# Patient Record
Sex: FEMALE | Race: WHITE | Employment: OTHER | URBAN - METROPOLITAN AREA
[De-identification: names, ages, dates, MRNs, and addresses within clinical notes are randomized per-mention and may not be internally consistent; named-entity substitution may affect disease eponyms.]

---

## 2017-01-03 RX ORDER — CLONAZEPAM 0.5 MG/1
TABLET ORAL
Qty: 190 TABLET | Refills: 0 | Status: SHIPPED | OUTPATIENT
Start: 2017-01-03 | End: 2017-02-12 | Stop reason: SDUPTHER

## 2017-01-09 ENCOUNTER — OFFICE VISIT (OUTPATIENT)
Dept: PULMONOLOGY | Facility: CLINIC | Age: 22
End: 2017-01-09

## 2017-01-09 VITALS
HEART RATE: 89 BPM | HEIGHT: 55 IN | WEIGHT: 85 LBS | OXYGEN SATURATION: 97 % | RESPIRATION RATE: 14 BRPM | BODY MASS INDEX: 19.67 KG/M2 | DIASTOLIC BLOOD PRESSURE: 71 MMHG | SYSTOLIC BLOOD PRESSURE: 115 MMHG

## 2017-01-09 DIAGNOSIS — J98.4 RESTRICTIVE LUNG MECHANICS DUE TO NEUROMUSCULAR DISEASE (HCC): ICD-10-CM

## 2017-01-09 DIAGNOSIS — F84.2 RETT SYNDROME: ICD-10-CM

## 2017-01-09 DIAGNOSIS — J96.11 CHRONIC RESPIRATORY FAILURE WITH HYPOXIA AND HYPERCAPNIA (HCC): Primary | ICD-10-CM

## 2017-01-09 DIAGNOSIS — J96.12 CHRONIC RESPIRATORY FAILURE WITH HYPOXIA AND HYPERCAPNIA (HCC): Primary | ICD-10-CM

## 2017-01-09 DIAGNOSIS — G47.33 OSA (OBSTRUCTIVE SLEEP APNEA): ICD-10-CM

## 2017-01-09 DIAGNOSIS — G70.9 RESTRICTIVE LUNG MECHANICS DUE TO NEUROMUSCULAR DISEASE (HCC): ICD-10-CM

## 2017-01-09 DIAGNOSIS — G47.31 CENTRAL SLEEP APNEA: ICD-10-CM

## 2017-01-09 PROCEDURE — 99203 OFFICE O/P NEW LOW 30 MIN: CPT | Performed by: INTERNAL MEDICINE

## 2017-01-09 RX ORDER — PREDNISONE 10 MG/1
20 TABLET ORAL DAILY
Qty: 10 TABLET | Refills: 0 | Status: SHIPPED | OUTPATIENT
Start: 2017-01-09 | End: 2017-01-14

## 2017-01-09 RX ORDER — PROBENECID 500 MG/1
500 TABLET, FILM COATED ORAL 2 TIMES DAILY
Status: ON HOLD | COMMUNITY
End: 2017-03-03 | Stop reason: HOSPADM

## 2017-01-09 RX ORDER — ALBUTEROL SULFATE 2.5 MG/3ML
2.5 SOLUTION RESPIRATORY (INHALATION) 3 TIMES DAILY PRN
Qty: 120 EACH | Refills: 6 | Status: ON HOLD | OUTPATIENT
Start: 2017-01-09 | End: 2017-03-03 | Stop reason: HOSPADM

## 2017-01-09 RX ORDER — LEVOFLOXACIN 250 MG/1
250 TABLET ORAL DAILY
Qty: 7 TABLET | Refills: 0 | Status: SHIPPED | OUTPATIENT
Start: 2017-01-09 | End: 2017-01-16

## 2017-01-09 RX ORDER — POLYETHYLENE GLYCOL 3350 17 G/17G
17 POWDER, FOR SOLUTION ORAL DAILY
COMMUNITY
End: 2017-04-13 | Stop reason: SDUPTHER

## 2017-01-10 ENCOUNTER — TELEPHONE (OUTPATIENT)
Dept: FAMILY MEDICINE CLINIC | Facility: CLINIC | Age: 22
End: 2017-01-10

## 2017-01-13 RX ORDER — NUTRITIONAL SUPPLEMENT 0.04G-1/ML
1 LIQUID (ML) ORAL 3 TIMES DAILY
Qty: 22320 ML | Refills: 5 | Status: SHIPPED | OUTPATIENT
Start: 2017-01-13 | End: 2019-11-06

## 2017-01-13 RX ORDER — NUTRITIONAL SUPPLEMENT 0.04G-1/ML
1 LIQUID (ML) ORAL 3 TIMES DAILY
Qty: 22320 ML | Refills: 5 | Status: SHIPPED | OUTPATIENT
Start: 2017-01-13 | End: 2017-01-13 | Stop reason: SDUPTHER

## 2017-02-07 RX ORDER — LEVETIRACETAM 100 MG/ML
SOLUTION ORAL
Qty: 2160 ML | Refills: 3 | Status: SHIPPED | OUTPATIENT
Start: 2017-02-07 | End: 2017-12-27 | Stop reason: SDUPTHER

## 2017-02-13 ENCOUNTER — HOSPITAL ENCOUNTER (OUTPATIENT)
Age: 22
Setting detail: SPECIMEN
Discharge: HOME | End: 2017-02-13
Payer: COMMERCIAL

## 2017-02-13 ENCOUNTER — OFFICE VISIT (OUTPATIENT)
Dept: FAMILY MEDICINE CLINIC | Facility: CLINIC | Age: 22
End: 2017-02-13

## 2017-02-13 VITALS
WEIGHT: 97.5 LBS | HEART RATE: 80 BPM | RESPIRATION RATE: 16 BRPM | SYSTOLIC BLOOD PRESSURE: 104 MMHG | HEIGHT: 55 IN | TEMPERATURE: 97.6 F | DIASTOLIC BLOOD PRESSURE: 70 MMHG | BODY MASS INDEX: 22.57 KG/M2

## 2017-02-13 DIAGNOSIS — G70.9 RESTRICTIVE LUNG MECHANICS DUE TO NEUROMUSCULAR DISEASE (HCC): ICD-10-CM

## 2017-02-13 DIAGNOSIS — R39.89 ABNORMAL URINE COLOR: ICD-10-CM

## 2017-02-13 DIAGNOSIS — Z23 NEED FOR INFLUENZA VACCINATION: ICD-10-CM

## 2017-02-13 DIAGNOSIS — G47.33 OSA (OBSTRUCTIVE SLEEP APNEA): ICD-10-CM

## 2017-02-13 DIAGNOSIS — G47.36: ICD-10-CM

## 2017-02-13 DIAGNOSIS — G24.9 DYSTONIA: ICD-10-CM

## 2017-02-13 DIAGNOSIS — R25.1 TREMORS OF NERVOUS SYSTEM: ICD-10-CM

## 2017-02-13 DIAGNOSIS — G70.9: ICD-10-CM

## 2017-02-13 DIAGNOSIS — R82.90 ABNORMAL URINE ODOR: ICD-10-CM

## 2017-02-13 DIAGNOSIS — K21.9 GASTROESOPHAGEAL REFLUX DISEASE, ESOPHAGITIS PRESENCE NOT SPECIFIED: ICD-10-CM

## 2017-02-13 DIAGNOSIS — J98.4 RESTRICTIVE LUNG MECHANICS DUE TO NEUROMUSCULAR DISEASE (HCC): ICD-10-CM

## 2017-02-13 DIAGNOSIS — G40.909 SEIZURE DISORDER (HCC): ICD-10-CM

## 2017-02-13 DIAGNOSIS — F84.2 RETT'S SYNDROME: Primary | ICD-10-CM

## 2017-02-13 DIAGNOSIS — Z23 NEED FOR 23-POLYVALENT PNEUMOCOCCAL POLYSACCHARIDE VACCINE: ICD-10-CM

## 2017-02-13 DIAGNOSIS — G47.31 CENTRAL SLEEP APNEA: ICD-10-CM

## 2017-02-13 LAB
BILIRUBIN, URINE: NEGATIVE
BLOOD, URINE: NEGATIVE
CLARITY: CLEAR
COLOR: YELLOW
GLUCOSE URINE: NEGATIVE
KETONES, URINE: NEGATIVE
LEUKOCYTE ESTERASE, URINE: NEGATIVE
NITRITE, URINE: NEGATIVE
PH UA: 7 (ref 4.5–8)
PROTEIN UA: NEGATIVE
SPECIFIC GRAVITY, URINE: 1.02
UROBILINOGEN, URINE: NORMAL

## 2017-02-13 PROCEDURE — 90472 IMMUNIZATION ADMIN EACH ADD: CPT | Performed by: PEDIATRICS

## 2017-02-13 PROCEDURE — 99214 OFFICE O/P EST MOD 30 MIN: CPT | Performed by: PEDIATRICS

## 2017-02-13 PROCEDURE — 87086 URINE CULTURE/COLONY COUNT: CPT

## 2017-02-13 PROCEDURE — 90471 IMMUNIZATION ADMIN: CPT | Performed by: PEDIATRICS

## 2017-02-13 PROCEDURE — 90688 IIV4 VACCINE SPLT 0.5 ML IM: CPT | Performed by: PEDIATRICS

## 2017-02-13 PROCEDURE — 90732 PPSV23 VACC 2 YRS+ SUBQ/IM: CPT | Performed by: PEDIATRICS

## 2017-02-13 RX ORDER — CLONAZEPAM 0.5 MG/1
0.75 TABLET ORAL 3 TIMES DAILY PRN
Qty: 190 TABLET | Refills: 0 | Status: SHIPPED | OUTPATIENT
Start: 2017-02-13 | End: 2017-03-24 | Stop reason: SDUPTHER

## 2017-02-13 RX ORDER — SACCHAROMYCES BOULARDII 250 MG
250 CAPSULE ORAL DAILY
COMMUNITY
End: 2018-02-09 | Stop reason: SDUPTHER

## 2017-02-13 ASSESSMENT — ENCOUNTER SYMPTOMS
EYE REDNESS: 0
COUGH: 0
CONSTIPATION: 0
EYE DISCHARGE: 0
PHOTOPHOBIA: 0
RHINORRHEA: 0
COLOR CHANGE: 0
ABDOMINAL PAIN: 0
DIARRHEA: 0
SHORTNESS OF BREATH: 0
TROUBLE SWALLOWING: 0
EYE PAIN: 0
STRIDOR: 0
VOMITING: 0
WHEEZING: 0

## 2017-02-14 PROBLEM — G70.9 SLEEP-RELATED HYPOVENTILATION DUE TO NEUROMUSCULAR DISORDER (HCC): Status: ACTIVE | Noted: 2017-02-14

## 2017-02-14 PROBLEM — G47.36 SLEEP-RELATED HYPOVENTILATION DUE TO NEUROMUSCULAR DISORDER (HCC): Status: ACTIVE | Noted: 2017-02-14

## 2017-02-14 RX ORDER — TRAMADOL HYDROCHLORIDE 50 MG/1
50 TABLET ORAL EVERY 8 HOURS PRN
Qty: 30 TABLET | Refills: 5 | Status: SHIPPED | OUTPATIENT
Start: 2017-02-14 | End: 2017-10-03 | Stop reason: SDUPTHER

## 2017-02-14 ASSESSMENT — ENCOUNTER SYMPTOMS: APNEA: 1

## 2017-02-15 LAB
CULTURE: NO GROWTH
CULTURE: NORMAL
Lab: NORMAL
SPECIMEN DESCRIPTION: NORMAL
STATUS: NORMAL

## 2017-02-27 ENCOUNTER — HOSPITAL ENCOUNTER (INPATIENT)
Age: 22
LOS: 4 days | Discharge: HOME HEALTH | DRG: 193 | End: 2017-03-03
Attending: EMERGENCY MEDICINE | Admitting: INTERNAL MEDICINE
Payer: COMMERCIAL

## 2017-02-27 ENCOUNTER — APPOINTMENT (OUTPATIENT)
Dept: GENERAL RADIOLOGY | Age: 22
DRG: 193 | End: 2017-02-27
Payer: COMMERCIAL

## 2017-02-27 DIAGNOSIS — L08.9 SKIN INFECTION AT GASTROSTOMY TUBE SITE (HCC): ICD-10-CM

## 2017-02-27 DIAGNOSIS — L03.114 CELLULITIS OF LEFT ELBOW: Primary | ICD-10-CM

## 2017-02-27 DIAGNOSIS — J18.9 PNEUMONIA OF LEFT LOWER LOBE DUE TO INFECTIOUS ORGANISM: ICD-10-CM

## 2017-02-27 DIAGNOSIS — K94.22 SKIN INFECTION AT GASTROSTOMY TUBE SITE (HCC): ICD-10-CM

## 2017-02-27 LAB
ABSOLUTE EOS #: 0.2 K/UL (ref 0–0.4)
ABSOLUTE LYMPH #: 2 K/UL (ref 1.2–5.2)
ABSOLUTE MONO #: 0.4 K/UL (ref 0.1–1.4)
ALBUMIN SERPL-MCNC: 4.7 G/DL (ref 3.5–5.2)
ALBUMIN/GLOBULIN RATIO: 1.3 (ref 1–2.5)
ALP BLD-CCNC: 94 U/L (ref 35–104)
ALT SERPL-CCNC: 15 U/L (ref 5–33)
ANION GAP SERPL CALCULATED.3IONS-SCNC: 8 MMOL/L (ref 9–17)
AST SERPL-CCNC: 16 U/L
BASOPHILS # BLD: 1 % (ref 0–2)
BASOPHILS ABSOLUTE: 0 K/UL (ref 0–0.2)
BILIRUB SERPL-MCNC: 0.17 MG/DL (ref 0.3–1.2)
BUN BLDV-MCNC: 13 MG/DL (ref 6–20)
BUN/CREAT BLD: ABNORMAL (ref 9–20)
C-REACTIVE PROTEIN: 10.7 MG/L (ref 0–5)
CALCIUM SERPL-MCNC: 10 MG/DL (ref 8.6–10.4)
CHLORIDE BLD-SCNC: 94 MMOL/L (ref 98–107)
CO2: 34 MMOL/L (ref 20–31)
CREAT SERPL-MCNC: <0.2 MG/DL (ref 0.5–0.9)
DIFFERENTIAL TYPE: NORMAL
EOSINOPHILS RELATIVE PERCENT: 4 % (ref 1–4)
FIO2: ABNORMAL
GFR AFRICAN AMERICAN: ABNORMAL ML/MIN
GFR NON-AFRICAN AMERICAN: ABNORMAL ML/MIN
GFR SERPL CREATININE-BSD FRML MDRD: ABNORMAL ML/MIN/{1.73_M2}
GFR SERPL CREATININE-BSD FRML MDRD: ABNORMAL ML/MIN/{1.73_M2}
GLUCOSE BLD-MCNC: 90 MG/DL (ref 70–99)
HCO3 VENOUS: 41.2 MMOL/L (ref 24–30)
HCT VFR BLD CALC: 42.3 % (ref 36–46)
HEMOGLOBIN: 14.2 G/DL (ref 12–16)
KEPPRA: 27 UG/ML
LYMPHOCYTES # BLD: 36 % (ref 25–45)
MCH RBC QN AUTO: 29.4 PG (ref 26–34)
MCHC RBC AUTO-ENTMCNC: 33.6 G/DL (ref 31–37)
MCV RBC AUTO: 87.5 FL (ref 80–100)
MONOCYTES # BLD: 8 % (ref 2–8)
NEGATIVE BASE EXCESS, VEN: ABNORMAL (ref 0–2)
O2 DEVICE/FLOW/%: ABNORMAL
O2 SAT, VEN: 81 %
PATIENT TEMP: ABNORMAL
PCO2, VEN: 69 MM HG (ref 39–55)
PDW BLD-RTO: 14 % (ref 12.5–15.4)
PH VENOUS: 7.38 (ref 7.32–7.42)
PLATELET # BLD: 280 K/UL (ref 140–450)
PLATELET ESTIMATE: NORMAL
PMV BLD AUTO: 8.4 FL (ref 6–12)
PO2, VEN: 48 MM HG (ref 30–50)
POC LACTIC ACID, VEN: 0.46 MMOL/L (ref 0.9–1.7)
POC PCO2 TEMP: ABNORMAL MM HG
POC PH TEMP: ABNORMAL
POC PO2 TEMP: ABNORMAL MM HG
POSITIVE BASE EXCESS, VEN: 16 (ref 0–2)
POTASSIUM SERPL-SCNC: 4.5 MMOL/L (ref 3.7–5.3)
RBC # BLD: 4.83 M/UL (ref 4–5.2)
RBC # BLD: NORMAL 10*6/UL
SEDIMENTATION RATE, ERYTHROCYTE: 17 MM (ref 0–20)
SEG NEUTROPHILS: 51 % (ref 34–64)
SEGMENTED NEUTROPHILS ABSOLUTE COUNT: 3 K/UL (ref 1.8–8)
SODIUM BLD-SCNC: 136 MMOL/L (ref 135–144)
TOTAL CO2, VENOUS: 43 MM HG (ref 25–31)
TOTAL PROTEIN: 8.3 G/DL (ref 6.4–8.3)
WBC # BLD: 5.7 K/UL (ref 4.5–13.5)
WBC # BLD: NORMAL 10*3/UL

## 2017-02-27 PROCEDURE — P9612 CATHETERIZE FOR URINE SPEC: HCPCS

## 2017-02-27 PROCEDURE — 6360000002 HC RX W HCPCS: Performed by: EMERGENCY MEDICINE

## 2017-02-27 PROCEDURE — 87040 BLOOD CULTURE FOR BACTERIA: CPT

## 2017-02-27 PROCEDURE — 85651 RBC SED RATE NONAUTOMATED: CPT

## 2017-02-27 PROCEDURE — 2000000000 HC ICU R&B

## 2017-02-27 PROCEDURE — 96365 THER/PROPH/DIAG IV INF INIT: CPT

## 2017-02-27 PROCEDURE — 96375 TX/PRO/DX INJ NEW DRUG ADDON: CPT

## 2017-02-27 PROCEDURE — 86738 MYCOPLASMA ANTIBODY: CPT

## 2017-02-27 PROCEDURE — 80053 COMPREHEN METABOLIC PANEL: CPT

## 2017-02-27 PROCEDURE — 85025 COMPLETE CBC W/AUTO DIFF WBC: CPT

## 2017-02-27 PROCEDURE — 83605 ASSAY OF LACTIC ACID: CPT

## 2017-02-27 PROCEDURE — 80177 DRUG SCRN QUAN LEVETIRACETAM: CPT

## 2017-02-27 PROCEDURE — 96361 HYDRATE IV INFUSION ADD-ON: CPT

## 2017-02-27 PROCEDURE — 86140 C-REACTIVE PROTEIN: CPT

## 2017-02-27 PROCEDURE — 73080 X-RAY EXAM OF ELBOW: CPT

## 2017-02-27 PROCEDURE — 99284 EMERGENCY DEPT VISIT MOD MDM: CPT

## 2017-02-27 PROCEDURE — 82803 BLOOD GASES ANY COMBINATION: CPT

## 2017-02-27 PROCEDURE — 2580000003 HC RX 258: Performed by: EMERGENCY MEDICINE

## 2017-02-27 PROCEDURE — 71010 XR CHEST LIMITED: CPT

## 2017-02-27 RX ORDER — 0.9 % SODIUM CHLORIDE 0.9 %
10 INTRAVENOUS SOLUTION INTRAVENOUS ONCE
Status: COMPLETED | OUTPATIENT
Start: 2017-02-27 | End: 2017-02-28

## 2017-02-27 RX ADMIN — VANCOMYCIN HYDROCHLORIDE 750 MG: 100 INJECTION, POWDER, LYOPHILIZED, FOR SOLUTION INTRAVENOUS at 23:21

## 2017-02-27 RX ADMIN — SODIUM CHLORIDE 417 ML: 9 INJECTION, SOLUTION INTRAVENOUS at 22:24

## 2017-02-27 RX ADMIN — CEFTRIAXONE SODIUM 1000 MG: 1 INJECTION, POWDER, FOR SOLUTION INTRAMUSCULAR; INTRAVENOUS at 22:25

## 2017-02-27 ASSESSMENT — ENCOUNTER SYMPTOMS
COUGH: 0
VOMITING: 0
BLOOD IN STOOL: 0
NAUSEA: 0
ABDOMINAL PAIN: 0
DIARRHEA: 0

## 2017-02-28 ENCOUNTER — TELEPHONE (OUTPATIENT)
Dept: PULMONOLOGY | Facility: CLINIC | Age: 22
End: 2017-02-28

## 2017-02-28 DIAGNOSIS — J96.12 CHRONIC RESPIRATORY FAILURE WITH HYPERCAPNIA (HCC): Primary | ICD-10-CM

## 2017-02-28 PROBLEM — G70.9 SLEEP-RELATED HYPOVENTILATION DUE TO NEUROMUSCULAR DISORDER (HCC): Chronic | Status: ACTIVE | Noted: 2017-02-14

## 2017-02-28 PROBLEM — G47.31 CENTRAL SLEEP APNEA: Chronic | Status: ACTIVE | Noted: 2017-01-09

## 2017-02-28 PROBLEM — G70.9 RESTRICTIVE LUNG MECHANICS DUE TO NEUROMUSCULAR DISEASE (HCC): Chronic | Status: ACTIVE | Noted: 2017-01-09

## 2017-02-28 PROBLEM — G47.36 SLEEP-RELATED HYPOVENTILATION DUE TO NEUROMUSCULAR DISORDER (HCC): Chronic | Status: ACTIVE | Noted: 2017-02-14

## 2017-02-28 PROBLEM — J96.02 ACUTE RESPIRATORY FAILURE WITH HYPERCAPNIA (HCC): Status: ACTIVE | Noted: 2017-02-28

## 2017-02-28 PROBLEM — J98.4 RESTRICTIVE LUNG MECHANICS DUE TO NEUROMUSCULAR DISEASE (HCC): Chronic | Status: ACTIVE | Noted: 2017-01-09

## 2017-02-28 PROBLEM — J18.9 PNEUMONIA OF LEFT LOWER LOBE DUE TO INFECTIOUS ORGANISM: Status: ACTIVE | Noted: 2017-02-28

## 2017-02-28 LAB
ABSOLUTE EOS #: 0.1 K/UL (ref 0–0.4)
ABSOLUTE LYMPH #: 1.4 K/UL (ref 1.2–5.2)
ABSOLUTE MONO #: 0.7 K/UL (ref 0.1–1.4)
ANION GAP SERPL CALCULATED.3IONS-SCNC: 12 MMOL/L (ref 9–17)
BASOPHILS # BLD: 0 % (ref 0–2)
BASOPHILS ABSOLUTE: 0 K/UL (ref 0–0.2)
BUN BLDV-MCNC: 12 MG/DL (ref 6–20)
BUN/CREAT BLD: ABNORMAL (ref 9–20)
CALCIUM SERPL-MCNC: 8.8 MG/DL (ref 8.6–10.4)
CHLORIDE BLD-SCNC: 102 MMOL/L (ref 98–107)
CO2: 28 MMOL/L (ref 20–31)
CREAT SERPL-MCNC: <0.2 MG/DL (ref 0.5–0.9)
DIFFERENTIAL TYPE: ABNORMAL
DIRECT EXAM: NORMAL
EOSINOPHILS RELATIVE PERCENT: 1 % (ref 1–4)
GFR AFRICAN AMERICAN: ABNORMAL ML/MIN
GFR NON-AFRICAN AMERICAN: ABNORMAL ML/MIN
GFR SERPL CREATININE-BSD FRML MDRD: ABNORMAL ML/MIN/{1.73_M2}
GFR SERPL CREATININE-BSD FRML MDRD: ABNORMAL ML/MIN/{1.73_M2}
GLUCOSE BLD-MCNC: 84 MG/DL (ref 70–99)
HCT VFR BLD CALC: 38 % (ref 36–46)
HEMOGLOBIN: 12.8 G/DL (ref 12–16)
LYMPHOCYTES # BLD: 19 % (ref 25–45)
Lab: NORMAL
MAGNESIUM: 2.1 MG/DL (ref 1.6–2.6)
MCH RBC QN AUTO: 29.2 PG (ref 26–34)
MCHC RBC AUTO-ENTMCNC: 33.6 G/DL (ref 31–37)
MCV RBC AUTO: 87 FL (ref 80–100)
MONOCYTES # BLD: 9 % (ref 2–8)
PDW BLD-RTO: 14 % (ref 12.5–15.4)
PLATELET # BLD: 254 K/UL (ref 140–450)
PLATELET ESTIMATE: ABNORMAL
PMV BLD AUTO: 8.2 FL (ref 6–12)
POTASSIUM SERPL-SCNC: 4.2 MMOL/L (ref 3.7–5.3)
RBC # BLD: 4.36 M/UL (ref 4–5.2)
RBC # BLD: ABNORMAL 10*6/UL
SEG NEUTROPHILS: 71 % (ref 34–64)
SEGMENTED NEUTROPHILS ABSOLUTE COUNT: 5.1 K/UL (ref 1.8–8)
SODIUM BLD-SCNC: 142 MMOL/L (ref 135–144)
SPECIMEN DESCRIPTION: NORMAL
STATUS: NORMAL
WBC # BLD: 7.3 K/UL (ref 4.5–13.5)
WBC # BLD: ABNORMAL 10*3/UL

## 2017-02-28 PROCEDURE — 97162 PT EVAL MOD COMPLEX 30 MIN: CPT

## 2017-02-28 PROCEDURE — 2580000003 HC RX 258: Performed by: EMERGENCY MEDICINE

## 2017-02-28 PROCEDURE — 2580000003 HC RX 258: Performed by: INTERNAL MEDICINE

## 2017-02-28 PROCEDURE — 36415 COLL VENOUS BLD VENIPUNCTURE: CPT

## 2017-02-28 PROCEDURE — 2000000000 HC ICU R&B

## 2017-02-28 PROCEDURE — 6370000000 HC RX 637 (ALT 250 FOR IP): Performed by: INTERNAL MEDICINE

## 2017-02-28 PROCEDURE — 94762 N-INVAS EAR/PLS OXIMTRY CONT: CPT

## 2017-02-28 PROCEDURE — 87804 INFLUENZA ASSAY W/OPTIC: CPT

## 2017-02-28 PROCEDURE — 2580000003 HC RX 258: Performed by: FAMILY MEDICINE

## 2017-02-28 PROCEDURE — 97110 THERAPEUTIC EXERCISES: CPT

## 2017-02-28 PROCEDURE — G8979 MOBILITY GOAL STATUS: HCPCS

## 2017-02-28 PROCEDURE — G0463 HOSPITAL OUTPT CLINIC VISIT: HCPCS

## 2017-02-28 PROCEDURE — 99223 1ST HOSP IP/OBS HIGH 75: CPT | Performed by: INTERNAL MEDICINE

## 2017-02-28 PROCEDURE — 6370000000 HC RX 637 (ALT 250 FOR IP): Performed by: EMERGENCY MEDICINE

## 2017-02-28 PROCEDURE — 6360000002 HC RX W HCPCS: Performed by: FAMILY MEDICINE

## 2017-02-28 PROCEDURE — 6360000002 HC RX W HCPCS: Performed by: INTERNAL MEDICINE

## 2017-02-28 PROCEDURE — 85025 COMPLETE CBC W/AUTO DIFF WBC: CPT

## 2017-02-28 PROCEDURE — G8978 MOBILITY CURRENT STATUS: HCPCS

## 2017-02-28 PROCEDURE — 83735 ASSAY OF MAGNESIUM: CPT

## 2017-02-28 PROCEDURE — 94660 CPAP INITIATION&MGMT: CPT

## 2017-02-28 PROCEDURE — 99233 SBSQ HOSP IP/OBS HIGH 50: CPT | Performed by: INTERNAL MEDICINE

## 2017-02-28 PROCEDURE — 80048 BASIC METABOLIC PNL TOTAL CA: CPT

## 2017-02-28 RX ORDER — SODIUM CHLORIDE 0.9 % (FLUSH) 0.9 %
10 SYRINGE (ML) INJECTION PRN
Status: DISCONTINUED | OUTPATIENT
Start: 2017-02-28 | End: 2017-03-03 | Stop reason: HOSPADM

## 2017-02-28 RX ORDER — DIAZEPAM 10 MG/2ML
10 GEL RECTAL
Status: ACTIVE | OUTPATIENT
Start: 2017-02-28 | End: 2017-02-28

## 2017-02-28 RX ORDER — SODIUM CHLORIDE 9 MG/ML
INJECTION, SOLUTION INTRAVENOUS CONTINUOUS
Status: DISCONTINUED | OUTPATIENT
Start: 2017-02-28 | End: 2017-03-01

## 2017-02-28 RX ORDER — POLYETHYLENE GLYCOL 3350 17 G/17G
17 POWDER, FOR SOLUTION ORAL DAILY
Status: DISCONTINUED | OUTPATIENT
Start: 2017-03-01 | End: 2017-03-03 | Stop reason: HOSPADM

## 2017-02-28 RX ORDER — DOCUSATE SODIUM 100 MG/1
100 CAPSULE, LIQUID FILLED ORAL 2 TIMES DAILY PRN
Status: DISCONTINUED | OUTPATIENT
Start: 2017-02-28 | End: 2017-03-03 | Stop reason: HOSPADM

## 2017-02-28 RX ORDER — SODIUM CHLORIDE 0.9 % (FLUSH) 0.9 %
10 SYRINGE (ML) INJECTION EVERY 12 HOURS SCHEDULED
Status: DISCONTINUED | OUTPATIENT
Start: 2017-02-28 | End: 2017-03-03 | Stop reason: HOSPADM

## 2017-02-28 RX ORDER — LACTOBACILLUS RHAMNOSUS GG 10B CELL
1 CAPSULE ORAL DAILY
Status: DISCONTINUED | OUTPATIENT
Start: 2017-03-01 | End: 2017-03-03 | Stop reason: HOSPADM

## 2017-02-28 RX ORDER — FAMOTIDINE 20 MG/1
20 TABLET, FILM COATED ORAL 2 TIMES DAILY
Status: DISCONTINUED | OUTPATIENT
Start: 2017-02-28 | End: 2017-03-03 | Stop reason: HOSPADM

## 2017-02-28 RX ORDER — CLONAZEPAM 0.5 MG/1
0.75 TABLET ORAL 3 TIMES DAILY PRN
Status: DISCONTINUED | OUTPATIENT
Start: 2017-02-28 | End: 2017-03-03 | Stop reason: HOSPADM

## 2017-02-28 RX ORDER — TRAZODONE HYDROCHLORIDE 50 MG/1
50 TABLET ORAL NIGHTLY
Status: DISCONTINUED | OUTPATIENT
Start: 2017-02-28 | End: 2017-03-03 | Stop reason: HOSPADM

## 2017-02-28 RX ORDER — ACETAMINOPHEN 325 MG/1
650 TABLET ORAL EVERY 4 HOURS PRN
Status: DISCONTINUED | OUTPATIENT
Start: 2017-02-28 | End: 2017-03-03 | Stop reason: HOSPADM

## 2017-02-28 RX ORDER — MORPHINE SULFATE 2 MG/ML
2 INJECTION, SOLUTION INTRAMUSCULAR; INTRAVENOUS
Status: DISCONTINUED | OUTPATIENT
Start: 2017-02-28 | End: 2017-03-03 | Stop reason: HOSPADM

## 2017-02-28 RX ORDER — MIDAZOLAM HYDROCHLORIDE 1 MG/ML
0.25 INJECTION INTRAMUSCULAR; INTRAVENOUS ONCE
Status: DISCONTINUED | OUTPATIENT
Start: 2017-02-28 | End: 2017-03-01

## 2017-02-28 RX ORDER — POTASSIUM CHLORIDE 7.45 MG/ML
10 INJECTION INTRAVENOUS PRN
Status: DISCONTINUED | OUTPATIENT
Start: 2017-02-28 | End: 2017-03-03 | Stop reason: HOSPADM

## 2017-02-28 RX ORDER — NYSTATIN 100000 U/G
CREAM TOPICAL 2 TIMES DAILY
Status: DISCONTINUED | OUTPATIENT
Start: 2017-02-28 | End: 2017-03-03 | Stop reason: HOSPADM

## 2017-02-28 RX ORDER — BISACODYL 10 MG
10 SUPPOSITORY, RECTAL RECTAL DAILY PRN
Status: DISCONTINUED | OUTPATIENT
Start: 2017-02-28 | End: 2017-03-03 | Stop reason: HOSPADM

## 2017-02-28 RX ORDER — POTASSIUM CHLORIDE 20MEQ/15ML
40 LIQUID (ML) ORAL PRN
Status: DISCONTINUED | OUTPATIENT
Start: 2017-02-28 | End: 2017-03-03 | Stop reason: HOSPADM

## 2017-02-28 RX ORDER — BACLOFEN 2000 UG/ML
975 INJECTION, SOLUTION INTRATHECAL ONCE
Status: DISCONTINUED | OUTPATIENT
Start: 2017-02-28 | End: 2017-03-01

## 2017-02-28 RX ORDER — LEVETIRACETAM 100 MG/ML
1400 SOLUTION ORAL 2 TIMES DAILY
Status: DISCONTINUED | OUTPATIENT
Start: 2017-02-28 | End: 2017-03-03 | Stop reason: HOSPADM

## 2017-02-28 RX ORDER — POTASSIUM CHLORIDE 20 MEQ/1
40 TABLET, EXTENDED RELEASE ORAL PRN
Status: DISCONTINUED | OUTPATIENT
Start: 2017-02-28 | End: 2017-03-03 | Stop reason: HOSPADM

## 2017-02-28 RX ORDER — ONDANSETRON 2 MG/ML
4 INJECTION INTRAMUSCULAR; INTRAVENOUS EVERY 6 HOURS PRN
Status: DISCONTINUED | OUTPATIENT
Start: 2017-02-28 | End: 2017-03-03 | Stop reason: HOSPADM

## 2017-02-28 RX ADMIN — FAMOTIDINE 20 MG: 20 TABLET, FILM COATED ORAL at 20:37

## 2017-02-28 RX ADMIN — NYSTATIN: 100000 CREAM TOPICAL at 20:37

## 2017-02-28 RX ADMIN — ENOXAPARIN SODIUM 30 MG: 30 INJECTION SUBCUTANEOUS at 08:15

## 2017-02-28 RX ADMIN — SODIUM CHLORIDE: 9 INJECTION, SOLUTION INTRAVENOUS at 18:47

## 2017-02-28 RX ADMIN — VANCOMYCIN HYDROCHLORIDE 750 MG: 1 INJECTION, POWDER, LYOPHILIZED, FOR SOLUTION INTRAVENOUS at 14:00

## 2017-02-28 RX ADMIN — TRAZODONE HYDROCHLORIDE 50 MG: 50 TABLET ORAL at 23:43

## 2017-02-28 RX ADMIN — SODIUM CHLORIDE: 9 INJECTION, SOLUTION INTRAVENOUS at 02:37

## 2017-02-28 RX ADMIN — AZITHROMYCIN 500 MG: 500 INJECTION, POWDER, LYOPHILIZED, FOR SOLUTION INTRAVENOUS at 07:00

## 2017-02-28 RX ADMIN — LEVETIRACETAM 1400 MG: 100 SOLUTION ORAL at 20:37

## 2017-02-28 RX ADMIN — CEFTRIAXONE SODIUM 1 G: 1 INJECTION, POWDER, FOR SOLUTION INTRAMUSCULAR; INTRAVENOUS at 23:00

## 2017-02-28 RX ADMIN — NYSTATIN: 100000 CREAM TOPICAL at 08:15

## 2017-02-28 RX ADMIN — LEVETIRACETAM 1400 MG: 100 SOLUTION ORAL at 08:15

## 2017-02-28 RX ADMIN — MORPHINE SULFATE 2 MG: 2 INJECTION, SOLUTION INTRAMUSCULAR; INTRAVENOUS at 03:14

## 2017-02-28 ASSESSMENT — PAIN SCALES - GENERAL: PAINLEVEL_OUTOF10: 0

## 2017-03-01 LAB
ABSOLUTE EOS #: 0.3 K/UL (ref 0–0.4)
ABSOLUTE LYMPH #: 2.3 K/UL (ref 1.2–5.2)
ABSOLUTE MONO #: 0.7 K/UL (ref 0.1–1.4)
ANION GAP SERPL CALCULATED.3IONS-SCNC: 10 MMOL/L (ref 9–17)
BASOPHILS # BLD: 1 % (ref 0–2)
BASOPHILS ABSOLUTE: 0 K/UL (ref 0–0.2)
BUN BLDV-MCNC: 7 MG/DL (ref 6–20)
BUN/CREAT BLD: ABNORMAL (ref 9–20)
CALCIUM SERPL-MCNC: 9 MG/DL (ref 8.6–10.4)
CAMPYLOBACTER PCR: NORMAL
CHLORIDE BLD-SCNC: 105 MMOL/L (ref 98–107)
CO2: 30 MMOL/L (ref 20–31)
CREAT SERPL-MCNC: <0.2 MG/DL (ref 0.5–0.9)
DIFFERENTIAL TYPE: ABNORMAL
EOSINOPHILS RELATIVE PERCENT: 4 % (ref 1–4)
FIO2: 2
GFR AFRICAN AMERICAN: ABNORMAL ML/MIN
GFR NON-AFRICAN AMERICAN: ABNORMAL ML/MIN
GFR SERPL CREATININE-BSD FRML MDRD: ABNORMAL ML/MIN/{1.73_M2}
GFR SERPL CREATININE-BSD FRML MDRD: ABNORMAL ML/MIN/{1.73_M2}
GLUCOSE BLD-MCNC: 84 MG/DL (ref 70–99)
HCT VFR BLD CALC: 38.6 % (ref 36–46)
HEMOGLOBIN: 12.8 G/DL (ref 12–16)
LYMPHOCYTES # BLD: 37 % (ref 25–45)
MCH RBC QN AUTO: 29.2 PG (ref 26–34)
MCHC RBC AUTO-ENTMCNC: 33.3 G/DL (ref 31–37)
MCV RBC AUTO: 87.7 FL (ref 80–100)
MONOCYTES # BLD: 11 % (ref 2–8)
MYCOPLASMA PNEUMONIAE IGM: 0.26
NEGATIVE BASE EXCESS, ART: ABNORMAL (ref 0–2)
O2 DEVICE/FLOW/%: ABNORMAL
PATIENT TEMP: ABNORMAL
PDW BLD-RTO: 14 % (ref 12.5–15.4)
PLATELET # BLD: 240 K/UL (ref 140–450)
PLATELET ESTIMATE: ABNORMAL
PMV BLD AUTO: 8.2 FL (ref 6–12)
POC HCO3: 35 MMOL/L (ref 22–27)
POC O2 SATURATION: 97 %
POC PCO2 TEMP: ABNORMAL MM HG
POC PCO2: 62 MM HG (ref 32–45)
POC PH TEMP: ABNORMAL
POC PH: 7.36 (ref 7.35–7.45)
POC PO2 TEMP: ABNORMAL MM HG
POC PO2: 102 MM HG (ref 75–95)
POSITIVE BASE EXCESS, ART: 10 (ref 0–2)
POTASSIUM SERPL-SCNC: 3.9 MMOL/L (ref 3.7–5.3)
RBC # BLD: 4.4 M/UL (ref 4–5.2)
RBC # BLD: ABNORMAL 10*6/UL
SALMONELLA PCR: NORMAL
SEG NEUTROPHILS: 47 % (ref 34–64)
SEGMENTED NEUTROPHILS ABSOLUTE COUNT: 2.9 K/UL (ref 1.8–8)
SHIGATOXIN GENE PCR: NORMAL
SHIGELLA SP PCR: NORMAL
SODIUM BLD-SCNC: 145 MMOL/L (ref 135–144)
SPECIMEN: NORMAL
TCO2 (CALC), ART: 37 MM HG (ref 23–28)
VANCOMYCIN TROUGH DATE LAST DOSE: ABNORMAL
VANCOMYCIN TROUGH DOSE AMOUNT: ABNORMAL
VANCOMYCIN TROUGH TIME LAST DOSE: ABNORMAL
VANCOMYCIN TROUGH: 6.5 UG/ML (ref 10–20)
WBC # BLD: 6.1 K/UL (ref 4.5–13.5)
WBC # BLD: ABNORMAL 10*3/UL

## 2017-03-01 PROCEDURE — 6370000000 HC RX 637 (ALT 250 FOR IP): Performed by: EMERGENCY MEDICINE

## 2017-03-01 PROCEDURE — 2580000003 HC RX 258: Performed by: FAMILY MEDICINE

## 2017-03-01 PROCEDURE — 99232 SBSQ HOSP IP/OBS MODERATE 35: CPT | Performed by: INTERNAL MEDICINE

## 2017-03-01 PROCEDURE — 6360000002 HC RX W HCPCS: Performed by: INTERNAL MEDICINE

## 2017-03-01 PROCEDURE — 80048 BASIC METABOLIC PNL TOTAL CA: CPT

## 2017-03-01 PROCEDURE — 97530 THERAPEUTIC ACTIVITIES: CPT

## 2017-03-01 PROCEDURE — 6360000002 HC RX W HCPCS: Performed by: FAMILY MEDICINE

## 2017-03-01 PROCEDURE — 6370000000 HC RX 637 (ALT 250 FOR IP): Performed by: INTERNAL MEDICINE

## 2017-03-01 PROCEDURE — 1200000000 HC SEMI PRIVATE

## 2017-03-01 PROCEDURE — 94762 N-INVAS EAR/PLS OXIMTRY CONT: CPT

## 2017-03-01 PROCEDURE — 36415 COLL VENOUS BLD VENIPUNCTURE: CPT

## 2017-03-01 PROCEDURE — 87505 NFCT AGENT DETECTION GI: CPT

## 2017-03-01 PROCEDURE — 2580000003 HC RX 258: Performed by: EMERGENCY MEDICINE

## 2017-03-01 PROCEDURE — 36600 WITHDRAWAL OF ARTERIAL BLOOD: CPT

## 2017-03-01 PROCEDURE — 82803 BLOOD GASES ANY COMBINATION: CPT

## 2017-03-01 PROCEDURE — 80202 ASSAY OF VANCOMYCIN: CPT

## 2017-03-01 PROCEDURE — 99291 CRITICAL CARE FIRST HOUR: CPT | Performed by: INTERNAL MEDICINE

## 2017-03-01 PROCEDURE — 2580000003 HC RX 258: Performed by: INTERNAL MEDICINE

## 2017-03-01 PROCEDURE — 85025 COMPLETE CBC W/AUTO DIFF WBC: CPT

## 2017-03-01 PROCEDURE — 97110 THERAPEUTIC EXERCISES: CPT

## 2017-03-01 RX ORDER — DIAZEPAM 10 MG/2ML
10 GEL RECTAL
Status: COMPLETED | OUTPATIENT
Start: 2017-03-01 | End: 2017-03-01

## 2017-03-01 RX ORDER — DEXTROSE, SODIUM CHLORIDE, AND POTASSIUM CHLORIDE 5; .45; .15 G/100ML; G/100ML; G/100ML
INJECTION INTRAVENOUS CONTINUOUS
Status: DISCONTINUED | OUTPATIENT
Start: 2017-03-01 | End: 2017-03-03 | Stop reason: HOSPADM

## 2017-03-01 RX ORDER — LORAZEPAM 2 MG/ML
0.5 INJECTION INTRAMUSCULAR EVERY 4 HOURS PRN
Status: DISCONTINUED | OUTPATIENT
Start: 2017-03-01 | End: 2017-03-03 | Stop reason: HOSPADM

## 2017-03-01 RX ADMIN — VANCOMYCIN HYDROCHLORIDE 750 MG: 1 INJECTION, POWDER, LYOPHILIZED, FOR SOLUTION INTRAVENOUS at 02:06

## 2017-03-01 RX ADMIN — FAMOTIDINE 20 MG: 20 TABLET, FILM COATED ORAL at 09:02

## 2017-03-01 RX ADMIN — Medication 2000 UNITS: at 09:03

## 2017-03-01 RX ADMIN — NYSTATIN: 100000 CREAM TOPICAL at 21:02

## 2017-03-01 RX ADMIN — LEVETIRACETAM 1400 MG: 100 SOLUTION ORAL at 09:03

## 2017-03-01 RX ADMIN — Medication 10 ML: at 21:01

## 2017-03-01 RX ADMIN — TRAZODONE HYDROCHLORIDE 50 MG: 50 TABLET ORAL at 21:00

## 2017-03-01 RX ADMIN — FAMOTIDINE 20 MG: 20 TABLET, FILM COATED ORAL at 21:01

## 2017-03-01 RX ADMIN — POTASSIUM CHLORIDE, DEXTROSE MONOHYDRATE AND SODIUM CHLORIDE: 150; 5; 450 INJECTION, SOLUTION INTRAVENOUS at 14:48

## 2017-03-01 RX ADMIN — SODIUM CHLORIDE: 9 INJECTION, SOLUTION INTRAVENOUS at 12:15

## 2017-03-01 RX ADMIN — Medication 1 CAPSULE: at 12:10

## 2017-03-01 RX ADMIN — VANCOMYCIN HYDROCHLORIDE 750 MG: 1 INJECTION, POWDER, LYOPHILIZED, FOR SOLUTION INTRAVENOUS at 10:17

## 2017-03-01 RX ADMIN — LEVETIRACETAM 1400 MG: 100 SOLUTION ORAL at 21:01

## 2017-03-01 RX ADMIN — ENOXAPARIN SODIUM 30 MG: 30 INJECTION SUBCUTANEOUS at 09:02

## 2017-03-01 RX ADMIN — DIAZEPAM 5 MG: 10 GEL RECTAL at 10:05

## 2017-03-01 RX ADMIN — CEFTRIAXONE SODIUM 1 G: 1 INJECTION, POWDER, FOR SOLUTION INTRAMUSCULAR; INTRAVENOUS at 23:43

## 2017-03-01 RX ADMIN — NYSTATIN: 100000 CREAM TOPICAL at 09:04

## 2017-03-01 RX ADMIN — AZITHROMYCIN 500 MG: 500 INJECTION, POWDER, LYOPHILIZED, FOR SOLUTION INTRAVENOUS at 07:15

## 2017-03-02 ENCOUNTER — APPOINTMENT (OUTPATIENT)
Dept: GENERAL RADIOLOGY | Age: 22
DRG: 193 | End: 2017-03-02
Payer: COMMERCIAL

## 2017-03-02 LAB
ABSOLUTE EOS #: 0.1 K/UL (ref 0–0.4)
ABSOLUTE LYMPH #: 1.5 K/UL (ref 1.2–5.2)
ABSOLUTE MONO #: 1.2 K/UL (ref 0.1–1.4)
ANION GAP SERPL CALCULATED.3IONS-SCNC: 13 MMOL/L (ref 9–17)
BASOPHILS # BLD: 1 % (ref 0–2)
BASOPHILS ABSOLUTE: 0 K/UL (ref 0–0.2)
BUN BLDV-MCNC: 8 MG/DL (ref 6–20)
BUN/CREAT BLD: ABNORMAL (ref 9–20)
CALCIUM SERPL-MCNC: 9.5 MG/DL (ref 8.6–10.4)
CHLORIDE BLD-SCNC: 100 MMOL/L (ref 98–107)
CO2: 29 MMOL/L (ref 20–31)
CREAT SERPL-MCNC: <0.2 MG/DL (ref 0.5–0.9)
DIFFERENTIAL TYPE: ABNORMAL
EOSINOPHILS RELATIVE PERCENT: 1 % (ref 1–4)
GFR AFRICAN AMERICAN: ABNORMAL ML/MIN
GFR NON-AFRICAN AMERICAN: ABNORMAL ML/MIN
GFR SERPL CREATININE-BSD FRML MDRD: ABNORMAL ML/MIN/{1.73_M2}
GFR SERPL CREATININE-BSD FRML MDRD: ABNORMAL ML/MIN/{1.73_M2}
GLUCOSE BLD-MCNC: 91 MG/DL (ref 70–99)
HCT VFR BLD CALC: 38.6 % (ref 36–46)
HEMOGLOBIN: 13 G/DL (ref 12–16)
LYMPHOCYTES # BLD: 16 % (ref 25–45)
MCH RBC QN AUTO: 29.4 PG (ref 26–34)
MCHC RBC AUTO-ENTMCNC: 33.6 G/DL (ref 31–37)
MCV RBC AUTO: 87.4 FL (ref 80–100)
MONOCYTES # BLD: 13 % (ref 2–8)
PDW BLD-RTO: 13.9 % (ref 12.5–15.4)
PLATELET # BLD: 242 K/UL (ref 140–450)
PLATELET ESTIMATE: ABNORMAL
PMV BLD AUTO: 8.8 FL (ref 6–12)
POTASSIUM SERPL-SCNC: 3.7 MMOL/L (ref 3.7–5.3)
RBC # BLD: 4.42 M/UL (ref 4–5.2)
RBC # BLD: ABNORMAL 10*6/UL
SEG NEUTROPHILS: 69 % (ref 34–64)
SEGMENTED NEUTROPHILS ABSOLUTE COUNT: 6.6 K/UL (ref 1.8–8)
SODIUM BLD-SCNC: 142 MMOL/L (ref 135–144)
WBC # BLD: 9.4 K/UL (ref 4.5–13.5)
WBC # BLD: ABNORMAL 10*3/UL

## 2017-03-02 PROCEDURE — 1200000000 HC SEMI PRIVATE

## 2017-03-02 PROCEDURE — 71010 XR CHEST PORTABLE: CPT

## 2017-03-02 PROCEDURE — 6360000002 HC RX W HCPCS: Performed by: INTERNAL MEDICINE

## 2017-03-02 PROCEDURE — 85025 COMPLETE CBC W/AUTO DIFF WBC: CPT

## 2017-03-02 PROCEDURE — 80048 BASIC METABOLIC PNL TOTAL CA: CPT

## 2017-03-02 PROCEDURE — 6360000002 HC RX W HCPCS: Performed by: FAMILY MEDICINE

## 2017-03-02 PROCEDURE — 6370000000 HC RX 637 (ALT 250 FOR IP): Performed by: EMERGENCY MEDICINE

## 2017-03-02 PROCEDURE — 6370000000 HC RX 637 (ALT 250 FOR IP): Performed by: INTERNAL MEDICINE

## 2017-03-02 PROCEDURE — 99233 SBSQ HOSP IP/OBS HIGH 50: CPT | Performed by: INTERNAL MEDICINE

## 2017-03-02 PROCEDURE — 2580000003 HC RX 258: Performed by: FAMILY MEDICINE

## 2017-03-02 PROCEDURE — 99232 SBSQ HOSP IP/OBS MODERATE 35: CPT | Performed by: FAMILY MEDICINE

## 2017-03-02 PROCEDURE — 97110 THERAPEUTIC EXERCISES: CPT

## 2017-03-02 PROCEDURE — 2580000003 HC RX 258: Performed by: INTERNAL MEDICINE

## 2017-03-02 PROCEDURE — 36415 COLL VENOUS BLD VENIPUNCTURE: CPT

## 2017-03-02 PROCEDURE — 94762 N-INVAS EAR/PLS OXIMTRY CONT: CPT

## 2017-03-02 RX ADMIN — FAMOTIDINE 20 MG: 20 TABLET, FILM COATED ORAL at 19:57

## 2017-03-02 RX ADMIN — TRAZODONE HYDROCHLORIDE 50 MG: 50 TABLET ORAL at 19:57

## 2017-03-02 RX ADMIN — LEVETIRACETAM 1400 MG: 100 SOLUTION ORAL at 19:57

## 2017-03-02 RX ADMIN — POLYETHYLENE GLYCOL 3350 17 G: 17 POWDER, FOR SOLUTION ORAL at 10:09

## 2017-03-02 RX ADMIN — CEFTRIAXONE SODIUM 1 G: 1 INJECTION, POWDER, FOR SOLUTION INTRAMUSCULAR; INTRAVENOUS at 23:22

## 2017-03-02 RX ADMIN — NYSTATIN: 100000 CREAM TOPICAL at 09:00

## 2017-03-02 RX ADMIN — LEVETIRACETAM 1400 MG: 100 SOLUTION ORAL at 09:00

## 2017-03-02 RX ADMIN — FAMOTIDINE 20 MG: 20 TABLET, FILM COATED ORAL at 09:00

## 2017-03-02 RX ADMIN — ENOXAPARIN SODIUM 30 MG: 30 INJECTION SUBCUTANEOUS at 09:00

## 2017-03-02 RX ADMIN — AZITHROMYCIN 500 MG: 500 INJECTION, POWDER, LYOPHILIZED, FOR SOLUTION INTRAVENOUS at 06:01

## 2017-03-02 RX ADMIN — Medication 1 CAPSULE: at 10:09

## 2017-03-02 RX ADMIN — Medication 2000 UNITS: at 10:09

## 2017-03-03 VITALS
RESPIRATION RATE: 16 BRPM | BODY MASS INDEX: 21.29 KG/M2 | SYSTOLIC BLOOD PRESSURE: 110 MMHG | HEART RATE: 89 BPM | DIASTOLIC BLOOD PRESSURE: 71 MMHG | HEIGHT: 55 IN | OXYGEN SATURATION: 92 % | TEMPERATURE: 98.6 F | WEIGHT: 92 LBS

## 2017-03-03 LAB
ABSOLUTE EOS #: 0.1 K/UL (ref 0–0.4)
ABSOLUTE LYMPH #: 2.3 K/UL (ref 1.2–5.2)
ABSOLUTE MONO #: 0.8 K/UL (ref 0.1–1.4)
ANION GAP SERPL CALCULATED.3IONS-SCNC: 12 MMOL/L (ref 9–17)
BASOPHILS # BLD: 1 % (ref 0–2)
BASOPHILS ABSOLUTE: 0 K/UL (ref 0–0.2)
BUN BLDV-MCNC: 9 MG/DL (ref 6–20)
BUN/CREAT BLD: ABNORMAL (ref 9–20)
C-REACTIVE PROTEIN: 23.7 MG/L (ref 0–5)
CALCIUM SERPL-MCNC: 9.8 MG/DL (ref 8.6–10.4)
CHLORIDE BLD-SCNC: 98 MMOL/L (ref 98–107)
CO2: 31 MMOL/L (ref 20–31)
CREAT SERPL-MCNC: 0.23 MG/DL (ref 0.5–0.9)
DIFFERENTIAL TYPE: ABNORMAL
EOSINOPHILS RELATIVE PERCENT: 2 % (ref 1–4)
FIO2: 21
GFR AFRICAN AMERICAN: >60 ML/MIN
GFR NON-AFRICAN AMERICAN: >60 ML/MIN
GFR SERPL CREATININE-BSD FRML MDRD: ABNORMAL ML/MIN/{1.73_M2}
GFR SERPL CREATININE-BSD FRML MDRD: ABNORMAL ML/MIN/{1.73_M2}
GLUCOSE BLD-MCNC: 120 MG/DL (ref 70–99)
HCT VFR BLD CALC: 38.6 % (ref 36–46)
HEMOGLOBIN: 12.9 G/DL (ref 12–16)
LYMPHOCYTES # BLD: 29 % (ref 25–45)
MCH RBC QN AUTO: 29.5 PG (ref 26–34)
MCHC RBC AUTO-ENTMCNC: 33.4 G/DL (ref 31–37)
MCV RBC AUTO: 88.1 FL (ref 80–100)
MONOCYTES # BLD: 10 % (ref 2–8)
NEGATIVE BASE EXCESS, ART: ABNORMAL (ref 0–2)
O2 DEVICE/FLOW/%: ABNORMAL
PATIENT TEMP: ABNORMAL
PDW BLD-RTO: 13.9 % (ref 12.5–15.4)
PLATELET # BLD: 226 K/UL (ref 140–450)
PLATELET ESTIMATE: ABNORMAL
PMV BLD AUTO: 8.6 FL (ref 6–12)
POC HCO3: 37.1 MMOL/L (ref 22–27)
POC O2 SATURATION: 94 %
POC PCO2 TEMP: ABNORMAL MM HG
POC PCO2: 53 MM HG (ref 32–45)
POC PH TEMP: ABNORMAL
POC PH: 7.46 (ref 7.35–7.45)
POC PO2 TEMP: ABNORMAL MM HG
POC PO2: 69 MM HG (ref 75–95)
POSITIVE BASE EXCESS, ART: 13 (ref 0–2)
POTASSIUM SERPL-SCNC: 4.5 MMOL/L (ref 3.7–5.3)
RBC # BLD: 4.38 M/UL (ref 4–5.2)
RBC # BLD: ABNORMAL 10*6/UL
SEG NEUTROPHILS: 58 % (ref 34–64)
SEGMENTED NEUTROPHILS ABSOLUTE COUNT: 4.6 K/UL (ref 1.8–8)
SODIUM BLD-SCNC: 141 MMOL/L (ref 135–144)
TCO2 (CALC), ART: 39 MM HG (ref 23–28)
WBC # BLD: 7.9 K/UL (ref 4.5–13.5)
WBC # BLD: ABNORMAL 10*3/UL

## 2017-03-03 PROCEDURE — 76937 US GUIDE VASCULAR ACCESS: CPT

## 2017-03-03 PROCEDURE — 97530 THERAPEUTIC ACTIVITIES: CPT

## 2017-03-03 PROCEDURE — 36600 WITHDRAWAL OF ARTERIAL BLOOD: CPT

## 2017-03-03 PROCEDURE — 94762 N-INVAS EAR/PLS OXIMTRY CONT: CPT

## 2017-03-03 PROCEDURE — C1725 CATH, TRANSLUMIN NON-LASER: HCPCS

## 2017-03-03 PROCEDURE — 6360000002 HC RX W HCPCS: Performed by: INTERNAL MEDICINE

## 2017-03-03 PROCEDURE — 97110 THERAPEUTIC EXERCISES: CPT

## 2017-03-03 PROCEDURE — 80048 BASIC METABOLIC PNL TOTAL CA: CPT

## 2017-03-03 PROCEDURE — 99232 SBSQ HOSP IP/OBS MODERATE 35: CPT | Performed by: FAMILY MEDICINE

## 2017-03-03 PROCEDURE — 99232 SBSQ HOSP IP/OBS MODERATE 35: CPT | Performed by: INTERNAL MEDICINE

## 2017-03-03 PROCEDURE — 82803 BLOOD GASES ANY COMBINATION: CPT

## 2017-03-03 PROCEDURE — 36415 COLL VENOUS BLD VENIPUNCTURE: CPT

## 2017-03-03 PROCEDURE — 86140 C-REACTIVE PROTEIN: CPT

## 2017-03-03 PROCEDURE — 6360000002 HC RX W HCPCS: Performed by: FAMILY MEDICINE

## 2017-03-03 PROCEDURE — 85025 COMPLETE CBC W/AUTO DIFF WBC: CPT

## 2017-03-03 PROCEDURE — 2580000003 HC RX 258: Performed by: FAMILY MEDICINE

## 2017-03-03 PROCEDURE — 2580000003 HC RX 258: Performed by: INTERNAL MEDICINE

## 2017-03-03 RX ORDER — ZINC OXIDE 216 MG/ML
1 LOTION TOPICAL 3 TIMES DAILY
Qty: 22320 ML | Refills: 5 | Status: SHIPPED | OUTPATIENT
Start: 2017-03-03 | End: 2017-03-03

## 2017-03-03 RX ORDER — ZINC OXIDE 216 MG/ML
1 LOTION TOPICAL 3 TIMES DAILY
Qty: 22320 ML | Refills: 5 | Status: ON HOLD | OUTPATIENT
Start: 2017-03-03 | End: 2018-01-25 | Stop reason: HOSPADM

## 2017-03-03 RX ADMIN — AZITHROMYCIN 500 MG: 500 INJECTION, POWDER, LYOPHILIZED, FOR SOLUTION INTRAVENOUS at 06:16

## 2017-03-03 RX ADMIN — CEFTRIAXONE SODIUM 1 G: 1 INJECTION, POWDER, FOR SOLUTION INTRAMUSCULAR; INTRAVENOUS at 19:00

## 2017-03-05 LAB
CULTURE: NORMAL
CULTURE: NORMAL
Lab: NORMAL
SPECIMEN DESCRIPTION: NORMAL
STATUS: NORMAL

## 2017-03-07 RX ORDER — POLYETHYLENE GLYCOL 3350 17 G/17G
17 POWDER, FOR SOLUTION ORAL DAILY
Qty: 510 G | Refills: 5 | Status: SHIPPED | OUTPATIENT
Start: 2017-03-07 | End: 2020-03-19 | Stop reason: SDUPTHER

## 2017-03-13 ENCOUNTER — TELEPHONE (OUTPATIENT)
Dept: FAMILY MEDICINE CLINIC | Age: 22
End: 2017-03-13

## 2017-03-17 ENCOUNTER — OFFICE VISIT (OUTPATIENT)
Dept: FAMILY MEDICINE CLINIC | Age: 22
End: 2017-03-17
Payer: COMMERCIAL

## 2017-03-17 VITALS
DIASTOLIC BLOOD PRESSURE: 70 MMHG | SYSTOLIC BLOOD PRESSURE: 98 MMHG | RESPIRATION RATE: 20 BRPM | OXYGEN SATURATION: 95 % | BODY MASS INDEX: 20.6 KG/M2 | TEMPERATURE: 96.9 F | HEART RATE: 60 BPM | WEIGHT: 89 LBS

## 2017-03-17 DIAGNOSIS — R19.7 DIARRHEA, UNSPECIFIED TYPE: ICD-10-CM

## 2017-03-17 DIAGNOSIS — R68.89 BODY TEMPERATURE LOW: ICD-10-CM

## 2017-03-17 DIAGNOSIS — J18.9 PNEUMONIA OF LEFT LOWER LOBE DUE TO INFECTIOUS ORGANISM: Primary | ICD-10-CM

## 2017-03-17 PROCEDURE — 99214 OFFICE O/P EST MOD 30 MIN: CPT | Performed by: PEDIATRICS

## 2017-03-19 ENCOUNTER — HOSPITAL ENCOUNTER (INPATIENT)
Age: 22
LOS: 3 days | Discharge: HOME HEALTH | DRG: 871 | End: 2017-03-23
Attending: EMERGENCY MEDICINE | Admitting: INTERNAL MEDICINE
Payer: COMMERCIAL

## 2017-03-19 ENCOUNTER — APPOINTMENT (OUTPATIENT)
Dept: GENERAL RADIOLOGY | Age: 22
DRG: 871 | End: 2017-03-19
Payer: COMMERCIAL

## 2017-03-19 DIAGNOSIS — A41.9 SEPSIS, DUE TO UNSPECIFIED ORGANISM: ICD-10-CM

## 2017-03-19 DIAGNOSIS — R41.82 ALTERED MENTAL STATUS, UNSPECIFIED ALTERED MENTAL STATUS TYPE: ICD-10-CM

## 2017-03-19 DIAGNOSIS — J18.9 PNEUMONIA DUE TO ORGANISM: ICD-10-CM

## 2017-03-19 DIAGNOSIS — R68.89 BODY TEMPERATURE LOW: Primary | ICD-10-CM

## 2017-03-19 LAB
-: NORMAL
ABSOLUTE EOS #: 0.2 K/UL (ref 0–0.4)
ABSOLUTE LYMPH #: 1.1 K/UL (ref 1.2–5.2)
ABSOLUTE MONO #: 0.3 K/UL (ref 0.1–1.4)
AMORPHOUS: NORMAL
ANION GAP SERPL CALCULATED.3IONS-SCNC: 9 MMOL/L (ref 9–17)
ANION GAP: 10 MMOL/L (ref 8–16)
BACTERIA: NORMAL
BASOPHILS # BLD: 0 % (ref 0–2)
BASOPHILS ABSOLUTE: 0 K/UL (ref 0–0.2)
BILIRUBIN URINE: NEGATIVE
BUN BLDV-MCNC: 16 MG/DL (ref 6–20)
BUN/CREAT BLD: ABNORMAL (ref 9–20)
CALCIUM SERPL-MCNC: 9.5 MG/DL (ref 8.6–10.4)
CASTS UA: NORMAL /LPF (ref 0–8)
CHLORIDE BLD-SCNC: 95 MMOL/L (ref 98–107)
CO2: 35 MMOL/L (ref 20–31)
COLOR: YELLOW
COMMENT UA: ABNORMAL
CREAT SERPL-MCNC: <0.2 MG/DL (ref 0.5–0.9)
CRYSTALS, UA: NORMAL /HPF
DIFFERENTIAL TYPE: ABNORMAL
EOSINOPHILS RELATIVE PERCENT: 3 % (ref 1–4)
EPITHELIAL CELLS UA: NORMAL /HPF (ref 0–5)
GFR AFRICAN AMERICAN: ABNORMAL ML/MIN
GFR NON-AFRICAN AMERICAN: ABNORMAL ML/MIN
GFR SERPL CREATININE-BSD FRML MDRD: ABNORMAL ML/MIN/{1.73_M2}
GFR SERPL CREATININE-BSD FRML MDRD: ABNORMAL ML/MIN/{1.73_M2}
GLUCOSE BLD-MCNC: 85 MG/DL (ref 74–106)
GLUCOSE BLD-MCNC: 95 MG/DL (ref 70–99)
GLUCOSE URINE: NEGATIVE
HCO3 VENOUS: 37.7 MMOL/L (ref 24–30)
HCT VFR BLD CALC: 41.3 % (ref 36–46)
HEMOGLOBIN: 13.9 G/DL (ref 12–16)
KETONES, URINE: NEGATIVE
LACTIC ACID, WHOLE BLOOD: 1 MMOL/L (ref 0.7–2.1)
LACTIC ACID: NORMAL MMOL/L
LEUKOCYTE ESTERASE, URINE: NEGATIVE
LYMPHOCYTES # BLD: 18 % (ref 25–45)
MCH RBC QN AUTO: 29.6 PG (ref 26–34)
MCHC RBC AUTO-ENTMCNC: 33.7 G/DL (ref 31–37)
MCV RBC AUTO: 87.8 FL (ref 80–100)
MONOCYTES # BLD: 5 % (ref 2–8)
MUCUS: NORMAL
NEGATIVE BASE EXCESS, VEN: ABNORMAL (ref 0–2)
NITRITE, URINE: NEGATIVE
OTHER OBSERVATIONS UA: NORMAL
PCO2, VEN: 69 MM HG (ref 39–55)
PDW BLD-RTO: 13.7 % (ref 12.5–15.4)
PH UA: 7 (ref 5–8)
PH VENOUS: 7.35 (ref 7.32–7.42)
PLATELET # BLD: 257 K/UL (ref 140–450)
PLATELET ESTIMATE: ABNORMAL
PMV BLD AUTO: 8.2 FL (ref 6–12)
POC BUN: 19 MG/DL (ref 6–20)
POC CHLORIDE: 97 MMOL/L (ref 98–110)
POC HEMATOCRIT: 42 % (ref 36–46)
POC HEMOGLOBIN: 14.3 GM/DL (ref 12–16)
POC POTASSIUM: 4.4 MMOL/L (ref 3.5–5.1)
POC SODIUM: 140 MMOL/L (ref 136–145)
POSITIVE BASE EXCESS, VEN: 12 (ref 0–2)
POTASSIUM SERPL-SCNC: 4.5 MMOL/L (ref 3.7–5.3)
PROTEIN UA: NEGATIVE
RBC # BLD: 4.7 M/UL (ref 4–5.2)
RBC # BLD: ABNORMAL 10*6/UL
RBC UA: NORMAL /HPF (ref 0–4)
RENAL EPITHELIAL, UA: NORMAL /HPF
SEG NEUTROPHILS: 74 % (ref 34–64)
SEGMENTED NEUTROPHILS ABSOLUTE COUNT: 4.5 K/UL (ref 1.8–8)
SODIUM BLD-SCNC: 139 MMOL/L (ref 135–144)
SPECIFIC GRAVITY UA: 1.01 (ref 1–1.03)
TOTAL CO2, VENOUS: 40 MM HG (ref 25–31)
TRICHOMONAS: NORMAL
TSH SERPL DL<=0.05 MIU/L-ACNC: 1.03 MIU/L (ref 0.3–5)
TURBIDITY: CLEAR
URINE HGB: ABNORMAL
UROBILINOGEN, URINE: NORMAL
WBC # BLD: 6.1 K/UL (ref 4.5–13.5)
WBC # BLD: ABNORMAL 10*3/UL
WBC UA: NORMAL /HPF (ref 0–5)
YEAST: NORMAL

## 2017-03-19 PROCEDURE — 6360000002 HC RX W HCPCS: Performed by: EMERGENCY MEDICINE

## 2017-03-19 PROCEDURE — 83605 ASSAY OF LACTIC ACID: CPT

## 2017-03-19 PROCEDURE — 82435 ASSAY OF BLOOD CHLORIDE: CPT

## 2017-03-19 PROCEDURE — 84132 ASSAY OF SERUM POTASSIUM: CPT

## 2017-03-19 PROCEDURE — 87493 C DIFF AMPLIFIED PROBE: CPT

## 2017-03-19 PROCEDURE — 51703 INSERT BLADDER CATH COMPLEX: CPT

## 2017-03-19 PROCEDURE — 99285 EMERGENCY DEPT VISIT HI MDM: CPT

## 2017-03-19 PROCEDURE — 80048 BASIC METABOLIC PNL TOTAL CA: CPT

## 2017-03-19 PROCEDURE — 84443 ASSAY THYROID STIM HORMONE: CPT

## 2017-03-19 PROCEDURE — 36556 INSERT NON-TUNNEL CV CATH: CPT

## 2017-03-19 PROCEDURE — 2580000003 HC RX 258: Performed by: EMERGENCY MEDICINE

## 2017-03-19 PROCEDURE — 84520 ASSAY OF UREA NITROGEN: CPT

## 2017-03-19 PROCEDURE — 87040 BLOOD CULTURE FOR BACTERIA: CPT

## 2017-03-19 PROCEDURE — 81001 URINALYSIS AUTO W/SCOPE: CPT

## 2017-03-19 PROCEDURE — 85014 HEMATOCRIT: CPT

## 2017-03-19 PROCEDURE — 84295 ASSAY OF SERUM SODIUM: CPT

## 2017-03-19 PROCEDURE — 82803 BLOOD GASES ANY COMBINATION: CPT

## 2017-03-19 PROCEDURE — 85025 COMPLETE CBC W/AUTO DIFF WBC: CPT

## 2017-03-19 PROCEDURE — 82947 ASSAY GLUCOSE BLOOD QUANT: CPT

## 2017-03-19 PROCEDURE — 96366 THER/PROPH/DIAG IV INF ADDON: CPT

## 2017-03-19 PROCEDURE — 87086 URINE CULTURE/COLONY COUNT: CPT

## 2017-03-19 PROCEDURE — 96365 THER/PROPH/DIAG IV INF INIT: CPT

## 2017-03-19 RX ORDER — 0.9 % SODIUM CHLORIDE 0.9 %
1000 INTRAVENOUS SOLUTION INTRAVENOUS ONCE
Status: COMPLETED | OUTPATIENT
Start: 2017-03-19 | End: 2017-03-20

## 2017-03-19 RX ORDER — VANCOMYCIN HYDROCHLORIDE 1 G/200ML
1000 INJECTION, SOLUTION INTRAVENOUS ONCE
Status: COMPLETED | OUTPATIENT
Start: 2017-03-19 | End: 2017-03-20

## 2017-03-19 RX ADMIN — SODIUM CHLORIDE 1000 ML: 9 INJECTION, SOLUTION INTRAVENOUS at 23:15

## 2017-03-19 RX ADMIN — PIPERACILLIN AND TAZOBACTAM 3.38 G: 3; .375 INJECTION, POWDER, LYOPHILIZED, FOR SOLUTION INTRAVENOUS; PARENTERAL at 23:56

## 2017-03-19 ASSESSMENT — ENCOUNTER SYMPTOMS
NAUSEA: 0
DIARRHEA: 1
VOMITING: 0
COUGH: 1

## 2017-03-20 ENCOUNTER — APPOINTMENT (OUTPATIENT)
Dept: GENERAL RADIOLOGY | Age: 22
DRG: 871 | End: 2017-03-20
Payer: COMMERCIAL

## 2017-03-20 PROBLEM — I95.9 HYPOTENSION, UNSPECIFIED: Status: ACTIVE | Noted: 2017-03-20

## 2017-03-20 PROBLEM — T68.XXXA HYPOTHERMIA: Status: ACTIVE | Noted: 2017-03-20

## 2017-03-20 PROBLEM — J18.9 PNEUMONIA DUE TO INFECTIOUS ORGANISM: Status: ACTIVE | Noted: 2017-03-20

## 2017-03-20 PROBLEM — R47.01 NONVERBAL: Status: ACTIVE | Noted: 2017-03-20

## 2017-03-20 LAB
C DIFFICILE TOXINS, PCR: NORMAL
FIO2: 28
FIO2: 30
NEGATIVE BASE EXCESS, ART: ABNORMAL (ref 0–2)
NEGATIVE BASE EXCESS, ART: ABNORMAL (ref 0–2)
O2 DEVICE/FLOW/%: ABNORMAL
O2 DEVICE/FLOW/%: ABNORMAL
PATIENT TEMP: 34.4
PATIENT TEMP: 34.7
POC HCO3: 32 MMOL/L (ref 22–27)
POC HCO3: 33.2 MMOL/L (ref 22–27)
POC O2 SATURATION: 100 %
POC O2 SATURATION: 98 %
POC PCO2 TEMP: 64 MM HG
POC PCO2 TEMP: 77 MM HG
POC PCO2: 71 MM HG (ref 32–45)
POC PCO2: 86 MM HG (ref 32–45)
POC PH TEMP: 7.23
POC PH TEMP: 7.3
POC PH: 7.2 (ref 7.35–7.45)
POC PH: 7.26 (ref 7.35–7.45)
POC PO2 TEMP: 113 MM HG
POC PO2 TEMP: 350 MM HG
POC PO2: 127 MM HG (ref 75–95)
POC PO2: 364 MM HG (ref 75–95)
POSITIVE BASE EXCESS, ART: 5 (ref 0–2)
POSITIVE BASE EXCESS, ART: 5 (ref 0–2)
SPECIMEN DESCRIPTION: NORMAL
TCO2 (CALC), ART: 34 MM HG (ref 23–28)
TCO2 (CALC), ART: 36 MM HG (ref 23–28)

## 2017-03-20 PROCEDURE — 94762 N-INVAS EAR/PLS OXIMTRY CONT: CPT

## 2017-03-20 PROCEDURE — 02HV33Z INSERTION OF INFUSION DEVICE INTO SUPERIOR VENA CAVA, PERCUTANEOUS APPROACH: ICD-10-PCS | Performed by: EMERGENCY MEDICINE

## 2017-03-20 PROCEDURE — 6360000002 HC RX W HCPCS: Performed by: EMERGENCY MEDICINE

## 2017-03-20 PROCEDURE — 71010 XR CHEST PORTABLE: CPT

## 2017-03-20 PROCEDURE — 87493 C DIFF AMPLIFIED PROBE: CPT

## 2017-03-20 PROCEDURE — 6370000000 HC RX 637 (ALT 250 FOR IP): Performed by: EMERGENCY MEDICINE

## 2017-03-20 PROCEDURE — 6370000000 HC RX 637 (ALT 250 FOR IP): Performed by: INTERNAL MEDICINE

## 2017-03-20 PROCEDURE — 6360000002 HC RX W HCPCS: Performed by: INTERNAL MEDICINE

## 2017-03-20 PROCEDURE — 36600 WITHDRAWAL OF ARTERIAL BLOOD: CPT

## 2017-03-20 PROCEDURE — 94660 CPAP INITIATION&MGMT: CPT

## 2017-03-20 PROCEDURE — 2580000003 HC RX 258: Performed by: EMERGENCY MEDICINE

## 2017-03-20 PROCEDURE — 2580000003 HC RX 258: Performed by: INTERNAL MEDICINE

## 2017-03-20 PROCEDURE — 2500000003 HC RX 250 WO HCPCS

## 2017-03-20 PROCEDURE — 82803 BLOOD GASES ANY COMBINATION: CPT

## 2017-03-20 PROCEDURE — 2000000000 HC ICU R&B

## 2017-03-20 PROCEDURE — 99291 CRITICAL CARE FIRST HOUR: CPT | Performed by: INTERNAL MEDICINE

## 2017-03-20 RX ORDER — LIDOCAINE HYDROCHLORIDE 10 MG/ML
INJECTION, SOLUTION INFILTRATION; PERINEURAL
Status: COMPLETED
Start: 2017-03-20 | End: 2017-03-20

## 2017-03-20 RX ORDER — LEVETIRACETAM 100 MG/ML
1400 SOLUTION ORAL 2 TIMES DAILY
Status: DISCONTINUED | OUTPATIENT
Start: 2017-03-20 | End: 2017-03-23 | Stop reason: HOSPADM

## 2017-03-20 RX ORDER — SODIUM CHLORIDE 0.9 % (FLUSH) 0.9 %
10 SYRINGE (ML) INJECTION EVERY 12 HOURS SCHEDULED
Status: DISCONTINUED | OUTPATIENT
Start: 2017-03-20 | End: 2017-03-23 | Stop reason: HOSPADM

## 2017-03-20 RX ORDER — DOXAZOSIN MESYLATE 1 MG/1
1 TABLET ORAL NIGHTLY
Status: DISCONTINUED | OUTPATIENT
Start: 2017-03-20 | End: 2017-03-23 | Stop reason: HOSPADM

## 2017-03-20 RX ORDER — SODIUM CHLORIDE 9 MG/ML
INJECTION, SOLUTION INTRAVENOUS CONTINUOUS
Status: DISCONTINUED | OUTPATIENT
Start: 2017-03-20 | End: 2017-03-21

## 2017-03-20 RX ORDER — NUTRITIONAL SUPPLEMENT 0.04G-1/ML
1 LIQUID (ML) ORAL 3 TIMES DAILY
Status: DISCONTINUED | OUTPATIENT
Start: 2017-03-20 | End: 2017-03-20

## 2017-03-20 RX ORDER — ONDANSETRON 2 MG/ML
4 INJECTION INTRAMUSCULAR; INTRAVENOUS EVERY 6 HOURS PRN
Status: DISCONTINUED | OUTPATIENT
Start: 2017-03-20 | End: 2017-03-23 | Stop reason: HOSPADM

## 2017-03-20 RX ORDER — SODIUM CHLORIDE 0.9 % (FLUSH) 0.9 %
10 SYRINGE (ML) INJECTION PRN
Status: DISCONTINUED | OUTPATIENT
Start: 2017-03-20 | End: 2017-03-23 | Stop reason: HOSPADM

## 2017-03-20 RX ORDER — 0.9 % SODIUM CHLORIDE 0.9 %
1000 INTRAVENOUS SOLUTION INTRAVENOUS ONCE
Status: COMPLETED | OUTPATIENT
Start: 2017-03-20 | End: 2017-03-20

## 2017-03-20 RX ORDER — TRAZODONE HYDROCHLORIDE 50 MG/1
50 TABLET ORAL NIGHTLY
Status: DISCONTINUED | OUTPATIENT
Start: 2017-03-20 | End: 2017-03-23 | Stop reason: HOSPADM

## 2017-03-20 RX ORDER — CLONAZEPAM 0.5 MG/1
0.75 TABLET ORAL 3 TIMES DAILY PRN
Status: DISCONTINUED | OUTPATIENT
Start: 2017-03-20 | End: 2017-03-23 | Stop reason: HOSPADM

## 2017-03-20 RX ORDER — POLYETHYLENE GLYCOL 3350 17 G/17G
17 POWDER, FOR SOLUTION ORAL DAILY
Status: DISCONTINUED | OUTPATIENT
Start: 2017-03-20 | End: 2017-03-22

## 2017-03-20 RX ORDER — PANTOPRAZOLE SODIUM 40 MG/1
40 TABLET, DELAYED RELEASE ORAL
Status: DISCONTINUED | OUTPATIENT
Start: 2017-03-20 | End: 2017-03-22

## 2017-03-20 RX ORDER — LACTOBACILLUS RHAMNOSUS GG 10B CELL
1 CAPSULE ORAL DAILY
Status: DISCONTINUED | OUTPATIENT
Start: 2017-03-20 | End: 2017-03-23 | Stop reason: HOSPADM

## 2017-03-20 RX ADMIN — PIPERACILLIN AND TAZOBACTAM 3.38 G: 3; .375 INJECTION, POWDER, LYOPHILIZED, FOR SOLUTION INTRAVENOUS; PARENTERAL at 21:20

## 2017-03-20 RX ADMIN — PIPERACILLIN AND TAZOBACTAM 3.38 G: 3; .375 INJECTION, POWDER, LYOPHILIZED, FOR SOLUTION INTRAVENOUS; PARENTERAL at 10:52

## 2017-03-20 RX ADMIN — SODIUM CHLORIDE: 9 INJECTION, SOLUTION INTRAVENOUS at 04:41

## 2017-03-20 RX ADMIN — SODIUM CHLORIDE 1000 ML: 9 INJECTION, SOLUTION INTRAVENOUS at 00:58

## 2017-03-20 RX ADMIN — PIPERACILLIN AND TAZOBACTAM 3.38 G: 3; .375 INJECTION, POWDER, LYOPHILIZED, FOR SOLUTION INTRAVENOUS; PARENTERAL at 04:41

## 2017-03-20 RX ADMIN — PANTOPRAZOLE SODIUM 40 MG: 40 TABLET, DELAYED RELEASE ORAL at 11:19

## 2017-03-20 RX ADMIN — VANCOMYCIN HYDROCHLORIDE 750 MG: 1 INJECTION, POWDER, LYOPHILIZED, FOR SOLUTION INTRAVENOUS at 16:17

## 2017-03-20 RX ADMIN — MAGNESIUM HYDROXIDE 15 ML: 400 SUSPENSION ORAL at 11:21

## 2017-03-20 RX ADMIN — Medication 10 ML: at 16:22

## 2017-03-20 RX ADMIN — ENOXAPARIN SODIUM 40 MG: 40 INJECTION SUBCUTANEOUS at 08:23

## 2017-03-20 RX ADMIN — Medication 10 ML: at 11:39

## 2017-03-20 RX ADMIN — VANCOMYCIN HYDROCHLORIDE 1000 MG: 1 INJECTION, SOLUTION INTRAVENOUS at 00:58

## 2017-03-20 RX ADMIN — DOXAZOSIN MESYLATE 1 MG: 1 TABLET ORAL at 21:08

## 2017-03-20 RX ADMIN — POLYETHYLENE GLYCOL 3350 17 G: 17 POWDER, FOR SOLUTION ORAL at 11:21

## 2017-03-20 RX ADMIN — TRAZODONE HYDROCHLORIDE 50 MG: 50 TABLET ORAL at 21:07

## 2017-03-20 RX ADMIN — LEVETIRACETAM 1400 MG: 500 SOLUTION ORAL at 11:20

## 2017-03-20 RX ADMIN — Medication 1 CAPSULE: at 11:19

## 2017-03-20 RX ADMIN — SODIUM CHLORIDE: 9 INJECTION, SOLUTION INTRAVENOUS at 16:20

## 2017-03-20 RX ADMIN — LEVETIRACETAM 1400 MG: 500 SOLUTION ORAL at 21:06

## 2017-03-20 RX ADMIN — SODIUM CHLORIDE: 9 INJECTION, SOLUTION INTRAVENOUS at 03:37

## 2017-03-20 RX ADMIN — VANCOMYCIN HYDROCHLORIDE 750 MG: 1 INJECTION, POWDER, LYOPHILIZED, FOR SOLUTION INTRAVENOUS at 21:20

## 2017-03-20 RX ADMIN — Medication 2000 UNITS: at 11:20

## 2017-03-20 RX ADMIN — Medication: at 03:30

## 2017-03-20 RX ADMIN — SODIUM CHLORIDE: 9 INJECTION, SOLUTION INTRAVENOUS at 08:17

## 2017-03-20 ASSESSMENT — ENCOUNTER SYMPTOMS
TROUBLE SWALLOWING: 0
ABDOMINAL PAIN: 0
WHEEZING: 0
EYE DISCHARGE: 0
COLOR CHANGE: 0
APNEA: 1
EYE REDNESS: 0
EYE PAIN: 0
VOMITING: 0
DIARRHEA: 0
RHINORRHEA: 0
COUGH: 0
STRIDOR: 0
CONSTIPATION: 0
PHOTOPHOBIA: 0
SHORTNESS OF BREATH: 0

## 2017-03-20 ASSESSMENT — PULMONARY FUNCTION TESTS
PIF_VALUE: 20
PIF_VALUE: 21
PIF_VALUE: 20
PIF_VALUE: 21
PIF_VALUE: 21
PIF_VALUE: 20
PIF_VALUE: 21
PIF_VALUE: 20
PIF_VALUE: 20
PIF_VALUE: 21
PIF_VALUE: 20

## 2017-03-21 PROBLEM — R68.89 BODY TEMPERATURE LOW: Status: ACTIVE | Noted: 2017-03-20

## 2017-03-21 LAB
ANION GAP SERPL CALCULATED.3IONS-SCNC: 14 MMOL/L (ref 9–17)
BUN BLDV-MCNC: 6 MG/DL (ref 6–20)
BUN/CREAT BLD: ABNORMAL (ref 9–20)
CALCIUM SERPL-MCNC: 8.5 MG/DL (ref 8.6–10.4)
CHLORIDE BLD-SCNC: 107 MMOL/L (ref 98–107)
CO2: 25 MMOL/L (ref 20–31)
CREAT SERPL-MCNC: <0.2 MG/DL (ref 0.5–0.9)
CULTURE: NORMAL
CULTURE: NORMAL
FIO2: 30
GFR AFRICAN AMERICAN: ABNORMAL ML/MIN
GFR NON-AFRICAN AMERICAN: ABNORMAL ML/MIN
GFR SERPL CREATININE-BSD FRML MDRD: ABNORMAL ML/MIN/{1.73_M2}
GFR SERPL CREATININE-BSD FRML MDRD: ABNORMAL ML/MIN/{1.73_M2}
GLUCOSE BLD-MCNC: 86 MG/DL (ref 70–99)
HCT VFR BLD CALC: 32.9 % (ref 36–46)
HEMOGLOBIN: 11.2 G/DL (ref 12–16)
Lab: NORMAL
MCH RBC QN AUTO: 29.7 PG (ref 26–34)
MCHC RBC AUTO-ENTMCNC: 34.2 G/DL (ref 31–37)
MCV RBC AUTO: 86.8 FL (ref 80–100)
NEGATIVE BASE EXCESS, ART: ABNORMAL (ref 0–2)
O2 DEVICE/FLOW/%: ABNORMAL
OSMOLALITY URINE: 245 MOSM/KG (ref 80–1300)
PATIENT TEMP: ABNORMAL
PDW BLD-RTO: 13.8 % (ref 12.5–15.4)
PLATELET # BLD: 207 K/UL (ref 140–450)
PMV BLD AUTO: 8.5 FL (ref 6–12)
POC HCO3: 29.6 MMOL/L (ref 22–27)
POC O2 SATURATION: 99 %
POC PCO2 TEMP: ABNORMAL MM HG
POC PCO2: 37 MM HG (ref 32–45)
POC PH TEMP: ABNORMAL
POC PH: 7.51 (ref 7.35–7.45)
POC PO2 TEMP: ABNORMAL MM HG
POC PO2: 136 MM HG (ref 75–95)
POSITIVE BASE EXCESS, ART: 7 (ref 0–2)
POTASSIUM SERPL-SCNC: 2.8 MMOL/L (ref 3.7–5.3)
POTASSIUM SERPL-SCNC: 4.3 MMOL/L (ref 3.7–5.3)
RBC # BLD: 3.79 M/UL (ref 4–5.2)
SERUM OSMOLALITY: 295 MOSM/KG (ref 275–295)
SODIUM BLD-SCNC: 146 MMOL/L (ref 135–144)
SODIUM,UR: 85 MMOL/L
SPECIMEN DESCRIPTION: NORMAL
STATUS: NORMAL
TCO2 (CALC), ART: 31 MM HG (ref 23–28)
VANCOMYCIN TROUGH DATE LAST DOSE: NORMAL
VANCOMYCIN TROUGH DOSE AMOUNT: NORMAL
VANCOMYCIN TROUGH TIME LAST DOSE: NORMAL
VANCOMYCIN TROUGH: 13.5 UG/ML (ref 10–20)
WBC # BLD: 6.8 K/UL (ref 4.5–13.5)

## 2017-03-21 PROCEDURE — 99291 CRITICAL CARE FIRST HOUR: CPT | Performed by: INTERNAL MEDICINE

## 2017-03-21 PROCEDURE — 6370000000 HC RX 637 (ALT 250 FOR IP): Performed by: INTERNAL MEDICINE

## 2017-03-21 PROCEDURE — 6360000002 HC RX W HCPCS: Performed by: INTERNAL MEDICINE

## 2017-03-21 PROCEDURE — 2580000003 HC RX 258: Performed by: INTERNAL MEDICINE

## 2017-03-21 PROCEDURE — 82803 BLOOD GASES ANY COMBINATION: CPT

## 2017-03-21 PROCEDURE — 85027 COMPLETE CBC AUTOMATED: CPT

## 2017-03-21 PROCEDURE — 84132 ASSAY OF SERUM POTASSIUM: CPT

## 2017-03-21 PROCEDURE — 2580000003 HC RX 258: Performed by: EMERGENCY MEDICINE

## 2017-03-21 PROCEDURE — 80048 BASIC METABOLIC PNL TOTAL CA: CPT

## 2017-03-21 PROCEDURE — 94762 N-INVAS EAR/PLS OXIMTRY CONT: CPT

## 2017-03-21 PROCEDURE — 36600 WITHDRAWAL OF ARTERIAL BLOOD: CPT

## 2017-03-21 PROCEDURE — 2000000000 HC ICU R&B

## 2017-03-21 PROCEDURE — 80202 ASSAY OF VANCOMYCIN: CPT

## 2017-03-21 PROCEDURE — 6360000002 HC RX W HCPCS: Performed by: EMERGENCY MEDICINE

## 2017-03-21 PROCEDURE — 36415 COLL VENOUS BLD VENIPUNCTURE: CPT

## 2017-03-21 PROCEDURE — 83935 ASSAY OF URINE OSMOLALITY: CPT

## 2017-03-21 PROCEDURE — 83930 ASSAY OF BLOOD OSMOLALITY: CPT

## 2017-03-21 PROCEDURE — 94660 CPAP INITIATION&MGMT: CPT

## 2017-03-21 PROCEDURE — 84300 ASSAY OF URINE SODIUM: CPT

## 2017-03-21 RX ORDER — TRAMADOL HYDROCHLORIDE 50 MG/1
50 TABLET ORAL EVERY 8 HOURS PRN
Status: DISCONTINUED | OUTPATIENT
Start: 2017-03-21 | End: 2017-03-23 | Stop reason: HOSPADM

## 2017-03-21 RX ORDER — SODIUM CHLORIDE 450 MG/100ML
INJECTION, SOLUTION INTRAVENOUS CONTINUOUS
Status: DISCONTINUED | OUTPATIENT
Start: 2017-03-21 | End: 2017-03-22

## 2017-03-21 RX ORDER — ALBUTEROL SULFATE 2.5 MG/3ML
2.5 SOLUTION RESPIRATORY (INHALATION) 4 TIMES DAILY
Status: DISCONTINUED | OUTPATIENT
Start: 2017-03-21 | End: 2017-03-23 | Stop reason: HOSPADM

## 2017-03-21 RX ORDER — POTASSIUM CHLORIDE 7.45 MG/ML
10 INJECTION INTRAVENOUS PRN
Status: DISCONTINUED | OUTPATIENT
Start: 2017-03-21 | End: 2017-03-21

## 2017-03-21 RX ORDER — POTASSIUM CHLORIDE 20MEQ/15ML
40 LIQUID (ML) ORAL PRN
Status: DISCONTINUED | OUTPATIENT
Start: 2017-03-21 | End: 2017-03-23 | Stop reason: HOSPADM

## 2017-03-21 RX ORDER — POTASSIUM CHLORIDE 29.8 MG/ML
20 INJECTION INTRAVENOUS PRN
Status: DISCONTINUED | OUTPATIENT
Start: 2017-03-21 | End: 2017-03-23 | Stop reason: HOSPADM

## 2017-03-21 RX ORDER — DIAZEPAM 10 MG/2ML
5 GEL RECTAL PRN
Status: COMPLETED | OUTPATIENT
Start: 2017-03-21 | End: 2017-03-22

## 2017-03-21 RX ORDER — POTASSIUM CHLORIDE 20 MEQ/1
40 TABLET, EXTENDED RELEASE ORAL PRN
Status: DISCONTINUED | OUTPATIENT
Start: 2017-03-21 | End: 2017-03-23 | Stop reason: HOSPADM

## 2017-03-21 RX ADMIN — POTASSIUM CHLORIDE 20 MEQ: 29.8 INJECTION, SOLUTION INTRAVENOUS at 12:22

## 2017-03-21 RX ADMIN — Medication 10 ML: at 09:44

## 2017-03-21 RX ADMIN — TRAZODONE HYDROCHLORIDE 50 MG: 50 TABLET ORAL at 20:15

## 2017-03-21 RX ADMIN — VANCOMYCIN HYDROCHLORIDE 750 MG: 1 INJECTION, POWDER, LYOPHILIZED, FOR SOLUTION INTRAVENOUS at 05:30

## 2017-03-21 RX ADMIN — ENOXAPARIN SODIUM 40 MG: 40 INJECTION SUBCUTANEOUS at 09:42

## 2017-03-21 RX ADMIN — PIPERACILLIN AND TAZOBACTAM 3.38 G: 3; .375 INJECTION, POWDER, LYOPHILIZED, FOR SOLUTION INTRAVENOUS; PARENTERAL at 04:02

## 2017-03-21 RX ADMIN — PIPERACILLIN AND TAZOBACTAM 3.38 G: 3; .375 INJECTION, POWDER, LYOPHILIZED, FOR SOLUTION INTRAVENOUS; PARENTERAL at 11:10

## 2017-03-21 RX ADMIN — VANCOMYCIN HYDROCHLORIDE 750 MG: 1 INJECTION, POWDER, LYOPHILIZED, FOR SOLUTION INTRAVENOUS at 20:17

## 2017-03-21 RX ADMIN — SODIUM CHLORIDE: 9 INJECTION, SOLUTION INTRAVENOUS at 03:45

## 2017-03-21 RX ADMIN — PIPERACILLIN AND TAZOBACTAM 3.38 G: 3; .375 INJECTION, POWDER, LYOPHILIZED, FOR SOLUTION INTRAVENOUS; PARENTERAL at 20:09

## 2017-03-21 RX ADMIN — DOXAZOSIN MESYLATE 1 MG: 1 TABLET ORAL at 20:15

## 2017-03-21 RX ADMIN — TRAMADOL HYDROCHLORIDE 50 MG: 50 TABLET, FILM COATED ORAL at 01:48

## 2017-03-21 RX ADMIN — PANTOPRAZOLE SODIUM 40 MG: 40 TABLET, DELAYED RELEASE ORAL at 11:33

## 2017-03-21 RX ADMIN — Medication 2000 UNITS: at 11:52

## 2017-03-21 RX ADMIN — POTASSIUM CHLORIDE 20 MEQ: 29.8 INJECTION, SOLUTION INTRAVENOUS at 17:13

## 2017-03-21 RX ADMIN — TRAMADOL HYDROCHLORIDE 50 MG: 50 TABLET, FILM COATED ORAL at 21:43

## 2017-03-21 RX ADMIN — POTASSIUM CHLORIDE 20 MEQ: 29.8 INJECTION, SOLUTION INTRAVENOUS at 11:19

## 2017-03-21 RX ADMIN — VANCOMYCIN HYDROCHLORIDE 750 MG: 1 INJECTION, POWDER, LYOPHILIZED, FOR SOLUTION INTRAVENOUS at 12:23

## 2017-03-21 RX ADMIN — LEVETIRACETAM 1400 MG: 500 SOLUTION ORAL at 09:42

## 2017-03-21 RX ADMIN — LEVETIRACETAM 1400 MG: 500 SOLUTION ORAL at 20:15

## 2017-03-21 RX ADMIN — SODIUM CHLORIDE: 4.5 INJECTION, SOLUTION INTRAVENOUS at 11:10

## 2017-03-21 RX ADMIN — Medication 1 CAPSULE: at 09:43

## 2017-03-21 RX ADMIN — Medication 10 ML: at 20:17

## 2017-03-21 ASSESSMENT — PULMONARY FUNCTION TESTS
PIF_VALUE: 21
PIF_VALUE: 21

## 2017-03-22 PROBLEM — A41.9 SEPSIS (HCC): Status: ACTIVE | Noted: 2017-03-22

## 2017-03-22 LAB
ANION GAP SERPL CALCULATED.3IONS-SCNC: 13 MMOL/L (ref 9–17)
BUN BLDV-MCNC: 5 MG/DL (ref 6–20)
BUN/CREAT BLD: ABNORMAL (ref 9–20)
CALCIUM SERPL-MCNC: 9.1 MG/DL (ref 8.6–10.4)
CHLORIDE BLD-SCNC: 98 MMOL/L (ref 98–107)
CO2: 27 MMOL/L (ref 20–31)
CREAT SERPL-MCNC: <0.2 MG/DL (ref 0.5–0.9)
GFR AFRICAN AMERICAN: ABNORMAL ML/MIN
GFR NON-AFRICAN AMERICAN: ABNORMAL ML/MIN
GFR SERPL CREATININE-BSD FRML MDRD: ABNORMAL ML/MIN/{1.73_M2}
GFR SERPL CREATININE-BSD FRML MDRD: ABNORMAL ML/MIN/{1.73_M2}
GLUCOSE BLD-MCNC: 97 MG/DL (ref 70–99)
MAGNESIUM: 2 MG/DL (ref 1.6–2.6)
POTASSIUM SERPL-SCNC: 3.5 MMOL/L (ref 3.7–5.3)
POTASSIUM SERPL-SCNC: 4.6 MMOL/L (ref 3.7–5.3)
SODIUM BLD-SCNC: 138 MMOL/L (ref 135–144)

## 2017-03-22 PROCEDURE — 83735 ASSAY OF MAGNESIUM: CPT

## 2017-03-22 PROCEDURE — 99233 SBSQ HOSP IP/OBS HIGH 50: CPT | Performed by: INTERNAL MEDICINE

## 2017-03-22 PROCEDURE — 6370000000 HC RX 637 (ALT 250 FOR IP): Performed by: INTERNAL MEDICINE

## 2017-03-22 PROCEDURE — 6360000002 HC RX W HCPCS: Performed by: EMERGENCY MEDICINE

## 2017-03-22 PROCEDURE — 6360000002 HC RX W HCPCS: Performed by: INTERNAL MEDICINE

## 2017-03-22 PROCEDURE — 6370000000 HC RX 637 (ALT 250 FOR IP): Performed by: EMERGENCY MEDICINE

## 2017-03-22 PROCEDURE — 2580000003 HC RX 258: Performed by: INTERNAL MEDICINE

## 2017-03-22 PROCEDURE — 2580000003 HC RX 258: Performed by: EMERGENCY MEDICINE

## 2017-03-22 PROCEDURE — 84132 ASSAY OF SERUM POTASSIUM: CPT

## 2017-03-22 PROCEDURE — 2580000003 HC RX 258: Performed by: STUDENT IN AN ORGANIZED HEALTH CARE EDUCATION/TRAINING PROGRAM

## 2017-03-22 PROCEDURE — 6360000002 HC RX W HCPCS: Performed by: HOSPITALIST

## 2017-03-22 PROCEDURE — 36415 COLL VENOUS BLD VENIPUNCTURE: CPT

## 2017-03-22 PROCEDURE — 94664 DEMO&/EVAL PT USE INHALER: CPT

## 2017-03-22 PROCEDURE — 94640 AIRWAY INHALATION TREATMENT: CPT

## 2017-03-22 PROCEDURE — 1200000000 HC SEMI PRIVATE

## 2017-03-22 PROCEDURE — 76937 US GUIDE VASCULAR ACCESS: CPT

## 2017-03-22 PROCEDURE — 94667 MNPJ CHEST WALL 1ST: CPT

## 2017-03-22 RX ORDER — SODIUM CHLORIDE 9 MG/ML
INJECTION, SOLUTION INTRAVENOUS CONTINUOUS
Status: DISCONTINUED | OUTPATIENT
Start: 2017-03-22 | End: 2017-03-23 | Stop reason: HOSPADM

## 2017-03-22 RX ADMIN — PIPERACILLIN AND TAZOBACTAM 3.38 G: 3; .375 INJECTION, POWDER, LYOPHILIZED, FOR SOLUTION INTRAVENOUS; PARENTERAL at 03:55

## 2017-03-22 RX ADMIN — PIPERACILLIN AND TAZOBACTAM 3.38 G: 3; .375 INJECTION, POWDER, LYOPHILIZED, FOR SOLUTION INTRAVENOUS; PARENTERAL at 11:30

## 2017-03-22 RX ADMIN — Medication 2000 UNITS: at 11:29

## 2017-03-22 RX ADMIN — Medication 10 ML: at 09:46

## 2017-03-22 RX ADMIN — ALBUTEROL SULFATE 2.5 MG: 2.5 SOLUTION RESPIRATORY (INHALATION) at 16:18

## 2017-03-22 RX ADMIN — LEVETIRACETAM 1400 MG: 500 SOLUTION ORAL at 09:43

## 2017-03-22 RX ADMIN — ALBUTEROL SULFATE 2.5 MG: 2.5 SOLUTION RESPIRATORY (INHALATION) at 12:58

## 2017-03-22 RX ADMIN — VANCOMYCIN HYDROCHLORIDE 750 MG: 1 INJECTION, POWDER, LYOPHILIZED, FOR SOLUTION INTRAVENOUS at 04:25

## 2017-03-22 RX ADMIN — LEVETIRACETAM 1400 MG: 500 SOLUTION ORAL at 22:06

## 2017-03-22 RX ADMIN — VANCOMYCIN HYDROCHLORIDE 750 MG: 1 INJECTION, POWDER, LYOPHILIZED, FOR SOLUTION INTRAVENOUS at 22:05

## 2017-03-22 RX ADMIN — VANCOMYCIN HYDROCHLORIDE 750 MG: 1 INJECTION, POWDER, LYOPHILIZED, FOR SOLUTION INTRAVENOUS at 14:09

## 2017-03-22 RX ADMIN — DIAZEPAM 5 MG: 10 GEL RECTAL at 11:20

## 2017-03-22 RX ADMIN — Medication 1 CAPSULE: at 09:42

## 2017-03-22 RX ADMIN — ENOXAPARIN SODIUM 40 MG: 40 INJECTION SUBCUTANEOUS at 09:42

## 2017-03-22 RX ADMIN — Medication 2000 UNITS: at 22:05

## 2017-03-22 RX ADMIN — DOXAZOSIN MESYLATE 1 MG: 1 TABLET ORAL at 22:16

## 2017-03-22 RX ADMIN — LANSOPRAZOLE 30 MG: 30 TABLET, ORALLY DISINTEGRATING, DELAYED RELEASE ORAL at 11:29

## 2017-03-22 RX ADMIN — Medication 10 ML: at 22:25

## 2017-03-22 RX ADMIN — ALBUTEROL SULFATE 2.5 MG: 2.5 SOLUTION RESPIRATORY (INHALATION) at 10:24

## 2017-03-22 RX ADMIN — POTASSIUM CHLORIDE 40 MEQ: 40 SOLUTION ORAL at 02:12

## 2017-03-22 RX ADMIN — TRAZODONE HYDROCHLORIDE 50 MG: 50 TABLET ORAL at 22:06

## 2017-03-22 RX ADMIN — ALBUTEROL SULFATE 2.5 MG: 2.5 SOLUTION RESPIRATORY (INHALATION) at 22:05

## 2017-03-22 RX ADMIN — SODIUM CHLORIDE: 9 INJECTION, SOLUTION INTRAVENOUS at 22:08

## 2017-03-22 RX ADMIN — PIPERACILLIN AND TAZOBACTAM 3.38 G: 3; .375 INJECTION, POWDER, LYOPHILIZED, FOR SOLUTION INTRAVENOUS; PARENTERAL at 22:07

## 2017-03-22 ASSESSMENT — PAIN SCALES - GENERAL: PAINLEVEL_OUTOF10: 0

## 2017-03-22 ASSESSMENT — PULMONARY FUNCTION TESTS: PIF_VALUE: 21

## 2017-03-23 ENCOUNTER — APPOINTMENT (OUTPATIENT)
Dept: GENERAL RADIOLOGY | Age: 22
DRG: 871 | End: 2017-03-23
Payer: COMMERCIAL

## 2017-03-23 VITALS
HEIGHT: 55 IN | SYSTOLIC BLOOD PRESSURE: 119 MMHG | BODY MASS INDEX: 22.09 KG/M2 | RESPIRATION RATE: 17 BRPM | WEIGHT: 95.46 LBS | DIASTOLIC BLOOD PRESSURE: 70 MMHG | TEMPERATURE: 97.7 F | HEART RATE: 92 BPM | OXYGEN SATURATION: 98 %

## 2017-03-23 LAB
ANION GAP SERPL CALCULATED.3IONS-SCNC: 17 MMOL/L (ref 9–17)
BUN BLDV-MCNC: 6 MG/DL (ref 6–20)
BUN/CREAT BLD: ABNORMAL (ref 9–20)
CALCIUM SERPL-MCNC: 9.5 MG/DL (ref 8.6–10.4)
CHLORIDE BLD-SCNC: 98 MMOL/L (ref 98–107)
CO2: 26 MMOL/L (ref 20–31)
CREAT SERPL-MCNC: <0.2 MG/DL (ref 0.5–0.9)
GFR AFRICAN AMERICAN: ABNORMAL ML/MIN
GFR NON-AFRICAN AMERICAN: ABNORMAL ML/MIN
GFR SERPL CREATININE-BSD FRML MDRD: ABNORMAL ML/MIN/{1.73_M2}
GFR SERPL CREATININE-BSD FRML MDRD: ABNORMAL ML/MIN/{1.73_M2}
GLUCOSE BLD-MCNC: 84 MG/DL (ref 70–99)
POTASSIUM SERPL-SCNC: 3.7 MMOL/L (ref 3.7–5.3)
SODIUM BLD-SCNC: 141 MMOL/L (ref 135–144)

## 2017-03-23 PROCEDURE — 2580000003 HC RX 258: Performed by: EMERGENCY MEDICINE

## 2017-03-23 PROCEDURE — 6360000002 HC RX W HCPCS: Performed by: HOSPITALIST

## 2017-03-23 PROCEDURE — 2580000003 HC RX 258: Performed by: INTERNAL MEDICINE

## 2017-03-23 PROCEDURE — 71010 XR CHEST PORTABLE: CPT

## 2017-03-23 PROCEDURE — 80048 BASIC METABOLIC PNL TOTAL CA: CPT

## 2017-03-23 PROCEDURE — 99233 SBSQ HOSP IP/OBS HIGH 50: CPT | Performed by: INTERNAL MEDICINE

## 2017-03-23 PROCEDURE — 6370000000 HC RX 637 (ALT 250 FOR IP): Performed by: INTERNAL MEDICINE

## 2017-03-23 PROCEDURE — 6360000002 HC RX W HCPCS: Performed by: EMERGENCY MEDICINE

## 2017-03-23 PROCEDURE — 94660 CPAP INITIATION&MGMT: CPT

## 2017-03-23 PROCEDURE — 36415 COLL VENOUS BLD VENIPUNCTURE: CPT

## 2017-03-23 PROCEDURE — 6360000002 HC RX W HCPCS: Performed by: INTERNAL MEDICINE

## 2017-03-23 PROCEDURE — 6370000000 HC RX 637 (ALT 250 FOR IP): Performed by: EMERGENCY MEDICINE

## 2017-03-23 PROCEDURE — 94640 AIRWAY INHALATION TREATMENT: CPT

## 2017-03-23 RX ORDER — AMOXICILLIN AND CLAVULANATE POTASSIUM 875; 125 MG/1; MG/1
1 TABLET, FILM COATED ORAL 2 TIMES DAILY
Qty: 14 TABLET | Refills: 0 | Status: SHIPPED | OUTPATIENT
Start: 2017-03-23 | End: 2017-03-30

## 2017-03-23 RX ADMIN — PIPERACILLIN AND TAZOBACTAM 3.38 G: 3; .375 INJECTION, POWDER, LYOPHILIZED, FOR SOLUTION INTRAVENOUS; PARENTERAL at 03:30

## 2017-03-23 RX ADMIN — LEVETIRACETAM 1400 MG: 500 SOLUTION ORAL at 10:19

## 2017-03-23 RX ADMIN — PIPERACILLIN AND TAZOBACTAM 3.38 G: 3; .375 INJECTION, POWDER, LYOPHILIZED, FOR SOLUTION INTRAVENOUS; PARENTERAL at 12:52

## 2017-03-23 RX ADMIN — Medication 2000 UNITS: at 10:19

## 2017-03-23 RX ADMIN — Medication 1 CAPSULE: at 10:19

## 2017-03-23 RX ADMIN — VANCOMYCIN HYDROCHLORIDE 750 MG: 1 INJECTION, POWDER, LYOPHILIZED, FOR SOLUTION INTRAVENOUS at 06:45

## 2017-03-23 RX ADMIN — VANCOMYCIN HYDROCHLORIDE 750 MG: 1 INJECTION, POWDER, LYOPHILIZED, FOR SOLUTION INTRAVENOUS at 12:51

## 2017-03-23 RX ADMIN — ALBUTEROL SULFATE 2.5 MG: 2.5 SOLUTION RESPIRATORY (INHALATION) at 16:34

## 2017-03-23 RX ADMIN — ALBUTEROL SULFATE 2.5 MG: 2.5 SOLUTION RESPIRATORY (INHALATION) at 08:37

## 2017-03-23 RX ADMIN — ALBUTEROL SULFATE 2.5 MG: 2.5 SOLUTION RESPIRATORY (INHALATION) at 12:01

## 2017-03-23 RX ADMIN — LANSOPRAZOLE 30 MG: 30 TABLET, ORALLY DISINTEGRATING, DELAYED RELEASE ORAL at 10:20

## 2017-03-23 RX ADMIN — ENOXAPARIN SODIUM 40 MG: 40 INJECTION SUBCUTANEOUS at 10:19

## 2017-03-24 RX ORDER — CLONAZEPAM 0.5 MG/1
TABLET ORAL
Qty: 190 TABLET | Refills: 0 | Status: ON HOLD | OUTPATIENT
Start: 2017-03-24 | End: 2017-04-11 | Stop reason: HOSPADM

## 2017-03-25 LAB
CULTURE: NORMAL
Lab: NORMAL
Lab: NORMAL
SPECIMEN DESCRIPTION: NORMAL
SPECIMEN DESCRIPTION: NORMAL
STATUS: NORMAL
STATUS: NORMAL

## 2017-03-31 DIAGNOSIS — J18.9 PNEUMONIA OF LEFT LOWER LOBE DUE TO INFECTIOUS ORGANISM: ICD-10-CM

## 2017-03-31 PROCEDURE — 71020 XR CHEST STANDARD TWO VW: CPT | Performed by: PEDIATRICS

## 2017-04-08 ENCOUNTER — APPOINTMENT (OUTPATIENT)
Dept: CT IMAGING | Age: 22
DRG: 948 | End: 2017-04-08
Payer: COMMERCIAL

## 2017-04-08 ENCOUNTER — HOSPITAL ENCOUNTER (INPATIENT)
Age: 22
LOS: 3 days | Discharge: HOME HEALTH | DRG: 948 | End: 2017-04-11
Attending: EMERGENCY MEDICINE | Admitting: INTERNAL MEDICINE
Payer: COMMERCIAL

## 2017-04-08 ENCOUNTER — APPOINTMENT (OUTPATIENT)
Dept: GENERAL RADIOLOGY | Age: 22
DRG: 948 | End: 2017-04-08
Payer: COMMERCIAL

## 2017-04-08 PROBLEM — T68.XXXA HYPOTHERMIA: Status: ACTIVE | Noted: 2017-04-08

## 2017-04-08 PROBLEM — R94.31 PROLONGED QT INTERVAL: Status: ACTIVE | Noted: 2017-04-08

## 2017-04-08 LAB
-: ABNORMAL
ABSOLUTE EOS #: 0.2 K/UL (ref 0–0.4)
ABSOLUTE LYMPH #: 1.6 K/UL (ref 1–4.8)
ABSOLUTE MONO #: 0.4 K/UL (ref 0.1–1.4)
ALBUMIN SERPL-MCNC: 3.9 G/DL (ref 3.5–5.2)
ALBUMIN/GLOBULIN RATIO: 1.1 (ref 1–2.5)
ALP BLD-CCNC: 87 U/L (ref 35–104)
ALT SERPL-CCNC: 20 U/L (ref 5–33)
AMMONIA: 56 UMOL/L (ref 11–51)
AMORPHOUS: ABNORMAL
AMPHETAMINE SCREEN URINE: NEGATIVE
ANION GAP SERPL CALCULATED.3IONS-SCNC: 10 MMOL/L (ref 9–17)
ANION GAP: 12 MMOL/L (ref 8–16)
AST SERPL-CCNC: 18 U/L
BACTERIA: ABNORMAL
BARBITURATE SCREEN URINE: NEGATIVE
BASOPHILS # BLD: 1 % (ref 0–2)
BASOPHILS ABSOLUTE: 0 K/UL (ref 0–0.2)
BENZODIAZEPINE SCREEN, URINE: POSITIVE
BILIRUB SERPL-MCNC: 0.17 MG/DL (ref 0.3–1.2)
BILIRUBIN DIRECT: <0.08 MG/DL
BILIRUBIN URINE: NEGATIVE
BUN BLDV-MCNC: 14 MG/DL (ref 6–20)
BUN/CREAT BLD: ABNORMAL (ref 9–20)
BUPRENORPHINE URINE: ABNORMAL
CALCIUM SERPL-MCNC: 9.4 MG/DL (ref 8.6–10.4)
CANNABINOID SCREEN URINE: NEGATIVE
CASTS UA: ABNORMAL /LPF (ref 0–8)
CHLORIDE BLD-SCNC: 94 MMOL/L (ref 98–107)
CO2: 35 MMOL/L (ref 20–31)
COCAINE METABOLITE, URINE: NEGATIVE
COLOR: YELLOW
CORTISOL COLLECTION INFO: NORMAL
CORTISOL: 5.9 UG/DL
CREAT SERPL-MCNC: <0.2 MG/DL (ref 0.5–0.9)
CRYSTALS, UA: ABNORMAL /HPF
DIFFERENTIAL TYPE: ABNORMAL
EOSINOPHILS RELATIVE PERCENT: 4 % (ref 1–4)
EPITHELIAL CELLS UA: ABNORMAL /HPF (ref 0–5)
FIO2: 21
GFR AFRICAN AMERICAN: ABNORMAL ML/MIN
GFR NON-AFRICAN AMERICAN: ABNORMAL ML/MIN
GFR SERPL CREATININE-BSD FRML MDRD: ABNORMAL ML/MIN/{1.73_M2}
GFR SERPL CREATININE-BSD FRML MDRD: ABNORMAL ML/MIN/{1.73_M2}
GLUCOSE BLD-MCNC: 87 MG/DL (ref 74–106)
GLUCOSE BLD-MCNC: 92 MG/DL (ref 70–99)
GLUCOSE URINE: NEGATIVE
HCG QUALITATIVE: NEGATIVE
HCO3 VENOUS: 35.2 MMOL/L (ref 24–30)
HCT VFR BLD CALC: 34.1 % (ref 36–46)
HEMOGLOBIN: 11.6 G/DL (ref 12–16)
INR BLD: 1.1
KETONES, URINE: NEGATIVE
LEUKOCYTE ESTERASE, URINE: ABNORMAL
LIPASE: 21 U/L (ref 13–60)
LYMPHOCYTES # BLD: 33 % (ref 25–45)
MAGNESIUM: 2.1 MG/DL (ref 1.6–2.6)
MCH RBC QN AUTO: 29.7 PG (ref 26–34)
MCHC RBC AUTO-ENTMCNC: 33.9 G/DL (ref 31–37)
MCV RBC AUTO: 87.4 FL (ref 80–100)
MDMA URINE: ABNORMAL
METHADONE SCREEN, URINE: NEGATIVE
METHAMPHETAMINE, URINE: ABNORMAL
MONOCYTES # BLD: 8 % (ref 2–8)
MUCUS: ABNORMAL
NEGATIVE BASE EXCESS, ART: ABNORMAL (ref 0–2)
NEGATIVE BASE EXCESS, VEN: ABNORMAL (ref 0–2)
NITRITE, URINE: NEGATIVE
O2 DEVICE/FLOW/%: ABNORMAL
OPIATES, URINE: NEGATIVE
OTHER OBSERVATIONS UA: ABNORMAL
OXYCODONE SCREEN URINE: NEGATIVE
PARTIAL THROMBOPLASTIN TIME: 27.1 SEC (ref 21.3–31.3)
PATIENT TEMP: ABNORMAL
PCO2, VEN: 45 MM HG (ref 39–55)
PDW BLD-RTO: 14 % (ref 12.5–15.4)
PH UA: 6.5 (ref 5–8)
PH VENOUS: 7.5 (ref 7.32–7.42)
PHENCYCLIDINE, URINE: NEGATIVE
PLATELET # BLD: 338 K/UL (ref 140–450)
PLATELET ESTIMATE: ABNORMAL
PMV BLD AUTO: 7.9 FL (ref 6–12)
POC BUN: 15 MG/DL (ref 6–20)
POC CHLORIDE: 97 MMOL/L (ref 98–110)
POC HCO3: 32.3 MMOL/L (ref 22–27)
POC HEMATOCRIT: 34 % (ref 36–46)
POC HEMOGLOBIN: 11.6 GM/DL (ref 12–16)
POC LACTIC ACID, VEN: 0.36 MMOL/L (ref 0.9–1.7)
POC O2 SATURATION: 93 %
POC PCO2 TEMP: ABNORMAL MM HG
POC PCO2: 59 MM HG (ref 32–45)
POC PH TEMP: ABNORMAL
POC PH: 7.35 (ref 7.35–7.45)
POC PO2 TEMP: ABNORMAL MM HG
POC PO2: 72 MM HG (ref 75–95)
POC POTASSIUM: 3.5 MMOL/L (ref 3.5–5.1)
POC SODIUM: 141 MMOL/L (ref 136–145)
POC TROPONIN I: 0 NG/ML (ref 0–0.1)
POC TROPONIN INTERP: NORMAL
POSITIVE BASE EXCESS, ART: 7 (ref 0–2)
POSITIVE BASE EXCESS, VEN: 12 (ref 0–2)
POTASSIUM SERPL-SCNC: 3.5 MMOL/L (ref 3.7–5.3)
PROPOXYPHENE, URINE: ABNORMAL
PROTEIN UA: NEGATIVE
PROTHROMBIN TIME: 11.6 SEC (ref 9.4–12.6)
RBC # BLD: 3.9 M/UL (ref 4–5.2)
RBC # BLD: ABNORMAL 10*6/UL
RBC UA: ABNORMAL /HPF (ref 0–4)
RENAL EPITHELIAL, UA: ABNORMAL /HPF
SEG NEUTROPHILS: 54 % (ref 34–64)
SEGMENTED NEUTROPHILS ABSOLUTE COUNT: 2.7 K/UL (ref 1.8–7.7)
SERUM OSMOLALITY: 299 MOSM/KG (ref 275–295)
SODIUM BLD-SCNC: 139 MMOL/L (ref 135–144)
SPECIFIC GRAVITY UA: 1.02 (ref 1–1.03)
TCO2 (CALC), ART: 34 MM HG (ref 23–28)
TEST INFORMATION: ABNORMAL
TOTAL CO2, VENOUS: 37 MM HG (ref 25–31)
TOTAL PROTEIN: 7.4 G/DL (ref 6.4–8.3)
TRICHOMONAS: ABNORMAL
TRICYCLIC ANTIDEPRESSANTS, UR: ABNORMAL
TSH SERPL DL<=0.05 MIU/L-ACNC: 0.68 MIU/L (ref 0.3–5)
TURBIDITY: CLEAR
URINE HGB: NEGATIVE
UROBILINOGEN, URINE: NORMAL
WBC # BLD: 4.9 K/UL (ref 4.5–13.5)
WBC # BLD: ABNORMAL 10*3/UL
WBC UA: ABNORMAL /HPF (ref 0–5)
YEAST: ABNORMAL

## 2017-04-08 PROCEDURE — 80053 COMPREHEN METABOLIC PANEL: CPT

## 2017-04-08 PROCEDURE — 71010 XR CHEST PORTABLE: CPT

## 2017-04-08 PROCEDURE — 83605 ASSAY OF LACTIC ACID: CPT

## 2017-04-08 PROCEDURE — 82803 BLOOD GASES ANY COMBINATION: CPT

## 2017-04-08 PROCEDURE — 87040 BLOOD CULTURE FOR BACTERIA: CPT

## 2017-04-08 PROCEDURE — 82140 ASSAY OF AMMONIA: CPT

## 2017-04-08 PROCEDURE — 93005 ELECTROCARDIOGRAM TRACING: CPT

## 2017-04-08 PROCEDURE — 36556 INSERT NON-TUNNEL CV CATH: CPT

## 2017-04-08 PROCEDURE — 87086 URINE CULTURE/COLONY COUNT: CPT

## 2017-04-08 PROCEDURE — 83735 ASSAY OF MAGNESIUM: CPT

## 2017-04-08 PROCEDURE — 84443 ASSAY THYROID STIM HORMONE: CPT

## 2017-04-08 PROCEDURE — 84703 CHORIONIC GONADOTROPIN ASSAY: CPT

## 2017-04-08 PROCEDURE — 85730 THROMBOPLASTIN TIME PARTIAL: CPT

## 2017-04-08 PROCEDURE — 99285 EMERGENCY DEPT VISIT HI MDM: CPT

## 2017-04-08 PROCEDURE — 51702 INSERT TEMP BLADDER CATH: CPT

## 2017-04-08 PROCEDURE — 83690 ASSAY OF LIPASE: CPT

## 2017-04-08 PROCEDURE — 36415 COLL VENOUS BLD VENIPUNCTURE: CPT

## 2017-04-08 PROCEDURE — 2580000003 HC RX 258: Performed by: STUDENT IN AN ORGANIZED HEALTH CARE EDUCATION/TRAINING PROGRAM

## 2017-04-08 PROCEDURE — 82805 BLOOD GASES W/O2 SATURATION: CPT

## 2017-04-08 PROCEDURE — 82248 BILIRUBIN DIRECT: CPT

## 2017-04-08 PROCEDURE — 2580000003 HC RX 258: Performed by: EMERGENCY MEDICINE

## 2017-04-08 PROCEDURE — 82533 TOTAL CORTISOL: CPT

## 2017-04-08 PROCEDURE — 84484 ASSAY OF TROPONIN QUANT: CPT

## 2017-04-08 PROCEDURE — 84295 ASSAY OF SERUM SODIUM: CPT

## 2017-04-08 PROCEDURE — 85014 HEMATOCRIT: CPT

## 2017-04-08 PROCEDURE — 6370000000 HC RX 637 (ALT 250 FOR IP): Performed by: STUDENT IN AN ORGANIZED HEALTH CARE EDUCATION/TRAINING PROGRAM

## 2017-04-08 PROCEDURE — 85025 COMPLETE CBC W/AUTO DIFF WBC: CPT

## 2017-04-08 PROCEDURE — 82947 ASSAY GLUCOSE BLOOD QUANT: CPT

## 2017-04-08 PROCEDURE — 36600 WITHDRAWAL OF ARTERIAL BLOOD: CPT

## 2017-04-08 PROCEDURE — 74176 CT ABD & PELVIS W/O CONTRAST: CPT

## 2017-04-08 PROCEDURE — 80307 DRUG TEST PRSMV CHEM ANLYZR: CPT

## 2017-04-08 PROCEDURE — 85610 PROTHROMBIN TIME: CPT

## 2017-04-08 PROCEDURE — 83930 ASSAY OF BLOOD OSMOLALITY: CPT

## 2017-04-08 PROCEDURE — 87641 MR-STAPH DNA AMP PROBE: CPT

## 2017-04-08 PROCEDURE — 02HV33Z INSERTION OF INFUSION DEVICE INTO SUPERIOR VENA CAVA, PERCUTANEOUS APPROACH: ICD-10-PCS | Performed by: EMERGENCY MEDICINE

## 2017-04-08 PROCEDURE — 84520 ASSAY OF UREA NITROGEN: CPT

## 2017-04-08 PROCEDURE — 84132 ASSAY OF SERUM POTASSIUM: CPT

## 2017-04-08 PROCEDURE — 2000000000 HC ICU R&B

## 2017-04-08 PROCEDURE — 81001 URINALYSIS AUTO W/SCOPE: CPT

## 2017-04-08 PROCEDURE — 82435 ASSAY OF BLOOD CHLORIDE: CPT

## 2017-04-08 RX ORDER — ONDANSETRON 2 MG/ML
4 INJECTION INTRAMUSCULAR; INTRAVENOUS EVERY 6 HOURS PRN
Status: DISCONTINUED | OUTPATIENT
Start: 2017-04-08 | End: 2017-04-11 | Stop reason: HOSPADM

## 2017-04-08 RX ORDER — POTASSIUM CHLORIDE 20MEQ/15ML
40 LIQUID (ML) ORAL PRN
Status: DISCONTINUED | OUTPATIENT
Start: 2017-04-08 | End: 2017-04-11 | Stop reason: HOSPADM

## 2017-04-08 RX ORDER — SODIUM CHLORIDE 0.9 % (FLUSH) 0.9 %
10 SYRINGE (ML) INJECTION PRN
Status: DISCONTINUED | OUTPATIENT
Start: 2017-04-08 | End: 2017-04-11 | Stop reason: HOSPADM

## 2017-04-08 RX ORDER — POTASSIUM CHLORIDE 20 MEQ/1
40 TABLET, EXTENDED RELEASE ORAL PRN
Status: DISCONTINUED | OUTPATIENT
Start: 2017-04-08 | End: 2017-04-11 | Stop reason: HOSPADM

## 2017-04-08 RX ORDER — TRAZODONE HYDROCHLORIDE 50 MG/1
50 TABLET ORAL NIGHTLY
Status: DISCONTINUED | OUTPATIENT
Start: 2017-04-08 | End: 2017-04-11 | Stop reason: HOSPADM

## 2017-04-08 RX ORDER — DOXAZOSIN 2 MG/1
1 TABLET ORAL NIGHTLY
Status: DISCONTINUED | OUTPATIENT
Start: 2017-04-08 | End: 2017-04-11 | Stop reason: HOSPADM

## 2017-04-08 RX ORDER — SODIUM CHLORIDE 0.9 % (FLUSH) 0.9 %
10 SYRINGE (ML) INJECTION EVERY 12 HOURS SCHEDULED
Status: DISCONTINUED | OUTPATIENT
Start: 2017-04-08 | End: 2017-04-11 | Stop reason: HOSPADM

## 2017-04-08 RX ORDER — 0.9 % SODIUM CHLORIDE 0.9 %
1000 INTRAVENOUS SOLUTION INTRAVENOUS ONCE
Status: COMPLETED | OUTPATIENT
Start: 2017-04-08 | End: 2017-04-08

## 2017-04-08 RX ORDER — SODIUM CHLORIDE 9 MG/ML
INJECTION, SOLUTION INTRAVENOUS CONTINUOUS
Status: DISCONTINUED | OUTPATIENT
Start: 2017-04-08 | End: 2017-04-11 | Stop reason: HOSPADM

## 2017-04-08 RX ORDER — LEVETIRACETAM 100 MG/ML
1400 SOLUTION ORAL 2 TIMES DAILY
Status: DISCONTINUED | OUTPATIENT
Start: 2017-04-08 | End: 2017-04-11 | Stop reason: HOSPADM

## 2017-04-08 RX ORDER — PANTOPRAZOLE SODIUM 40 MG/1
40 TABLET, DELAYED RELEASE ORAL
Status: DISCONTINUED | OUTPATIENT
Start: 2017-04-09 | End: 2017-04-10

## 2017-04-08 RX ORDER — ACETAMINOPHEN 325 MG/1
650 TABLET ORAL EVERY 4 HOURS PRN
Status: DISCONTINUED | OUTPATIENT
Start: 2017-04-08 | End: 2017-04-11 | Stop reason: HOSPADM

## 2017-04-08 RX ORDER — POTASSIUM CHLORIDE 7.45 MG/ML
10 INJECTION INTRAVENOUS PRN
Status: DISCONTINUED | OUTPATIENT
Start: 2017-04-08 | End: 2017-04-11 | Stop reason: HOSPADM

## 2017-04-08 RX ORDER — NUTRITIONAL SUPPLEMENT 0.04G-1/ML
1 LIQUID (ML) ORAL 3 TIMES DAILY
Status: DISCONTINUED | OUTPATIENT
Start: 2017-04-08 | End: 2017-04-08

## 2017-04-08 RX ADMIN — LEVETIRACETAM 1400 MG: 500 SOLUTION ORAL at 23:14

## 2017-04-08 RX ADMIN — POTASSIUM CHLORIDE 40 MEQ: 40 SOLUTION ORAL at 23:18

## 2017-04-08 RX ADMIN — DOXAZOSIN 1 MG: 2 TABLET ORAL at 23:13

## 2017-04-08 RX ADMIN — SODIUM CHLORIDE 1000 ML: 9 INJECTION, SOLUTION INTRAVENOUS at 19:47

## 2017-04-08 RX ADMIN — Medication 10 ML: at 23:13

## 2017-04-08 RX ADMIN — TRAZODONE HYDROCHLORIDE 50 MG: 50 TABLET ORAL at 23:13

## 2017-04-08 RX ADMIN — SODIUM CHLORIDE: 9 INJECTION, SOLUTION INTRAVENOUS at 22:08

## 2017-04-09 PROBLEM — R33.9 URINARY RETENTION WITH INCOMPLETE BLADDER EMPTYING: Status: ACTIVE | Noted: 2017-04-09

## 2017-04-09 PROBLEM — J96.11 CHRONIC RESPIRATORY FAILURE WITH HYPOXIA AND HYPERCAPNIA (HCC): Status: ACTIVE | Noted: 2017-04-09

## 2017-04-09 PROBLEM — R68.0 HYPOTHERMIA DUE TO NON-ENVIRONMENTAL CAUSE: Status: ACTIVE | Noted: 2017-04-08

## 2017-04-09 PROBLEM — J96.12 CHRONIC RESPIRATORY FAILURE WITH HYPOXIA AND HYPERCAPNIA (HCC): Status: ACTIVE | Noted: 2017-04-09

## 2017-04-09 LAB
ABSOLUTE EOS #: 0.29 K/UL (ref 0–0.4)
ABSOLUTE LYMPH #: 3.04 K/UL (ref 1–4.8)
ABSOLUTE MONO #: 0.76 K/UL (ref 0.1–1.4)
ALBUMIN SERPL-MCNC: 3.7 G/DL (ref 3.5–5.2)
ALBUMIN/GLOBULIN RATIO: 1.2 (ref 1–2.5)
ALP BLD-CCNC: 78 U/L (ref 35–104)
ALT SERPL-CCNC: 23 U/L (ref 5–33)
ANION GAP SERPL CALCULATED.3IONS-SCNC: 11 MMOL/L (ref 9–17)
AST SERPL-CCNC: 36 U/L
BASOPHILS # BLD: 1 % (ref 0–2)
BASOPHILS ABSOLUTE: 0.1 K/UL (ref 0–0.2)
BILIRUB SERPL-MCNC: 0.17 MG/DL (ref 0.3–1.2)
BUN BLDV-MCNC: 9 MG/DL (ref 6–20)
BUN/CREAT BLD: ABNORMAL (ref 9–20)
CALCIUM SERPL-MCNC: 8.6 MG/DL (ref 8.6–10.4)
CHLORIDE BLD-SCNC: 104 MMOL/L (ref 98–107)
CO2: 27 MMOL/L (ref 20–31)
CREAT SERPL-MCNC: <0.2 MG/DL (ref 0.5–0.9)
CULTURE: NO GROWTH
CULTURE: NORMAL
DIFFERENTIAL TYPE: ABNORMAL
EOSINOPHILS RELATIVE PERCENT: 3 % (ref 1–4)
GFR AFRICAN AMERICAN: ABNORMAL ML/MIN
GFR NON-AFRICAN AMERICAN: ABNORMAL ML/MIN
GFR SERPL CREATININE-BSD FRML MDRD: ABNORMAL ML/MIN/{1.73_M2}
GFR SERPL CREATININE-BSD FRML MDRD: ABNORMAL ML/MIN/{1.73_M2}
GLUCOSE BLD-MCNC: 81 MG/DL (ref 70–99)
HCT VFR BLD CALC: 33.4 % (ref 36–46)
HEMOGLOBIN: 11.3 G/DL (ref 12–16)
LYMPHOCYTES # BLD: 32 % (ref 25–45)
Lab: NORMAL
MCH RBC QN AUTO: 30 PG (ref 26–34)
MCHC RBC AUTO-ENTMCNC: 33.9 G/DL (ref 31–37)
MCV RBC AUTO: 88.4 FL (ref 80–100)
MONOCYTES # BLD: 8 % (ref 2–8)
MORPHOLOGY: NORMAL
MRSA, DNA, NASAL: NORMAL
PDW BLD-RTO: 14.3 % (ref 12.5–15.4)
PLATELET # BLD: ABNORMAL K/UL (ref 140–450)
PLATELET ESTIMATE: ABNORMAL
PMV BLD AUTO: ABNORMAL FL (ref 6–12)
POTASSIUM SERPL-SCNC: 5.2 MMOL/L (ref 3.7–5.3)
RBC # BLD: 3.78 M/UL (ref 4–5.2)
RBC # BLD: ABNORMAL 10*6/UL
SEG NEUTROPHILS: 56 % (ref 34–64)
SEGMENTED NEUTROPHILS ABSOLUTE COUNT: 5.31 K/UL (ref 1.8–7.7)
SODIUM BLD-SCNC: 142 MMOL/L (ref 135–144)
SPECIMEN DESCRIPTION: NORMAL
SPECIMEN DESCRIPTION: NORMAL
STATUS: NORMAL
TOTAL PROTEIN: 6.9 G/DL (ref 6.4–8.3)
WBC # BLD: 9.5 K/UL (ref 4.5–13.5)
WBC # BLD: ABNORMAL 10*3/UL

## 2017-04-09 PROCEDURE — 94660 CPAP INITIATION&MGMT: CPT

## 2017-04-09 PROCEDURE — 80053 COMPREHEN METABOLIC PANEL: CPT

## 2017-04-09 PROCEDURE — 6360000002 HC RX W HCPCS: Performed by: STUDENT IN AN ORGANIZED HEALTH CARE EDUCATION/TRAINING PROGRAM

## 2017-04-09 PROCEDURE — 85025 COMPLETE CBC W/AUTO DIFF WBC: CPT

## 2017-04-09 PROCEDURE — 94762 N-INVAS EAR/PLS OXIMTRY CONT: CPT

## 2017-04-09 PROCEDURE — 99223 1ST HOSP IP/OBS HIGH 75: CPT | Performed by: INTERNAL MEDICINE

## 2017-04-09 PROCEDURE — 2580000003 HC RX 258: Performed by: STUDENT IN AN ORGANIZED HEALTH CARE EDUCATION/TRAINING PROGRAM

## 2017-04-09 PROCEDURE — 2060000000 HC ICU INTERMEDIATE R&B

## 2017-04-09 PROCEDURE — 1200000000 HC SEMI PRIVATE

## 2017-04-09 PROCEDURE — 6370000000 HC RX 637 (ALT 250 FOR IP): Performed by: STUDENT IN AN ORGANIZED HEALTH CARE EDUCATION/TRAINING PROGRAM

## 2017-04-09 PROCEDURE — 36415 COLL VENOUS BLD VENIPUNCTURE: CPT

## 2017-04-09 RX ORDER — LORAZEPAM 2 MG/ML
1 INJECTION INTRAMUSCULAR EVERY 6 HOURS PRN
Status: DISCONTINUED | OUTPATIENT
Start: 2017-04-09 | End: 2017-04-11 | Stop reason: HOSPADM

## 2017-04-09 RX ADMIN — SODIUM CHLORIDE: 9 INJECTION, SOLUTION INTRAVENOUS at 23:07

## 2017-04-09 RX ADMIN — SODIUM CHLORIDE: 9 INJECTION, SOLUTION INTRAVENOUS at 11:18

## 2017-04-09 RX ADMIN — PANTOPRAZOLE SODIUM 40 MG: 40 TABLET, DELAYED RELEASE ORAL at 08:31

## 2017-04-09 RX ADMIN — TRAZODONE HYDROCHLORIDE 50 MG: 50 TABLET ORAL at 20:42

## 2017-04-09 RX ADMIN — LEVETIRACETAM 1400 MG: 500 SOLUTION ORAL at 08:31

## 2017-04-09 RX ADMIN — Medication 10 ML: at 08:32

## 2017-04-09 RX ADMIN — ENOXAPARIN SODIUM 30 MG: 100 INJECTION SUBCUTANEOUS at 11:19

## 2017-04-09 RX ADMIN — LEVETIRACETAM 1400 MG: 500 SOLUTION ORAL at 20:42

## 2017-04-09 RX ADMIN — Medication 2000 UNITS: at 08:31

## 2017-04-09 RX ADMIN — Medication 10 ML: at 20:43

## 2017-04-09 RX ADMIN — DOXAZOSIN 1 MG: 2 TABLET ORAL at 20:42

## 2017-04-09 ASSESSMENT — PULMONARY FUNCTION TESTS
PIF_VALUE: 16
PIF_VALUE: 16

## 2017-04-10 PROCEDURE — 2580000003 HC RX 258: Performed by: STUDENT IN AN ORGANIZED HEALTH CARE EDUCATION/TRAINING PROGRAM

## 2017-04-10 PROCEDURE — 6370000000 HC RX 637 (ALT 250 FOR IP): Performed by: STUDENT IN AN ORGANIZED HEALTH CARE EDUCATION/TRAINING PROGRAM

## 2017-04-10 PROCEDURE — 6360000002 HC RX W HCPCS: Performed by: STUDENT IN AN ORGANIZED HEALTH CARE EDUCATION/TRAINING PROGRAM

## 2017-04-10 PROCEDURE — 94660 CPAP INITIATION&MGMT: CPT

## 2017-04-10 PROCEDURE — 99254 IP/OBS CNSLTJ NEW/EST MOD 60: CPT

## 2017-04-10 PROCEDURE — 1200000000 HC SEMI PRIVATE

## 2017-04-10 PROCEDURE — 99233 SBSQ HOSP IP/OBS HIGH 50: CPT | Performed by: INTERNAL MEDICINE

## 2017-04-10 PROCEDURE — 95951 HC EEG MONITORING VIDEO RECORDING: CPT

## 2017-04-10 RX ADMIN — LEVETIRACETAM 1400 MG: 500 SOLUTION ORAL at 20:40

## 2017-04-10 RX ADMIN — ENOXAPARIN SODIUM 30 MG: 100 INJECTION SUBCUTANEOUS at 09:22

## 2017-04-10 RX ADMIN — Medication 10 ML: at 09:23

## 2017-04-10 RX ADMIN — TRAZODONE HYDROCHLORIDE 50 MG: 50 TABLET ORAL at 20:39

## 2017-04-10 RX ADMIN — Medication 2000 UNITS: at 09:22

## 2017-04-10 RX ADMIN — SODIUM CHLORIDE: 9 INJECTION, SOLUTION INTRAVENOUS at 11:25

## 2017-04-10 RX ADMIN — DOXAZOSIN 1 MG: 2 TABLET ORAL at 20:39

## 2017-04-10 RX ADMIN — PANTOPRAZOLE SODIUM 40 MG: 40 TABLET, DELAYED RELEASE ORAL at 06:16

## 2017-04-10 RX ADMIN — SODIUM CHLORIDE: 9 INJECTION, SOLUTION INTRAVENOUS at 23:40

## 2017-04-10 RX ADMIN — LEVETIRACETAM 1400 MG: 500 SOLUTION ORAL at 09:21

## 2017-04-10 RX ADMIN — Medication 10 ML: at 20:40

## 2017-04-10 ASSESSMENT — PULMONARY FUNCTION TESTS
PIF_VALUE: 16

## 2017-04-11 VITALS
RESPIRATION RATE: 11 BRPM | BODY MASS INDEX: 22.86 KG/M2 | HEART RATE: 73 BPM | HEIGHT: 55 IN | TEMPERATURE: 97.2 F | DIASTOLIC BLOOD PRESSURE: 75 MMHG | OXYGEN SATURATION: 95 % | SYSTOLIC BLOOD PRESSURE: 128 MMHG | WEIGHT: 98.8 LBS

## 2017-04-11 LAB
ABSOLUTE EOS #: 0.2 K/UL (ref 0–0.4)
ABSOLUTE LYMPH #: 2.8 K/UL (ref 1–4.8)
ABSOLUTE MONO #: 0.8 K/UL (ref 0.1–1.4)
ANION GAP SERPL CALCULATED.3IONS-SCNC: 14 MMOL/L (ref 9–17)
BASOPHILS # BLD: 0 % (ref 0–2)
BASOPHILS ABSOLUTE: 0 K/UL (ref 0–0.2)
BUN BLDV-MCNC: 11 MG/DL (ref 6–20)
BUN/CREAT BLD: ABNORMAL (ref 9–20)
CALCIUM SERPL-MCNC: 8.8 MG/DL (ref 8.6–10.4)
CHLORIDE BLD-SCNC: 101 MMOL/L (ref 98–107)
CO2: 29 MMOL/L (ref 20–31)
CREAT SERPL-MCNC: <0.2 MG/DL (ref 0.5–0.9)
DIFFERENTIAL TYPE: ABNORMAL
EOSINOPHILS RELATIVE PERCENT: 3 % (ref 1–4)
GFR AFRICAN AMERICAN: ABNORMAL ML/MIN
GFR NON-AFRICAN AMERICAN: ABNORMAL ML/MIN
GFR SERPL CREATININE-BSD FRML MDRD: ABNORMAL ML/MIN/{1.73_M2}
GFR SERPL CREATININE-BSD FRML MDRD: ABNORMAL ML/MIN/{1.73_M2}
GLUCOSE BLD-MCNC: 90 MG/DL (ref 70–99)
HCT VFR BLD CALC: 31.8 % (ref 36–46)
HEMOGLOBIN: 10.9 G/DL (ref 12–16)
LYMPHOCYTES # BLD: 38 % (ref 25–45)
MCH RBC QN AUTO: 29.8 PG (ref 26–34)
MCHC RBC AUTO-ENTMCNC: 34.3 G/DL (ref 31–37)
MCV RBC AUTO: 86.9 FL (ref 80–100)
MONOCYTES # BLD: 11 % (ref 2–8)
PDW BLD-RTO: 13.9 % (ref 12.5–15.4)
PLATELET # BLD: 289 K/UL (ref 140–450)
PLATELET ESTIMATE: ABNORMAL
PMV BLD AUTO: 8 FL (ref 6–12)
POTASSIUM SERPL-SCNC: 3.4 MMOL/L (ref 3.7–5.3)
RBC # BLD: 3.65 M/UL (ref 4–5.2)
RBC # BLD: ABNORMAL 10*6/UL
SEG NEUTROPHILS: 48 % (ref 34–64)
SEGMENTED NEUTROPHILS ABSOLUTE COUNT: 3.5 K/UL (ref 1.8–7.7)
SODIUM BLD-SCNC: 144 MMOL/L (ref 135–144)
WBC # BLD: 7.4 K/UL (ref 4.5–13.5)
WBC # BLD: ABNORMAL 10*3/UL

## 2017-04-11 PROCEDURE — 36415 COLL VENOUS BLD VENIPUNCTURE: CPT

## 2017-04-11 PROCEDURE — 99231 SBSQ HOSP IP/OBS SF/LOW 25: CPT

## 2017-04-11 PROCEDURE — 2580000003 HC RX 258: Performed by: STUDENT IN AN ORGANIZED HEALTH CARE EDUCATION/TRAINING PROGRAM

## 2017-04-11 PROCEDURE — 6370000000 HC RX 637 (ALT 250 FOR IP): Performed by: INTERNAL MEDICINE

## 2017-04-11 PROCEDURE — 80048 BASIC METABOLIC PNL TOTAL CA: CPT

## 2017-04-11 PROCEDURE — 6370000000 HC RX 637 (ALT 250 FOR IP): Performed by: STUDENT IN AN ORGANIZED HEALTH CARE EDUCATION/TRAINING PROGRAM

## 2017-04-11 PROCEDURE — 95951 PR EEG MONITORING/VIDEORECORD: CPT

## 2017-04-11 PROCEDURE — 6360000002 HC RX W HCPCS: Performed by: STUDENT IN AN ORGANIZED HEALTH CARE EDUCATION/TRAINING PROGRAM

## 2017-04-11 PROCEDURE — 99239 HOSP IP/OBS DSCHRG MGMT >30: CPT | Performed by: INTERNAL MEDICINE

## 2017-04-11 PROCEDURE — 95951 HC EEG MONITORING VIDEO RECORDING: CPT

## 2017-04-11 PROCEDURE — 85025 COMPLETE CBC W/AUTO DIFF WBC: CPT

## 2017-04-11 RX ORDER — SODIUM PHOSPHATE, DIBASIC AND SODIUM PHOSPHATE, MONOBASIC 7; 19 G/133ML; G/133ML
1 ENEMA RECTAL ONCE
Status: COMPLETED | OUTPATIENT
Start: 2017-04-11 | End: 2017-04-11

## 2017-04-11 RX ADMIN — SODIUM CHLORIDE: 9 INJECTION, SOLUTION INTRAVENOUS at 13:25

## 2017-04-11 RX ADMIN — POTASSIUM CHLORIDE 40 MEQ: 40 SOLUTION ORAL at 09:09

## 2017-04-11 RX ADMIN — SODIUM PHOSPHATE 1 ENEMA: 7; 19 ENEMA RECTAL at 13:12

## 2017-04-11 RX ADMIN — Medication 10 ML: at 09:05

## 2017-04-11 RX ADMIN — Medication 2000 UNITS: at 09:06

## 2017-04-11 RX ADMIN — LANSOPRAZOLE 30 MG: 30 TABLET, ORALLY DISINTEGRATING, DELAYED RELEASE ORAL at 09:04

## 2017-04-11 RX ADMIN — ENOXAPARIN SODIUM 30 MG: 100 INJECTION SUBCUTANEOUS at 09:04

## 2017-04-11 RX ADMIN — LEVETIRACETAM 1400 MG: 500 SOLUTION ORAL at 09:04

## 2017-04-11 ASSESSMENT — PULMONARY FUNCTION TESTS
PIF_VALUE: 15
PIF_VALUE: 16
PIF_VALUE: 15
PIF_VALUE: 16
PIF_VALUE: 15

## 2017-04-12 LAB
EKG ATRIAL RATE: 61 BPM
EKG P AXIS: 92 DEGREES
EKG P-R INTERVAL: 184 MS
EKG Q-T INTERVAL: 490 MS
EKG QRS DURATION: 82 MS
EKG QTC CALCULATION (BAZETT): 493 MS
EKG R AXIS: 22 DEGREES
EKG T AXIS: 49 DEGREES
EKG VENTRICULAR RATE: 61 BPM

## 2017-04-13 ENCOUNTER — OFFICE VISIT (OUTPATIENT)
Dept: FAMILY MEDICINE CLINIC | Age: 22
End: 2017-04-13
Payer: COMMERCIAL

## 2017-04-13 VITALS
HEART RATE: 78 BPM | HEIGHT: 55 IN | SYSTOLIC BLOOD PRESSURE: 116 MMHG | DIASTOLIC BLOOD PRESSURE: 72 MMHG | BODY MASS INDEX: 22.86 KG/M2 | RESPIRATION RATE: 16 BRPM | WEIGHT: 98.77 LBS

## 2017-04-13 DIAGNOSIS — R33.9 URINARY RETENTION: ICD-10-CM

## 2017-04-13 DIAGNOSIS — F84.2 RETT'S SYNDROME: ICD-10-CM

## 2017-04-13 PROCEDURE — 99213 OFFICE O/P EST LOW 20 MIN: CPT | Performed by: INTERNAL MEDICINE

## 2017-04-21 ENCOUNTER — OFFICE VISIT (OUTPATIENT)
Dept: UROLOGY | Age: 22
End: 2017-04-21
Payer: COMMERCIAL

## 2017-04-21 VITALS
SYSTOLIC BLOOD PRESSURE: 125 MMHG | TEMPERATURE: 98.5 F | WEIGHT: 85 LBS | BODY MASS INDEX: 19.67 KG/M2 | HEIGHT: 55 IN | DIASTOLIC BLOOD PRESSURE: 79 MMHG | HEART RATE: 86 BPM

## 2017-04-21 DIAGNOSIS — N31.9 NEUROGENIC BLADDER: Primary | ICD-10-CM

## 2017-04-21 DIAGNOSIS — R33.9 URINARY RETENTION: ICD-10-CM

## 2017-04-21 DIAGNOSIS — K59.00 CONSTIPATION, UNSPECIFIED CONSTIPATION TYPE: ICD-10-CM

## 2017-04-21 PROCEDURE — 99204 OFFICE O/P NEW MOD 45 MIN: CPT | Performed by: UROLOGY

## 2017-05-02 DIAGNOSIS — E55.9 VITAMIN D DEFICIENCY: ICD-10-CM

## 2017-05-03 RX ORDER — CLONAZEPAM 0.5 MG/1
0.75 TABLET ORAL 3 TIMES DAILY PRN
Qty: 190 TABLET | Refills: 0 | Status: ON HOLD | OUTPATIENT
Start: 2017-05-03 | End: 2018-01-23 | Stop reason: ALTCHOICE

## 2017-05-19 DIAGNOSIS — K94.22 INFLAMMATION AT GASTROSTOMY TUBE SITE (HCC): Primary | ICD-10-CM

## 2017-05-19 DIAGNOSIS — G47.9 SLEEP DISORDER: ICD-10-CM

## 2017-05-19 DIAGNOSIS — K11.7 INCREASED OROPHARYNGEAL SECRETIONS: ICD-10-CM

## 2017-05-19 RX ORDER — CHOLESTYRAMINE 4 G/9G
POWDER, FOR SUSPENSION ORAL
Qty: 15 PACKET | Refills: 6 | Status: CANCELLED | OUTPATIENT
Start: 2017-05-19 | End: 2018-05-19

## 2017-05-19 RX ORDER — NYSTATIN 100000 U/G
OINTMENT TOPICAL
Qty: 30 G | Refills: 6 | Status: SHIPPED | OUTPATIENT
Start: 2017-05-19 | End: 2018-02-09 | Stop reason: SDUPTHER

## 2017-05-19 RX ORDER — TRAZODONE HYDROCHLORIDE 50 MG/1
75-100 TABLET ORAL NIGHTLY
Qty: 60 TABLET | Refills: 5 | Status: SHIPPED | OUTPATIENT
Start: 2017-05-19 | End: 2018-02-17 | Stop reason: SDUPTHER

## 2017-05-19 RX ORDER — SCOLOPAMINE TRANSDERMAL SYSTEM 1 MG/1
1 PATCH, EXTENDED RELEASE TRANSDERMAL
Qty: 20 PATCH | Refills: 5 | Status: ON HOLD | OUTPATIENT
Start: 2017-05-19 | End: 2018-01-23 | Stop reason: ALTCHOICE

## 2017-05-25 ENCOUNTER — TELEPHONE (OUTPATIENT)
Dept: FAMILY MEDICINE CLINIC | Age: 22
End: 2017-05-25

## 2017-06-12 ENCOUNTER — OFFICE VISIT (OUTPATIENT)
Dept: FAMILY MEDICINE CLINIC | Age: 22
End: 2017-06-12
Payer: COMMERCIAL

## 2017-06-12 VITALS
HEART RATE: 80 BPM | RESPIRATION RATE: 14 BRPM | WEIGHT: 88.4 LBS | BODY MASS INDEX: 20.55 KG/M2 | DIASTOLIC BLOOD PRESSURE: 64 MMHG | TEMPERATURE: 97.4 F | SYSTOLIC BLOOD PRESSURE: 108 MMHG

## 2017-06-12 DIAGNOSIS — G47.36: ICD-10-CM

## 2017-06-12 DIAGNOSIS — R33.9 URINARY RETENTION WITH INCOMPLETE BLADDER EMPTYING: ICD-10-CM

## 2017-06-12 DIAGNOSIS — J96.11 CHRONIC RESPIRATORY FAILURE WITH HYPOXIA AND HYPERCAPNIA (HCC): ICD-10-CM

## 2017-06-12 DIAGNOSIS — F84.2 RETT'S SYNDROME: Primary | ICD-10-CM

## 2017-06-12 DIAGNOSIS — G70.9: ICD-10-CM

## 2017-06-12 DIAGNOSIS — J96.12 CHRONIC RESPIRATORY FAILURE WITH HYPOXIA AND HYPERCAPNIA (HCC): ICD-10-CM

## 2017-06-12 PROCEDURE — 99214 OFFICE O/P EST MOD 30 MIN: CPT | Performed by: PEDIATRICS

## 2017-06-12 ASSESSMENT — ENCOUNTER SYMPTOMS
COUGH: 0
SHORTNESS OF BREATH: 0
WHEEZING: 0
STRIDOR: 0

## 2017-06-13 ASSESSMENT — ENCOUNTER SYMPTOMS
EYE DISCHARGE: 0
PHOTOPHOBIA: 0
VOMITING: 0
EYE PAIN: 0
DIARRHEA: 1
CONSTIPATION: 0
APNEA: 1
TROUBLE SWALLOWING: 0
ABDOMINAL PAIN: 0
RHINORRHEA: 0
COLOR CHANGE: 0
EYE REDNESS: 0

## 2017-07-05 ENCOUNTER — TELEPHONE (OUTPATIENT)
Dept: FAMILY MEDICINE CLINIC | Age: 22
End: 2017-07-05

## 2017-07-10 ENCOUNTER — TELEPHONE (OUTPATIENT)
Dept: FAMILY MEDICINE CLINIC | Age: 22
End: 2017-07-10

## 2017-07-28 ENCOUNTER — TELEPHONE (OUTPATIENT)
Dept: FAMILY MEDICINE CLINIC | Age: 22
End: 2017-07-28

## 2017-09-05 ENCOUNTER — TELEPHONE (OUTPATIENT)
Dept: FAMILY MEDICINE CLINIC | Age: 22
End: 2017-09-05

## 2017-09-05 DIAGNOSIS — Z87.440 HISTORY OF URINARY TRACT INFECTION: Primary | ICD-10-CM

## 2017-09-06 ENCOUNTER — HOSPITAL ENCOUNTER (OUTPATIENT)
Age: 22
Setting detail: SPECIMEN
Discharge: HOME | End: 2017-09-06
Payer: COMMERCIAL

## 2017-09-06 LAB
-: ABNORMAL
AMORPHOUS: ABNORMAL
BACTERIA: ABNORMAL
BILIRUBIN URINE: ABNORMAL
CASTS UA: ABNORMAL /LPF
COLOR: ABNORMAL
COMMENT UA: ABNORMAL
CRYSTALS, UA: ABNORMAL /HPF
EPITHELIAL CELLS UA: ABNORMAL /HPF
GLUCOSE URINE: ABNORMAL
KETONES, URINE: ABNORMAL
LEUKOCYTE ESTERASE, URINE: ABNORMAL
MUCUS: ABNORMAL
NITRITE, URINE: ABNORMAL
OTHER OBSERVATIONS UA: ABNORMAL
PH UA: 7.5 (ref 5–8)
PROTEIN UA: ABNORMAL
RBC UA: ABNORMAL /HPF (ref 0–2)
RENAL EPITHELIAL, UA: ABNORMAL /HPF
SPECIFIC GRAVITY UA: 1.02 (ref 1–1.03)
TRICHOMONAS: ABNORMAL
TURBIDITY: CLEAR
URINE HGB: NEGATIVE
UROBILINOGEN, URINE: ABNORMAL
WBC UA: ABNORMAL /HPF (ref 0–5)
YEAST: ABNORMAL

## 2017-09-06 PROCEDURE — 81001 URINALYSIS AUTO W/SCOPE: CPT

## 2017-09-06 PROCEDURE — 87086 URINE CULTURE/COLONY COUNT: CPT

## 2017-09-07 ENCOUNTER — TELEPHONE (OUTPATIENT)
Dept: FAMILY MEDICINE CLINIC | Age: 22
End: 2017-09-07

## 2017-09-07 ENCOUNTER — OFFICE VISIT (OUTPATIENT)
Dept: FAMILY MEDICINE CLINIC | Age: 22
End: 2017-09-07
Payer: COMMERCIAL

## 2017-09-07 VITALS — TEMPERATURE: 99.3 F | SYSTOLIC BLOOD PRESSURE: 108 MMHG | HEART RATE: 116 BPM | DIASTOLIC BLOOD PRESSURE: 70 MMHG

## 2017-09-07 DIAGNOSIS — R47.01 NONVERBAL: ICD-10-CM

## 2017-09-07 DIAGNOSIS — R05.9 COUGH: Primary | ICD-10-CM

## 2017-09-07 DIAGNOSIS — N39.0 RECURRENT UTI: ICD-10-CM

## 2017-09-07 PROBLEM — R33.9 BLADDER RETENTION: Status: ACTIVE | Noted: 2017-04-26

## 2017-09-07 PROBLEM — Z99.11 DEPENDENT ON VENTILATOR (HCC): Status: ACTIVE | Noted: 2017-04-27

## 2017-09-07 PROBLEM — G70.9 MYONEURAL DISORDER (HCC): Status: ACTIVE | Noted: 2017-09-07

## 2017-09-07 PROBLEM — J98.11 ATELECTASIS: Status: ACTIVE | Noted: 2017-04-27

## 2017-09-07 PROBLEM — Z99.89 DEPENDENCE ON ENABLING MACHINE: Status: ACTIVE | Noted: 2017-04-27

## 2017-09-07 PROBLEM — K11.7 SIALORRHEA: Status: ACTIVE | Noted: 2017-04-27

## 2017-09-07 PROBLEM — Z87.01 HISTORY OF RECURRENT PNEUMONIA: Status: ACTIVE | Noted: 2017-04-27

## 2017-09-07 PROCEDURE — 99213 OFFICE O/P EST LOW 20 MIN: CPT | Performed by: NURSE PRACTITIONER

## 2017-09-07 RX ORDER — PHENAZOPYRIDINE HYDROCHLORIDE 200 MG/1
TABLET, FILM COATED ORAL
Refills: 1 | Status: ON HOLD | COMMUNITY
Start: 2017-08-17 | End: 2018-01-23 | Stop reason: ALTCHOICE

## 2017-09-07 ASSESSMENT — ENCOUNTER SYMPTOMS
CONSTIPATION: 0
WHEEZING: 0
DIARRHEA: 0
ABDOMINAL PAIN: 0
EYES NEGATIVE: 1
RHINORRHEA: 0
COUGH: 1
SHORTNESS OF BREATH: 0
VOMITING: 0

## 2017-09-08 LAB
CULTURE: NO GROWTH
CULTURE: NORMAL
Lab: NORMAL
SPECIMEN DESCRIPTION: NORMAL
SPECIMEN DESCRIPTION: NORMAL
STATUS: NORMAL

## 2017-09-08 RX ORDER — SULFAMETHOXAZOLE AND TRIMETHOPRIM 200; 40 MG/5ML; MG/5ML
SUSPENSION ORAL
Qty: 100 ML | Refills: 0 | Status: SHIPPED | OUTPATIENT
Start: 2017-09-08 | End: 2017-09-18

## 2017-09-11 ENCOUNTER — TELEPHONE (OUTPATIENT)
Dept: INTERNAL MEDICINE CLINIC | Age: 22
End: 2017-09-11

## 2017-09-20 ENCOUNTER — TELEPHONE (OUTPATIENT)
Dept: FAMILY MEDICINE CLINIC | Age: 22
End: 2017-09-20

## 2017-09-20 DIAGNOSIS — M62.838 MUSCLE SPASTICITY: ICD-10-CM

## 2017-09-20 DIAGNOSIS — Z99.11 DEPENDENT ON VENTILATOR (HCC): ICD-10-CM

## 2017-09-20 DIAGNOSIS — R62.50 DELAY IN DEVELOPMENT: ICD-10-CM

## 2017-09-20 DIAGNOSIS — Z99.89 DEPENDENCE ON ENABLING MACHINE: ICD-10-CM

## 2017-09-20 DIAGNOSIS — M41.9 SCOLIOSIS OF THORACOLUMBAR SPINE, UNSPECIFIED SCOLIOSIS TYPE: Primary | ICD-10-CM

## 2017-10-03 DIAGNOSIS — F84.2 RETT'S SYNDROME: ICD-10-CM

## 2017-10-03 RX ORDER — TRAMADOL HYDROCHLORIDE 50 MG/1
50 TABLET ORAL EVERY 8 HOURS PRN
Qty: 30 TABLET | Refills: 5 | Status: ON HOLD | OUTPATIENT
Start: 2017-10-03 | End: 2018-01-25 | Stop reason: HOSPADM

## 2017-10-03 NOTE — TELEPHONE ENCOUNTER
LOV 6-12-17  LRF 2-14-17    Health Maintenance   Topic Date Due    Cervical cancer screen  03/30/2017    Flu vaccine (1) 09/01/2017    HIV screen  04/13/2018 (Originally 3/30/2011)    Chlamydia screen  04/13/2018 (Originally 10/12/2016)    DTaP/Tdap/Td vaccine (7 - Td) 11/27/2017    Meningococcal (MCV) Vaccine Age 0-22 Years  Completed             (applicable per patient's age: Cancer Screenings, Depression Screening, Fall Risk Screening, Immunizations)    AST (U/L)   Date Value   04/09/2017 36 (H)     ALT (U/L)   Date Value   04/09/2017 23     BUN (mg/dL)   Date Value   04/11/2017 11      (goal A1C is < 7)   (goal LDL is <100) need 30-50% reduction from baseline     BP Readings from Last 3 Encounters:   09/07/17 108/70   06/12/17 108/64   04/21/17 125/79    (goal /80)      All Future Testing planned in [facility]:  Lab Frequency Next Occurrence   XR Chest Standard TWO VW Once 3/30/2017   Urine Culture Once 9/6/2017   Urinalysis Once 9/6/2017       Next Visit Date:  No future appointments.          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     Acute respiratory failure with hypercapnia (HCC)     Skin infection at gastrostomy tube site [facility])     Chronic respiratory failure with hypoxia and hypercapnia (HCC)     Pneumonia due to infectious organism     Body temperature low     Hypotension, unspecified     Nonverbal     Sepsis ([address]     Hypothermia due to non-environmental cause     Prolonged QT interval     Urinary retention with incomplete bladder emptying     Chronic respiratory failure with

## 2017-11-01 ENCOUNTER — TELEPHONE (OUTPATIENT)
Dept: FAMILY MEDICINE CLINIC | Facility: CLINIC | Age: 22
End: 2017-11-01

## 2017-11-01 DIAGNOSIS — E55.9 VITAMIN D DEFICIENCY: ICD-10-CM

## 2017-11-08 RX ORDER — DOXAZOSIN MESYLATE 1 MG/1
1 TABLET ORAL NIGHTLY
Qty: 90 TABLET | Refills: 1 | Status: SHIPPED | OUTPATIENT
Start: 2017-11-08 | End: 2018-06-14 | Stop reason: SDUPTHER

## 2017-11-08 NOTE — TELEPHONE ENCOUNTER
LOV 6/12/17  LRF 4/6/17    Health Maintenance   Topic Date Due    Cervical cancer screen  03/30/2017    Flu vaccine (1) 09/01/2017    HIV screen  04/13/2018 (Originally 3/30/2011)    Chlamydia screen  04/13/2018 (Originally 10/12/2016)    DTaP/Tdap/Td vaccine (7 - Td) 11/27/2017    Meningococcal (MCV) Vaccine Age 0-22 Years  Completed             (applicable per patient's age: Cancer Screenings, Depression Screening, Fall Risk Screening, Immunizations)    AST (U/L)   Date Value   04/09/2017 36 (H)     ALT (U/L)   Date Value   04/09/2017 23     BUN (mg/dL)   Date Value   04/11/2017 11      (goal A1C is < 7)   (goal LDL is <100) need 30-50% reduction from baseline     BP Readings from Last 3 Encounters:   09/07/17 108/70   06/12/17 108/64   04/21/17 125/79    (goal /80)      All Future Testing planned in [facility]:  Lab Frequency Next Occurrence   XR Chest Standard TWO VW Once 03/30/2017   Urine Culture Once 09/06/2017   Urinalysis Once 09/06/2017       Next Visit Date:  No future appointments.          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 [facility])     Acute respiratory failure with hypercapnia (HCC)     Skin infection at gastrostomy tube site [facility])     Chronic respiratory failure with hypoxia and hypercapnia (HCC)     Pneumonia due to infectious organism     Body temperature low     Hypotension     Nonverbal     Sepsis ([address]     Hypothermia due to non-environmental cause     Prolonged QT interval     Urinary retention with incomplete bladder emptying     Chronic respiratory failure with

## 2017-11-10 ENCOUNTER — TELEPHONE (OUTPATIENT)
Dept: FAMILY MEDICINE CLINIC | Age: 22
End: 2017-11-10

## 2017-11-10 NOTE — TELEPHONE ENCOUNTER
Lucy Cook, the  from [facility], called because she is trying to help the patient's mom get some modifications for the care and home to better assist with caring for Daniel Perales. Lucy Cook states that she would need a device to transfer from the Wheel chair to the car and a walk in shower. Lucy Cook is asking if you would be willing to send those orders for those changes and devices. Lucy Cook stated that the PT will be sending a new report as the one scanned into the chart is dated wrong. Please advise. Health Maintenance   Topic Date Due    Cervical cancer screen  03/30/2017    Flu vaccine (1) 09/01/2017    HIV screen  04/13/2018 (Originally 3/30/2011)    Chlamydia screen  04/13/2018 (Originally 10/12/2016)    DTaP/Tdap/Td vaccine (7 - Td) 11/27/2017    Meningococcal (MCV) Vaccine Age 0-22 Years  Completed             (applicable per patient's age: Cancer Screenings, Depression Screening, Fall Risk Screening, Immunizations)    AST (U/L)   Date Value   04/09/2017 36 (H)     ALT (U/L)   Date Value   04/09/2017 23     BUN (mg/dL)   Date Value   04/11/2017 11      (goal A1C is < 7)   (goal LDL is <100) need 30-50% reduction from baseline     BP Readings from Last 3 Encounters:   09/07/17 108/70   06/12/17 108/64   04/21/17 125/79    (goal /80)      All Future Testing planned in [facility]:  Lab Frequency Next Occurrence   XR Chest Standard TWO VW Once 03/30/2017   Urine Culture Once 09/06/2017   Urinalysis Once 09/06/2017       Next Visit Date:  No future appointments.          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

## 2017-11-14 NOTE — TELEPHONE ENCOUNTER
Dosing previously was 1 tsp daily. Sent in as 1 ml.   Pharmacy questioning if could change back to 1 tsp daily
WS has already changed this. cm
Yes - I thought the script might have been incorrect but I was not able to change it as it was a co-sign. Please change.   Thank you
hypoxia and hypercapnia (HCC)     Myoclonus     Atelectasis     Dependence on enabling machine     Dependent on ventilator ([address]     Delay in development     Tonic-clonic seizures ([address]     History of recurrent pneumonia     Fistula     Encounter for removal of internal fixation device     Sialorrhea     Myoneural disorder ([address]     Muscle spasticity     Bladder retention

## 2017-12-22 ENCOUNTER — TELEPHONE (OUTPATIENT)
Dept: FAMILY MEDICINE CLINIC | Age: 22
End: 2017-12-22

## 2017-12-22 DIAGNOSIS — M41.9 SCOLIOSIS, UNSPECIFIED SCOLIOSIS TYPE, UNSPECIFIED SPINAL REGION: ICD-10-CM

## 2017-12-22 DIAGNOSIS — F84.2 RETT'S SYNDROME: Primary | Chronic | ICD-10-CM

## 2017-12-26 ENCOUNTER — TELEPHONE (OUTPATIENT)
Dept: FAMILY MEDICINE CLINIC | Age: 22
End: 2017-12-26

## 2017-12-28 RX ORDER — LEVETIRACETAM 100 MG/ML
SOLUTION ORAL
Qty: 2520 ML | Refills: 2 | Status: SHIPPED | OUTPATIENT
Start: 2017-12-28 | End: 2021-10-16

## 2017-12-28 NOTE — TELEPHONE ENCOUNTER
([address]     Myoclonus     Atelectasis     Dependence on enabling machine     Dependent on ventilator ([address]     Delay in development     Tonic-clonic seizures ([address]     History of recurrent pneumonia     Fistula     Encounter for removal of internal fixation device     Sialorrhea     Myoneural disorder ([address]     Muscle spasticity     Bladder retention

## 2018-01-13 ENCOUNTER — NURSE TRIAGE (OUTPATIENT)
Dept: OTHER | Age: 23
End: 2018-01-13

## 2018-01-13 ENCOUNTER — HOSPITAL ENCOUNTER (INPATIENT)
Age: 23
LOS: 4 days | Discharge: HOME HEALTH | DRG: 177 | End: 2018-01-17
Attending: EMERGENCY MEDICINE | Admitting: INTERNAL MEDICINE
Payer: COMMERCIAL

## 2018-01-13 ENCOUNTER — APPOINTMENT (OUTPATIENT)
Dept: GENERAL RADIOLOGY | Age: 23
DRG: 177 | End: 2018-01-13
Payer: COMMERCIAL

## 2018-01-13 DIAGNOSIS — R50.9 FEVER, UNSPECIFIED FEVER CAUSE: ICD-10-CM

## 2018-01-13 DIAGNOSIS — J18.9 PNEUMONIA OF LEFT LOWER LOBE DUE TO INFECTIOUS ORGANISM: Primary | ICD-10-CM

## 2018-01-13 PROBLEM — J10.1 INFLUENZA A: Status: ACTIVE | Noted: 2018-01-13

## 2018-01-13 LAB
-: ABNORMAL
ABSOLUTE EOS #: 0.04 K/UL (ref 0–0.44)
ABSOLUTE IMMATURE GRANULOCYTE: 0.08 K/UL (ref 0–0.3)
ABSOLUTE LYMPH #: 0.7 K/UL (ref 1.1–3.7)
ABSOLUTE MONO #: 0.83 K/UL (ref 0.1–1.4)
ADENOVIRUS PCR: NOT DETECTED
AMORPHOUS: ABNORMAL
ANION GAP SERPL CALCULATED.3IONS-SCNC: 13 MMOL/L (ref 9–17)
BACTERIA: ABNORMAL
BASOPHILS # BLD: 0 % (ref 0–2)
BASOPHILS ABSOLUTE: 0.04 K/UL (ref 0–0.2)
BILIRUBIN URINE: NEGATIVE
BORDETELLA PERTUSSIS PCR: NOT DETECTED
BUN BLDV-MCNC: 8 MG/DL (ref 6–20)
BUN/CREAT BLD: ABNORMAL (ref 9–20)
CALCIUM SERPL-MCNC: 8.7 MG/DL (ref 8.6–10.4)
CASTS UA: ABNORMAL /LPF (ref 0–2)
CHLAMYDIA PNEUMONIAE BY PCR: NOT DETECTED
CHLORIDE BLD-SCNC: 98 MMOL/L (ref 98–107)
CO2: 28 MMOL/L (ref 20–31)
COLOR: YELLOW
CORONAVIRUS 229E PCR: NOT DETECTED
CORONAVIRUS HKU1 PCR: NOT DETECTED
CORONAVIRUS NL63 PCR: NOT DETECTED
CORONAVIRUS OC43 PCR: NOT DETECTED
CREAT SERPL-MCNC: <0.2 MG/DL (ref 0.5–0.9)
CRYSTALS, UA: ABNORMAL /HPF
DIFFERENTIAL TYPE: ABNORMAL
EOSINOPHILS RELATIVE PERCENT: 0 % (ref 1–4)
EPITHELIAL CELLS UA: ABNORMAL /HPF (ref 0–5)
GFR AFRICAN AMERICAN: ABNORMAL ML/MIN
GFR NON-AFRICAN AMERICAN: ABNORMAL ML/MIN
GFR SERPL CREATININE-BSD FRML MDRD: ABNORMAL ML/MIN/{1.73_M2}
GFR SERPL CREATININE-BSD FRML MDRD: ABNORMAL ML/MIN/{1.73_M2}
GLUCOSE BLD-MCNC: 113 MG/DL (ref 70–99)
GLUCOSE URINE: NEGATIVE
HCT VFR BLD CALC: 41.6 % (ref 36.3–47.1)
HEMOGLOBIN: 13.1 G/DL (ref 11.9–15.1)
HUMAN METAPNEUMOVIRUS PCR: NOT DETECTED
IMMATURE GRANULOCYTES: 1 %
INFLUENZA A BY PCR: DETECTED
INFLUENZA A H1 (2009) PCR: NOT DETECTED
INFLUENZA A H1 PCR: NOT DETECTED
INFLUENZA A H3 PCR: DETECTED
INFLUENZA B BY PCR: NOT DETECTED
KEPPRA: 25 UG/ML
KETONES, URINE: ABNORMAL
LACTIC ACID, WHOLE BLOOD: 0.9 MMOL/L (ref 0.7–2.1)
LEUKOCYTE ESTERASE, URINE: NEGATIVE
LYMPHOCYTES # BLD: 5 % (ref 25–45)
MCH RBC QN AUTO: 27.1 PG (ref 25.2–33.5)
MCHC RBC AUTO-ENTMCNC: 31.5 G/DL (ref 28.4–34.8)
MCV RBC AUTO: 86 FL (ref 82.6–102.9)
MONOCYTES # BLD: 6 % (ref 2–8)
MUCUS: ABNORMAL
MYCOPLASMA PNEUMONIAE PCR: NOT DETECTED
NITRITE, URINE: NEGATIVE
OTHER OBSERVATIONS UA: ABNORMAL
PARAINFLUENZA 1 PCR: NOT DETECTED
PARAINFLUENZA 2 PCR: NOT DETECTED
PARAINFLUENZA 3 PCR: NOT DETECTED
PARAINFLUENZA 4 PCR: NOT DETECTED
PDW BLD-RTO: 13.9 % (ref 11.8–14.4)
PH UA: 7.5 (ref 5–8)
PLATELET # BLD: 224 K/UL (ref 138–453)
PLATELET ESTIMATE: ABNORMAL
PMV BLD AUTO: 10.2 FL (ref 8.1–13.5)
POTASSIUM SERPL-SCNC: 4.1 MMOL/L (ref 3.7–5.3)
PROTEIN UA: NEGATIVE
RBC # BLD: 4.84 M/UL (ref 3.95–5.11)
RBC # BLD: ABNORMAL 10*6/UL
RBC UA: ABNORMAL /HPF (ref 0–2)
RENAL EPITHELIAL, UA: ABNORMAL /HPF
RESP SYNCYTIAL VIRUS PCR: NOT DETECTED
RHINO/ENTEROVIRUS PCR: NOT DETECTED
SEG NEUTROPHILS: 88 % (ref 34–64)
SEGMENTED NEUTROPHILS ABSOLUTE COUNT: 13.12 K/UL (ref 1.5–8.1)
SODIUM BLD-SCNC: 139 MMOL/L (ref 135–144)
SOURCE: ABNORMAL
SPECIFIC GRAVITY UA: 1.01 (ref 1–1.03)
TRICHOMONAS: ABNORMAL
TURBIDITY: CLEAR
URINE HGB: NEGATIVE
UROBILINOGEN, URINE: NORMAL
WBC # BLD: 14.8 K/UL (ref 4.5–13.5)
WBC # BLD: ABNORMAL 10*3/UL
WBC UA: ABNORMAL /HPF (ref 0–5)
YEAST: ABNORMAL

## 2018-01-13 PROCEDURE — 96365 THER/PROPH/DIAG IV INF INIT: CPT

## 2018-01-13 PROCEDURE — 71046 X-RAY EXAM CHEST 2 VIEWS: CPT

## 2018-01-13 PROCEDURE — 85025 COMPLETE CBC W/AUTO DIFF WBC: CPT

## 2018-01-13 PROCEDURE — 2580000003 HC RX 258: Performed by: EMERGENCY MEDICINE

## 2018-01-13 PROCEDURE — 80177 DRUG SCRN QUAN LEVETIRACETAM: CPT

## 2018-01-13 PROCEDURE — 87486 CHLMYD PNEUM DNA AMP PROBE: CPT

## 2018-01-13 PROCEDURE — 87581 M.PNEUMON DNA AMP PROBE: CPT

## 2018-01-13 PROCEDURE — 83605 ASSAY OF LACTIC ACID: CPT

## 2018-01-13 PROCEDURE — 87040 BLOOD CULTURE FOR BACTERIA: CPT

## 2018-01-13 PROCEDURE — 6360000002 HC RX W HCPCS: Performed by: EMERGENCY MEDICINE

## 2018-01-13 PROCEDURE — 1200000000 HC SEMI PRIVATE

## 2018-01-13 PROCEDURE — 99284 EMERGENCY DEPT VISIT MOD MDM: CPT

## 2018-01-13 PROCEDURE — 6370000000 HC RX 637 (ALT 250 FOR IP): Performed by: STUDENT IN AN ORGANIZED HEALTH CARE EDUCATION/TRAINING PROGRAM

## 2018-01-13 PROCEDURE — 2580000003 HC RX 258: Performed by: STUDENT IN AN ORGANIZED HEALTH CARE EDUCATION/TRAINING PROGRAM

## 2018-01-13 PROCEDURE — 80048 BASIC METABOLIC PNL TOTAL CA: CPT

## 2018-01-13 PROCEDURE — 6370000000 HC RX 637 (ALT 250 FOR IP): Performed by: EMERGENCY MEDICINE

## 2018-01-13 PROCEDURE — 87798 DETECT AGENT NOS DNA AMP: CPT

## 2018-01-13 PROCEDURE — 81001 URINALYSIS AUTO W/SCOPE: CPT

## 2018-01-13 PROCEDURE — 87633 RESP VIRUS 12-25 TARGETS: CPT

## 2018-01-13 RX ORDER — OSELTAMIVIR PHOSPHATE 6 MG/ML
75 FOR SUSPENSION ORAL ONCE
Status: COMPLETED | OUTPATIENT
Start: 2018-01-13 | End: 2018-01-13

## 2018-01-13 RX ORDER — TRAZODONE HYDROCHLORIDE 100 MG/1
100 TABLET ORAL NIGHTLY
Status: DISCONTINUED | OUTPATIENT
Start: 2018-01-13 | End: 2018-01-17 | Stop reason: HOSPADM

## 2018-01-13 RX ORDER — LEVETIRACETAM 100 MG/ML
500 SOLUTION ORAL 2 TIMES DAILY
Status: DISCONTINUED | OUTPATIENT
Start: 2018-01-13 | End: 2018-01-17 | Stop reason: HOSPADM

## 2018-01-13 RX ORDER — CLONAZEPAM 0.5 MG/1
0.75 TABLET ORAL 3 TIMES DAILY PRN
Status: DISCONTINUED | OUTPATIENT
Start: 2018-01-13 | End: 2018-01-17 | Stop reason: HOSPADM

## 2018-01-13 RX ORDER — SODIUM CHLORIDE 0.9 % (FLUSH) 0.9 %
10 SYRINGE (ML) INJECTION EVERY 12 HOURS SCHEDULED
Status: DISCONTINUED | OUTPATIENT
Start: 2018-01-13 | End: 2018-01-17 | Stop reason: HOSPADM

## 2018-01-13 RX ORDER — TRAMADOL HYDROCHLORIDE 50 MG/1
50 TABLET ORAL EVERY 8 HOURS PRN
Status: DISCONTINUED | OUTPATIENT
Start: 2018-01-13 | End: 2018-01-17 | Stop reason: HOSPADM

## 2018-01-13 RX ORDER — ACETAMINOPHEN 500 MG
500 TABLET ORAL EVERY 4 HOURS PRN
Status: DISCONTINUED | OUTPATIENT
Start: 2018-01-13 | End: 2018-01-13 | Stop reason: SDUPTHER

## 2018-01-13 RX ORDER — MULTIVIT-MIN/FERROUS GLUCONATE 9 MG/15 ML
15 LIQUID (ML) ORAL DAILY
Status: DISCONTINUED | OUTPATIENT
Start: 2018-01-14 | End: 2018-01-17 | Stop reason: HOSPADM

## 2018-01-13 RX ORDER — ONDANSETRON 2 MG/ML
4 INJECTION INTRAMUSCULAR; INTRAVENOUS EVERY 6 HOURS PRN
Status: DISCONTINUED | OUTPATIENT
Start: 2018-01-13 | End: 2018-01-17 | Stop reason: HOSPADM

## 2018-01-13 RX ORDER — SODIUM CHLORIDE 0.9 % (FLUSH) 0.9 %
10 SYRINGE (ML) INJECTION PRN
Status: DISCONTINUED | OUTPATIENT
Start: 2018-01-13 | End: 2018-01-17 | Stop reason: HOSPADM

## 2018-01-13 RX ORDER — SODIUM CHLORIDE 9 MG/ML
INJECTION, SOLUTION INTRAVENOUS CONTINUOUS
Status: DISCONTINUED | OUTPATIENT
Start: 2018-01-13 | End: 2018-01-17 | Stop reason: HOSPADM

## 2018-01-13 RX ORDER — 0.9 % SODIUM CHLORIDE 0.9 %
1000 INTRAVENOUS SOLUTION INTRAVENOUS ONCE
Status: COMPLETED | OUTPATIENT
Start: 2018-01-13 | End: 2018-01-13

## 2018-01-13 RX ORDER — LACTOBACILLUS RHAMNOSUS GG 10B CELL
1 CAPSULE ORAL
Status: DISCONTINUED | OUTPATIENT
Start: 2018-01-14 | End: 2018-01-17 | Stop reason: HOSPADM

## 2018-01-13 RX ORDER — SCOLOPAMINE TRANSDERMAL SYSTEM 1 MG/1
1 PATCH, EXTENDED RELEASE TRANSDERMAL
Status: DISCONTINUED | OUTPATIENT
Start: 2018-01-13 | End: 2018-01-17 | Stop reason: HOSPADM

## 2018-01-13 RX ORDER — DOXAZOSIN 2 MG/1
1 TABLET ORAL NIGHTLY
Status: DISCONTINUED | OUTPATIENT
Start: 2018-01-13 | End: 2018-01-17 | Stop reason: HOSPADM

## 2018-01-13 RX ORDER — ACETAMINOPHEN 325 MG/1
650 TABLET ORAL EVERY 4 HOURS PRN
Status: DISCONTINUED | OUTPATIENT
Start: 2018-01-13 | End: 2018-01-17 | Stop reason: HOSPADM

## 2018-01-13 RX ORDER — LIDOCAINE HYDROCHLORIDE 10 MG/ML
20 INJECTION, SOLUTION INFILTRATION; PERINEURAL ONCE
Status: DISCONTINUED | OUTPATIENT
Start: 2018-01-13 | End: 2018-01-17 | Stop reason: HOSPADM

## 2018-01-13 RX ORDER — POLYETHYLENE GLYCOL 3350 17 G/17G
17 POWDER, FOR SOLUTION ORAL DAILY
Status: DISCONTINUED | OUTPATIENT
Start: 2018-01-14 | End: 2018-01-17 | Stop reason: HOSPADM

## 2018-01-13 RX ORDER — PANTOPRAZOLE SODIUM 40 MG/1
40 TABLET, DELAYED RELEASE ORAL
Status: DISCONTINUED | OUTPATIENT
Start: 2018-01-14 | End: 2018-01-17 | Stop reason: HOSPADM

## 2018-01-13 RX ORDER — OSELTAMIVIR PHOSPHATE 6 MG/ML
75 FOR SUSPENSION ORAL 2 TIMES DAILY
Status: DISCONTINUED | OUTPATIENT
Start: 2018-01-14 | End: 2018-01-17 | Stop reason: HOSPADM

## 2018-01-13 RX ORDER — LEVOFLOXACIN 25 MG/ML
8 SOLUTION ORAL DAILY
Status: DISCONTINUED | OUTPATIENT
Start: 2018-01-14 | End: 2018-01-14

## 2018-01-13 RX ORDER — ONDANSETRON 2 MG/ML
4 INJECTION INTRAMUSCULAR; INTRAVENOUS EVERY 8 HOURS PRN
Status: DISCONTINUED | OUTPATIENT
Start: 2018-01-13 | End: 2018-01-13 | Stop reason: SDUPTHER

## 2018-01-13 RX ADMIN — IBUPROFEN 386 MG: 100 SUSPENSION ORAL at 23:37

## 2018-01-13 RX ADMIN — Medication 24 ML: at 23:33

## 2018-01-13 RX ADMIN — TRAZODONE HYDROCHLORIDE 100 MG: 100 TABLET ORAL at 23:18

## 2018-01-13 RX ADMIN — SODIUM CHLORIDE 1000 ML: 9 INJECTION, SOLUTION INTRAVENOUS at 20:22

## 2018-01-13 RX ADMIN — CEFTRIAXONE SODIUM 1 G: 1 INJECTION, POWDER, FOR SOLUTION INTRAMUSCULAR; INTRAVENOUS at 20:24

## 2018-01-13 RX ADMIN — DOXAZOSIN 1 MG: 2 TABLET ORAL at 23:17

## 2018-01-13 RX ADMIN — Medication 75 MG: at 21:44

## 2018-01-13 RX ADMIN — SODIUM CHLORIDE: 9 INJECTION, SOLUTION INTRAVENOUS at 22:51

## 2018-01-13 RX ADMIN — DEXTROSE 500 MG: 5 SOLUTION INTRAVENOUS at 22:51

## 2018-01-13 RX ADMIN — LEVETIRACETAM 500 MG: 100 SOLUTION ORAL at 23:17

## 2018-01-13 RX ADMIN — PANTOPRAZOLE SODIUM 40 MG: 40 TABLET, DELAYED RELEASE ORAL at 23:18

## 2018-01-13 ASSESSMENT — ENCOUNTER SYMPTOMS
SHORTNESS OF BREATH: 0
COUGH: 1
DIARRHEA: 0
CHEST TIGHTNESS: 0
NAUSEA: 0
ABDOMINAL PAIN: 0
VOMITING: 0
SORE THROAT: 0

## 2018-01-13 ASSESSMENT — PAIN SCALES - GENERAL: PAINLEVEL_OUTOF10: 0

## 2018-01-13 NOTE — ED PROVIDER NOTES
Betadine. Lidocaine 1%, 3 mL's, used for analgesia. Sterile precautions observed with sterile gloves, probe cover and sterile gel. With ultrasound guidance, femoral vein was accessed and 20 mL of blood was obtained. Patient tolerated well. CONSULTS:  IP CONSULT TO INTERNAL MEDICINE  IP CONSULT TO SOCIAL WORK    CRITICAL CARE:  None    FINAL IMPRESSION      1. Pneumonia of left lower lobe due to infectious organism ([address]    2.  Fever, unspecified fever cause          DISPOSITION / PLAN     DISPOSITION  Admitted      PATIENT REFERRED TO:  Frederick Davis MD  [address] [place], [place] [address]y [id number] [id number]            DISCHARGE MEDICATIONS:  New Prescriptions    No medications on file       Rubina Patrick MD  Emergency Medicine Resident    (Please note that portions of this note were completed with a voice recognition program.  Efforts were made to edit the dictations but occasionally words are     Rubina Patrick MD  Resident  01/13/18 [id number]

## 2018-01-14 ENCOUNTER — APPOINTMENT (OUTPATIENT)
Dept: GENERAL RADIOLOGY | Age: 23
DRG: 177 | End: 2018-01-14
Payer: COMMERCIAL

## 2018-01-14 LAB
ABSOLUTE EOS #: 0 K/UL (ref 0–0.4)
ABSOLUTE IMMATURE GRANULOCYTE: 0.28 K/UL (ref 0–0.3)
ABSOLUTE LYMPH #: 0.5 K/UL (ref 1–4.8)
ABSOLUTE MONO #: 0.28 K/UL (ref 0.1–1.4)
ALLEN TEST: POSITIVE
ANION GAP SERPL CALCULATED.3IONS-SCNC: 13 MMOL/L (ref 9–17)
BASOPHILS # BLD: 0 % (ref 0–2)
BASOPHILS ABSOLUTE: 0 K/UL (ref 0–0.2)
BUN BLDV-MCNC: 6 MG/DL (ref 6–20)
BUN/CREAT BLD: ABNORMAL (ref 9–20)
CALCIUM SERPL-MCNC: 7.2 MG/DL (ref 8.6–10.4)
CHLORIDE BLD-SCNC: 99 MMOL/L (ref 98–107)
CO2: 24 MMOL/L (ref 20–31)
CREAT SERPL-MCNC: <0.2 MG/DL (ref 0.5–0.9)
DIFFERENTIAL TYPE: ABNORMAL
EKG ATRIAL RATE: 131 BPM
EKG P AXIS: 77 DEGREES
EKG P-R INTERVAL: 162 MS
EKG Q-T INTERVAL: 280 MS
EKG QRS DURATION: 60 MS
EKG QTC CALCULATION (BAZETT): 413 MS
EKG R AXIS: 43 DEGREES
EKG T AXIS: 12 DEGREES
EKG VENTRICULAR RATE: 131 BPM
EOSINOPHILS RELATIVE PERCENT: 0 % (ref 1–4)
FIO2: 5
GFR AFRICAN AMERICAN: ABNORMAL ML/MIN
GFR NON-AFRICAN AMERICAN: ABNORMAL ML/MIN
GFR SERPL CREATININE-BSD FRML MDRD: ABNORMAL ML/MIN/{1.73_M2}
GFR SERPL CREATININE-BSD FRML MDRD: ABNORMAL ML/MIN/{1.73_M2}
GLUCOSE BLD-MCNC: 93 MG/DL (ref 70–99)
HCT VFR BLD CALC: 33.8 % (ref 36.3–47.1)
HEMOGLOBIN: 11 G/DL (ref 11.9–15.1)
IMMATURE GRANULOCYTES: 4 %
LYMPHOCYTES # BLD: 7 % (ref 25–45)
MCH RBC QN AUTO: 27.9 PG (ref 25.2–33.5)
MCHC RBC AUTO-ENTMCNC: 32.5 G/DL (ref 28.4–34.8)
MCV RBC AUTO: 85.8 FL (ref 82.6–102.9)
MODE: ABNORMAL
MONOCYTES # BLD: 4 % (ref 2–8)
MORPHOLOGY: ABNORMAL
NEGATIVE BASE EXCESS, ART: ABNORMAL (ref 0–2)
O2 DEVICE/FLOW/%: ABNORMAL
PATIENT TEMP: ABNORMAL
PDW BLD-RTO: 13.9 % (ref 11.8–14.4)
PLATELET # BLD: ABNORMAL K/UL (ref 138–453)
PLATELET ESTIMATE: ABNORMAL
PLATELET, FLUORESCENCE: NORMAL K/UL (ref 138–453)
PLATELET, IMMATURE FRACTION: NORMAL % (ref 1.1–10.3)
PMV BLD AUTO: ABNORMAL FL (ref 8.1–13.5)
POC HCO3: 31.4 MMOL/L (ref 21–28)
POC O2 SATURATION: 92 % (ref 94–98)
POC PCO2 TEMP: ABNORMAL MM HG
POC PCO2: 52.5 MM HG (ref 35–48)
POC PH TEMP: ABNORMAL
POC PH: 7.38 (ref 7.35–7.45)
POC PO2 TEMP: ABNORMAL MM HG
POC PO2: 65 MM HG (ref 83–108)
POSITIVE BASE EXCESS, ART: 5 (ref 0–3)
POTASSIUM SERPL-SCNC: 3.9 MMOL/L (ref 3.7–5.3)
RBC # BLD: 3.94 M/UL (ref 3.95–5.11)
RBC # BLD: ABNORMAL 10*6/UL
SAMPLE SITE: ABNORMAL
SEG NEUTROPHILS: 85 % (ref 34–64)
SEGMENTED NEUTROPHILS ABSOLUTE COUNT: 6.04 K/UL (ref 1.8–7.7)
SODIUM BLD-SCNC: 136 MMOL/L (ref 135–144)
TCO2 (CALC), ART: 33 MMOL/L (ref 22–29)
WBC # BLD: 7.1 K/UL (ref 4.5–13.5)
WBC # BLD: ABNORMAL 10*3/UL

## 2018-01-14 PROCEDURE — 2500000003 HC RX 250 WO HCPCS: Performed by: STUDENT IN AN ORGANIZED HEALTH CARE EDUCATION/TRAINING PROGRAM

## 2018-01-14 PROCEDURE — 97110 THERAPEUTIC EXERCISES: CPT

## 2018-01-14 PROCEDURE — 36415 COLL VENOUS BLD VENIPUNCTURE: CPT

## 2018-01-14 PROCEDURE — 71045 X-RAY EXAM CHEST 1 VIEW: CPT

## 2018-01-14 PROCEDURE — 6360000002 HC RX W HCPCS: Performed by: STUDENT IN AN ORGANIZED HEALTH CARE EDUCATION/TRAINING PROGRAM

## 2018-01-14 PROCEDURE — 94762 N-INVAS EAR/PLS OXIMTRY CONT: CPT

## 2018-01-14 PROCEDURE — G8978 MOBILITY CURRENT STATUS: HCPCS

## 2018-01-14 PROCEDURE — 94667 MNPJ CHEST WALL 1ST: CPT

## 2018-01-14 PROCEDURE — 85025 COMPLETE CBC W/AUTO DIFF WBC: CPT

## 2018-01-14 PROCEDURE — 2060000000 HC ICU INTERMEDIATE R&B

## 2018-01-14 PROCEDURE — 82803 BLOOD GASES ANY COMBINATION: CPT

## 2018-01-14 PROCEDURE — 97161 PT EVAL LOW COMPLEX 20 MIN: CPT

## 2018-01-14 PROCEDURE — G8979 MOBILITY GOAL STATUS: HCPCS

## 2018-01-14 PROCEDURE — 80048 BASIC METABOLIC PNL TOTAL CA: CPT

## 2018-01-14 PROCEDURE — 94668 MNPJ CHEST WALL SBSQ: CPT

## 2018-01-14 PROCEDURE — 6370000000 HC RX 637 (ALT 250 FOR IP): Performed by: STUDENT IN AN ORGANIZED HEALTH CARE EDUCATION/TRAINING PROGRAM

## 2018-01-14 PROCEDURE — 99223 1ST HOSP IP/OBS HIGH 75: CPT | Performed by: INTERNAL MEDICINE

## 2018-01-14 PROCEDURE — 93005 ELECTROCARDIOGRAM TRACING: CPT

## 2018-01-14 PROCEDURE — 36600 WITHDRAWAL OF ARTERIAL BLOOD: CPT

## 2018-01-14 RX ORDER — LEVALBUTEROL INHALATION SOLUTION 1.25 MG/3ML
1.25 SOLUTION RESPIRATORY (INHALATION) EVERY 6 HOURS PRN
Status: DISCONTINUED | OUTPATIENT
Start: 2018-01-14 | End: 2018-01-17 | Stop reason: HOSPADM

## 2018-01-14 RX ORDER — LEVOFLOXACIN 5 MG/ML
500 INJECTION, SOLUTION INTRAVENOUS EVERY 24 HOURS
Status: DISCONTINUED | OUTPATIENT
Start: 2018-01-14 | End: 2018-01-14

## 2018-01-14 RX ADMIN — Medication 24 ML: at 20:38

## 2018-01-14 RX ADMIN — Medication 75 MG: at 20:37

## 2018-01-14 RX ADMIN — TRAZODONE HYDROCHLORIDE 100 MG: 100 TABLET ORAL at 20:37

## 2018-01-14 RX ADMIN — TAZOBACTAM SODIUM AND PIPERACILLIN SODIUM 3.38 G: 375; 3 INJECTION, SOLUTION INTRAVENOUS at 23:18

## 2018-01-14 RX ADMIN — Medication 15 ML: at 09:41

## 2018-01-14 RX ADMIN — DOXAZOSIN 1 MG: 2 TABLET ORAL at 20:37

## 2018-01-14 RX ADMIN — Medication 400 UNITS: at 09:42

## 2018-01-14 RX ADMIN — ENOXAPARIN SODIUM 30 MG: 30 INJECTION SUBCUTANEOUS at 09:42

## 2018-01-14 RX ADMIN — IBUPROFEN 386 MG: 100 SUSPENSION ORAL at 09:54

## 2018-01-14 RX ADMIN — LEVOFLOXACIN 500 MG: 5 INJECTION, SOLUTION INTRAVENOUS at 09:43

## 2018-01-14 RX ADMIN — IBUPROFEN 386 MG: 100 SUSPENSION ORAL at 14:46

## 2018-01-14 RX ADMIN — LEVETIRACETAM 500 MG: 100 SOLUTION ORAL at 09:42

## 2018-01-14 RX ADMIN — Medication 75 MG: at 09:42

## 2018-01-14 RX ADMIN — LEVETIRACETAM 500 MG: 100 SOLUTION ORAL at 20:37

## 2018-01-14 RX ADMIN — IBUPROFEN 386 MG: 100 SUSPENSION ORAL at 06:23

## 2018-01-14 RX ADMIN — Medication 24 ML: at 09:42

## 2018-01-14 RX ADMIN — ACETAMINOPHEN 650 MG: 325 TABLET ORAL at 12:36

## 2018-01-14 ASSESSMENT — PAIN SCALES - GENERAL
PAINLEVEL_OUTOF10: 0

## 2018-01-14 NOTE — PROGRESS NOTES
pain)       Orientation  Orientation  Overall Orientation Status: Impaired  Orientation Level: Unable to assess (Pt is non verbal at baseline)    Social/Functional History  Social/Functional History  Lives With: Family (mother, father and sibilings)  Type of Home: House  Home Layout: One level  Home Access: Ramped entrance  Home Equipment: [address] Help From: Family, Home health (Family assists, home health aide 30hrs/wk, Nurse Mon-Thurs)  ADL Assistance: Needs assistance  Homemaking Assistance: Needs assistance  Homemaking Responsibilities: No  Ambulation Assistance: Needs assistance (max A household distances)  Transfer Assistance: Needs assistance  Active : No  Occupation: Student  Type of occupation: [employer]: [employer]  IADL Comments: Mother reports pt currently gets therapy at school but states this will be ending soon and she is interested in home therapy.  Mom states pt assist with supine to sit transfer and is max A to ambulate with family around the house (amb about 50ft normally)   Mother presented following treatment to answer home questions  Objective          PROM RLE (degrees)  RLE PROM: WFL  PROM LLE (degrees)  LLE PROM: WFL  PROM RUE (degrees)  RUE General PROM: Shoulder limited to approximately 25% of range, elbow/wrist is casted  PROM LUE (degrees)  LUE PROM: Exceptions  LUE General PROM: shoulder limited to approximately 25% of range, elbow   Strength Other  Other: Not formally assessed- no active movement noted during evaluation  Tone RLE  RLE Tone: Hypertonic  Tone Description: spastic  Tone LLE  LLE Tone: Hypertonic  Tone Description: spastic      Bed mobility  Supine to Sit: Dependent/Total  Sit to Supine: Dependent/Total  Scooting: Dependent/Total   Pt was EOB x2-3 minutes  Transfers  Comment: Not attempted this date  Ambulation  Ambulation?: No  Stairs/Curb  Stairs?: No     Balance  Posture: Poor  Sitting - Static:

## 2018-01-14 NOTE — PROGRESS NOTES
lower lobe due to infectious organism [facility])    Acute respiratory failure with hypercapnia (HCC)    Skin infection at gastrostomy tube site [facility])    Chronic respiratory failure with hypoxia and hypercapnia (HCC)    Pneumonia due to infectious organism    Body temperature low    Hypotension    Nonverbal    Sepsis (HCC)    Hypothermia due to non-environmental cause    Prolonged QT interval    Urinary retention with incomplete bladder emptying    Chronic respiratory failure with hypoxia and hypercapnia (HCC)    Myoclonus    Atelectasis    Dependence on enabling machine    Dependent on ventilator (HCC)    Delay in development    Tonic-clonic seizures (HCC)    History of recurrent pneumonia    Fistula    Encounter for removal of internal fixation device    Sialorrhea    Myoneural disorder (HCC)    Muscle spasticity    Bladder retention    Community acquired pneumonia of left lower lobe of lung [facility]    Influenza A       PLAN:  1. Community-acquired pneumonia due to influenza A  Wbc 14.8  Chest x-ray showed left lower lobe consolidation. Continue Tamiflu and levofloxacin  Continue IV fluids  Monitor heart rate     2. History of seizures  Continue Keppra     DVT prophylaxis  Lovenox 30 mg  PT and OT evaluation    MD JEFFERSON Lacey[address] Phone [phone]   Fax: [phone]  Answering Service: [phone]    I have discussed the care of Prasanna Tang , including pertinent history and exam findings,    today with the resident. I have seen and examined the patient and the key elements of all parts of the encounter have been performed by me . I agree with the assessment, plan and orders as documented by the resident.        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

## 2018-01-14 NOTE — PROGRESS NOTES
Rylee Matson is a 25 y.o. with PMH of Rett syndrome, restrictive lung disease and central sleep apnea, Chronic hypoxemic, convulsions, hypercarbic respiratory failure presented to the ER with high-grade fever of more than 102°F, cough, high heart rate since this morning. Father and mother at bedside. States she goes to the school 3 times a week. Doesn't know if someone was sick over there. She has no nausea or vomiting or diarrhea. No abdominal pain. In the ER, chest x-ray revealed left lower lobe pneumonia. WBC of 14.8. Urinalysis was negative. Was given a dose of Rocephin and Zithromax in the ER. On physical examination, patient is nonverbal.  Has kyphoscoliosis. Bedridden. Awake and alert. Regular rhythm, rapid rate, saturating more than 97% on room air. Has J-tube in place. 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 Start Tamiflu suspension 75 mg twice a day. 2. Will start Levaquin suspension by mouth from tomorrow 8 mg/kg daily for 4 days. 3. Resume oral diet. 4. Normal saline at 50 mL per hour. 5. Resume all medications. 6. Discharge plan home with family support. Maribell De La Fuente MD,   PGY-3 Internal Medicine Resident. [address] [place] [address]    1/13/2018  8:28 PM

## 2018-01-14 NOTE — H&P
[facility]    HISTORY AND PHYSICAL EXAMINATION            Date:   1/13/2018  Patient name:  Aishwarya Nunez  Date of admission:  1/13/2018  4:17 PM  MRN:   [id number]  YOB: 1995    CHIEF COMPLAINT     History Obtained From:  Patient and chart review. HISTORY OF PRESENT ILLNESS      The patient is a 25 y.o.  female  With past medical history of Rett syndrome, obstructive sleep apnea, GERD, seizures who is admitted to the hospital for fever along with cough and high heart rate. Patient's parents were at bedside and they told that her fever started today morning and she was having high-grade fever of more than 102°F.   Her Cough also started this morning with white colored thick sputum. They told that her heart rate was high in the 140s. No complaint of chest pain, nausea, vomiting, diarrhea or any change in urinary habits, any change in bowel habits. Doesn't know if anybody sick around. Patient does not interact and she  Goes to school 3 times a week. In ER chest x-ray revealed left lower pneumonia. Blood pressure was 109/ 77, heart rate 117, respiratory rate 20, temperature 99.5. Labs  WBC 14.8, UA negative, positive for influenza A. Patient was admitted last year in April for aspiration pneumonia. 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 PAST SURGICAL HISTORY       has a past surgical history that includes baclofen pump implantation (87203111); Gastric fundoplication (43121884); Spinal fusion ([id number]); Cholecystectomy (86020869); and baclofen pump implantation (06/28/2016).      HOME

## 2018-01-14 NOTE — ED NOTES
Dr Puja Ortega to perform a R fem stick for labs. Good blood return noted. Pressure applied to site after procedure.  Pt ramin Mejia RN  01/13/18 2052

## 2018-01-14 NOTE — PROGRESS NOTES
and uses the cough assist device QID at home, as well as bronchodilator nebs PRN.      RR 18  Breath Sounds: diminished t/o, fine crackles in bases      · Bronchodilator assessment at level  1  ·   ·   · [x]    Bronchodilator Assessment  BRONCHODILATOR ASSESSMENT SCORE  Score 0 1 2 3 4 5   Breath Sounds   []  Patient Baseline [x]  No Wheeze good aeration []  Faint, scattered wheezing, good aeration []  Expiratory Wheezing and or moderately diminished []  Insp/Exp wheeze and/or very diminished []  Insp/Exp and/ or marked distress   Respiratory Rate   []  Patient Baseline [x]  Less than 20 []  Less than 20 []  20-25 []  Greater than 25 []  Greater than 25   Peak flow % of Pred or PB [x]  NA   []  Greater than 90%  []  81-90% []  71-80% []  Less than or equal to 70%  or unable to perform []  Unable due to Respiratory Distress   Dyspnea re []  Patient Baseline [x]  No SOB []  No SOB []  SOB on exertion []  SOB min activity []  At rest/acute   e FEV% Predicted       [x]  NA []  Above 69%  []  Unable []  Above 60-69%  []  Unable []  Above 50-59%  []  Unable []  Above 35-49%  []  Unable []  Less than 35%  []  Unable

## 2018-01-14 NOTE — CARE COORDINATION
Case Management Initial Discharge Plan  Christian Ene,         Readmission Risk              Readmission Risk:        0       Age 72 or Greater:  0    Admitted from SNF or Requires Paid or Family Care:      Currently has CHF,COPD,ARF,CRI,or is on dialysis:      Takes more than 5 Prescription Medications:      Takes Digoxin,Insulin,Anticoagulants,Narcotics or ASA/Plavix:      [address] in Past 12 Months:      On Disability:      Patient Considers own Health:              Met with:family member patient and mother to discuss discharge plans.    Information verified: address, contacts, phone number, , insurance Yes  PCP: Trudy Blackburn MD  Date of last visit: recent    Insurance Provider: [employer]     Discharge Planning  Current Residence:  Private Residence  Living Arrangements:  Parent   Home has one stories/no stairs to climb  Support Systems:  Parent  Current Services PTA:  Home Bipap, Home Care, Durable Medical Equipment Supplier:   Patient able to perform ADL's:Dependent  DME used to aid ambulation prior to admission: w/c/during admission, none     Potential Assistance Needed:  N/A    Pharmacy: [facility] Medications:  No  Does patient want to participate in local refill/ meds to beds program?  Yes    Patient agreeable to home care: Yes  Freedom of choice provided:  yes      Type of [facility] Care Services:  [facility]  Patient expects to be discharged to:  Home    Prior SNF/Rehab Placement and Facility: n/a  Agreeable to SNF/Rehab: No  [place] of choice provided: n/a   Evaluation: no    Expected Discharge date:  01/15/18  Follow Up Appointment: Best Day/ Time: Tuesday AM    Transportation provider: mom  Transportation arrangements needed for discharge: No    Discharge Plan: Pt has Waiver, has Skilled nursing care [facility] and [facility] through [facility], wants to continue         Electronically signed by Sathya Carty RN on 1/14/18 at 2:39 PM

## 2018-01-15 LAB
ABSOLUTE EOS #: <0.03 K/UL (ref 0–0.44)
ABSOLUTE IMMATURE GRANULOCYTE: <0.03 K/UL (ref 0–0.3)
ABSOLUTE LYMPH #: 0.9 K/UL (ref 1.1–3.7)
ABSOLUTE MONO #: 0.32 K/UL (ref 0.1–1.4)
ANION GAP SERPL CALCULATED.3IONS-SCNC: 8 MMOL/L (ref 9–17)
BASOPHILS # BLD: 0 % (ref 0–2)
BASOPHILS ABSOLUTE: <0.03 K/UL (ref 0–0.2)
BUN BLDV-MCNC: 12 MG/DL (ref 6–20)
BUN/CREAT BLD: ABNORMAL (ref 9–20)
CALCIUM SERPL-MCNC: 8.8 MG/DL (ref 8.6–10.4)
CHLORIDE BLD-SCNC: 101 MMOL/L (ref 98–107)
CO2: 27 MMOL/L (ref 20–31)
CREAT SERPL-MCNC: <0.2 MG/DL (ref 0.5–0.9)
DIFFERENTIAL TYPE: ABNORMAL
EOSINOPHILS RELATIVE PERCENT: 0 % (ref 1–4)
GFR AFRICAN AMERICAN: ABNORMAL ML/MIN
GFR NON-AFRICAN AMERICAN: ABNORMAL ML/MIN
GFR SERPL CREATININE-BSD FRML MDRD: ABNORMAL ML/MIN/{1.73_M2}
GFR SERPL CREATININE-BSD FRML MDRD: ABNORMAL ML/MIN/{1.73_M2}
GLUCOSE BLD-MCNC: 139 MG/DL (ref 70–99)
HCT VFR BLD CALC: 32.8 % (ref 36.3–47.1)
HEMOGLOBIN: 10.3 G/DL (ref 11.9–15.1)
IMMATURE GRANULOCYTES: 0 %
KEPPRA: 11 UG/ML
LYMPHOCYTES # BLD: 14 % (ref 25–45)
MCH RBC QN AUTO: 27.6 PG (ref 25.2–33.5)
MCHC RBC AUTO-ENTMCNC: 31.4 G/DL (ref 28.4–34.8)
MCV RBC AUTO: 87.9 FL (ref 82.6–102.9)
MONOCYTES # BLD: 5 % (ref 2–8)
PDW BLD-RTO: 14.5 % (ref 11.8–14.4)
PLATELET # BLD: ABNORMAL K/UL (ref 138–453)
PLATELET ESTIMATE: ABNORMAL
PLATELET, FLUORESCENCE: 152 K/UL (ref 138–453)
PLATELET, IMMATURE FRACTION: 3.4 % (ref 1.1–10.3)
PMV BLD AUTO: ABNORMAL FL (ref 8.1–13.5)
POTASSIUM SERPL-SCNC: 3.2 MMOL/L (ref 3.7–5.3)
RBC # BLD: 3.73 M/UL (ref 3.95–5.11)
RBC # BLD: ABNORMAL 10*6/UL
SEG NEUTROPHILS: 80 % (ref 34–64)
SEGMENTED NEUTROPHILS ABSOLUTE COUNT: 4.97 K/UL (ref 1.5–8.1)
SODIUM BLD-SCNC: 136 MMOL/L (ref 135–144)
WBC # BLD: 6.2 K/UL (ref 4.5–13.5)
WBC # BLD: ABNORMAL 10*3/UL

## 2018-01-15 PROCEDURE — 2500000003 HC RX 250 WO HCPCS: Performed by: STUDENT IN AN ORGANIZED HEALTH CARE EDUCATION/TRAINING PROGRAM

## 2018-01-15 PROCEDURE — 2060000000 HC ICU INTERMEDIATE R&B

## 2018-01-15 PROCEDURE — 94762 N-INVAS EAR/PLS OXIMTRY CONT: CPT

## 2018-01-15 PROCEDURE — 2580000003 HC RX 258: Performed by: STUDENT IN AN ORGANIZED HEALTH CARE EDUCATION/TRAINING PROGRAM

## 2018-01-15 PROCEDURE — 80048 BASIC METABOLIC PNL TOTAL CA: CPT

## 2018-01-15 PROCEDURE — 6360000002 HC RX W HCPCS: Performed by: STUDENT IN AN ORGANIZED HEALTH CARE EDUCATION/TRAINING PROGRAM

## 2018-01-15 PROCEDURE — 80177 DRUG SCRN QUAN LEVETIRACETAM: CPT

## 2018-01-15 PROCEDURE — 99233 SBSQ HOSP IP/OBS HIGH 50: CPT | Performed by: INTERNAL MEDICINE

## 2018-01-15 PROCEDURE — 99255 IP/OBS CONSLTJ NEW/EST HI 80: CPT | Performed by: PSYCHIATRY & NEUROLOGY

## 2018-01-15 PROCEDURE — 6370000000 HC RX 637 (ALT 250 FOR IP): Performed by: STUDENT IN AN ORGANIZED HEALTH CARE EDUCATION/TRAINING PROGRAM

## 2018-01-15 PROCEDURE — 99255 IP/OBS CONSLTJ NEW/EST HI 80: CPT | Performed by: INTERNAL MEDICINE

## 2018-01-15 PROCEDURE — 85025 COMPLETE CBC W/AUTO DIFF WBC: CPT

## 2018-01-15 PROCEDURE — 36415 COLL VENOUS BLD VENIPUNCTURE: CPT

## 2018-01-15 PROCEDURE — 6370000000 HC RX 637 (ALT 250 FOR IP): Performed by: INTERNAL MEDICINE

## 2018-01-15 PROCEDURE — 94668 MNPJ CHEST WALL SBSQ: CPT

## 2018-01-15 PROCEDURE — 94640 AIRWAY INHALATION TREATMENT: CPT

## 2018-01-15 RX ORDER — AZITHROMYCIN 250 MG/1
250 TABLET, FILM COATED ORAL DAILY
Status: DISCONTINUED | OUTPATIENT
Start: 2018-01-15 | End: 2018-01-17 | Stop reason: HOSPADM

## 2018-01-15 RX ORDER — LORAZEPAM 2 MG/ML
1 INJECTION INTRAMUSCULAR EVERY 5 MIN PRN
Status: DISCONTINUED | OUTPATIENT
Start: 2018-01-15 | End: 2018-01-17 | Stop reason: HOSPADM

## 2018-01-15 RX ORDER — FUROSEMIDE 10 MG/ML
20 INJECTION INTRAMUSCULAR; INTRAVENOUS ONCE
Status: DISCONTINUED | OUTPATIENT
Start: 2018-01-15 | End: 2018-01-15

## 2018-01-15 RX ORDER — LORAZEPAM 2 MG/ML
1 INJECTION INTRAMUSCULAR EVERY 4 HOURS PRN
Status: DISCONTINUED | OUTPATIENT
Start: 2018-01-15 | End: 2018-01-15

## 2018-01-15 RX ADMIN — VANCOMYCIN HYDROCHLORIDE 750 MG: 1 INJECTION, POWDER, LYOPHILIZED, FOR SOLUTION INTRAVENOUS at 04:13

## 2018-01-15 RX ADMIN — TRAZODONE HYDROCHLORIDE 100 MG: 100 TABLET ORAL at 21:00

## 2018-01-15 RX ADMIN — PANTOPRAZOLE SODIUM 40 MG: 40 TABLET, DELAYED RELEASE ORAL at 09:46

## 2018-01-15 RX ADMIN — TAZOBACTAM SODIUM AND PIPERACILLIN SODIUM 3.38 G: 375; 3 INJECTION, SOLUTION INTRAVENOUS at 15:37

## 2018-01-15 RX ADMIN — TAZOBACTAM SODIUM AND PIPERACILLIN SODIUM 3.38 G: 375; 3 INJECTION, SOLUTION INTRAVENOUS at 09:46

## 2018-01-15 RX ADMIN — LEVETIRACETAM 500 MG: 100 SOLUTION ORAL at 09:44

## 2018-01-15 RX ADMIN — DOXAZOSIN 1 MG: 2 TABLET ORAL at 21:00

## 2018-01-15 RX ADMIN — Medication 1 CAPSULE: at 12:37

## 2018-01-15 RX ADMIN — ACETAMINOPHEN 650 MG: 325 TABLET ORAL at 04:13

## 2018-01-15 RX ADMIN — VANCOMYCIN HYDROCHLORIDE 750 MG: 1 INJECTION, POWDER, LYOPHILIZED, FOR SOLUTION INTRAVENOUS at 15:23

## 2018-01-15 RX ADMIN — ENOXAPARIN SODIUM 30 MG: 30 INJECTION SUBCUTANEOUS at 09:44

## 2018-01-15 RX ADMIN — Medication 400 UNITS: at 12:37

## 2018-01-15 RX ADMIN — LEVETIRACETAM 500 MG: 100 SOLUTION ORAL at 21:01

## 2018-01-15 RX ADMIN — AZITHROMYCIN 250 MG: 250 TABLET, FILM COATED ORAL at 21:00

## 2018-01-15 RX ADMIN — Medication 75 MG: at 12:36

## 2018-01-15 RX ADMIN — ACETAMINOPHEN 650 MG: 325 TABLET ORAL at 15:50

## 2018-01-15 ASSESSMENT — PAIN SCALES - GENERAL: PAINLEVEL_OUTOF10: 0

## 2018-01-15 NOTE — PROCEDURES
We need checklist for mri please, have family fill out if patient cannot. Thanks.  Call mri with any concerns

## 2018-01-15 NOTE — PROGRESS NOTES
Smoking Cessation - topics covered   []  Health Risks  []  Benefits of Quitting   []  Smoking Cessation  [x]  Patient has no history of tobacco use  []  Patient is former smoker. [x]  No need for tobacco cessation education. []  Booklet given  []  Patient verbalizes understanding. []  Patient denies need for tobacco cessation education.   Zoe Arce  8:29 AM

## 2018-01-15 NOTE — PROGRESS NOTES
, including pertinent history and exam findings,    today with the resident. I have seen and examined the patient and the key elements of all parts of the encounter have been performed by me . I agree with the assessment, plan and orders as documented by the resident. 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         Electronically signed by Noemy Gutierrez MD

## 2018-01-15 NOTE — PLAN OF CARE
Problem: Risk for Impaired Skin Integrity  Goal: Tissue integrity - skin and mucous membranes  Structural intactness and normal physiological function of skin and  mucous membranes.    Outcome: Ongoing      Problem: Breathing Pattern - Ineffective:  Goal: Ability to achieve and maintain a regular respiratory rate will improve  Ability to achieve and maintain a regular respiratory rate will improve   Outcome: Ongoing      Problem: Neurological  Goal: Maximum potential motor/sensory/cognitive function  Outcome: Ongoing      Problem: Nutritional:  Goal: Ability to tolerate tube feedings without aspirating will improve  Ability to tolerate tube feedings without aspirating will improve   Outcome: Ongoing

## 2018-01-15 NOTE — PLAN OF CARE
Problem: Nutrition  Goal: Optimal nutrition therapy  Outcome: Ongoing  Nutrition Problem: Inadequate oral intake  Intervention: Food and/or Nutrient Delivery: Continue NPO, Continue current Tube Feeding  Nutritional Goals: Meet 100% of pt's nutrition needs

## 2018-01-15 NOTE — PROGRESS NOTES
Nutrition Assessment    Type and Reason for Visit: Initial    Nutrition Recommendations: Recommend continue Vital AF (semielemental tube feed) at 35 ml per hour around the clock until pt is able to take po. This will provide 1000 kcal, 63g protein and is the nutritional equivalent of her home tube feed. Malnutrition Assessment:  · Malnutrition Status: No malnutrition    Nutrition Diagnosis:   · Problem: Inadequate oral intake  · Etiology: related to Cognitive or neurological impairment     Signs and symptoms:  as evidenced by Nutrition support - EN    Nutrition Assessment:  · Subjective Assessment: Pt referred due to tube feed. Spoke to [employer]. Pt is followed by a dietitian at [facility]. Pt is tolerating Vital AF (Semielemental) tube feed which is the equivalent of Peptamin, however Pt receives a 1.0 product. Current vital is 1.2 kcal per ounce. Regular home regimen is: po during the day and 500 ml of Peptamin 1.0 given 80 ml per hour at night. On days when pt takes no po, she gets 1000ml of Peptamin per day (1000 kcal, 40 g protein). Review of old records shows pt is at her UBW  · Current Nutrition Therapies:  · Oral Diet Orders: NPO   · Tube Feeding (TF) Orders:   · Feeding Route: Gastrostomy  · Formula: Semi-elemental  · Rate (ml/hr):35 ml per hour    · Volume (ml/day): 840 ml  · Duration: Continuous 24hrs  · Goal TF & Flush Orders Provides: 1008 kcal, 63 g protein  · Anthropometric Measures:  · Ht: 4' 7\" (139.7 cm)   · Current Body Wt: 86 lb (39 kg)  · BMI Classification: BMI 18.5 - 24.9 Normal Weight  · Comparative Standards (Estimated Nutrition Needs):  · Estimated Daily Total Kcal: 25 kcal per ki=5615 kcal per day  · Estimated Daily Protein (g): 1g/kg/d- 40 g per day    Estimated Intake vs Estimated Needs: Intake Meets Needs    Nutrition Risk Level:  Moderate    Nutrition Interventions:   Continue NPO, Continue current Tube Feeding       Nutrition Evaluation:   · Evaluation: Goals set

## 2018-01-15 NOTE — CONSULTS
clindamycin/lincomycin; gentamicin; hydrocodone; and tape [adhesive tape]. 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 She has never used smokeless tobacco. She reports that she does not drink alcohol or use drugs.     Family History   Problem Relation Age of Onset    High Blood Pressure Mother        Current Medications:     piperacillin-tazobactam  3.375 g Intravenous Q8H    vancomycin  750 mg Intravenous Q8H    vancomycin (VANCOCIN) intermittent dosing (placeholder)   Other RX Placeholder    lidocaine  20 mL Intradermal Once    sodium chloride flush  10 mL Intravenous 2 times per day    doxazosin  1 mg Oral Nightly    polyethylene glycol  17 g Per G Tube Daily    scopolamine  1 patch Transdermal Q72H    levETIRAcetam  500 mg Oral BID    sodium chloride flush  10 mL Intravenous 2 times per day    oseltamivir 6mg/ml  75 mg Oral BID    traZODone  100 mg Oral Nightly    pantoprazole  40 mg Oral QAM AC    enoxaparin  30 mg Subcutaneous Daily    magnesium hydroxide  24 mL Oral BID    CENTRUM/CERTA-JARED with minerals oral  15 mL Per J Tube Daily    vitamin D  400 Units Per G Tube Daily    lactobacillus  1 capsule Oral Daily with breakfast     PRN Meds include: LORazepam, levalbuterol, sodium chloride flush,

## 2018-01-15 NOTE — PLAN OF CARE
Problem: Falls - Risk of  Goal: Absence of falls  Outcome: Ongoing      Problem: Risk for Impaired Skin Integrity  Goal: Tissue integrity - skin and mucous membranes  Structural intactness and normal physiological function of skin and  mucous membranes.    Outcome: Ongoing      Problem: Breathing Pattern - Ineffective:  Goal: Ability to achieve and maintain a regular respiratory rate will improve  Ability to achieve and maintain a regular respiratory rate will improve   Outcome: Ongoing      Problem: Neurological  Goal: Maximum potential motor/sensory/cognitive function  Outcome: Ongoing

## 2018-01-15 NOTE — PROGRESS NOTES
New order for neuro consult. Dr. Deisy Smallwood updated on patient's status. Telephone orders for MRI, EEG, and prn ativan q5 min were given.

## 2018-01-15 NOTE — CONSULTS
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 She has never used smokeless tobacco.  ETOH:   reports that she does not drink alcohol. ALLERGIES:    Allergies   Allergen Reactions    Clindamycin/Lincomycin     Gentamicin     Hydrocodone     Tape Landry Sane Tape] Rash     Redness that lasts a couple days       Home Meds:   Prior to Admission medications    Medication Sig Start Date End Date Taking?  Authorizing Provider   ibuprofen (ADVIL;MOTRIN) 100 MG/5ML suspension Take 10 mg/kg by mouth every 4 hours as needed for Fever   Yes Historical Provider, MD   levETIRAcetam (KEPPRA) 100 MG/ML solution Take 14ML two times a day by G-button 12/28/17 12/28/18 Yes Mary Stanley MD   esomeprazole ([address]) 20 MG delayed release capsule Take 1 capsule by mouth every morning (before breakfast)  Patient taking differently: Take by mouth nightly  12/4/17 12/4/18 Yes Mary Stanley MD   vitamin D (AQUEOUS VITAMIN D) 400 UNIT/ML LIQD 1 mL by Per G Tube route daily 1 teaspoon per g-tube daily  Patient taking differently: 2,000 Units by Per G Tube route nightly 1 teaspoon per g-tube daily 11/14/17 11/14/18 Yes Mary Stanley MD   doxazosin (CARDURA) 1 MG tablet Take 1 tablet by mouth nightly 11/8/17 11/8/18 Yes Leda Velez MD   Multiple Vitamins-Minerals (CERTAVITE/ANTIOXIDANTS) solution Give 5 ML per G Tube daily 7/21/17 7/21/18 Yes Ashkan Niño CNP   traZODone (DESYREL) 50 MG tablet Take 1.5-2 tablets by mouth nightly 5/19/17 5/19/18 Yes Leda Velez MD   Probiotic Product (ALIGN PO) Take 4 mg by mouth nightly    Yes Historical Provider, MD   saccharomyces boulardii (FLORASTOR) 250 MG capsule Take 250 mg by mouth daily   Yes Historical Provider, MD   magnesium hydroxide (MILK OF MAGNESIA CONCENTRATE) 2400 MG/10ML SUSP Take 8 mLs by mouth 2 times daily    Yes Historical Provider, MD   Handicap Placard MISC by Does not apply route Expires 12/21/2022 12/22/17   Akin Sanchez CNP   traMADol (ULTRAM) 50 MG tablet 1 tablet by Per G Tube route every 8 hours as needed for Pain 10/3/17 10/4/18  Leda Velez MD   phenazopyridine (PYRIDIUM) 200 MG tablet GIVE 0.5 TABLETS BY G BUTTON 3 TIMES DAILY AS NEEDED FOR PAIN. ( URINARY PAIN ) 8/17/17 9/7/18  Historical Provider, MD   nystatin (MYCOSTATIN) 431894 UNIT/GM ointment Apply topically 2 times daily.  5/19/17 5/19/18  Leda Velez MD   scopolamine (TRANSDERM-SCOP, 1.5 MG,) transdermal patch Place 1 patch onto the skin every 72 hours Place on hairless area behind the ear- change every 1-3 days 5/19/17 5/19/18  Leda Velez MD   Feeding Tubes - Sets (JAYDEN-KEY GASTROSTOMY KIT 18FR) MISC 3.5 cm 5/19/17 5/19/18  Leda Velez MD   clonazePAM (KLONOPIN) 0.5 MG tablet Take 1.5 tablets by mouth 3 times daily as needed for Anxiety 5/3/17 5/3/18  Leda Velez MD   polyethylene glycol (GLYCOLAX) powder 17 g by Per G Tube route daily 3/7/17 3/7/18  Leda Velez MD   NUTRITIONAL SUPPLEMENT LIQD 1 Can by Per G Tube route 3 times daily Peptamen 1 jovany 3/3/17 3/3/18  Rema Andres MD   Nutritional Supplements (PEPTAMEN 1 JOVANY) LIQD Take 1 Can by mouth 3 times daily deformity      Heart:    Regular rate and rhythm, S1 and S2 normal, no murmur, rub        or gallop no rvh                           Abdomen:                                                                   Soft, non-tender, bowel sounds active all four quadrants,     no masses, no organomegaly                                 CBC:   Recent Labs      01/13/18   1837  01/14/18   1617  01/15/18   1021   WBC  14.8*  7.1  6.2   HGB  13.1  11.0*  10.3*   PLT  224  See Reflexed IPF Result  See Reflexed IPF Result     BMP:    Recent Labs      01/13/18   2000  01/14/18   1358  01/15/18   1021   NA  139  136  136   K  4.1  3.9  3.2*   CL  98  99  101   CO2  28  24  27   BUN  8  6  12   CREATININE  <0.20*  <0.20*  <0.20*   GLUCOSE  113*  93  139*     Hepatic: No results for input(s): AST, ALT, ALB, BILITOT, ALKPHOS in the last 72 hours. Amylase: No results found for: AMYLASE  Lipase:   Lab Results   Component Value Date    LIPASE 21 04/08/2017     Troponin: No results for input(s): TROPONINI in the last 72 hours. BNP: No results for input(s): BNP in the last 72 hours. Lipids: No results for input(s): CHOL, HDL in the last 72 hours. Invalid input(s): LDLCALCU  ABGs: No results found for: PHART, PO2ART, CQO8ETJ  INR: No results for input(s): INR in the last 72 hours. Thyroid:   Lab Results   Component Value Date    TSH 0.68 04/08/2017     Urinalysis:   Recent Labs      01/13/18   1737   BACTERIA  FEW*   COLORU  YELLOW   PHUR  7.5   PROTEINU  NEGATIVE   RBCUA  2 TO 5   SPECGRAV  1.015   BILIRUBINUR  NEGATIVE   NITRU  NEGATIVE   WBCUA  0 TO 2   LEUKOCYTESUR  NEGATIVE   GLUCOSEU  NEGATIVE       Xr Chest Standard (2 Vw)    Result Date: 1/13/2018  EXAMINATION: TWO VIEWS OF THE CHEST 1/13/2018 5:06 pm COMPARISON: April 8, 2017 HISTORY: ORDERING SYSTEM PROVIDED HISTORY: cough, fever TECHNOLOGIST PROVIDED HISTORY: Reason for exam:->cough, fever FINDINGS: Suboptimal positioning is noted.   The patient's head obscures the left lung cause T68. 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    Atelectasis J98.11    Dependence on enabling machine Z99.89    Dependent on ventilator ([facility]) Z99.11    Delay in development R62.50    Tonic-clonic seizures ([address] G40.409    History of recurrent pneumonia Z87.01    Fistula L98.8    Encounter for removal of internal fixation device Z47.2    Sialorrhea K11.7    Myoneural disorder ([facility]) G70.9    Muscle spasticity M62.838    Bladder retention R33.9    Community acquired pneumonia of left lower lobe of lung ([address] J18.1    Influenza A J10.1        : Left lower lobe pneumonia    PLAN:      Continue zosyn  Continue Tamiflu  Stop Vancomycin  Start Azithromycin  Supplement oxygen    I hope this updates you on my evaluation and clinical thinking. Thank you for allowing me to participate in his care. Attending Physician Statement  I have discussed the care of Aishwarya Nunez, including pertinent history and exam findings,  with the resident. I have seen and examined the patient and the key elements of all parts of the encounter have been performed by me. I agree with the assessment, plan and orders as documented by the resident.      Qiana Christiansen MD  1/15/2018  10:33 PM      Sincerely,      Jaswant Obando MD

## 2018-01-15 NOTE — PROGRESS NOTES
Intake/Output Summary (Last 24 hours) at 01/14/18 2159  Last data filed at 01/14/18 1941   Gross per 24 hour   Intake 1749 ml   Output 0 ml   Net 1749 ml       Culture Date      Source                       Results      Ht Readings from Last 1 Encounters:   01/13/18 4' 7\" (1.397 m)        Wt Readings from Last 1 Encounters:   01/13/18 86 lb 6.4 oz (39.2 kg)         Body mass index is 20.08 kg/m². CrCl cannot be calculated (Unknown ideal weight. ). Assessment/Plan:  Will initiate vancomycin 750 mg IV every 8 hours. Timing of trough level will be determined based on culture results, renal function, and clinical response. Thank you for the consult. Will continue to follow. Maria Elena Schofield Pharm. D.    1/14/2018  10:00 PM

## 2018-01-16 ENCOUNTER — APPOINTMENT (OUTPATIENT)
Dept: GENERAL RADIOLOGY | Age: 23
DRG: 177 | End: 2018-01-16
Payer: COMMERCIAL

## 2018-01-16 PROBLEM — J69.0 ASPIRATION PNEUMONIA (HCC): Status: ACTIVE | Noted: 2018-01-16

## 2018-01-16 LAB
BUN BLDV-MCNC: 10 MG/DL (ref 6–20)
CREAT SERPL-MCNC: <0.2 MG/DL (ref 0.5–0.9)
GFR AFRICAN AMERICAN: ABNORMAL ML/MIN
GFR NON-AFRICAN AMERICAN: ABNORMAL ML/MIN
GFR SERPL CREATININE-BSD FRML MDRD: ABNORMAL ML/MIN/{1.73_M2}
GFR SERPL CREATININE-BSD FRML MDRD: ABNORMAL ML/MIN/{1.73_M2}
HCT VFR BLD CALC: 32.5 % (ref 36.3–47.1)
HEMOGLOBIN: 9.4 G/DL (ref 11.9–15.1)
MCH RBC QN AUTO: 27.2 PG (ref 25.2–33.5)
MCHC RBC AUTO-ENTMCNC: 28.9 G/DL (ref 28.4–34.8)
MCV RBC AUTO: 94.2 FL (ref 82.6–102.9)
NRBC AUTOMATED: 0 PER 100 WBC
PDW BLD-RTO: 14.6 % (ref 11.8–14.4)
PLATELET # BLD: 224 K/UL (ref 138–453)
PMV BLD AUTO: 10.7 FL (ref 8.1–13.5)
POTASSIUM SERPL-SCNC: 3.5 MMOL/L (ref 3.7–5.3)
RBC # BLD: 3.45 M/UL (ref 3.95–5.11)
WBC # BLD: 5.4 K/UL (ref 4.5–13.5)

## 2018-01-16 PROCEDURE — 84520 ASSAY OF UREA NITROGEN: CPT

## 2018-01-16 PROCEDURE — 97535 SELF CARE MNGMENT TRAINING: CPT

## 2018-01-16 PROCEDURE — 94640 AIRWAY INHALATION TREATMENT: CPT

## 2018-01-16 PROCEDURE — 71045 X-RAY EXAM CHEST 1 VIEW: CPT

## 2018-01-16 PROCEDURE — 99232 SBSQ HOSP IP/OBS MODERATE 35: CPT | Performed by: INTERNAL MEDICINE

## 2018-01-16 PROCEDURE — G8987 SELF CARE CURRENT STATUS: HCPCS

## 2018-01-16 PROCEDURE — 82565 ASSAY OF CREATININE: CPT

## 2018-01-16 PROCEDURE — 6370000000 HC RX 637 (ALT 250 FOR IP): Performed by: STUDENT IN AN ORGANIZED HEALTH CARE EDUCATION/TRAINING PROGRAM

## 2018-01-16 PROCEDURE — 6370000000 HC RX 637 (ALT 250 FOR IP): Performed by: INTERNAL MEDICINE

## 2018-01-16 PROCEDURE — 85027 COMPLETE CBC AUTOMATED: CPT

## 2018-01-16 PROCEDURE — 2500000003 HC RX 250 WO HCPCS: Performed by: STUDENT IN AN ORGANIZED HEALTH CARE EDUCATION/TRAINING PROGRAM

## 2018-01-16 PROCEDURE — 97167 OT EVAL HIGH COMPLEX 60 MIN: CPT

## 2018-01-16 PROCEDURE — 36415 COLL VENOUS BLD VENIPUNCTURE: CPT

## 2018-01-16 PROCEDURE — 6360000002 HC RX W HCPCS: Performed by: STUDENT IN AN ORGANIZED HEALTH CARE EDUCATION/TRAINING PROGRAM

## 2018-01-16 PROCEDURE — G8988 SELF CARE GOAL STATUS: HCPCS

## 2018-01-16 PROCEDURE — 94762 N-INVAS EAR/PLS OXIMTRY CONT: CPT

## 2018-01-16 PROCEDURE — 84132 ASSAY OF SERUM POTASSIUM: CPT

## 2018-01-16 PROCEDURE — 2060000000 HC ICU INTERMEDIATE R&B

## 2018-01-16 PROCEDURE — 99233 SBSQ HOSP IP/OBS HIGH 50: CPT | Performed by: PSYCHIATRY & NEUROLOGY

## 2018-01-16 RX ADMIN — Medication 75 MG: at 10:17

## 2018-01-16 RX ADMIN — TAZOBACTAM SODIUM AND PIPERACILLIN SODIUM 3.38 G: 375; 3 INJECTION, SOLUTION INTRAVENOUS at 22:35

## 2018-01-16 RX ADMIN — TRAZODONE HYDROCHLORIDE 100 MG: 100 TABLET ORAL at 22:13

## 2018-01-16 RX ADMIN — DOXAZOSIN 1 MG: 2 TABLET ORAL at 22:13

## 2018-01-16 RX ADMIN — LEVETIRACETAM 500 MG: 100 SOLUTION ORAL at 10:17

## 2018-01-16 RX ADMIN — Medication 75 MG: at 00:41

## 2018-01-16 RX ADMIN — Medication 1 CAPSULE: at 10:19

## 2018-01-16 RX ADMIN — ENOXAPARIN SODIUM 30 MG: 30 INJECTION SUBCUTANEOUS at 10:16

## 2018-01-16 RX ADMIN — Medication 75 MG: at 23:07

## 2018-01-16 RX ADMIN — TAZOBACTAM SODIUM AND PIPERACILLIN SODIUM 3.38 G: 375; 3 INJECTION, SOLUTION INTRAVENOUS at 00:03

## 2018-01-16 RX ADMIN — Medication 15 ML: at 10:17

## 2018-01-16 RX ADMIN — LEVETIRACETAM 500 MG: 100 SOLUTION ORAL at 22:14

## 2018-01-16 RX ADMIN — TAZOBACTAM SODIUM AND PIPERACILLIN SODIUM 3.38 G: 375; 3 INJECTION, SOLUTION INTRAVENOUS at 08:49

## 2018-01-16 RX ADMIN — TAZOBACTAM SODIUM AND PIPERACILLIN SODIUM 3.38 G: 375; 3 INJECTION, SOLUTION INTRAVENOUS at 16:44

## 2018-01-16 RX ADMIN — AZITHROMYCIN 250 MG: 250 TABLET, FILM COATED ORAL at 10:17

## 2018-01-16 NOTE — PROGRESS NOTES
[address] [facility].    Date:   1/16/2018  Patient name:  Stuart Eckert  Date of admission:  1/13/2018  4:17 PM  MRN:   [id number]  YOB: 1995    CC- Fever along with cough    HPI-The patient is a 25 y.o.  female  With past medical history of Rett syndrome, obstructive sleep apnea, GERD, seizures who is admitted to the hospital for fever along with cough and high heart rate. Patient's parents were at bedside and they told that her fever started today morning and she was having high-grade fever of more than 102°F.   Her Cough also started this morning with white colored thick sputum. They told that her heart rate was high in the 140s. No complaint of chest pain, nausea, vomiting, diarrhea or any change in urinary habits, any change in bowel habits. Doesn't know if anybody sick around. Patient does not interact and she  Goes to school 3 times a week.     In ER chest x-ray revealed left lower pneumonia. Blood pressure was 109/ 77, heart rate 117, respiratory rate 20, temperature 99. 5.     Labs  WBC 14.8, UA negative, positive for influenza A.     Patient was admitted last year in April for aspiration pneumonia. In ER was given 1 dose of azithromycin and Rocephin     Interval history  No acute issues overnight. Patient is saturating well. Use BiPAP overnight. Heart rate is better in 9o's. Chest x-ray better. Afebrile since yesterday evening. One-time temperature of 99.2 at midnight. Blood pressure is stable. WC count is 5.9.       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

## 2018-01-16 NOTE — PROGRESS NOTES
NEUROLOGY INPATIENT PROGRESS NOTE    1/16/2018         Subjective: Holley Elliott is a  25 y.o. female admitted on 1/13/2018 with Community acquired pneumonia of left lower lobe of lung ([facility] [J18.1]      Briefly, this is a  25 y.o. female admitted on 1/13/2018 with past medical history of  Rett syndrome, ISELA on CPAP, seizure disorder, dystonia on baclofen pump, Nutrition by PEG tube, GERD presented with  fever. It started 2 days ago, 102 f at home. It is associated with cough and  thick purulent sputum.      In the ER chest X Ray showed left lower consolidation. Also Influenza A +. She is receiving antibiotics and tamiflu.     Neurology consulted as pt has seizure 1/14 night. Witnessed by mother. She describes it as eye rolling upwards. No tonic-clonic movements. On exam difficult to arouse but following commands. She has Rett disorder and follows with neurologist at [facility]. She is on keppra for seizures. She has 2 seizure episodes/week and with her most recent prior procedure 2 weeks ago, this does not represent an exacerbation relative to her baseline seizure frequency. Current AED regimen   is  LEV solution 500 mg every 12 hours PNG . She has full time home care. Uses wheel chair but can walk with assistance. Mother states pt seems a little better today. She states pt had 5d hospital stay 4/2017 for same reason. No seizures since admission. No current facility-administered medications on file prior to encounter.       Current Outpatient Prescriptions on File Prior to Encounter   Medication Sig Dispense Refill    levETIRAcetam (KEPPRA) 100 MG/ML solution Take 14ML two times a day by G-button 2520 mL 2    esomeprazole (NEXIUM) 20 MG delayed release capsule Take 1 capsule by mouth every morning (before breakfast) (Patient taking differently: Take by mouth nightly ) 60 capsule 5    vitamin D (AQUEOUS VITAMIN D) 400 UNIT/ML LIQD 1 mL by Per G Tube route daily 1 teaspoon per g-tube daily (Patient taking differently: 2,000 Units by Per G Tube route nightly 1 teaspoon per g-tube daily) 150 mL 5    doxazosin (CARDURA) 1 MG tablet Take 1 tablet by mouth nightly 90 tablet 1    Multiple Vitamins-Minerals (CERTAVITE/ANTIOXIDANTS) solution Give 5 ML per G Tube daily 236 mL 5    traZODone (DESYREL) 50 MG tablet Take 1.5-2 tablets by mouth nightly 60 tablet 5    Probiotic Product (ALIGN PO) Take 4 mg by mouth nightly       saccharomyces boulardii (FLORASTOR) 250 MG capsule Take 250 mg by mouth daily      magnesium hydroxide (MILK OF MAGNESIA CONCENTRATE) 2400 MG/10ML SUSP Take 8 mLs by mouth 2 times daily       Handicap Placard MISC by Does not apply route Expires 12/21/2022 1 each 0    traMADol (ULTRAM) 50 MG tablet 1 tablet by Per G Tube route every 8 hours as needed for Pain 30 tablet 5    phenazopyridine (PYRIDIUM) 200 MG tablet GIVE 0.5 TABLETS BY G BUTTON 3 TIMES DAILY AS NEEDED FOR PAIN. ( URINARY PAIN )  1    nystatin (MYCOSTATIN) 945475 UNIT/GM ointment Apply topically 2 times daily.  30 g 6    scopolamine (TRANSDERM-SCOP, 1.5 MG,) transdermal patch Place 1 patch onto the skin every 72 hours Place on hairless area behind the ear- change every 1-3 days 20 patch 5    Feeding Tubes - Sets (JAYDEN-KEY GASTROSTOMY KIT 18FR) MISC 3.5 cm 1 each 11    clonazePAM (KLONOPIN) 0.5 MG tablet Take 1.5 tablets by mouth 3 times daily as needed for Anxiety 190 tablet 0    polyethylene glycol (GLYCOLAX) powder 17 g by Per G Tube route daily 510 g 5    NUTRITIONAL SUPPLEMENT LIQD 1 Can by Per G Tube route 3 times daily Peptamen 1 jovany 08606 mL 5    Nutritional Supplements (PEPTAMEN 1 JOVANY) LIQD Take 1 Can by mouth 3 times daily Peptamen Adult 69278 mL 5    Diapers & Supplies MISC 1 each by Does not apply route daily as needed 200 each 11    Misc Natural Products (CYSTEX) LIQD Take 15 mLs by mouth daily 450 mL 11    diazepam (DIASTAT) 10 MG GEL Place 10 mg rectally once as needed 2 each 5       Allergies: Emelia Rodas is allergic to clindamycin/lincomycin; gentamicin; hydrocodone; and tape [adhesive tape].     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 ([address] 1/9/2017    Rett's syndrome     Scoliosis     Seizures (HCC)     Tremors of nervous system        Past Surgical History:   Procedure Laterality Date    BACLOFEN PUMP IMPLANTATION  [id number]    BACLOFEN PUMP IMPLANTATION  06/28/2016    CHOLECYSTECTOMY  [id number]    GASTRIC FUNDOPLICATION  [id number]    SPINAL FUSION  [id number]    removal of spinal and baclofen pump 04/01/2013           Medications:     azithromycin  250 mg Oral Daily    piperacillin-tazobactam  3.375 g Intravenous Q8H    lidocaine  20 mL Intradermal Once    sodium chloride flush  10 mL Intravenous 2 times per day    doxazosin  1 mg Oral Nightly    polyethylene glycol  17 g Per G Tube Daily    scopolamine  1 patch Transdermal Q72H    levETIRAcetam  500 mg Oral BID    sodium chloride flush  10 mL Intravenous 2 times per day    oseltamivir 6mg/ml  75 mg Oral BID    traZODone  100 mg Oral Nightly    pantoprazole  40 mg Oral QAM AC    enoxaparin  30 mg Subcutaneous Daily    magnesium hydroxide  24 mL Oral BID    CENTRUM/CERTA-JARED with minerals oral  15 mL Per J Tube Daily    vitamin D  400 Units Per G Tube Daily    lactobacillus  1 capsule Oral Daily with breakfast     PRN Meds include: LORazepam, levalbuterol, sodium chloride flush, clonazePAM, ibuprofen, traMADol, sodium chloride flush, acetaminophen, magnesium hydroxide, ondansetron    Objective:   /77   Pulse 97   Temp 97.9 °F (36.6 °C) (Oral)   Resp 27   Ht 4' 7\" (1.397 m)   Wt 86 lb 6.4 oz (39.2 kg)   LMP 09/17/2017   SpO2 97%   BMI 20.08 kg/m²     Blood pressure seizures but not for her typical brief, self-limited seizures.

## 2018-01-16 NOTE — PROGRESS NOTES
4/20  Functional Limitation: Self care  Self Care Current Status (): At least 80 percent but less than 100 percent impaired, limited or restricted  Self Care Goal Status (): At least 60 percent but less than 80 percent impaired, limited or restricted    Goals  Short term goals  Time Frame for Short term goals: by discharge, pt will  Short term goal 1: demo understanding and I use of EC/WS, pursed lipped breathing and fall prevention tech during functional activites with 2x verbal cues  Short term goal 2: demo mod A for functional transfers to assist wtih ADL activities   Short term goal 3: demo understanding of HEP to increase ROM/ strength in B hands to assist with functional activities    Patient Goals   Patient goals : per mom to go home     Therapy Time   Individual Concurrent Group Co-treatment   Time In 1435         Time Out 1515         Minutes 40          See above for LOF. RN reports patient is medically stable for therapy treatment this date. Chart reviewed prior to treatment and patient is agreeable for therapy. All lines intact and patient positioned comfortably at end of treatment. All patient needs addressed prior to ending therapy session.        Bryan Steele, OTR/L

## 2018-01-16 NOTE — PROGRESS NOTES
Physical Therapy  DATE: 2018    NAME: Stuart Eckert  MRN: [id number]   : 1995    Patient not seen this date for Physical Therapy due to:  [] Blood transfusion in progress  [] Hemodialysis  []  Patient Declined  [] Spine Precautions   [] Strict Bedrest  [] Surgery/ Procedure  [] Testing      [x] Other -- JUANA Mcnally ok's for PT not to see pt d/t OT working with pt today and heavy family involvement. [] PT being discontinued at this time. Patient independent. No further needs. [] PT being discontinued at this time as the patient has been transferred to palliative care. No further needs.     Edward Harkins, PTA

## 2018-01-17 VITALS
HEIGHT: 55 IN | SYSTOLIC BLOOD PRESSURE: 84 MMHG | TEMPERATURE: 98 F | DIASTOLIC BLOOD PRESSURE: 42 MMHG | OXYGEN SATURATION: 98 % | HEART RATE: 51 BPM | RESPIRATION RATE: 16 BRPM | BODY MASS INDEX: 19.99 KG/M2 | WEIGHT: 86.4 LBS

## 2018-01-17 LAB
BUN BLDV-MCNC: 10 MG/DL (ref 6–20)
CREAT SERPL-MCNC: <0.2 MG/DL (ref 0.5–0.9)
GFR AFRICAN AMERICAN: ABNORMAL ML/MIN
GFR NON-AFRICAN AMERICAN: ABNORMAL ML/MIN
GFR SERPL CREATININE-BSD FRML MDRD: ABNORMAL ML/MIN/{1.73_M2}
GFR SERPL CREATININE-BSD FRML MDRD: ABNORMAL ML/MIN/{1.73_M2}
HCT VFR BLD CALC: 31.5 % (ref 36.3–47.1)
HEMOGLOBIN: 9.3 G/DL (ref 11.9–15.1)
MCH RBC QN AUTO: 27.1 PG (ref 25.2–33.5)
MCHC RBC AUTO-ENTMCNC: 29.5 G/DL (ref 28.4–34.8)
MCV RBC AUTO: 91.8 FL (ref 82.6–102.9)
NRBC AUTOMATED: 0 PER 100 WBC
PDW BLD-RTO: 14.6 % (ref 11.8–14.4)
PLATELET # BLD: 249 K/UL (ref 138–453)
PMV BLD AUTO: 10.8 FL (ref 8.1–13.5)
RBC # BLD: 3.43 M/UL (ref 3.95–5.11)
WBC # BLD: 4.6 K/UL (ref 4.5–13.5)

## 2018-01-17 PROCEDURE — 2500000003 HC RX 250 WO HCPCS: Performed by: STUDENT IN AN ORGANIZED HEALTH CARE EDUCATION/TRAINING PROGRAM

## 2018-01-17 PROCEDURE — 6370000000 HC RX 637 (ALT 250 FOR IP): Performed by: INTERNAL MEDICINE

## 2018-01-17 PROCEDURE — 94762 N-INVAS EAR/PLS OXIMTRY CONT: CPT

## 2018-01-17 PROCEDURE — 99232 SBSQ HOSP IP/OBS MODERATE 35: CPT | Performed by: INTERNAL MEDICINE

## 2018-01-17 PROCEDURE — 36415 COLL VENOUS BLD VENIPUNCTURE: CPT

## 2018-01-17 PROCEDURE — 82565 ASSAY OF CREATININE: CPT

## 2018-01-17 PROCEDURE — 85027 COMPLETE CBC AUTOMATED: CPT

## 2018-01-17 PROCEDURE — 6370000000 HC RX 637 (ALT 250 FOR IP): Performed by: STUDENT IN AN ORGANIZED HEALTH CARE EDUCATION/TRAINING PROGRAM

## 2018-01-17 PROCEDURE — 99232 SBSQ HOSP IP/OBS MODERATE 35: CPT | Performed by: NURSE PRACTITIONER

## 2018-01-17 PROCEDURE — 94640 AIRWAY INHALATION TREATMENT: CPT

## 2018-01-17 PROCEDURE — 84520 ASSAY OF UREA NITROGEN: CPT

## 2018-01-17 RX ORDER — LEVALBUTEROL INHALATION SOLUTION 1.25 MG/3ML
1.25 SOLUTION RESPIRATORY (INHALATION) EVERY 6 HOURS PRN
Qty: 84 ML | Refills: 5 | Status: SHIPPED | OUTPATIENT
Start: 2018-01-17 | End: 2018-05-04 | Stop reason: SDUPTHER

## 2018-01-17 RX ORDER — LEVALBUTEROL INHALATION SOLUTION 1.25 MG/3ML
1.25 SOLUTION RESPIRATORY (INHALATION) EVERY 6 HOURS PRN
Qty: 84 ML | Refills: 2 | Status: SHIPPED | OUTPATIENT
Start: 2018-01-17 | End: 2018-01-17

## 2018-01-17 RX ORDER — OSELTAMIVIR PHOSPHATE 6 MG/ML
75 FOR SUSPENSION ORAL 2 TIMES DAILY
Qty: 125 ML | Status: CANCELLED | OUTPATIENT
Start: 2018-01-17

## 2018-01-17 RX ORDER — OSELTAMIVIR PHOSPHATE 6 MG/ML
75 FOR SUSPENSION ORAL 2 TIMES DAILY
Qty: 125 ML | Refills: 0 | Status: SHIPPED | OUTPATIENT
Start: 2018-01-17 | End: 2018-01-18

## 2018-01-17 RX ORDER — LIDOCAINE HYDROCHLORIDE 10 MG/ML
20 INJECTION, SOLUTION INFILTRATION; PERINEURAL ONCE
Qty: 20 ML | Refills: 0 | Status: SHIPPED | OUTPATIENT
Start: 2018-01-17 | End: 2018-01-17

## 2018-01-17 RX ORDER — AZITHROMYCIN 250 MG/1
250 TABLET, FILM COATED ORAL DAILY
Qty: 1 TABLET | Refills: 0 | Status: SHIPPED | OUTPATIENT
Start: 2018-01-18 | End: 2018-01-19

## 2018-01-17 RX ADMIN — AZITHROMYCIN 250 MG: 250 TABLET, FILM COATED ORAL at 10:43

## 2018-01-17 RX ADMIN — TAZOBACTAM SODIUM AND PIPERACILLIN SODIUM 3.38 G: 375; 3 INJECTION, SOLUTION INTRAVENOUS at 06:43

## 2018-01-17 RX ADMIN — Medication 15 ML: at 10:44

## 2018-01-17 RX ADMIN — Medication 400 UNITS: at 10:44

## 2018-01-17 RX ADMIN — Medication 75 MG: at 10:43

## 2018-01-17 RX ADMIN — Medication 1 CAPSULE: at 10:45

## 2018-01-17 RX ADMIN — LEVETIRACETAM 500 MG: 100 SOLUTION ORAL at 10:43

## 2018-01-17 NOTE — PLAN OF CARE
Problem: RESPIRATORY  Intervention: Respiratory assessment  PROVIDE ADEQUATE OXYGENATION WITH ACCEPTABLE SP02/ABG'S    [x]  IDENTIFY APPROPRIATE OXYGEN THERAPY  [x]   MONITOR SP02/ABG'S AS NEEDED   [x]   PATIENT EDUCATION AS NEEDED

## 2018-01-17 NOTE — PROGRESS NOTES
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 rate 16, height 4' 7\" (1.397 m), weight 86 lb 6.4 oz (39.2 kg), last menstrual period 09/17/2017, SpO2 98 %. Limited Neurological Examination:  Mental status   Alert; drooling; pt is mute & looks small for her age    Cranial nerves   Visual fields intact to visual threat.  Good eye contact but no response on questioning  Face appears symmetrical  Rest CN examination deferred    Motor function  Generalized weakness; L arm contrated at elbow   Sensory function Deferred   Cerebellar No visible tremors   Reflex function Deferred   Gait                  Not tested       DATA      Lab Results   Component Value Date    WBC 4.6 01/17/2018    HGB 9.3 (L) 01/17/2018    HCT 31.5 (L) 01/17/2018     01/17/2018    ALT 23 04/09/2017    AST 36 (H) 04/09/2017     01/15/2018    K 3.5 (L) 01/16/2018     01/15/2018    CREATININE <0.20 (L) 01/17/2018    BUN 10 01/17/2018    CO2 27 01/15/2018    TSH 0.68 04/08/2017    INR 1.1 04/08/2017       Influenza A +       1/13/2018 19:53 1/15/2018 14:20   Levetiracetam 25 11                       IMPRESSION & PLAN: 25 y.o.  female admitted  Rett

## 2018-01-17 NOTE — PROGRESS NOTES
Pulmonary Progress Note  [facility]      Patient - Martha Nicholson,  Age - 25 y.o.    - 1995      Room Number - 3020/3020-01   MRN -  [id number]   Acct # - [de-identified]  Date of Admission -  2018  4:17 PM    SUBJECTIVE  Pt remains stable and continues to improve. She continues to have a cough producing white sputum that needs suctioning. No fever. Pt will complete Zosyn today. Remains on Azithromycin and Tamiflu. Pt is nonverbal    VITALS    height is 4' 7\" (1.397 m) and weight is 86 lb 6.4 oz (39.2 kg). Her oral temperature is 98 °F (36.7 °C). Her blood pressure is 84/42 (abnormal) and her pulse is 51. Her respiration is 16 and oxygen saturation is 98%. Temperature Range: Temp: 98 °F (36.7 °C) Temp  Av.1 °F (36.7 °C)  Min: 97.3 °F (36.3 °C)  Max: 99.1 °F (37.3 °C)  BP Range:  Systolic (61GGY), YSM:836 , Min:84 , UNM:186     Diastolic (21BBK), LLF:53, Min:42, Max:77    Pulse Range: Pulse  Av.2  Min: 51  Max: 104  Respiration Range: Resp  Av.4  Min: 14  Max: 27  Current Pulse Ox[de-identified]  SpO2: 98 %  24HR Pulse Ox Range:  SpO2  Av.8 %  Min: 94 %  Max: 99 %  Oxygen Amount and Delivery:  O2 Flow Rate (L/min): 2 L/min      Wt Readings from Last 3 Encounters:   18 86 lb 6.4 oz (39.2 kg)   17 88 lb 6.4 oz (40.1 kg)   17 85 lb (38.6 kg)       I/O (24 Hours)    Intake/Output Summary (Last 24 hours) at 18 0950  Last data filed at 18 0645   Gross per 24 hour   Intake          1660.46 ml   Output                0 ml   Net          1660.46 ml       EXAM  General Appearance  Awake, alert, oriented, in no acute distress  HEENT - Normal, Head is normocephalic, atraumatic. Neck - Supple, symmetrical, trachea midline   Lungs - Chest expands equally bilaterally upon respiration, no accessory muscles used, no wheezes, rales or rhonchi. Cardiovascular - Heart sounds are normal.  Regular rate and rhythm without murmur, gallop or rub.   Abdomen - soft, nontender, nondistended, no masses or organomegaly  Neurologic - Awake, alert, oriented to name, place and time. There are no focal motor or sensory deficits  Skin - No bruising or bleeding  Extremities - No cyanosis, clubbing or edema    MEDS   azithromycin  250 mg Oral Daily    piperacillin-tazobactam  3.375 g Intravenous Q8H    lidocaine  20 mL Intradermal Once    sodium chloride flush  10 mL Intravenous 2 times per day    doxazosin  1 mg Oral Nightly    polyethylene glycol  17 g Per G Tube Daily    scopolamine  1 patch Transdermal Q72H    levETIRAcetam  500 mg Oral BID    sodium chloride flush  10 mL Intravenous 2 times per day    oseltamivir 6mg/ml  75 mg Oral BID    traZODone  100 mg Oral Nightly    pantoprazole  40 mg Oral QAM AC    enoxaparin  30 mg Subcutaneous Daily    magnesium hydroxide  24 mL Oral BID    CENTRUM/CERTA-JARED with minerals oral  15 mL Per J Tube Daily    vitamin D  400 Units Per G Tube Daily    lactobacillus  1 capsule Oral Daily with breakfast      sodium chloride 50 mL/hr at 01/15/18 1235     LORazepam, levalbuterol, sodium chloride flush, clonazePAM, ibuprofen, traMADol, sodium chloride flush, acetaminophen, magnesium hydroxide, ondansetron    LABS  CBC   Recent Labs      01/17/18   0657   WBC  4.6   HGB  9.3*   HCT  31.5*   MCV  91.8   PLT  249     BMP: Recent Labs      01/15/18   1021  01/16/18   0741  01/17/18   0657   NA  136   --    --    K  3.2*  3.5*   --    CL  101   --    --    CO2  27   --    --    BUN  12  10  10   CREATININE  <0.20*  <0.20*  <0.20*   GLUCOSE  139*   --    --      No results found for: PH, PCO2, PO2, HCO3, O2SAT  Lab Results   Component Value Date    MODE NOT REPORTED 01/14/2018     LIVER PROFILE No results for input(s): AST, ALT, LIPASE, BILIDIR, BILITOT, ALKPHOS in the last 72 hours. Invalid input(s): AMYLASE,  ALB  INR No results for input(s): INR in the last 72 hours.   PTT   Lab Results   Component Value Date    APTT 27.1 04/08/2017

## 2018-01-17 NOTE — PROGRESS NOTES
Rett's syndrome     Scoliosis     Seizures (HCC)     Tremors of nervous system        Social History     Social History    Marital status: Single     Spouse name: N/A    Number of children: N/A    Years of education: N/A     Occupational History    Not on file. Social History Main Topics    Smoking status: Never Smoker    Smokeless tobacco: Never Used    Alcohol use No    Drug use: No    Sexual activity: No     Other Topics Concern    Not on file     Social History Narrative    No narrative on file       REVIEW OF SYSTEMS:    · Unable to obtain because of patient's neuro status. EXAM-  BP (!) 84/42   Pulse 51   Temp 98 °F (36.7 °C) (Oral)   Resp 16   Ht 4' 7\" (1.397 m)   Wt 86 lb 6.4 oz (39.2 kg)   LMP 09/17/2017   SpO2 98%   BMI 20.08 kg/m²      · General appearance: Nonconversant. · Neck  supple, no JVP. · Lungs:poor respiratory effort, difficult to examine breath sounds bilaterally, diminished breath sounds bilaterally. · Heart: Regular rate and rhythm. S1, S2 normal, no murmur, no carotid bruit. · Abdomen: soft, non-tender; no masses, no organomegaly  · Extremities: no cyanosis, no edema no clubbing no synovitis  · Skin - no rash or ulcers  · Musculoskeletal: Positive for kyphoscoliosis, contractures of extremities. · Neuro: alert, nonconversant.       Laboratory Testing:  CBC:   Recent Labs      01/17/18   0657   WBC  4.6   HGB  9.3*   PLT  249     BMP:    Recent Labs      01/15/18   1021  01/16/18   0741  01/17/18   0657   NA  136   --    --    K  3.2*  3.5*   --    CL  101   --    --    CO2  27   --    --    BUN  12  10  10   CREATININE  <0.20*  <0.20*  <0.20*   GLUCOSE  139*   --    --          ASSESSMENT:    Principal Problem:    Community acquired pneumonia of left lower lobe of lung ([address]  Active Problems:    Rett's syndrome    Scoliosis    Dystonia    Convulsions ([address]    Gastroesophageal reflux disease    Central sleep apnea    Restrictive lung mechanics due to

## 2018-01-18 ENCOUNTER — CARE COORDINATION (OUTPATIENT)
Dept: CARE COORDINATION | Age: 23
End: 2018-01-18

## 2018-01-18 RX ORDER — ESOMEPRAZOLE MAGNESIUM 40 MG/1
20 FOR SUSPENSION ORAL 2 TIMES DAILY
COMMUNITY
End: 2019-11-06 | Stop reason: SDUPTHER

## 2018-01-18 NOTE — DISCHARGE SUMMARY
BiPAP and chest x-ray was done which got worse so started her on  Zosyn along with azithromycin as she had aspiration pneumonia. She developed acute on  chronic respiratory failure. Pulmonology was consulted. She completed her course of Zosyn and was discharged on Tamiflu and Zithromax to complete a 5 day course. She was finally discharged to her home with home  care after being stable. Her home meds were continued during her stay. Significant therapeutic interventions: none    Significant Diagnostic Studies:   Labs / Micro:  Results for orders placed or performed during the hospital encounter of 01/13/18   Culture Blood #1   Result Value Ref Range    Specimen Description . BLOOD     Special Requests  FEM STICK R SIDE 15 ML     Culture NO GROWTH 5 DAYS     Culture       [employer] [address] [facility], [address] [place] [address] [phone]    Status Pending    Respiratory Virus PCR Panel   Result Value Ref Range    Source . NASOPHARYNGEAL SWAB     Adenovirus PCR Not Detected NOTDET    Coronavirus 229E PCR Not Detected NOTDET    Coronavirus HKU1 PCR Not Detected NOTDET    Coronavirus NL63 PCR Not Detected NOTDET    Coronavirus OC43 PCR Not Detected NOTDET    Human Metapneumovirus PCR Not Detected NOTDET    Rhino/Enterovirus PCR Not Detected NOTDET    Influenza A by PCR DETECTED (A) NOTDET    Influenza A H1 PCR Not Detected NOTDET    Influenza A H1 (2009) PCR Not Detected NOTDET    Influenza A H3 PCR DETECTED (A) NOTDET    Influenza B by PCR Not Detected NOTDET    Parainfluenza 1 PCR Not Detected NOTDET    Parainfluenza 2 PCR Not Detected NOTDET    Parainfluenza 3 PCR Not Detected NOTDET    Parainfluenza 4 PCR Not Detected NOTDET    Resp Syncytial Virus PCR Not Detected NOTDET    B Pertussis by PCR Not Detected NOTDET    Chlamydia pneumoniae By PCR Not Detected NOTDET    Mycoplasma pneumo by PCR Not Detected NOTDET   CBC Auto Differential   Result Value Ref Range    WBC 14.8 (H) 4.5 - 13.5 k/uL    RBC 4.84 3.95 - 5.11 m/uL    Hemoglobin 13.1 11.9 - 15.1 g/dL    Hematocrit 41.6 36.3 - 47.1 %    MCV 86.0 82.6 - 102.9 fL    MCH 27.1 25.2 - 33.5 pg    MCHC 31.5 28.4 - 34.8 g/dL    RDW 13.9 11.8 - 14.4 %    Platelets 290 911 - 724 k/uL    MPV 10.2 8.1 - 13.5 fL    Differential Type NOT REPORTED     Seg Neutrophils 88 (H) 34 - 64 %    Lymphocytes 5 (L) 25 - 45 %    Monocytes 6 2 - 8 %    Eosinophils % 0 (L) 1 - 4 %    Basophils 0 0 - 2 %    Immature Granulocytes 1 (H) 0 %    Segs Absolute 13.12 (H) 1.50 - 8.10 k/uL    Absolute Lymph # 0.70 (L) 1.10 - 3.70 k/uL    Absolute Mono # 0.83 0.10 - 1.40 k/uL    Absolute Eos # 0.04 0.00 - 0.44 k/uL    Basophils # 0.04 0.00 - 0.20 k/uL    Absolute Immature Granulocyte 0.08 0.00 - 0.30 k/uL    WBC Morphology NOT REPORTED     RBC Morphology NOT REPORTED     Platelet Estimate NOT REPORTED    Urinalysis with microscopic   Result Value Ref Range    Color, UA YELLOW YEL    Turbidity UA CLEAR CLEAR    Glucose, Ur NEGATIVE NEG    Bilirubin Urine NEGATIVE NEG    Ketones, Urine MODERATE (A) NEG    Specific Gravity, UA 1.015 1.005 - 1.030    Urine Hgb NEGATIVE NEG    pH, UA 7.5 5.0 - 8.0    Protein, UA NEGATIVE NEG    Urobilinogen, Urine Normal NORM    Nitrite, Urine NEGATIVE NEG    Leukocyte Esterase, Urine NEGATIVE NEG    -          WBC, UA 0 TO 2 0 - 5 /HPF    RBC, UA 2 TO 5 0 - 2 /HPF    Casts UA NOT REPORTED 0 - 2 /LPF    Crystals UA NOT REPORTED NONE /HPF    Epithelial Cells UA None 0 - 5 /HPF    Renal Epithelial, Urine NOT REPORTED 0 /HPF    Bacteria, UA FEW (A) NONE    Mucus, UA 1+ (A) NONE    Trichomonas, UA NOT REPORTED NONE    Amorphous, UA 1+ (A) NONE    Other Observations UA NOT REPORTED NREQ    Yeast, UA NOT REPORTED NONE   Levetiracetam Level   Result Value Ref Range    Levetiracetam Lvl 25 ug/mL   Lactic Acid, Whole Blood   Result Value Ref Range    Lactic Acid, Whole Blood 0.9 0.7 - 2.1 mmol/L   Basic Metabolic Panel   Result Value Ref Range    Glucose 113 (H) 70 - 99 mg/dL    BUN 8 6 - 20 mg/dL    CREATININE <0.20 (L) 0.50 - 0.90 mg/dL    Bun/Cre Ratio NOT REPORTED 9 - 20    Calcium 8.7 8.6 - 10.4 mg/dL    Sodium 139 135 - 144 mmol/L    Potassium 4.1 3.7 - 5.3 mmol/L    Chloride 98 98 - 107 mmol/L    CO2 28 20 - 31 mmol/L    Anion Gap 13 9 - 17 mmol/L    GFR Non- CANNOT BE CALCULATED >60 mL/min    GFR  CANNOT BE CALCULATED >60 mL/min    GFR Comment          GFR Staging NOT REPORTED    Basic Metabolic Panel w/ Reflex to MG   Result Value Ref Range    Glucose 93 70 - 99 mg/dL    BUN 6 6 - 20 mg/dL    CREATININE <0.20 (L) 0.50 - 0.90 mg/dL    Bun/Cre Ratio NOT REPORTED 9 - 20    Calcium 7.2 (L) 8.6 - 10.4 mg/dL    Sodium 136 135 - 144 mmol/L    Potassium 3.9 3.7 - 5.3 mmol/L    Chloride 99 98 - 107 mmol/L    CO2 24 20 - 31 mmol/L    Anion Gap 13 9 - 17 mmol/L    GFR Non- CANNOT BE CALCULATED >60 mL/min    GFR  CANNOT BE CALCULATED >60 mL/min    GFR Comment          GFR Staging NOT REPORTED    CBC Auto Differential   Result Value Ref Range    WBC 7.1 4.5 - 13.5 k/uL    RBC 3.94 (L) 3.95 - 5.11 m/uL    Hemoglobin 11.0 (L) 11.9 - 15.1 g/dL    Hematocrit 33.8 (L) 36.3 - 47.1 %    MCV 85.8 82.6 - 102.9 fL    MCH 27.9 25.2 - 33.5 pg    MCHC 32.5 28.4 - 34.8 g/dL    RDW 13.9 11.8 - 14.4 %    Platelets See Reflexed IPF Result 138 - 453 k/uL    MPV NOT REPORTED 8.1 - 13.5 fL    Differential Type NOT REPORTED     WBC Morphology NOT REPORTED     RBC Morphology NOT REPORTED     Platelet Estimate NOT REPORTED     Immature Granulocytes 4 (H) 0 %    Seg Neutrophils 85 (H) 34 - 64 %    Lymphocytes 7 (L) 25 - 45 %    Monocytes 4 2 - 8 %    Eosinophils % 0 (L) 1 - 4 %    Basophils 0 0 - 2 %    Absolute Immature Granulocyte 0.28 0.00 - 0.30 k/uL    Segs Absolute 6.04 1.8 - 7.7 k/uL    Absolute Lymph # 0.50 (L) 1.0 - 4.8 k/uL    Absolute Mono # 0.28 0.1 - 1.4 k/uL    Absolute Eos # 0.00 0.0 - 0.4 k/uL    Basophils # 0.00 0.0 - 0.2 k/uL    Morphology POC pCO2 Temp NOT REPORTED mm Hg    POC pO2 Temp NOT REPORTED mm Hg   EKG 12 Lead   Result Value Ref Range    Ventricular Rate 131 BPM    Atrial Rate 131 BPM    P-R Interval 162 ms    QRS Duration 60 ms    Q-T Interval 280 ms    QTc Calculation (Bazett) 413 ms    P Axis 77 degrees    R Axis 43 degrees    T Axis 12 degrees      Blood Culture showed no growth  Radiology:    Xr Chest Standard (2 Vw)    Result Date: 1/13/2018  EXAMINATION: TWO VIEWS OF THE CHEST 1/13/2018 5:06 pm COMPARISON: April 8, 2017 HISTORY: ORDERING SYSTEM PROVIDED HISTORY: cough, fever TECHNOLOGIST PROVIDED HISTORY: Reason for exam:->cough, fever FINDINGS: Suboptimal positioning is noted. The patient's head obscures the left lung apex. The patient's arm obscures a portion of the anterior chest on the lateral view. Increased opacity in the left lung base is demonstrated with air bronchograms. This appears to be in the posterior left lower lobe. There is no evidence for pneumothorax or effusion. The cardiac and mediastinal contours are unchanged in appearance. No acute osseous abnormality identified. Left lower lobe consolidation. Xr Chest Portable    Result Date: 1/16/2018  EXAMINATION: SINGLE VIEW OF THE CHEST 1/16/2018 5:51 am COMPARISON: 01/14/2018 HISTORY: ORDERING SYSTEM PROVIDED HISTORY: f/u pneumonia TECHNOLOGIST PROVIDED HISTORY: Reason for exam:->f/u pneumonia FINDINGS: Bilateral lung airspace disease identified not significantly changed Mild cardiomegaly is noted. There is no evidence of pneumothorax. The osseous structures are without acute process.   Dextroconvex thoracic scoliosis is appreciated     Stable bilateral lung airspace disease which may represent pulmonary edema versus pneumonia     Xr Chest Portable    Result Date: 1/14/2018  EXAMINATION: SINGLE VIEW OF THE CHEST 1/14/2018 8:12 pm COMPARISON: January 13 HISTORY: ORDERING SYSTEM PROVIDED HISTORY: Desaturation TECHNOLOGIST PROVIDED HISTORY: Reason for tablet  Commonly known as:  ULTRAM  1 tablet by Per G Tube route every 8 hours as needed for Pain     traZODone 50 MG tablet  Commonly known as:  DESYREL  Take 1.5-2 tablets by mouth nightly        * This list has 2 medication(s) that are the same as other medications prescribed for you. Read the directions carefully, and ask your doctor or other care provider to review them with you. ASK your doctor about these medications    lidocaine 1 % injection  Inject 20 mLs into the skin once for 1 dose  Ask about: Should I take this medication? Where to Get Your Medications      These medications were sent to [facility] [address] [place] [address].  [address], [address] [address]    Phone:  [phone]   · azithromycin 250 MG tablet  · levalbuterol 1.25 MG/3ML nebulizer solution  · lidocaine 1 % injection  · oseltamivir 6mg/ml 6 MG/ML Susr suspension         Time Spent on discharge is  31 mins in patient examination, evaluation, counseling as well as medication reconciliation, prescriptions for required medications, discharge plan and follow up. Electronically signed by   Michaela Navarrete MD  1/17/18  9:18 AM      Thank you Dr. Mike Chen MD for the opportunity to be involved in this patient's care.

## 2018-01-18 NOTE — CARE COORDINATION
[facility] Transitions Initial Follow Up Call    Call within 2 business days of discharge: Yes    Patient: Tana Simeon Patient : 1995   MRN: [id number]  Reason for Admission:  Pneumonia LLL Discharge Date: 18 RARS: [facility] Risk Score: 0     Spoke with: Caregiver, [address]: [facility]    Non-face-to-face services provided:  Obtained and reviewed discharge summary and/or continuity of care documents     Jesse Peterson states Patient had a good night. She states Patient has completed her Azithromycin antibiotic. She states she is taking her Tamiflu and Lovofloxacin as prescribed. Jesse Peterson states the home care nurse is scheduled to see Patient this afternoon. The aid is currently at her bedside. Patient's  is Laurie Moncada. Jesse Peterson states she will call Dr. Naomy Fox and Dr. Hakeem Anglin to schedule follow up appointments. She states she performs the ordered cough assist with albuterol without difficulty. She states Andrew Mckenzie still has a cough, no fever, no nausea/vomiting. Jesse Peterson states patient is a little lethargic. She has been medicating her with tramadol for discomfort. Jesse Peterson believes Shira's discomfort could be due to her cast.  She states she has an appointment today to get it taken off. Jesse Peterson states she suspended Patient's Milk of Magnesia and Glycolax due to diarrhea. She states Shira's diarrhea are result of her antibiotic therapy. She states Vancomycin always gives her diarrhea. Jesse Kristen denies concerns/needs at this time. Writer explained CTC role to her and informed her that she will receive a return call for follow up care. She expressed understanding.       Care Transitions 24 Hour Call    Schedule Follow Up Appointment with PCP:  Declined  Do you have any ongoing symptoms?:  Yes  Patient-reported symptoms:  Cough  Do you have a copy of your discharge instructions?:  Yes  Do you have all of your prescriptions and are they filled?:  Yes  Have

## 2018-01-19 LAB
CULTURE: NORMAL
CULTURE: NORMAL
Lab: NORMAL
SPECIMEN DESCRIPTION: NORMAL
STATUS: NORMAL

## 2018-01-20 ENCOUNTER — APPOINTMENT (OUTPATIENT)
Dept: GENERAL RADIOLOGY | Age: 23
DRG: 392 | End: 2018-01-20
Payer: COMMERCIAL

## 2018-01-20 ENCOUNTER — HOSPITAL ENCOUNTER (EMERGENCY)
Age: 23
Discharge: HOME | DRG: 392 | End: 2018-01-21
Attending: EMERGENCY MEDICINE
Payer: COMMERCIAL

## 2018-01-20 DIAGNOSIS — K59.00 CONSTIPATION, UNSPECIFIED CONSTIPATION TYPE: Primary | ICD-10-CM

## 2018-01-20 LAB
ALBUMIN SERPL-MCNC: 4.4 G/DL (ref 3.5–5.2)
ALBUMIN/GLOBULIN RATIO: 1.2 (ref 1–2.5)
ALP BLD-CCNC: 67 U/L (ref 35–104)
ALT SERPL-CCNC: 17 U/L (ref 5–33)
ANION GAP SERPL CALCULATED.3IONS-SCNC: 13 MMOL/L (ref 9–17)
AST SERPL-CCNC: 17 U/L
BILIRUB SERPL-MCNC: <0.1 MG/DL (ref 0.3–1.2)
BILIRUBIN URINE: NEGATIVE
BUN BLDV-MCNC: 16 MG/DL (ref 6–20)
BUN/CREAT BLD: ABNORMAL (ref 9–20)
CALCIUM SERPL-MCNC: 9.7 MG/DL (ref 8.6–10.4)
CHLORIDE BLD-SCNC: 99 MMOL/L (ref 98–107)
CO2: 28 MMOL/L (ref 20–31)
COLOR: YELLOW
COMMENT UA: NORMAL
CREAT SERPL-MCNC: <0.2 MG/DL (ref 0.5–0.9)
GFR AFRICAN AMERICAN: ABNORMAL ML/MIN
GFR NON-AFRICAN AMERICAN: ABNORMAL ML/MIN
GFR SERPL CREATININE-BSD FRML MDRD: ABNORMAL ML/MIN/{1.73_M2}
GFR SERPL CREATININE-BSD FRML MDRD: ABNORMAL ML/MIN/{1.73_M2}
GLUCOSE BLD-MCNC: 98 MG/DL (ref 70–99)
GLUCOSE URINE: NEGATIVE
HCT VFR BLD CALC: 40.9 % (ref 36.3–47.1)
HEMOGLOBIN: 12.6 G/DL (ref 11.9–15.1)
KETONES, URINE: NEGATIVE
LACTIC ACID, WHOLE BLOOD: 1.4 MMOL/L (ref 0.7–2.1)
LEUKOCYTE ESTERASE, URINE: NEGATIVE
LIPASE: 32 U/L (ref 13–60)
MCH RBC QN AUTO: 27.3 PG (ref 25.2–33.5)
MCHC RBC AUTO-ENTMCNC: 30.8 G/DL (ref 28.4–34.8)
MCV RBC AUTO: 88.5 FL (ref 82.6–102.9)
NITRITE, URINE: NEGATIVE
NRBC AUTOMATED: 0 PER 100 WBC
PDW BLD-RTO: 14.1 % (ref 11.8–14.4)
PH UA: 6 (ref 5–8)
PLATELET # BLD: 456 K/UL (ref 138–453)
PMV BLD AUTO: 10.1 FL (ref 8.1–13.5)
POTASSIUM SERPL-SCNC: 4.3 MMOL/L (ref 3.7–5.3)
PROTEIN UA: NEGATIVE
RBC # BLD: 4.62 M/UL (ref 3.95–5.11)
SODIUM BLD-SCNC: 140 MMOL/L (ref 135–144)
SPECIFIC GRAVITY UA: 1.03 (ref 1–1.03)
TOTAL PROTEIN: 8 G/DL (ref 6.4–8.3)
TURBIDITY: CLEAR
URINE HGB: NEGATIVE
UROBILINOGEN, URINE: NORMAL
WBC # BLD: 8.7 K/UL (ref 4.5–13.5)

## 2018-01-20 PROCEDURE — 85027 COMPLETE CBC AUTOMATED: CPT

## 2018-01-20 PROCEDURE — 87086 URINE CULTURE/COLONY COUNT: CPT

## 2018-01-20 PROCEDURE — 83605 ASSAY OF LACTIC ACID: CPT

## 2018-01-20 PROCEDURE — 51701 INSERT BLADDER CATHETER: CPT

## 2018-01-20 PROCEDURE — 71045 X-RAY EXAM CHEST 1 VIEW: CPT

## 2018-01-20 PROCEDURE — 74018 RADEX ABDOMEN 1 VIEW: CPT

## 2018-01-20 PROCEDURE — 99284 EMERGENCY DEPT VISIT MOD MDM: CPT

## 2018-01-20 PROCEDURE — 2580000003 HC RX 258: Performed by: EMERGENCY MEDICINE

## 2018-01-20 PROCEDURE — 84703 CHORIONIC GONADOTROPIN ASSAY: CPT

## 2018-01-20 PROCEDURE — 83690 ASSAY OF LIPASE: CPT

## 2018-01-20 PROCEDURE — 80053 COMPREHEN METABOLIC PANEL: CPT

## 2018-01-20 PROCEDURE — 81003 URINALYSIS AUTO W/O SCOPE: CPT

## 2018-01-20 RX ORDER — FENTANYL CITRATE 50 UG/ML
25 INJECTION, SOLUTION INTRAMUSCULAR; INTRAVENOUS ONCE
Status: COMPLETED | OUTPATIENT
Start: 2018-01-21 | End: 2018-01-21

## 2018-01-20 RX ORDER — 0.9 % SODIUM CHLORIDE 0.9 %
20 INTRAVENOUS SOLUTION INTRAVENOUS ONCE
Status: COMPLETED | OUTPATIENT
Start: 2018-01-20 | End: 2018-01-20

## 2018-01-20 RX ADMIN — SODIUM CHLORIDE 784 ML: 9 INJECTION, SOLUTION INTRAVENOUS at 21:30

## 2018-01-21 ENCOUNTER — HOSPITAL ENCOUNTER (EMERGENCY)
Age: 23
Discharge: HOME | DRG: 392 | End: 2018-01-22
Attending: EMERGENCY MEDICINE
Payer: COMMERCIAL

## 2018-01-21 ENCOUNTER — APPOINTMENT (OUTPATIENT)
Dept: CT IMAGING | Age: 23
DRG: 392 | End: 2018-01-21
Payer: COMMERCIAL

## 2018-01-21 VITALS
DIASTOLIC BLOOD PRESSURE: 73 MMHG | BODY MASS INDEX: 19.76 KG/M2 | SYSTOLIC BLOOD PRESSURE: 111 MMHG | TEMPERATURE: 98.4 F | OXYGEN SATURATION: 97 % | WEIGHT: 85 LBS | RESPIRATION RATE: 21 BRPM | HEART RATE: 89 BPM

## 2018-01-21 VITALS
OXYGEN SATURATION: 97 % | WEIGHT: 86.42 LBS | BODY MASS INDEX: 20.09 KG/M2 | HEART RATE: 87 BPM | TEMPERATURE: 98.1 F | DIASTOLIC BLOOD PRESSURE: 87 MMHG | SYSTOLIC BLOOD PRESSURE: 134 MMHG | RESPIRATION RATE: 20 BRPM

## 2018-01-21 DIAGNOSIS — K59.00 CONSTIPATION, UNSPECIFIED CONSTIPATION TYPE: Primary | ICD-10-CM

## 2018-01-21 LAB
CULTURE: NO GROWTH
CULTURE: NORMAL
HCG QUALITATIVE: NEGATIVE
Lab: NORMAL
SPECIMEN DESCRIPTION: NORMAL
STATUS: NORMAL

## 2018-01-21 PROCEDURE — 96376 TX/PRO/DX INJ SAME DRUG ADON: CPT

## 2018-01-21 PROCEDURE — 74176 CT ABD & PELVIS W/O CONTRAST: CPT

## 2018-01-21 PROCEDURE — 96374 THER/PROPH/DIAG INJ IV PUSH: CPT

## 2018-01-21 PROCEDURE — 6370000000 HC RX 637 (ALT 250 FOR IP): Performed by: EMERGENCY MEDICINE

## 2018-01-21 PROCEDURE — 6360000002 HC RX W HCPCS: Performed by: EMERGENCY MEDICINE

## 2018-01-21 PROCEDURE — 99282 EMERGENCY DEPT VISIT SF MDM: CPT

## 2018-01-21 RX ORDER — FENTANYL CITRATE 50 UG/ML
25 INJECTION, SOLUTION INTRAMUSCULAR; INTRAVENOUS ONCE
Status: COMPLETED | OUTPATIENT
Start: 2018-01-21 | End: 2018-01-21

## 2018-01-21 RX ORDER — FENTANYL CITRATE 50 UG/ML
15 INJECTION, SOLUTION INTRAMUSCULAR; INTRAVENOUS ONCE
Status: DISCONTINUED | OUTPATIENT
Start: 2018-01-21 | End: 2018-01-21

## 2018-01-21 RX ORDER — MAGNESIUM CITRATE
150 SOLUTION, ORAL ORAL 2 TIMES DAILY
Qty: 2100 ML | Refills: 0 | Status: SHIPPED | OUTPATIENT
Start: 2018-01-21 | End: 2018-01-28

## 2018-01-21 RX ORDER — SODIUM PHOSPHATE, DIBASIC AND SODIUM PHOSPHATE, MONOBASIC 7; 19 G/133ML; G/133ML
118 ENEMA RECTAL ONCE
Status: COMPLETED | OUTPATIENT
Start: 2018-01-21 | End: 2018-01-21

## 2018-01-21 RX ADMIN — FENTANYL CITRATE 25 MCG: 50 INJECTION, SOLUTION INTRAMUSCULAR; INTRAVENOUS at 00:06

## 2018-01-21 RX ADMIN — SODIUM PHOSPHATE 1 ENEMA: 7; 19 ENEMA RECTAL at 02:36

## 2018-01-21 RX ADMIN — FENTANYL CITRATE 25 MCG: 50 INJECTION, SOLUTION INTRAMUSCULAR; INTRAVENOUS at 04:08

## 2018-01-21 RX ADMIN — MAGESIUM CITRATE 296 ML: 1.75 LIQUID ORAL at 21:30

## 2018-01-21 ASSESSMENT — PAIN SCALES - GENERAL
PAINLEVEL_OUTOF10: 7
PAINLEVEL_OUTOF10: 7

## 2018-01-21 NOTE — ED PROVIDER NOTES
5/19/17 5/19/18  Lavelleo MD Alexandra   scopolamine (TRANSDERM-SCOP, 1.5 MG,) transdermal patch Place 1 patch onto the skin every 72 hours Place on hairless area behind the ear- change every 1-3 days 5/19/17 5/19/18  Susy Morris MD   traZODone (DESYREL) 50 MG tablet Take 1.5-2 tablets by mouth nightly 5/19/17 5/19/18  Susy Morris MD   Feeding Tubes - Sets (JAYDEN-KEY GASTROSTOMY KIT 18FR) MISC 3.5 cm 5/19/17 5/19/18  Reino Foots, MD   clonazePAM (KLONOPIN) 0.5 MG tablet Take 1.5 tablets by mouth 3 times daily as needed for Anxiety 5/3/17 5/3/18  Lavelleo Foots, MD   polyethylene glycol (GLYCOLAX) powder 17 g by Per G Tube route daily 3/7/17 3/7/18  Lavelleo FootMD serjio   NUTRITIONAL SUPPLEMENT LIQD 1 Can by Per G Tube route 3 times daily Peptamen 1 jovany 3/3/17 3/3/18  Denise Rodriguez MD   Probiotic Product (ALIGN PO) Take 4 mg by mouth nightly     Historical Provider, MD   saccharomyces boulardii (FLORASTOR) 250 MG capsule Take 250 mg by mouth daily    Historical Provider, MD   Nutritional Supplements (PEPTAMEN 1 JOVANY) LIQD Take 1 Can by mouth 3 times daily Peptamen Adult 1/13/17 1/13/18  Reino Alexandra, MD   magnesium hydroxide (MILK OF MAGNESIA CONCENTRATE) 2400 MG/10ML SUSP Take 8 mLs by mouth 2 times daily     Historical Provider, MD   800 Poly Pl 1 each by Does not apply route daily as needed 2/3/16 2/13/18  Lavelleo MD Alexandra   Misc Natural Products (CYSTEX) LIQD Take 15 mLs by mouth daily 2/3/16 2/13/18  Lavelleo FootMD serjio   diazepam (DIASTAT) 10 MG GEL Place 10 mg rectally once as needed 2/3/16 6/12/18  Susy Morris MD       REVIEW OF SYSTEMS    (2-9 systems for level 4, 10 or more for level 5)      Review of Systems   Unable to perform ROS: Patient nonverbal       PHYSICAL EXAM   (up to 7 for level 4, 8 or more for level 5)      INITIAL VITALS:   /87   Pulse 87   Temp 98.1 °F (36.7 °C) (Oral)   Resp 20   LMP 09/17/2017   SpO2 98%     Physical Exam   Constitutional:   Appears smaller for age. Her extremities are contracted. HENT:   Head: Normocephalic and atraumatic. Right Ear: External ear normal.   Left Ear: External ear normal.   TMs within normal limits bilaterally. Posterior oropharynx clear, no exudates, erythema or swelling. Eyes: Pupils are equal, round, and reactive to light. Pupils are round, reactive to light bilaterally. There is conjunctival injection of the right medial eye. No drainage. No scleral icterus. Cardiovascular: Normal rate, regular rhythm, normal heart sounds and intact distal pulses. No murmur heard. Pulmonary/Chest: Effort normal and breath sounds normal.   Lungs clear to auscultation bilaterally. Abdominal: Soft. Abdomen soft, nondistended. G-tube is attached to the left upper quadrant. There is very minimal pink tinge drainage around the G-tube. Otherwise, site appears clean, intact. There is no erythema or swelling. Musculoskeletal:   Extremities are contracted. Neurological:   Awake, nonverbal.   Skin: Skin is warm and dry. There is no rash on her extremities. Nursing note and vitals reviewed.       DIFFERENTIAL  DIAGNOSIS     PLAN (LABS / IMAGING / EKG):  Orders Placed This Encounter   Procedures    Urine Culture    C DIFF TOXIN B BY RT PCR    XR CHEST PORTABLE    XR ABDOMEN (KUB) (SINGLE AP VIEW)    Urinalysis    CBC    Comprehensive Metabolic Panel    LACTIC ACID, WHOLE BLOOD    LIPASE    Straight cath    Insert peripheral IV       MEDICATIONS ORDERED:  Orders Placed This Encounter   Medications    diatrizoate meglumine-sodium (GASTROGRAFIN) 66-10 % solution 30 mL    0.9 % sodium chloride bolus    fentaNYL (SUBLIMAZE) injection 25 mcg       DDX: ?pain, diarrhea, UTI    DIAGNOSTIC RESULTS / EMERGENCY DEPARTMENT COURSE / MDM     LABS:  Results for orders placed or performed during the hospital encounter of 01/20/18 Urinalysis   Result Value Ref Range    Color, UA YELLOW YEL    Turbidity UA CLEAR CLEAR    Glucose, Ur NEGATIVE NEG    Bilirubin Urine NEGATIVE NEG    Ketones, Urine NEGATIVE NEG    Specific Gravity, UA 1.026 1.005 - 1.030    Urine Hgb NEGATIVE NEG    pH, UA 6.0 5.0 - 8.0    Protein, UA NEGATIVE NEG    Urobilinogen, Urine Normal NORM    Nitrite, Urine NEGATIVE NEG    Leukocyte Esterase, Urine NEGATIVE NEG    Urinalysis Comments       Microscopic exam not performed based on chemical results unless requested in   CBC   Result Value Ref Range    WBC 8.7 4.5 - 13.5 k/uL    RBC 4.62 3.95 - 5.11 m/uL    Hemoglobin 12.6 11.9 - 15.1 g/dL    Hematocrit 40.9 36.3 - 47.1 %    MCV 88.5 82.6 - 102.9 fL    MCH 27.3 25.2 - 33.5 pg    MCHC 30.8 28.4 - 34.8 g/dL    RDW 14.1 11.8 - 14.4 %    Platelets 107 (H) 916 - 453 k/uL    MPV 10.1 8.1 - 13.5 fL    NRBC Automated 0.0 0.0 per 100 WBC   Comprehensive Metabolic Panel   Result Value Ref Range    Glucose 98 70 - 99 mg/dL    BUN 16 6 - 20 mg/dL    CREATININE <0.20 (L) 0.50 - 0.90 mg/dL    Bun/Cre Ratio NOT REPORTED 9 - 20    Calcium 9.7 8.6 - 10.4 mg/dL    Sodium 140 135 - 144 mmol/L    Potassium 4.3 3.7 - 5.3 mmol/L    Chloride 99 98 - 107 mmol/L    CO2 28 20 - 31 mmol/L    Anion Gap 13 9 - 17 mmol/L    Alkaline Phosphatase 67 35 - 104 U/L    ALT 17 5 - 33 U/L    AST 17 <32 U/L    Total Bilirubin <0.10 (L) 0.3 - 1.2 mg/dL    Total Protein 8.0 6.4 - 8.3 g/dL    Alb 4.4 3.5 - 5.2 g/dL    Albumin/Globulin Ratio 1.2 1.0 - 2.5    GFR Non- CANNOT BE CALCULATED >60 mL/min    GFR  CANNOT BE CALCULATED >60 mL/min    GFR Comment          GFR Staging NOT REPORTED    LACTIC ACID, WHOLE BLOOD   Result Value Ref Range    Lactic Acid, Whole Blood 1.4 0.7 - 2.1 mmol/L   LIPASE   Result Value Ref Range    Lipase 32 13 - 60 U/L   HCG Qualitative, Serum   Result Value Ref Range    hCG Qual NEGATIVE NEG       IMPRESSION: 52-year-old female with history of rett's syndrome, dystonia, nonverbal presenting with mom who is stating patient is in pain. Patient was recently admitted for influenza and pneumonia. She completed her antibiotic therapy as well as Tamiflu. No fever at home. No emesis. She has not been coughing as much. Mom states she's been having loose bowel movements. No change in her urination. She is fully immunized. On arrival, vital signs within normal limits for her age. She appears small for age, nontoxic. She has some conjunctival injection on the right, spasticity in her extremities. G-tube intact in the abdomen. No rashes on the body. We'll check basic labs, chest x-ray, KUB, urinalysis, obtain stool sample for C. diff. We'll treat accordingly. RADIOLOGY:  Xr Abdomen (kub) (single Ap View)    Result Date: 1/20/2018  EXAMINATION: SUPINE VIEW(S) OF THE ABDOMEN 1/20/2018 8:05 pm COMPARISON: None. HISTORY: ORDERING SYSTEM PROVIDED HISTORY: please obtain upright and inject contrast for G tube placement TECHNOLOGIST PROVIDED HISTORY: Reason for exam:->please obtain upright and inject contrast for G tube placement FINDINGS: Contrast appears to be opacifying a portion of the stomach and pylorus. The bowel gas pattern is unremarkable. G tube tip appears to be in the stomach     Xr Chest Portable    Result Date: 1/20/2018  EXAMINATION: SINGLE VIEW OF THE CHEST 1/20/2018 8:05 pm COMPARISON: None. HISTORY: ORDERING SYSTEM PROVIDED HISTORY: f/u pneumonia TECHNOLOGIST PROVIDED HISTORY: Reason for exam:->f/u pneumonia FINDINGS: The lungs are without acute focal process. There is no effusion or pneumothorax. The cardiomediastinal silhouette is stable. The osseous structures are stable. No acute process. EKG  None    All EKG's are interpreted by the Emergency Department Physician who either signs or Co-signs this chart in the absence of a cardiologist.    [address] [place] [address] [place]:  9:47 PM  Patient reassessed; sleeping. Mom updated on labs. Awaiting urine. Patient has not had any bowel movements as yet to test for C. Diff. 11:51 PM  Patient reassessed; resting comfortably. Mom states she is in pain. Mom updated on negative urine analysis. All questions answered. Mom wants to proceed with a CT scan. We'll order CT scan without IV contrast to evaluate the kidneys as well for stone. We'll give a small dose of fentanyl. 1:00 AM  Serum preg negative. Signed out to Dr. Darian Luevano. Awaiting CT Ab/pel. PROCEDURES:  None    CONSULTS:  None    CRITICAL CARE:  None    FINAL IMPRESSION      Pain of unknown source     DISPOSITION / PLAN     DISPOSITION  pending CT      PATIENT REFERRED TO:  No follow-up provider specified.     DISCHARGE MEDICATIONS:  New Prescriptions    No medications on file       Teri Savage MD  Emergency Medicine Resident    (Please note that portions of this note were completed with a voice recognition program.  Efforts were made to edit the dictations but occasionally words are mis-transcribed.)        Teri Savage MD  Resident  01/21/18 [id number]

## 2018-01-21 NOTE — ED PROVIDER NOTES
[facility] ED  Emergency Department  Emergency Medicine Resident Sign-out     Care of Shaan Santoyo was assumed from Dr. Julieta Patrick and is being seen for Abdominal Pain  . The patient's initial evaluation and plan have been discussed with the prior provider who initially evaluated the patient. EMERGENCY DEPARTMENT COURSE / MEDICAL DECISION MAKING:       MEDICATIONS GIVEN:  Orders Placed This Encounter   Medications    diatrizoate meglumine-sodium (GASTROGRAFIN) 66-10 % solution 30 mL    0.9 % sodium chloride bolus    fentaNYL (SUBLIMAZE) injection 25 mcg    fleet rectal enema 1 enema    DISCONTD: fentaNYL (SUBLIMAZE) injection 15 mcg    fentaNYL (SUBLIMAZE) injection 25 mcg       LABS / RADIOLOGY:     Labs Reviewed   CBC - Abnormal; Notable for the following:        Result Value    Platelets 102 (*)     All other components within normal limits   COMPREHENSIVE METABOLIC PANEL - Abnormal; Notable for the following:     CREATININE <0.20 (*)     Total Bilirubin <0.10 (*)     All other components within normal limits   URINE CULTURE   C DIFF TOXIN B BY RT PCR   URINALYSIS   LACTIC ACID, WHOLE BLOOD   LIPASE   HCG, SERUM, QUALITATIVE       Ct Abdomen Pelvis Wo Iv Contrast Additional Contrast? None    Result Date: 1/21/2018  EXAMINATION: CT OF THE ABDOMEN AND PELVIS WITHOUT CONTRAST 1/21/2018 1:24 am TECHNIQUE: CT of the abdomen and pelvis was performed without the administration of intravenous contrast. Multiplanar reformatted images are provided for review. Dose modulation, iterative reconstruction, and/or weight based adjustment of the mA/kV was utilized to reduce the radiation dose to as low as reasonably achievable. COMPARISON: 04/08/2017 HISTORY: ORDERING SYSTEM PROVIDED HISTORY: Ab pain TECHNOLOGIST PROVIDED HISTORY: Additional Contrast?->None FINDINGS: Lower Chest: Again seen is chronic consolidation focally in the left lower lobe posteriorly. Organs:  The visualized portions of the liver, MD Ervin  [address], [place] [address]  [phone]    Go in 2 days  for follow up     DISCHARGE MEDICATIONS: Discharge Medication List as of 1/21/2018  3:59 AM             Kelly Hamm DO  Emergency Medicine Resident  [facility]       [address] [place]  Resident  01/21/18 [id number]

## 2018-01-22 ENCOUNTER — CARE COORDINATION (OUTPATIENT)
Dept: CARE COORDINATION | Age: 23
End: 2018-01-22

## 2018-01-22 ENCOUNTER — TELEPHONE (OUTPATIENT)
Dept: FAMILY MEDICINE CLINIC | Age: 23
End: 2018-01-22

## 2018-01-22 PROCEDURE — 6370000000 HC RX 637 (ALT 250 FOR IP): Performed by: EMERGENCY MEDICINE

## 2018-01-22 RX ADMIN — POLYETHYLENE GLYCOL 3350, SODIUM SULFATE ANHYDROUS, SODIUM BICARBONATE, SODIUM CHLORIDE, POTASSIUM CHLORIDE 4000 ML: 236; 22.74; 6.74; 5.86; 2.97 POWDER, FOR SOLUTION ORAL at 00:09

## 2018-01-22 NOTE — ED PROVIDER NOTES
) 8/17/17 9/7/18  Historical Provider, MD   Multiple Vitamins-Minerals (CERTAVITE/ANTIOXIDANTS) solution Give 5 ML per G Tube daily 7/21/17 7/21/18  Darrell Chaudhry CNP   nystatin (MYCOSTATIN) 120282 UNIT/GM ointment Apply topically 2 times daily.  5/19/17 5/19/18  Soraya Mcgee MD   scopolamine (TRANSDERM-SCOP, 1.5 MG,) transdermal patch Place 1 patch onto the skin every 72 hours Place on hairless area behind the ear- change every 1-3 days 5/19/17 5/19/18  Soraya Mcgee MD   traZODone (DESYREL) 50 MG tablet Take 1.5-2 tablets by mouth nightly 5/19/17 5/19/18  Soraya Mcgee MD   Feeding Tubes - Sets (JAYDEN-KEY GASTROSTOMY KIT 18FR) MISC 3.5 cm 5/19/17 5/19/18  Soraya Mcgee MD   clonazePAM (KLONOPIN) 0.5 MG tablet Take 1.5 tablets by mouth 3 times daily as needed for Anxiety 5/3/17 5/3/18  Soraya Mcgee MD   polyethylene glycol (GLYCOLAX) powder 17 g by Per G Tube route daily 3/7/17 3/7/18  Soraya Mcgee MD   NUTRITIONAL SUPPLEMENT LIQD 1 Can by Per G Tube route 3 times daily Peptamen 1 jovany 3/3/17 3/3/18  Yeyo Burton MD   Probiotic Product (ALIGN PO) Take 4 mg by mouth nightly     Historical Provider, MD   saccharomyces boulardii (FLORASTOR) 250 MG capsule Take 250 mg by mouth daily    Historical Provider, MD   Nutritional Supplements (PEPTAMEN 1 JOVANY) LIQD Take 1 Can by mouth 3 times daily Peptamen Adult 1/13/17 1/13/18  Soraya Mcgee MD   magnesium hydroxide (MILK OF MAGNESIA CONCENTRATE) 2400 MG/10ML SUSP Take 8 mLs by mouth 2 times daily     Historical Provider, MD   800 Poly Pl 1 each by Does not apply route daily as needed 2/3/16 2/13/18  Soraya Mcgee MD   Misc Natural Products (CYSTEX) LIQD Take 15 mLs by mouth daily 2/3/16 2/13/18  Soraya Mcgee MD   diazepam (DIASTAT) 10 MG GEL Place 10 mg rectally once as needed 2/3/16 6/12/18  Soraya Mcgee MD       REVIEW OF SYSTEMS Methylnaltrexone Bromide (RELISTOR) 150 MG TABS Take 1 tablet by mouth daily, Disp-14 tablet, R-0Print      magnesium citrate (CITROMA) SOLN Take 150 mLs by mouth 2 times daily for 14 doses, Disp-2100 mL, R-0Print             Kedar Hillman MD  Emergency Medicine Resident    (Please note that portions of this note were completed with a voice recognition program.  Efforts were made to edit the dictations but occasionally words are mis-transcribed.)        Kedar Hillman MD  Resident  01/22/18 6040

## 2018-01-22 NOTE — TELEPHONE ENCOUNTER
Is the multivitamin 1 tsp daily (5ml)? If so does she need a 1 month supply? This would be 150ml. Script is written for 300ml with 4 refills. Please clarify.

## 2018-01-22 NOTE — TELEPHONE ENCOUNTER
Mother contacted office today stating that pt has not had a hard stool for 1 1/2 weeks and the only stool she has had has been liquid. Mother states that patient has been in pain and trying to push out stool for 3 days now. Mother states that patient was in hospital on 01/20/18 and 01/21/18 for constipation. They did a CT which showed that her rectum is full of stool. They did an enema which did not help at all. Mother states that she has been giving patient milk of magnesia and also barium enema and she has only received a 1/2 cup of fluid no stool and there is no color to the fluid either. Per CMW, massage patients abdomen clockwise to see if that helps move the stool. There are also pressure points on the feet that she can massage as well. Informed mother to contact our office in the next day or two if there is no change. Mother verbalized understanding.     Electronically signed by Lieutenant Brice on 1/22/2018 at 4:01 PM

## 2018-01-23 ENCOUNTER — APPOINTMENT (OUTPATIENT)
Dept: GENERAL RADIOLOGY | Age: 23
DRG: 392 | End: 2018-01-23
Payer: COMMERCIAL

## 2018-01-23 ENCOUNTER — HOSPITAL ENCOUNTER (INPATIENT)
Age: 23
LOS: 2 days | Discharge: HOME HEALTH | DRG: 392 | End: 2018-01-25
Attending: EMERGENCY MEDICINE | Admitting: INTERNAL MEDICINE
Payer: COMMERCIAL

## 2018-01-23 DIAGNOSIS — R56.9 CONVULSIONS, UNSPECIFIED CONVULSION TYPE (HCC): ICD-10-CM

## 2018-01-23 DIAGNOSIS — K59.00 CONSTIPATION, UNSPECIFIED CONSTIPATION TYPE: Primary | ICD-10-CM

## 2018-01-23 DIAGNOSIS — F84.2 RETT'S SYNDROME: Chronic | ICD-10-CM

## 2018-01-23 DIAGNOSIS — R25.1 TREMORS OF NERVOUS SYSTEM: ICD-10-CM

## 2018-01-23 DIAGNOSIS — R11.10 VOMITING, INTRACTABILITY OF VOMITING NOT SPECIFIED, PRESENCE OF NAUSEA NOT SPECIFIED, UNSPECIFIED VOMITING TYPE: ICD-10-CM

## 2018-01-23 DIAGNOSIS — R56.9 SEIZURE (HCC): ICD-10-CM

## 2018-01-23 LAB
ABSOLUTE EOS #: 0.16 K/UL (ref 0–0.44)
ABSOLUTE IMMATURE GRANULOCYTE: 0.19 K/UL (ref 0–0.3)
ABSOLUTE LYMPH #: 1.89 K/UL (ref 1.1–3.7)
ABSOLUTE MONO #: 0.57 K/UL (ref 0.1–1.4)
ALBUMIN SERPL-MCNC: 3.7 G/DL (ref 3.5–5.2)
ALBUMIN/GLOBULIN RATIO: 1.1 (ref 1–2.5)
ALP BLD-CCNC: 43 U/L (ref 35–104)
ALT SERPL-CCNC: 22 U/L (ref 5–33)
ANION GAP SERPL CALCULATED.3IONS-SCNC: 13 MMOL/L (ref 9–17)
ANION GAP SERPL CALCULATED.3IONS-SCNC: 13 MMOL/L (ref 9–17)
AST SERPL-CCNC: 72 U/L
BASOPHILS # BLD: 1 % (ref 0–2)
BASOPHILS ABSOLUTE: 0.04 K/UL (ref 0–0.2)
BILIRUB SERPL-MCNC: 0.28 MG/DL (ref 0.3–1.2)
BILIRUBIN DIRECT: <0.08 MG/DL
BILIRUBIN, INDIRECT: ABNORMAL MG/DL (ref 0–1)
BUN BLDV-MCNC: 7 MG/DL (ref 6–20)
BUN BLDV-MCNC: 8 MG/DL (ref 6–20)
BUN/CREAT BLD: ABNORMAL (ref 9–20)
BUN/CREAT BLD: ABNORMAL (ref 9–20)
CALCIUM SERPL-MCNC: 8.3 MG/DL (ref 8.6–10.4)
CALCIUM SERPL-MCNC: 9.1 MG/DL (ref 8.6–10.4)
CHLORIDE BLD-SCNC: 102 MMOL/L (ref 98–107)
CHLORIDE BLD-SCNC: 96 MMOL/L (ref 98–107)
CHP ED QC CHECK: NORMAL
CO2: 25 MMOL/L (ref 20–31)
CO2: 29 MMOL/L (ref 20–31)
CREAT SERPL-MCNC: <0.2 MG/DL (ref 0.5–0.9)
CREAT SERPL-MCNC: <0.2 MG/DL (ref 0.5–0.9)
DIFFERENTIAL TYPE: ABNORMAL
EOSINOPHILS RELATIVE PERCENT: 2 % (ref 1–4)
GFR AFRICAN AMERICAN: ABNORMAL ML/MIN
GFR AFRICAN AMERICAN: ABNORMAL ML/MIN
GFR NON-AFRICAN AMERICAN: ABNORMAL ML/MIN
GFR NON-AFRICAN AMERICAN: ABNORMAL ML/MIN
GFR SERPL CREATININE-BSD FRML MDRD: ABNORMAL ML/MIN/{1.73_M2}
GLOBULIN: ABNORMAL G/DL (ref 1.5–3.8)
GLUCOSE BLD-MCNC: 100 MG/DL
GLUCOSE BLD-MCNC: 100 MG/DL (ref 65–105)
GLUCOSE BLD-MCNC: 71 MG/DL (ref 70–99)
GLUCOSE BLD-MCNC: 84 MG/DL (ref 70–99)
HCG QUALITATIVE: NEGATIVE
HCT VFR BLD CALC: 34.8 % (ref 36.3–47.1)
HEMOGLOBIN: 11.2 G/DL (ref 11.9–15.1)
IMMATURE GRANULOCYTES: 3 %
KEPPRA: 33 UG/ML
LACTIC ACID, WHOLE BLOOD: 0.7 MMOL/L (ref 0.7–2.1)
LACTIC ACID: NORMAL MMOL/L
LIPASE: 24 U/L (ref 13–60)
LYMPHOCYTES # BLD: 25 % (ref 25–45)
MCH RBC QN AUTO: 27.3 PG (ref 25.2–33.5)
MCHC RBC AUTO-ENTMCNC: 32.2 G/DL (ref 28.4–34.8)
MCV RBC AUTO: 84.9 FL (ref 82.6–102.9)
MONOCYTES # BLD: 8 % (ref 2–8)
NRBC AUTOMATED: 0 PER 100 WBC
PDW BLD-RTO: 14.3 % (ref 11.8–14.4)
PLATELET # BLD: ABNORMAL K/UL (ref 138–453)
PLATELET ESTIMATE: ABNORMAL
PLATELET, FLUORESCENCE: 368 K/UL (ref 138–453)
PLATELET, IMMATURE FRACTION: 2.9 % (ref 1.1–10.3)
PMV BLD AUTO: ABNORMAL FL (ref 8.1–13.5)
POTASSIUM SERPL-SCNC: 5 MMOL/L (ref 3.7–5.3)
POTASSIUM SERPL-SCNC: 5.6 MMOL/L (ref 3.7–5.3)
POTASSIUM SERPL-SCNC: 5.9 MMOL/L (ref 3.7–5.3)
RBC # BLD: 4.1 M/UL (ref 3.95–5.11)
RBC # BLD: ABNORMAL 10*6/UL
SEG NEUTROPHILS: 62 % (ref 34–64)
SEGMENTED NEUTROPHILS ABSOLUTE COUNT: 4.61 K/UL (ref 1.5–8.1)
SODIUM BLD-SCNC: 138 MMOL/L (ref 135–144)
SODIUM BLD-SCNC: 140 MMOL/L (ref 135–144)
TOTAL PROTEIN: 7.2 G/DL (ref 6.4–8.3)
WBC # BLD: 7.5 K/UL (ref 4.5–13.5)
WBC # BLD: ABNORMAL 10*3/UL

## 2018-01-23 PROCEDURE — 2580000003 HC RX 258: Performed by: INTERNAL MEDICINE

## 2018-01-23 PROCEDURE — 2060000000 HC ICU INTERMEDIATE R&B

## 2018-01-23 PROCEDURE — 99223 1ST HOSP IP/OBS HIGH 75: CPT | Performed by: INTERNAL MEDICINE

## 2018-01-23 PROCEDURE — 99285 EMERGENCY DEPT VISIT HI MDM: CPT

## 2018-01-23 PROCEDURE — 83605 ASSAY OF LACTIC ACID: CPT

## 2018-01-23 PROCEDURE — 85025 COMPLETE CBC W/AUTO DIFF WBC: CPT

## 2018-01-23 PROCEDURE — 99254 IP/OBS CNSLTJ NEW/EST MOD 60: CPT | Performed by: NURSE PRACTITIONER

## 2018-01-23 PROCEDURE — 6370000000 HC RX 637 (ALT 250 FOR IP): Performed by: NURSE PRACTITIONER

## 2018-01-23 PROCEDURE — 85055 RETICULATED PLATELET ASSAY: CPT

## 2018-01-23 PROCEDURE — 84703 CHORIONIC GONADOTROPIN ASSAY: CPT

## 2018-01-23 PROCEDURE — 80076 HEPATIC FUNCTION PANEL: CPT

## 2018-01-23 PROCEDURE — 80048 BASIC METABOLIC PNL TOTAL CA: CPT

## 2018-01-23 PROCEDURE — 6370000000 HC RX 637 (ALT 250 FOR IP): Performed by: EMERGENCY MEDICINE

## 2018-01-23 PROCEDURE — 6360000002 HC RX W HCPCS: Performed by: NURSE PRACTITIONER

## 2018-01-23 PROCEDURE — 83690 ASSAY OF LIPASE: CPT

## 2018-01-23 PROCEDURE — 82947 ASSAY GLUCOSE BLOOD QUANT: CPT

## 2018-01-23 PROCEDURE — 76937 US GUIDE VASCULAR ACCESS: CPT

## 2018-01-23 PROCEDURE — 84132 ASSAY OF SERUM POTASSIUM: CPT

## 2018-01-23 PROCEDURE — 80177 DRUG SCRN QUAN LEVETIRACETAM: CPT

## 2018-01-23 PROCEDURE — 74022 RADEX COMPL AQT ABD SERIES: CPT

## 2018-01-23 RX ORDER — LEVALBUTEROL INHALATION SOLUTION 1.25 MG/3ML
1.25 SOLUTION RESPIRATORY (INHALATION) EVERY 6 HOURS PRN
Status: DISCONTINUED | OUTPATIENT
Start: 2018-01-23 | End: 2018-01-25 | Stop reason: HOSPADM

## 2018-01-23 RX ORDER — POTASSIUM CHLORIDE 20 MEQ/1
40 TABLET, EXTENDED RELEASE ORAL PRN
Status: DISCONTINUED | OUTPATIENT
Start: 2018-01-23 | End: 2018-01-25 | Stop reason: HOSPADM

## 2018-01-23 RX ORDER — SODIUM CHLORIDE 9 MG/ML
INJECTION, SOLUTION INTRAVENOUS CONTINUOUS
Status: DISCONTINUED | OUTPATIENT
Start: 2018-01-23 | End: 2018-01-25

## 2018-01-23 RX ORDER — KETOROLAC TROMETHAMINE 15 MG/ML
15 INJECTION, SOLUTION INTRAMUSCULAR; INTRAVENOUS ONCE
Status: DISCONTINUED | OUTPATIENT
Start: 2018-01-23 | End: 2018-01-25 | Stop reason: HOSPADM

## 2018-01-23 RX ORDER — NICOTINE 21 MG/24HR
1 PATCH, TRANSDERMAL 24 HOURS TRANSDERMAL DAILY PRN
Status: DISCONTINUED | OUTPATIENT
Start: 2018-01-23 | End: 2018-01-25 | Stop reason: HOSPADM

## 2018-01-23 RX ORDER — POTASSIUM CHLORIDE 20MEQ/15ML
40 LIQUID (ML) ORAL PRN
Status: DISCONTINUED | OUTPATIENT
Start: 2018-01-23 | End: 2018-01-25 | Stop reason: HOSPADM

## 2018-01-23 RX ORDER — BISACODYL 10 MG
10 SUPPOSITORY, RECTAL RECTAL DAILY PRN
Status: DISCONTINUED | OUTPATIENT
Start: 2018-01-23 | End: 2018-01-25 | Stop reason: HOSPADM

## 2018-01-23 RX ORDER — ONDANSETRON 2 MG/ML
4 INJECTION INTRAMUSCULAR; INTRAVENOUS ONCE
Status: DISCONTINUED | OUTPATIENT
Start: 2018-01-23 | End: 2018-01-25 | Stop reason: HOSPADM

## 2018-01-23 RX ORDER — SODIUM CHLORIDE 0.9 % (FLUSH) 0.9 %
10 SYRINGE (ML) INJECTION PRN
Status: DISCONTINUED | OUTPATIENT
Start: 2018-01-23 | End: 2018-01-25 | Stop reason: HOSPADM

## 2018-01-23 RX ORDER — CLONAZEPAM 0.5 MG/1
0.75 TABLET ORAL 3 TIMES DAILY PRN
Status: DISCONTINUED | OUTPATIENT
Start: 2018-01-23 | End: 2018-01-25 | Stop reason: HOSPADM

## 2018-01-23 RX ORDER — SODIUM CHLORIDE 0.9 % (FLUSH) 0.9 %
10 SYRINGE (ML) INJECTION EVERY 12 HOURS SCHEDULED
Status: DISCONTINUED | OUTPATIENT
Start: 2018-01-23 | End: 2018-01-25 | Stop reason: HOSPADM

## 2018-01-23 RX ORDER — SODIUM CHLORIDE 9 MG/ML
INJECTION, SOLUTION INTRAVENOUS CONTINUOUS
Status: DISCONTINUED | OUTPATIENT
Start: 2018-01-23 | End: 2018-01-24

## 2018-01-23 RX ORDER — ACETAMINOPHEN 325 MG/1
650 TABLET ORAL EVERY 4 HOURS PRN
Status: DISCONTINUED | OUTPATIENT
Start: 2018-01-23 | End: 2018-01-25 | Stop reason: HOSPADM

## 2018-01-23 RX ORDER — LEVETIRACETAM 100 MG/ML
1500 SOLUTION ORAL 2 TIMES DAILY
Status: DISCONTINUED | OUTPATIENT
Start: 2018-01-23 | End: 2018-01-25 | Stop reason: HOSPADM

## 2018-01-23 RX ORDER — LEVETIRACETAM 100 MG/ML
1500 SOLUTION ORAL ONCE
Status: COMPLETED | OUTPATIENT
Start: 2018-01-23 | End: 2018-01-23

## 2018-01-23 RX ORDER — MULTIVITAMIN/FERROUS GLUCONATE 10 MG/5 ML
LIQUID (ML) ORAL
Qty: 150 ML | Refills: 5 | Status: SHIPPED | OUTPATIENT
Start: 2018-01-23 | End: 2019-11-06

## 2018-01-23 RX ORDER — ONDANSETRON 2 MG/ML
4 INJECTION INTRAMUSCULAR; INTRAVENOUS EVERY 6 HOURS PRN
Status: DISCONTINUED | OUTPATIENT
Start: 2018-01-23 | End: 2018-01-25 | Stop reason: HOSPADM

## 2018-01-23 RX ORDER — LEVETIRACETAM 100 MG/ML
1400 SOLUTION ORAL 2 TIMES DAILY
Status: DISCONTINUED | OUTPATIENT
Start: 2018-01-23 | End: 2018-01-23

## 2018-01-23 RX ORDER — DOCUSATE SODIUM 100 MG/1
100 CAPSULE, LIQUID FILLED ORAL 2 TIMES DAILY
Status: DISCONTINUED | OUTPATIENT
Start: 2018-01-23 | End: 2018-01-25

## 2018-01-23 RX ORDER — ZINC OXIDE 216 MG/ML
1 LOTION TOPICAL 3 TIMES DAILY
Status: DISCONTINUED | OUTPATIENT
Start: 2018-01-23 | End: 2018-01-23

## 2018-01-23 RX ORDER — DOCUSATE SODIUM 100 MG/1
100 CAPSULE, LIQUID FILLED ORAL ONCE
Status: DISCONTINUED | OUTPATIENT
Start: 2018-01-23 | End: 2018-01-23

## 2018-01-23 RX ORDER — NUTRITIONAL SUPPLEMENT 0.04G-1/ML
1 LIQUID (ML) ORAL 3 TIMES DAILY
Status: DISCONTINUED | OUTPATIENT
Start: 2018-01-23 | End: 2018-01-25 | Stop reason: HOSPADM

## 2018-01-23 RX ORDER — FENTANYL CITRATE 50 UG/ML
25 INJECTION, SOLUTION INTRAMUSCULAR; INTRAVENOUS
Status: DISCONTINUED | OUTPATIENT
Start: 2018-01-23 | End: 2018-01-25 | Stop reason: HOSPADM

## 2018-01-23 RX ORDER — DOXAZOSIN 2 MG/1
1 TABLET ORAL NIGHTLY
Status: DISCONTINUED | OUTPATIENT
Start: 2018-01-23 | End: 2018-01-25 | Stop reason: HOSPADM

## 2018-01-23 RX ORDER — MAGNESIUM SULFATE 1 G/100ML
1 INJECTION INTRAVENOUS PRN
Status: DISCONTINUED | OUTPATIENT
Start: 2018-01-23 | End: 2018-01-25 | Stop reason: HOSPADM

## 2018-01-23 RX ORDER — POTASSIUM CHLORIDE 7.45 MG/ML
10 INJECTION INTRAVENOUS PRN
Status: DISCONTINUED | OUTPATIENT
Start: 2018-01-23 | End: 2018-01-25 | Stop reason: HOSPADM

## 2018-01-23 RX ORDER — ONDANSETRON 2 MG/ML
4 INJECTION INTRAMUSCULAR; INTRAVENOUS EVERY 8 HOURS PRN
Status: DISCONTINUED | OUTPATIENT
Start: 2018-01-23 | End: 2018-01-23

## 2018-01-23 RX ORDER — SCOLOPAMINE TRANSDERMAL SYSTEM 1 MG/1
1 PATCH, EXTENDED RELEASE TRANSDERMAL
Status: DISCONTINUED | OUTPATIENT
Start: 2018-01-23 | End: 2018-01-25 | Stop reason: HOSPADM

## 2018-01-23 RX ORDER — LIDOCAINE HYDROCHLORIDE 10 MG/ML
5 INJECTION, SOLUTION INFILTRATION; PERINEURAL ONCE
Status: DISCONTINUED | OUTPATIENT
Start: 2018-01-23 | End: 2018-01-25 | Stop reason: HOSPADM

## 2018-01-23 RX ORDER — TRAZODONE HYDROCHLORIDE 50 MG/1
50 TABLET ORAL NIGHTLY
Status: DISCONTINUED | OUTPATIENT
Start: 2018-01-23 | End: 2018-01-25 | Stop reason: HOSPADM

## 2018-01-23 RX ORDER — TRAMADOL HYDROCHLORIDE 50 MG/1
50 TABLET ORAL EVERY 6 HOURS PRN
Status: DISCONTINUED | OUTPATIENT
Start: 2018-01-23 | End: 2018-01-24

## 2018-01-23 RX ORDER — LACTOBACILLUS RHAMNOSUS GG 10B CELL
1 CAPSULE ORAL DAILY
Status: DISCONTINUED | OUTPATIENT
Start: 2018-01-23 | End: 2018-01-25 | Stop reason: HOSPADM

## 2018-01-23 RX ORDER — ACETAMINOPHEN 325 MG/1
650 TABLET ORAL EVERY 4 HOURS PRN
Status: DISCONTINUED | OUTPATIENT
Start: 2018-01-23 | End: 2018-01-23

## 2018-01-23 RX ORDER — POLYETHYLENE GLYCOL 3350 17 G/17G
17 POWDER, FOR SOLUTION ORAL DAILY
Status: DISCONTINUED | OUTPATIENT
Start: 2018-01-23 | End: 2018-01-23 | Stop reason: SDUPTHER

## 2018-01-23 RX ORDER — POLYETHYLENE GLYCOL 3350 17 G/17G
17 POWDER, FOR SOLUTION ORAL DAILY
Status: DISCONTINUED | OUTPATIENT
Start: 2018-01-24 | End: 2018-01-25 | Stop reason: HOSPADM

## 2018-01-23 RX ADMIN — ESOMEPRAZOLE MAGNESIUM 40 MG: 20 CAPSULE, DELAYED RELEASE ORAL at 21:39

## 2018-01-23 RX ADMIN — TRAZODONE HYDROCHLORIDE 50 MG: 50 TABLET ORAL at 21:38

## 2018-01-23 RX ADMIN — Medication 1 CAPSULE: at 21:38

## 2018-01-23 RX ADMIN — SODIUM CHLORIDE: 9 INJECTION, SOLUTION INTRAVENOUS at 17:39

## 2018-01-23 RX ADMIN — LEVETIRACETAM 1500 MG: 100 SOLUTION ORAL at 06:14

## 2018-01-23 RX ADMIN — POLYETHYLENE GLYCOL 3350 17 G: 17 POWDER, FOR SOLUTION ORAL at 21:38

## 2018-01-23 RX ADMIN — LEVETIRACETAM 1500 MG: 100 SOLUTION ORAL at 21:38

## 2018-01-23 RX ADMIN — ENOXAPARIN SODIUM 40 MG: 40 INJECTION SUBCUTANEOUS at 21:39

## 2018-01-23 RX ADMIN — DOXAZOSIN 1 MG: 2 TABLET ORAL at 21:40

## 2018-01-23 ASSESSMENT — PAIN DESCRIPTION - LOCATION: LOCATION: ABDOMEN

## 2018-01-23 ASSESSMENT — PAIN SCALES - WONG BAKER: WONGBAKER_NUMERICALRESPONSE: 6

## 2018-01-23 NOTE — ED PROVIDER NOTES
[facility] ED  Emergency Department  Faculty Sign-Out Addendum     Care of Aishwarya Nunez was assumed from previous attending and is being seen for Constipation (3rd ER visit for constipation. )  . The patient's initial evaluation and plan have been discussed with the prior provider who initially evaluated the patient.       EMERGENCY DEPARTMENT COURSE / MEDICAL DECISION MAKING:       MEDICATIONS GIVEN:  Orders Placed This Encounter   Medications    ondansetron (ZOFRAN) injection 4 mg    ketorolac (TORADOL) injection 15 mg    levETIRAcetam (KEPPRA) 100 MG/ML solution 1,500 mg    lidocaine 1 % injection 5 mL    sodium chloride flush 0.9 % injection 10 mL    sodium chloride flush 0.9 % injection 10 mL    docusate sodium (COLACE) capsule 100 mg       LABS / RADIOLOGY:     Labs Reviewed   CBC WITH AUTO DIFFERENTIAL - Abnormal; Notable for the following:        Result Value    Hemoglobin 11.2 (*)     Hematocrit 34.8 (*)     Immature Granulocytes 3 (*)     All other components within normal limits   BASIC METABOLIC PANEL - Abnormal; Notable for the following:     CREATININE <0.20 (*)     Calcium 8.3 (*)     Potassium 5.6 (*)     Chloride 96 (*)     All other components within normal limits   HEPATIC FUNCTION PANEL - Abnormal; Notable for the following:     AST 72 (*)     Total Bilirubin 0.28 (*)     All other components within normal limits   BASIC METABOLIC PANEL - Abnormal; Notable for the following:     CREATININE <0.20 (*)     Potassium 5.9 (*)     All other components within normal limits   POCT GLUCOSE - Normal   IMMATURE PLATELET FRACTION   HCG, SERUM, QUALITATIVE   LIPASE   LACTIC ACID, PLASMA   LEVETIRACETAM LEVEL   UA W/REFLEX CULTURE   PREGNANCY, URINE   PREVIOUS SPECIMEN   LACTIC ACID, WHOLE BLOOD   POC GLUCOSE FINGERSTICK       Ct Abdomen Pelvis Wo Iv Contrast Additional Contrast? None    Result Date: 1/21/2018  EXAMINATION: CT OF THE ABDOMEN AND PELVIS WITHOUT CONTRAST 1/21/2018

## 2018-01-23 NOTE — H&P
12/22/17   Kacie Hill CNP   vitamin D (AQUEOUS VITAMIN D) 400 UNIT/ML LIQD 1 mL by Per G Tube route daily 1 teaspoon per g-tube daily  Patient taking differently: 2,000 Units by Per G Tube route nightly 1 teaspoon per g-tube daily 11/14/17 11/14/18  Jose A Sanchez MD   doxazosin (CARDURA) 1 MG tablet Take 1 tablet by mouth nightly 11/8/17 11/8/18  Jose A Sanchez MD   traMADol (ULTRAM) 50 MG tablet 1 tablet by Per G Tube route every 8 hours as needed for Pain 10/3/17 10/4/18  Jose A Sanchez MD   phenazopyridine (PYRIDIUM) 200 MG tablet GIVE 0.5 TABLETS BY G BUTTON 3 TIMES DAILY AS NEEDED FOR PAIN. ( URINARY PAIN ) 8/17/17 9/7/18  Historical Provider, MD   Multiple Vitamins-Minerals (CERTAVITE/ANTIOXIDANTS) solution Give 5 ML per G Tube daily 7/21/17 7/21/18  Kit Zamudio CNP   nystatin (MYCOSTATIN) 667932 UNIT/GM ointment Apply topically 2 times daily.  5/19/17 5/19/18  Jose A Sanchez MD   scopolamine (TRANSDERM-SCOP, 1.5 MG,) transdermal patch Place 1 patch onto the skin every 72 hours Place on hairless area behind the ear- change every 1-3 days 5/19/17 5/19/18  Jose A Sanchez MD   traZODone (DESYREL) 50 MG tablet Take 1.5-2 tablets by mouth nightly 5/19/17 5/19/18  Jose A Sanchez MD   Feeding Tubes - Sets (JAYDEN-KEY GASTROSTOMY KIT 18FR) MISC 3.5 cm 5/19/17 5/19/18  Jose A Sanchez MD   clonazePAM (KLONOPIN) 0.5 MG tablet Take 1.5 tablets by mouth 3 times daily as needed for Anxiety 5/3/17 5/3/18  Jose A Sanchez MD   polyethylene glycol (GLYCOLAX) powder 17 g by Per G Tube route daily 3/7/17 3/7/18  Jose A Sanchez MD   NUTRITIONAL SUPPLEMENT LIQD 1 Can by Per G Tube route 3 times daily Peptamen 1 jovany 3/3/17 3/3/18  Lady Shukri MD   Probiotic Product (ALIGN PO) Take 4 mg by mouth nightly     Historical Provider, MD   saccharomyces boulardii (FLORASTOR) 250 MG capsule Take 250 mg by mouth daily    Historical 84.9 82.6 - 102.9 fL    MCH 27.3 25.2 - 33.5 pg    MCHC 32.2 28.4 - 34.8 g/dL    RDW 14.3 11.8 - 14.4 %    Platelets See Reflexed IPF Result 138 - 453 k/uL    MPV NOT REPORTED 8.1 - 13.5 fL    NRBC Automated 0.0 0.0 per 100 WBC    Differential Type NOT REPORTED     WBC Morphology NOT REPORTED     RBC Morphology NOT REPORTED     Platelet Estimate NOT REPORTED     Seg Neutrophils 62 34 - 64 %    Lymphocytes 25 25 - 45 %    Monocytes 8 2 - 8 %    Eosinophils % 2 1 - 4 %    Basophils 1 0 - 2 %    Immature Granulocytes 3 (H) 0 %    Segs Absolute 4.61 1.50 - 8.10 k/uL    Absolute Lymph # 1.89 1.10 - 3.70 k/uL    Absolute Mono # 0.57 0.10 - 1.40 k/uL    Absolute Eos # 0.16 0.00 - 0.44 k/uL    Basophils # 0.04 0.00 - 0.20 k/uL    Absolute Immature Granulocyte 0.19 0.00 - 0.30 k/uL   Immature Platelet Fraction    Collection Time: 01/23/18  1:16 AM   Result Value Ref Range    Platelet, Immature Fraction 2.9 1.1 - 10.3 %    Platelet, Fluorescence 368 138 - 453 k/uL   Basic Metabolic Panel    Collection Time: 01/23/18  1:16 AM   Result Value Ref Range    Glucose 84 70 - 99 mg/dL    BUN 7 6 - 20 mg/dL    CREATININE <0.20 (L) 0.50 - 0.90 mg/dL    Bun/Cre Ratio NOT REPORTED 9 - 20    Calcium 8.3 (L) 8.6 - 10.4 mg/dL    Sodium 138 135 - 144 mmol/L    Potassium 5.6 (H) 3.7 - 5.3 mmol/L    Chloride 96 (L) 98 - 107 mmol/L    CO2 29 20 - 31 mmol/L    Anion Gap 13 9 - 17 mmol/L    GFR Non- CANNOT BE CALCULATED >60 mL/min    GFR  CANNOT BE CALCULATED >60 mL/min    GFR Comment          GFR Staging NOT REPORTED    HCG Qualitative, Serum    Collection Time: 01/23/18  1:16 AM   Result Value Ref Range    hCG Qual NEGATIVE NEG   Lipase    Collection Time: 01/23/18  1:16 AM   Result Value Ref Range    Lipase 24 13 - 60 U/L   Hepatic Function Panel    Collection Time: 01/23/18  1:16 AM   Result Value Ref Range    Alb 3.7 3.5 - 5.2 g/dL    Alkaline Phosphatase 43 35 - 104 U/L    ALT 22 5 - 33 U/L    AST 72 (H) <32 U/L    Total Bilirubin 0.28 (L) 0.3 - 1.2 mg/dL    Bilirubin, Direct <0.08 <0.31 mg/dL    Bilirubin, Indirect CANNOT BE CALCULATED 0.00 - 1.00 mg/dL    Total Protein 7.2 6.4 - 8.3 g/dL    Globulin NOT REPORTED 1.5 - 3.8 g/dL    Albumin/Globulin Ratio 1.1 1.0 - 2.5   POC Glucose Fingerstick    Collection Time: 01/23/18  4:57 AM   Result Value Ref Range    POC Glucose 100 65 - 105 mg/dL   POCT Glucose    Collection Time: 01/23/18  5:00 AM   Result Value Ref Range    Glucose 100 mg/dL    QC OK? yes ok    BASIC METABOLIC PANEL    Collection Time: 01/23/18  2:22 PM   Result Value Ref Range    Glucose 71 70 - 99 mg/dL    BUN 8 6 - 20 mg/dL    CREATININE <0.20 (L) 0.50 - 0.90 mg/dL    Bun/Cre Ratio NOT REPORTED 9 - 20    Calcium 9.1 8.6 - 10.4 mg/dL    Sodium 140 135 - 144 mmol/L    Potassium 5.9 (H) 3.7 - 5.3 mmol/L    Chloride 102 98 - 107 mmol/L    CO2 25 20 - 31 mmol/L    Anion Gap 13 9 - 17 mmol/L    GFR Non- CANNOT BE CALCULATED >60 mL/min    GFR  CANNOT BE CALCULATED >60 mL/min    GFR Comment          GFR Staging NOT REPORTED    Lactic Acid, Plasma    Collection Time: 01/23/18  2:22 PM   Result Value Ref Range    Lactic Acid NOT REPORTED mmol/L    Lactic Acid, Whole Blood 0.7 0.7 - 2.1 mmol/L   Levetiracetam Level    Collection Time: 01/23/18  2:22 PM   Result Value Ref Range    Levetiracetam Lvl 33 ug/mL   POTASSIUM    Collection Time: 01/23/18  3:36 PM   Result Value Ref Range    Potassium 5.0 3.7 - 5.3 mmol/L       Imaging/Diagnostics:  CT abdomen and pelvis(1/21/18)  Rectal fecal impaction.  Otherwise no acute findings. X-ray abdominal series:(1/23/17)  No acute cardiopulmonary disease.       Mild nonobstructive gaseous distention of bowel.       Soft tissue density in the pelvis, likely moderately distended urinary   bladder.              Assessment :      Primary Problem  Emesis    Active Hospital Problems    Diagnosis Date Noted    Emesis [R11.10] 01/23/2018     Priority: High

## 2018-01-23 NOTE — ED PROVIDER NOTES
PHENAZOPYRIDINE (PYRIDIUM) 200 MG TABLET    GIVE 0.5 TABLETS BY G BUTTON 3 TIMES DAILY AS NEEDED FOR PAIN. ( URINARY PAIN )    POLYETHYLENE GLYCOL (GLYCOLAX) POWDER    17 g by Per G Tube route daily    PROBIOTIC PRODUCT (ALIGN PO)    Take 4 mg by mouth nightly     SACCHAROMYCES BOULARDII (FLORASTOR) 250 MG CAPSULE    Take 250 mg by mouth daily    SCOPOLAMINE (TRANSDERM-SCOP, 1.5 MG,) TRANSDERMAL PATCH    Place 1 patch onto the skin every 72 hours Place on hairless area behind the ear- change every 1-3 days    TRAMADOL (ULTRAM) 50 MG TABLET    1 tablet by Per G Tube route every 8 hours as needed for Pain    TRAZODONE (DESYREL) 50 MG TABLET    Take 1.5-2 tablets by mouth nightly    VITAMIN D (AQUEOUS VITAMIN D) 400 UNIT/ML LIQD    1 mL by Per G Tube route daily 1 teaspoon per g-tube daily       Encounter Medications  Orders Placed This Encounter   Medications    ondansetron (ZOFRAN) injection 4 mg    ketorolac (TORADOL) injection 15 mg    levETIRAcetam (KEPPRA) 100 MG/ML solution 1,500 mg    lidocaine 1 % injection 5 mL    sodium chloride flush 0.9 % injection 10 mL    sodium chloride flush 0.9 % injection 10 mL       ALLERGIES     is allergic to clindamycin/lincomycin; gentamicin; hydrocodone; and tape [adhesive tape].       RECENT VITALS:   Temp: 98.1 °F (36.7 °C),  Pulse: 88, Resp: 18, BP: (!) 90/43      LABS:  Labs Reviewed   CBC WITH AUTO DIFFERENTIAL - Abnormal; Notable for the following:        Result Value    Hemoglobin 11.2 (*)     Hematocrit 34.8 (*)     Immature Granulocytes 3 (*)     All other components within normal limits   BASIC METABOLIC PANEL - Abnormal; Notable for the following:     CREATININE <0.20 (*)     Calcium 8.3 (*)     Potassium 5.6 (*)     Chloride 96 (*)     All other components within normal limits   HEPATIC FUNCTION PANEL - Abnormal; Notable for the following:     AST 72 (*)     Total Bilirubin 0.28 (*)     All other components within normal limits   POCT GLUCOSE - Normal

## 2018-01-23 NOTE — ED NOTES
Soap piedad edema given per writer. Pt tolerating well at this time.       Andrew Banegas RN  01/23/18 [id number]

## 2018-01-23 NOTE — CONSULTS
5/19/17 5/19/18  Marcela Kay MD   scopolamine (TRANSDERM-SCOP, 1.5 MG,) transdermal patch Place 1 patch onto the skin every 72 hours Place on hairless area behind the ear- change every 1-3 days 5/19/17 5/19/18  Marcela Kay MD   traZODone (DESYREL) 50 MG tablet Take 1.5-2 tablets by mouth nightly 5/19/17 5/19/18  Marcela Kay MD   Feeding Tubes - Sets (JAYDEN-KEY GASTROSTOMY KIT 18FR) MISC 3.5 cm 5/19/17 5/19/18  Marcela Kay MD   clonazePAM (KLONOPIN) 0.5 MG tablet Take 1.5 tablets by mouth 3 times daily as needed for Anxiety 5/3/17 5/3/18  Marcela Kay MD   polyethylene glycol (GLYCOLAX) powder 17 g by Per G Tube route daily 3/7/17 3/7/18  Marcela Kay MD   NUTRITIONAL SUPPLEMENT LIQD 1 Can by Per G Tube route 3 times daily Peptamen 1 jovany 3/3/17 3/3/18  Christiano Boyd MD   Probiotic Product (ALIGN PO) Take 4 mg by mouth nightly     Historical Provider, MD   saccharomyces boulardii (FLORASTOR) 250 MG capsule Take 250 mg by mouth daily    Historical Provider, MD   Nutritional Supplements (PEPTAMEN 1 JOVANY) LIQD Take 1 Can by mouth 3 times daily Peptamen Adult 1/13/17 1/13/18  Marcela Kay MD   magnesium hydroxide (MILK OF MAGNESIA CONCENTRATE) 2400 MG/10ML SUSP Take 8 mLs by mouth 2 times daily     Historical Provider, MD   800 Poly Pl 1 each by Does not apply route daily as needed 2/3/16 2/13/18  Marcela Kay MD   Misc Natural Products (CYSTEX) LIQD Take 15 mLs by mouth daily 2/3/16 2/13/18  Marcela Kay MD   diazepam (DIASTAT) 10 MG GEL Place 10 mg rectally once as needed 2/3/16 6/12/18  Marcela Kay MD     Scheduled Meds:   ondansetron  4 mg Intravenous Once    ketorolac  15 mg Intravenous Once    lidocaine 1 % injection  5 mL Intradermal Once    sodium chloride flush  10 mL Intravenous 2 times per day    docusate sodium  100 mg Per G Tube Once     Continuous Infusions: PRN Meds:sodium chloride flush    Allergies   Allergen Reactions    Clindamycin/Lincomycin     Gentamicin     Hydrocodone     Tape Tiffany Clamp Tape] Rash     Redness that lasts a couple days       SOCIAL HISTORY:   Social History     Social History    Marital status: Single     Spouse name: N/A    Number of children: N/A    Years of education: N/A     Occupational History    Not on file. Social History Main Topics    Smoking status: Never Smoker    Smokeless tobacco: Never Used    Alcohol use No    Drug use: No    Sexual activity: No     Other Topics Concern    Not on file     Social History Narrative    No narrative on file       FAMILY HISTORY:   Family History   Problem Relation Age of Onset    High Blood Pressure Mother        REVIEW OF SYSTEMS:  10 out of 14 systems were reviewed and otherwise negative per patient recall. PHYSICAL EXAM:    /73   Pulse 83   Temp 98.1 °F (36.7 °C)   Resp 18   Wt 85 lb (38.6 kg)   LMP 09/17/2017   SpO2 95%   BMI 19.76 kg/m²     General:  Alert and oriented x 0. No acute distress. Nontoxic in appearance. Skin:    Rash is not appreciated. Easy bruising is not appreciated. There are no spider angioma on the anterior chest wall. There is no jaundice     HEENT:  Sclera are anicteric and conjunctiva are normal.  Oropharynx moist without thrush. Oral ulcers are not seen. Heart:     Regular rate and rhythm. There are no murmurs. Lungs:    Clear to auscultation, with no rales, wheezes, or rhonchi. Unlabored breathing pattern with adequate aeration. Abdomen: Bowel sounds are sluggish. The abdomen is soft and non distended. G-tube noted to be in place. No tenderness was elicited. No guarding, rebound, or rigidity elicited. Peritoneal signs are not appreciated. Extemities: There is no clubbing or edema. Pulses are palpable.      Lab and Imaging Review   Recent Labs      01/20/18   2045  01/23/18   0116  01/23/18   0500 01/23/18   1422   WBC  8.7  7.5   --    --    HGB  12.6  11.2*   --    --    MCV  88.5  84.9   --    --    PLT  456*  See Reflexed IPF Result   --    --    NA  140  138   --   140   K  4.3  5.6*   --   5.9*   CL  99  96*   --   102   CO2  28  29   --   25   BUN  16  7   --   8   CREATININE  <0.20*  <0.20*   --   <0.20*   GLUCOSE  98  84  100  71   CALCIUM  9.7  8.3*   --   9.1   PROT  8.0  7.2   --    --    LABALBU  4.4  3.7   --    --    AST  17  72*   --    --    ALT  17  22   --    --    ALKPHOS  67  43   --    --    BILITOT  <0.10*  0.28*   --    --    BILIDIR   --   <0.08   --    --    LIPASE  32  24   --    --      No results for input(s): INR, PROTIME in the last 72 hours. IMPRESSION:  1. 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 Constipation results from hypotonia, dysmotility of the bowel, and development of a functional megacolon. RECOMMENDATIONS:   1. Recommend adequate amounts of fluid via G-tube and high-fiber foods formula  2. Passive and active exercise are very useful in initiating bowel motility   3. Continue oral osmotic laxative (Miralax). Continue mineral oil enemas (however long-term administration can interfere with the absorption of certain fat-soluble vitamins, resulting in deficiencies). Psyllium (Metamucil) or other high fibre preparations such as Benefibre can be used to increase the bulk of the stool   4. Monitor electrolytes and correct as appropriate  5. If feasible (given patient's mentation): Toilet timing to take advantage of the gastro-colic reflex should be encouraged. Where possible she should be encouraged to sit on the toilet within 30 minutes of feeds. 6. Patients with Rett syndrome are vulnerable to GERD, especially if motor function is poor as seen in this patient .  Continue PPIs    Electronically signed by Gean Seip, MD  on 1/23/2018 at 3:59 PM

## 2018-01-23 NOTE — ED PROVIDER NOTES
I performed a history and physical examination of the patient and discussed management with the resident. I reviewed the residents note and agree with the documented findings and plan of care. Any areas of disagreement are noted on the chart. I was personally present for the key portions of any procedures. I have documented in the chart those procedures where I was not present during the key portions. I have reviewed the emergency nurses triage note. I agree with the chief complaint, past medical history, past surgical history, allergies, medications, social and family history as documented unless otherwise noted below. Documentation of the HPI, Physical Exam and Medical Decision Making performed by medical students or scribes is based on my personal performance of the HPI, PE and MDM. For Phys Assistant/ Nurse Practitioner cases/documentation I have personally evaluated this patient and have completed at least one if not all key elements of the E/M (history, physical exam, and MDM). I find the patient's history and physical exam are consistent with the NP/PA documentation. I agree with the care provided, treatment rendered, disposition and followup plan. Additional findings are as noted. Jeramie . Rustam Munroe MD  Attending Emergency  Physician    [facility] [place]. LAST STOOL PASSED LAST WEEK. SOME NAUSEA/VOMITING FOR PAST SEV DAYS. NO FEVER, HEMATEMESIS, MELENA, HEMATOCHEZIA. HX OF RETT'S SYNDROME, G-TUBE FEEDINGS SUPPLEMENTING ORAL INTAKE. MINIMAL ORAL INTAKE RECENTLY, BUT RECEIVING NORMAL G-TUBE FEEDS. HAS BEEN SEEN SEV TIMES IN ED FOR SAME SX OVER PAST SEV DAYS. NO RESULTS WITH VARIOUS ENEMAS, CATHARTICS. PATIENT'S MOTHER AND DR. Les Amaya REPORT EMPTY RECTAL VAULT DESPITE CT FINDINGS CONSISTENT WITH POSS FECAL IMPACTION. AWAKE, ALERT, COOP, RESPONSIVE. LUNGS CLEAR. CARDIAC-S1S2, RRR,NO MRG. ABD SOFT, NONDISTENDED, NORMAL BOWEL SOUNDS. NO OBVIOUS TENDERNESS.  SOME STOOL PALPABLE IN SIGMOID

## 2018-01-23 NOTE — ED NOTES
Pts brief changed, scant amount of urine noted. Pt and family updated on POC and duration of stay. Cont to awaiting clean bed for transfer to the floor.       Harrison Britton RN  01/23/18 1400

## 2018-01-23 NOTE — ED PROVIDER NOTES
Take 1 tablet by mouth nightly 11/8/17 11/8/18  Nancy Hui MD   traMADol (ULTRAM) 50 MG tablet 1 tablet by Per G Tube route every 8 hours as needed for Pain 10/3/17 10/4/18  Nancy Hui MD   phenazopyridine (PYRIDIUM) 200 MG tablet GIVE 0.5 TABLETS BY G BUTTON 3 TIMES DAILY AS NEEDED FOR PAIN. ( URINARY PAIN ) 8/17/17 9/7/18  Historical Provider, MD   Multiple Vitamins-Minerals (CERTAVITE/ANTIOXIDANTS) solution Give 5 ML per G Tube daily 7/21/17 7/21/18  Monica Ambriz CNP   nystatin (MYCOSTATIN) 547411 UNIT/GM ointment Apply topically 2 times daily.  5/19/17 5/19/18  Nancy Hui MD   scopolamine (TRANSDERM-SCOP, 1.5 MG,) transdermal patch Place 1 patch onto the skin every 72 hours Place on hairless area behind the ear- change every 1-3 days 5/19/17 5/19/18  Nancy Hui MD   traZODone (DESYREL) 50 MG tablet Take 1.5-2 tablets by mouth nightly 5/19/17 5/19/18  Nancy Hui MD   Feeding Tubes - Sets (JAYDEN-KEY GASTROSTOMY KIT 18FR) MISC 3.5 cm 5/19/17 5/19/18  Nancy Hui MD   clonazePAM (KLONOPIN) 0.5 MG tablet Take 1.5 tablets by mouth 3 times daily as needed for Anxiety 5/3/17 5/3/18  Nancy Hui MD   polyethylene glycol (GLYCOLAX) powder 17 g by Per G Tube route daily 3/7/17 3/7/18  Nancy Hui MD   NUTRITIONAL SUPPLEMENT LIQD 1 Can by Per G Tube route 3 times daily Peptamen 1 jovany 3/3/17 3/3/18  Isabel Yanes MD   Probiotic Product (ALIGN PO) Take 4 mg by mouth nightly     Historical Provider, MD   saccharomyces boulardii (FLORASTOR) 250 MG capsule Take 250 mg by mouth daily    Historical Provider, MD   Nutritional Supplements (PEPTAMEN 1 JOVANY) LIQD Take 1 Can by mouth 3 times daily Peptamen Adult 1/13/17 1/13/18  Nancy Hui MD   magnesium hydroxide (MILK OF MAGNESIA CONCENTRATE) 2400 MG/10ML SUSP Take 8 mLs by mouth 2 times daily     Historical Provider, MD   800 Poly Pl 1 each by Does not apply route daily as needed 2/3/16 2/13/18  Aniket Lovell MD   [facility] Natural Products SLUSSFORS) LIQD Take 15 mLs by mouth daily 2/3/16 2/13/18  Aniket Lovell MD   diazepam (DIASTAT) 10 MG GEL Place 10 mg rectally once as needed 2/3/16 6/12/18  Aniket Lovell MD       REVIEW OF SYSTEMS    (2-9 systems for level 4, 10 or more for level 5)      Review of Systems   Unable to perform ROS: Patient nonverbal       PHYSICAL EXAM   (up to 7 for level 4, 8 or more for level 5)      INITIAL VITALS:   /62   Pulse 88   Temp 98.1 °F (36.7 °C)   Resp 18   Wt 85 lb (38.6 kg)   LMP 09/17/2017   SpO2 94%   BMI 19.76 kg/m²     Physical Exam   Constitutional:  Non-toxic appearance. She does not have a sickly appearance. She does not appear ill. No distress. Appears small for age   HENT:   Head: Normocephalic and atraumatic. Not macrocephalic and not microcephalic. Right Ear: External ear normal.   Left Ear: External ear normal.   Nose: Nose normal.   Eyes: Right conjunctiva is not injected. Right conjunctiva has no hemorrhage. Left conjunctiva is not injected. Left conjunctiva has no hemorrhage. No scleral icterus. Right eye exhibits no nystagmus. Left eye exhibits no nystagmus. Neck: Trachea normal and phonation normal. Neck supple. No JVD present. No tracheal deviation present. Cardiovascular: Normal rate, S1 normal, S2 normal and normal heart sounds. Exam reveals no gallop, no S3, no S4, no distant heart sounds and no friction rub. No murmur heard. Pulmonary/Chest: Effort normal. No accessory muscle usage or stridor. No respiratory distress. She has no decreased breath sounds. She has no wheezes. She has no rhonchi. She has no rales. She exhibits no tenderness. Abdominal: Soft. She exhibits no distension. There is no tenderness. There is no rigidity, no rebound, no guarding, no tenderness at McBurney's point and negative Chawla's sign.    Baclofen pump to right lower quadrant. G tube in tact   Musculoskeletal: She exhibits no edema or deformity. Neurological: She is alert. GCS eye subscore is 4. GCS verbal subscore is 1. GCS motor subscore is 5. Contracture of extremities. Skin: Skin is warm. No bruising, no petechiae and no rash noted. Rash is not pustular, not vesicular and not urticarial. She is not diaphoretic. No cyanosis. No pallor.        DIFFERENTIAL  DIAGNOSIS     PLAN (LABS / IMAGING / EKG):  Orders Placed This Encounter   Procedures    XR Acute Abd Series Chest 1 VW    CBC WITH AUTO DIFFERENTIAL    UA W/REFLEX CULTURE    Pregnancy, Urine    Immature Platelet Fraction    Basic Metabolic Panel    HCG Qualitative, Serum    Lipase    Hepatic Function Panel    PREVIOUS SPECIMEN    Inpatient consult to Hospitalist    Insert peripheral IV    PATIENT STATUS (DIRECT) Observation    PATIENT STATUS (FROM ED OR OR/PROCEDURAL) Observation       MEDICATIONS ORDERED:  Orders Placed This Encounter   Medications    ondansetron (ZOFRAN) injection 4 mg    ketorolac (TORADOL) injection 15 mg     DIAGNOSTIC RESULTS / EMERGENCY DEPARTMENT COURSE / MDM     LABS:  Results for orders placed or performed during the hospital encounter of 01/23/18   CBC WITH AUTO DIFFERENTIAL   Result Value Ref Range    WBC 7.5 4.5 - 13.5 k/uL    RBC 4.10 3.95 - 5.11 m/uL    Hemoglobin 11.2 (L) 11.9 - 15.1 g/dL    Hematocrit 34.8 (L) 36.3 - 47.1 %    MCV 84.9 82.6 - 102.9 fL    MCH 27.3 25.2 - 33.5 pg    MCHC 32.2 28.4 - 34.8 g/dL    RDW 14.3 11.8 - 14.4 %    Platelets See Reflexed IPF Result 138 - 453 k/uL    MPV NOT REPORTED 8.1 - 13.5 fL    NRBC Automated 0.0 0.0 per 100 WBC    Differential Type NOT REPORTED     WBC Morphology NOT REPORTED     RBC Morphology NOT REPORTED     Platelet Estimate NOT REPORTED     Seg Neutrophils 62 34 - 64 %    Lymphocytes 25 25 - 45 %    Monocytes 8 2 - 8 %    Eosinophils % 2 1 - 4 %    Basophils 1 0 - 2 %    Immature Granulocytes 3 (H) 0 %

## 2018-01-23 NOTE — PROGRESS NOTES
[address] ED  Emergency Department  Emergency Medicine Resident Sign-out     Care of Nayla Smith was assumed from Dr. Raymond Resendiz and is being seen for Constipation (3rd ER visit for constipation. )  . The patient's initial evaluation and plan have been discussed with the prior provider who initially evaluated the patient. EMERGENCY DEPARTMENT COURSE / MEDICAL DECISION MAKING:       MEDICATIONS GIVEN:  Orders Placed This Encounter   Medications    ondansetron (ZOFRAN) injection 4 mg    ketorolac (TORADOL) injection 15 mg    levETIRAcetam (KEPPRA) 100 MG/ML solution 1,500 mg       LABS / RADIOLOGY:     Labs Reviewed   CBC WITH AUTO DIFFERENTIAL - Abnormal; Notable for the following:        Result Value    Hemoglobin 11.2 (*)     Hematocrit 34.8 (*)     Immature Granulocytes 3 (*)     All other components within normal limits   BASIC METABOLIC PANEL - Abnormal; Notable for the following:     CREATININE <0.20 (*)     Calcium 8.3 (*)     Potassium 5.6 (*)     Chloride 96 (*)     All other components within normal limits   HEPATIC FUNCTION PANEL - Abnormal; Notable for the following:     AST 72 (*)     Total Bilirubin 0.28 (*)     All other components within normal limits   POCT GLUCOSE - Normal   IMMATURE PLATELET FRACTION   HCG, SERUM, QUALITATIVE   LIPASE   UA W/REFLEX CULTURE   PREGNANCY, URINE   PREVIOUS SPECIMEN   POC GLUCOSE FINGERSTICK       Ct Abdomen Pelvis Wo Iv Contrast Additional Contrast? None    Result Date: 1/21/2018  EXAMINATION: CT OF THE ABDOMEN AND PELVIS WITHOUT CONTRAST 1/21/2018 1:24 am TECHNIQUE: CT of the abdomen and pelvis was performed without the administration of intravenous contrast. Multiplanar reformatted images are provided for review. Dose modulation, iterative reconstruction, and/or weight based adjustment of the mA/kV was utilized to reduce the radiation dose to as low as reasonably achievable.  COMPARISON: 04/08/2017 HISTORY: ORDERING SYSTEM PROVIDED HISTORY: Ab pain TECHNOLOGIST PROVIDED HISTORY: Additional Contrast?->None FINDINGS: Lower Chest: Again seen is chronic consolidation focally in the left lower lobe posteriorly. Organs: The visualized portions of the liver, spleen, pancreas and adrenal glands appear unremarkable within the constraints of a noncontrast exam.  No biliary ductal dilatation. There has been cholecystectomy. The kidneys appear normal in size without evidence of a contour distorting mass. No renal stone or hydronephrosis. No perinephric stranding. GI/Bowel: A G-tube is in place. Opacified loops of bowel are normal in caliber. There is fecal impaction of the rectum. Pelvis: The urinary bladder appears unremarkable. The pelvic organs demonstrate no acute abnormality. Peritoneum/Retroperitoneum: The abdominal aorta is normal in caliber. Bones/Soft Tissues: No acute findings. Electronic device again noted overlying the right lower quadrant within the subcutaneous tissues. Rectal fecal impaction. Otherwise no acute findings. Xr Chest Standard (2 Vw)    Result Date: 1/13/2018  EXAMINATION: TWO VIEWS OF THE CHEST 1/13/2018 5:06 pm COMPARISON: April 8, 2017 HISTORY: ORDERING SYSTEM PROVIDED HISTORY: cough, fever TECHNOLOGIST PROVIDED HISTORY: Reason for exam:->cough, fever FINDINGS: Suboptimal positioning is noted. The patient's head obscures the left lung apex. The patient's arm obscures a portion of the anterior chest on the lateral view. Increased opacity in the left lung base is demonstrated with air bronchograms. This appears to be in the posterior left lower lobe. There is no evidence for pneumothorax or effusion. The cardiac and mediastinal contours are unchanged in appearance. No acute osseous abnormality identified. Left lower lobe consolidation. Xr Abdomen (kub) (single Ap View)    Result Date: 1/20/2018  EXAMINATION: SUPINE VIEW(S) OF THE ABDOMEN 1/20/2018 8:05 pm COMPARISON: None.  HISTORY: ORDERING SYSTEM PROVIDED

## 2018-01-23 NOTE — Clinical Note
Patient Class: Observation [104]   REQUIRED: Diagnosis: Emesis [018407]   Estimated Length of Stay: Estimated stay of less than 2 midnights   Future Attending Provider: Manny Faria [[id number]]

## 2018-01-23 NOTE — CONSULTS
General Surgery   Consultation        PATIENT NAME: Burak Amaro   YOB: 1995    ADMISSION DATE: 1/23/2018 12:31 AM     Admitting Provider: Dr. Glenna Faustin Physician: Dr. Ashely Rod: 1/23/2018    Consult Regarding:  Constipation, intractable nausea/vomiting    HISTORY OF PRESENT ILLNESS:  The patient is a 25 y.o. 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 This is Pt's 3rd visit to [facility] ED in the last 3 days for similar complaints. During these visits Pt has been given copious amounts of enemas including: soup suds, milk and molasses, and GoLYTELY with little success. Mother reports attempted fecal disimpaction at home over the past several days with little success as well. Additionally Pt has been experiencing severe emesis PO and through her G-tube although the amount of emesis is unclear but mother reports it is bilious. Importantly Pt had a recent admission to the hospital with PNA and seropositive flu. During that admission she was given Vancomycin which parents report caused her to experience diarrhea. Tmax in past 24 hours has been 99.1.     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

## 2018-01-23 NOTE — ED PROVIDER NOTES
Subjective


Date of Service:


Jun 12, 2017.


Subjective


Pt evaluation today including:  conversation w/ patient, physical exam, chart 

review, lab review, review of studies, conversation w/ consultant


Pt resting comfortably in bed


No distress noted


States only 2 BMs overnight which were formed


No fevers or chills


No other complaints





Problem List


Medical Problems:


(1) Anemia


Status: Chronic  





(2) Diverticulosis Colon (W/O Ment Of Hemorrhage)


Status: Chronic  





(3) DJD (degenerative joint disease)


Status: Chronic  





(4) Esophageal Reflux


Status: Chronic  





(5) Hypertension


Status: Chronic  





(6) Hypothyroidism, Unspecified


Status: Chronic  





(7) Irritable Bowel Syndrome


Status: Chronic  





(8) Long-Term(Current)Use Of Steroids


Status: Chronic  





(9) Polymyalgia Rheumatica


Status: Chronic  





(10) Rheumatoid Arthritis, Unspecified


Status: Chronic  





(11) Unspecified Urinary Incontinence


Status: Chronic  





(12) Urinary tract infection


Status: Acute  





(13) Vitamin D Deficiency, Unspecified


Status: Chronic  





(14) Vomiting and diarrhea


Status: Acute  











Review of Systems


Constitutional:  No fever, No chills, No sweats, No weight loss


Eyes:  No worsening of vision, No eye pain, No redness, No discharge


ENT:  No hearing loss, No unusual epistaxis, No nasal symptoms, No sore throat


Respiratory:  No cough, No sputum, No wheezing, No shortness of breath


Cardiac:  No chest pain, No orthopnea, No PND, No edema


Abdomen:  No pain, No nausea, No vomiting, No diarrhea, No constipation


Musculoskeletal:  No joint pain, No muscle pain


Female :  No dysuria, No urinary frequency, No hematuria, No incontinence


Neurologic:  No memory loss, No paralysis, No weakness, No numbness/tingling


Psychiatric:  No depression symptoms, No anhedonism, No anxiety, No insomnia





Objective


Vital Signs











  Date Time  Temp Pulse Resp B/P (MAP) Pulse Ox O2 Delivery O2 Flow Rate FiO2


 


6/12/17 08:58      Room Air  


 


6/12/17 07:25 36.7 74 18 185/79 (114) 96 Room Air  


 


6/12/17 00:00      Room Air  


 


6/11/17 22:20 36.7 70 16 149/70 (96) 97 Room Air  


 


6/11/17 20:00      Room Air  


 


6/11/17 16:00      Room Air  


 


6/11/17 16:00 36.6 62 18 134/75 (94) 96 Room Air  











Physical Exam


General Appearance:  WD/WN, no apparent distress


Eyes:  normal inspection, PERRL, EOMI, sclerae normal


ENT:  normal ENT inspection, hearing grossly normal, TMs normal, pharynx normal


Neck:  supple, no adenopathy, thyroid normal, no JVD


Respiratory/Chest:  chest non-tender, lungs clear, normal breath sounds, no 

respiratory distress


Cardiovascular:  regular rate, rhythm, no edema, no gallop, no JVD


Abdomen:  normal bowel sounds, non tender, soft, no organomegaly


Neurologic/Psychiatric:  no motor/sensory deficits, alert, normal mood/affect, 

oriented x 3





Laboratory Results





Last 24 Hours








Test


  6/12/17


07:05


 


White Blood Count 7.14 K/uL 


 


Red Blood Count 3.30 M/uL 


 


Hemoglobin 8.7 g/dL 


 


Hematocrit 27.5 % 


 


Mean Corpuscular Volume 83.3 fL 


 


Mean Corpuscular Hemoglobin 26.4 pg 


 


Mean Corpuscular Hemoglobin


Concent 31.6 g/dl 


 


 


Platelet Count 356 K/uL 


 


Mean Platelet Volume 9.8 fL 


 


Neutrophils (%) (Auto) 55.2 % 


 


Lymphocytes (%) (Auto) 31.8 % 


 


Monocytes (%) (Auto) 9.9 % 


 


Eosinophils (%) (Auto) 1.3 % 


 


Basophils (%) (Auto) 0.7 % 


 


Neutrophils # (Auto) 3.94 K/uL 


 


Lymphocytes # (Auto) 2.27 K/uL 


 


Monocytes # (Auto) 0.71 K/uL 


 


Eosinophils # (Auto) 0.09 K/uL 


 


Basophils # (Auto) 0.05 K/uL 


 


RDW Standard Deviation 45.9 fL 


 


RDW Coefficient of Variation 15.1 % 


 


Immature Granulocyte % (Auto) 1.1 % 


 


Immature Granulocyte # (Auto) 0.08 K/uL 


 


Red Blood Cell Morphology Unremarkable 


 


Sodium Level 137 mmol/L 


 


Potassium Level 3.8 mmol/L 


 


Chloride Level 101 mmol/L 


 


Carbon Dioxide Level 28 mmol/L 


 


Anion Gap 8.0 mmol/L 


 


Blood Urea Nitrogen 14 mg/dl 


 


Creatinine 0.93 mg/dl 


 


Est Creatinine Clear Calc


Drug Dose 45.1 ml/min 


 


 


Estimated GFR (


American) 67.3 


 


 


Estimated GFR (Non-


American 58.0 


 


 


BUN/Creatinine Ratio 14.5 


 


Random Glucose 87 mg/dl 


 


Calcium Level 8.9 mg/dl 


 


Magnesium Level 1.9 mg/dl 











Assessment and Plan


80 y/o female admitted on 6/5/2017 with nausea, vomiting, diarrhea, and weakness





New onset C. difficile diarrhea


Improved now on vancomycin


Will need prolonged tx per ID recs


Failed fagyl as pt states worsening diarrhea


Patient has underlined possible immunodeficiency secondary to taking oral 

prednisone for rheumatoid disease





Escherichia coli UTI: Urine culture is back positive with Escherichia coli and 

a second gram-negative margie.  


No symptoms


Finished course of ceftin





Heme positive stool liekly from C dfiff infection


GI consulted


Hg improving


Rec continued antibx course 10 days after last dose fo ceftin





Diarrhea and poor by mouth intake, possible dehydration, was on IV fluid, 

resolved





History of PMR, RS3PE syndrome, inflammatory symmetrical polyarthritis, 

hypertension, hypothyroidism, irritable bowel syndrome, and chronic reflux 

esophagitis who presented to the ED 





Hyponatremia resolved with adequate PO intake





RS3PE/inflammatory symmetrical polyarthritis--stable


Cont to hold hydroxychloroquine, discussed with Dr. leon, recommend continue 

hold hydroxychloroquine, and follow-up with her as outpatient 2 week





HTN--not well controlled, continue lisinopril 20 mg PO BID, hydrochlorothiazide 

25 mg PO qd, and verapamil 120 mg PO qd, metoprolol 12.5 mg by mouth twice a 

day yesterday, we'll increase to 25 mg by mouth twice a day





Hypothyroidism--stable, continue Synthroid 75 g PO qd





Depression--stable, continue Zoloft 25 mg PO qhs





DVT prophylaxis with heparin 5000 units SC q12h


-PEARL hose and SCDs





Code Status


DO NOT RESUSCITATE


Continued [facility] stay due to:  home environment unsafe for pt


Discharge planning:  home 1/21/2018 1:24 am TECHNIQUE: CT of the abdomen and pelvis was performed without the administration of intravenous contrast. Multiplanar reformatted images are provided for review. Dose modulation, iterative reconstruction, and/or weight based adjustment of the mA/kV was utilized to reduce the radiation dose to as low as reasonably achievable. COMPARISON: 04/08/2017 HISTORY: ORDERING SYSTEM PROVIDED HISTORY: Ab pain TECHNOLOGIST PROVIDED HISTORY: Additional Contrast?->None FINDINGS: Lower Chest: Again seen is chronic consolidation focally in the left lower lobe posteriorly. Organs: The visualized portions of the liver, spleen, pancreas and adrenal glands appear unremarkable within the constraints of a noncontrast exam.  No biliary ductal dilatation. There has been cholecystectomy. The kidneys appear normal in size without evidence of a contour distorting mass. No renal stone or hydronephrosis. No perinephric stranding. GI/Bowel: A G-tube is in place. Opacified loops of bowel are normal in caliber. There is fecal impaction of the rectum. Pelvis: The urinary bladder appears unremarkable. The pelvic organs demonstrate no acute abnormality. Peritoneum/Retroperitoneum: The abdominal aorta is normal in caliber. Bones/Soft Tissues: No acute findings. Electronic device again noted overlying the right lower quadrant within the subcutaneous tissues. Rectal fecal impaction. Otherwise no acute findings. Xr Chest Standard (2 Vw)    Result Date: 1/13/2018  EXAMINATION: TWO VIEWS OF THE CHEST 1/13/2018 5:06 pm COMPARISON: April 8, 2017 HISTORY: ORDERING SYSTEM PROVIDED HISTORY: cough, fever TECHNOLOGIST PROVIDED HISTORY: Reason for exam:->cough, fever FINDINGS: Suboptimal positioning is noted. The patient's head obscures the left lung apex. The patient's arm obscures a portion of the anterior chest on the lateral view. Increased opacity in the left lung base is demonstrated with air bronchograms.   This appears process. There is no effusion or pneumothorax. The cardiomediastinal silhouette is stable. The osseous structures are stable. No acute process. Xr Chest Portable    Result Date: 1/16/2018  EXAMINATION: SINGLE VIEW OF THE CHEST 1/16/2018 5:51 am COMPARISON: 01/14/2018 HISTORY: ORDERING SYSTEM PROVIDED HISTORY: f/u pneumonia TECHNOLOGIST PROVIDED HISTORY: Reason for exam:->f/u pneumonia FINDINGS: Bilateral lung airspace disease identified not significantly changed Mild cardiomegaly is noted. There is no evidence of pneumothorax. The osseous structures are without acute process. Dextroconvex thoracic scoliosis is appreciated     Stable bilateral lung airspace disease which may represent pulmonary edema versus pneumonia     Xr Chest Portable    Result Date: 1/14/2018  EXAMINATION: SINGLE VIEW OF THE CHEST 1/14/2018 8:12 pm COMPARISON: January 13 HISTORY: ORDERING SYSTEM PROVIDED HISTORY: Desaturation TECHNOLOGIST PROVIDED HISTORY: Reason for exam:->Desaturation FINDINGS: Scoliotic curvature of thoracolumbar spine is noted. Heart size is stable. Multifocal developing airspace disease is noted bilaterally. There is no pneumothorax. Moderate bowel distention in the upper abdomen is noted. Diffuse airspace disease. This is likely pulmonary edema       RECENT VITALS:     Temp: 98.1 °F (36.7 °C),  Pulse: 83, Resp: 18, BP: 121/73, SpO2: 95 %    This patient is a 25 y.o. Female with presented with a complaint of abdominal pain. She has had multiple visits to the ED for same complaint. She has had multiple enemas , Golytely etc no bowel movement yet. Rectal vault was empty as manual disimpaction was attempted. Admitted to Dr. Libertad Hancock Internep[facility] for further management. ED Course as of Jan 23 1853 Tue Jan 23, 2018   2952 I visited with the patient's family. Updated them on plan of car and the fact that we have a bed. I answered their questions and discussed the plan of care with family.   [KO]      ED Course User

## 2018-01-23 NOTE — PROGRESS NOTES
mLs by mouth 2 times daily       Diapers & Supplies MISC 1 each by Does not apply route daily as needed 200 each 11    Misc Natural Products (CYSTEX) LIQD Take 15 mLs by mouth daily 450 mL 11    diazepam (DIASTAT) 10 MG GEL Place 10 mg rectally once as needed 2 each 5       Allergies: Roberto Samayoa is allergic to clindamycin/lincomycin; gentamicin; hydrocodone; and tape [adhesive tape]. 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 She has never used smokeless tobacco. She reports that she does not drink alcohol or use drugs.     Family History   Problem Relation Age of Onset    High Blood Pressure Mother        ROS: Limited due to patient being non verbal  Constitutional  Negative for fever and chills    HEENT  Negative for ear discharge, ear pain, nosebleed    Eyes  Negative for photophobia, pain and discharge    Respiratory  Negative for hemoptysis and sputum    Cardiovascular  Negative for orthopnea, claudication and PND    Gastrointestinal  Negative for abdominal pain, diarrhea, blood in stool    Musculoskeletal  Negative for joint pain, negative for myalgia    Skin  Negative for rash or itching    Endo/heme/allergies  Negative for polydipsia, environmental allergy    Psychiatric/behavioral  Negative for suicidal

## 2018-01-24 ENCOUNTER — APPOINTMENT (OUTPATIENT)
Dept: GENERAL RADIOLOGY | Age: 23
DRG: 392 | End: 2018-01-24
Payer: COMMERCIAL

## 2018-01-24 LAB
ABSOLUTE EOS #: 0.09 K/UL (ref 0–0.44)
ABSOLUTE IMMATURE GRANULOCYTE: 0.08 K/UL (ref 0–0.3)
ABSOLUTE LYMPH #: 1.89 K/UL (ref 1.1–3.7)
ABSOLUTE MONO #: 0.4 K/UL (ref 0.1–1.4)
BASOPHILS # BLD: 1 % (ref 0–2)
BASOPHILS ABSOLUTE: 0.04 K/UL (ref 0–0.2)
DIFFERENTIAL TYPE: ABNORMAL
EKG ATRIAL RATE: 115 BPM
EKG Q-T INTERVAL: 424 MS
EKG QRS DURATION: 70 MS
EKG QTC CALCULATION (BAZETT): 596 MS
EKG R AXIS: 23 DEGREES
EKG T AXIS: 46 DEGREES
EKG VENTRICULAR RATE: 119 BPM
EOSINOPHILS RELATIVE PERCENT: 1 % (ref 1–4)
HCT VFR BLD CALC: 34.2 % (ref 36.3–47.1)
HEMOGLOBIN: 10.3 G/DL (ref 11.9–15.1)
IMMATURE GRANULOCYTES: 1 %
LYMPHOCYTES # BLD: 30 % (ref 25–45)
MAGNESIUM: 1.8 MG/DL (ref 1.6–2.6)
MCH RBC QN AUTO: 27.3 PG (ref 25.2–33.5)
MCHC RBC AUTO-ENTMCNC: 30.1 G/DL (ref 28.4–34.8)
MCV RBC AUTO: 90.7 FL (ref 82.6–102.9)
MONOCYTES # BLD: 6 % (ref 2–8)
NRBC AUTOMATED: 0 PER 100 WBC
PDW BLD-RTO: 13.7 % (ref 11.8–14.4)
PLATELET # BLD: 359 K/UL (ref 138–453)
PLATELET ESTIMATE: ABNORMAL
PMV BLD AUTO: 9.8 FL (ref 8.1–13.5)
RBC # BLD: 3.77 M/UL (ref 3.95–5.11)
RBC # BLD: ABNORMAL 10*6/UL
SEG NEUTROPHILS: 61 % (ref 34–64)
SEGMENTED NEUTROPHILS ABSOLUTE COUNT: 3.82 K/UL (ref 1.5–8.1)
WBC # BLD: 6.3 K/UL (ref 4.5–13.5)
WBC # BLD: ABNORMAL 10*3/UL

## 2018-01-24 PROCEDURE — 36415 COLL VENOUS BLD VENIPUNCTURE: CPT

## 2018-01-24 PROCEDURE — 6370000000 HC RX 637 (ALT 250 FOR IP): Performed by: STUDENT IN AN ORGANIZED HEALTH CARE EDUCATION/TRAINING PROGRAM

## 2018-01-24 PROCEDURE — 2060000000 HC ICU INTERMEDIATE R&B

## 2018-01-24 PROCEDURE — 6370000000 HC RX 637 (ALT 250 FOR IP): Performed by: INTERNAL MEDICINE

## 2018-01-24 PROCEDURE — 85025 COMPLETE CBC W/AUTO DIFF WBC: CPT

## 2018-01-24 PROCEDURE — 6370000000 HC RX 637 (ALT 250 FOR IP): Performed by: EMERGENCY MEDICINE

## 2018-01-24 PROCEDURE — 2580000003 HC RX 258: Performed by: EMERGENCY MEDICINE

## 2018-01-24 PROCEDURE — 94660 CPAP INITIATION&MGMT: CPT

## 2018-01-24 PROCEDURE — 74018 RADEX ABDOMEN 1 VIEW: CPT

## 2018-01-24 PROCEDURE — 6370000000 HC RX 637 (ALT 250 FOR IP): Performed by: NURSE PRACTITIONER

## 2018-01-24 PROCEDURE — 6360000002 HC RX W HCPCS: Performed by: INTERNAL MEDICINE

## 2018-01-24 PROCEDURE — 93005 ELECTROCARDIOGRAM TRACING: CPT

## 2018-01-24 PROCEDURE — 83735 ASSAY OF MAGNESIUM: CPT

## 2018-01-24 PROCEDURE — 99232 SBSQ HOSP IP/OBS MODERATE 35: CPT | Performed by: NURSE PRACTITIONER

## 2018-01-24 PROCEDURE — 94762 N-INVAS EAR/PLS OXIMTRY CONT: CPT

## 2018-01-24 PROCEDURE — 95819 EEG AWAKE AND ASLEEP: CPT

## 2018-01-24 PROCEDURE — 99232 SBSQ HOSP IP/OBS MODERATE 35: CPT | Performed by: INTERNAL MEDICINE

## 2018-01-24 RX ORDER — LORAZEPAM 2 MG/ML
1 INJECTION INTRAMUSCULAR EVERY 30 MIN PRN
Status: DISCONTINUED | OUTPATIENT
Start: 2018-01-24 | End: 2018-01-25 | Stop reason: HOSPADM

## 2018-01-24 RX ADMIN — MAGNESIUM CITRATE 150 ML: 1.75 LIQUID ORAL at 21:28

## 2018-01-24 RX ADMIN — ENOXAPARIN SODIUM 30 MG: 40 INJECTION SUBCUTANEOUS at 18:47

## 2018-01-24 RX ADMIN — LEVETIRACETAM 1500 MG: 100 SOLUTION ORAL at 21:27

## 2018-01-24 RX ADMIN — DOXAZOSIN 1 MG: 2 TABLET ORAL at 21:28

## 2018-01-24 RX ADMIN — POLYETHYLENE GLYCOL 3350 17 G: 17 POWDER, FOR SOLUTION ORAL at 10:13

## 2018-01-24 RX ADMIN — Medication 10 ML: at 21:27

## 2018-01-24 RX ADMIN — ESOMEPRAZOLE MAGNESIUM 40 MG: 20 CAPSULE, DELAYED RELEASE ORAL at 10:12

## 2018-01-24 RX ADMIN — Medication 1 CAPSULE: at 21:27

## 2018-01-24 RX ADMIN — TRAZODONE HYDROCHLORIDE 50 MG: 50 TABLET ORAL at 21:28

## 2018-01-24 RX ADMIN — MAGNESIUM CITRATE 150 ML: 1.75 LIQUID ORAL at 15:56

## 2018-01-24 RX ADMIN — LEVETIRACETAM 1500 MG: 100 SOLUTION ORAL at 10:12

## 2018-01-24 NOTE — PROGRESS NOTES
Potassium 5.0 3.7 - 5.3 mmol/L   EKG 12 Lead    Collection Time: 01/24/18 12:01 AM   Result Value Ref Range    Ventricular Rate 119 BPM    Atrial Rate 115 BPM    QRS Duration 70 ms    Q-T Interval 424 ms    QTc Calculation (Bazett) 596 ms    R Axis 23 degrees    T Axis 46 degrees         Radiology Review: CT Reviewed from 1/23/18. ASSESSMENT   1. Constipation     Plan  1. Afebrile, VSS  2. Continue bowel regimen   3. Will order KUB this morning for follow up   4. Labs pending this morning   5. No immediate plans for surgical intervention at this time. Will continue to monitor closely and follow up on plain film today. 6. Medical Management per primary      Electronically signed by Letty Adhikari DO  on 1/24/2018 at 7:10 AM       UPDATE:    Reviewed the plain films this morning with surgical team. Forrestine Schwab to be gas throughout the colon without any notable stool burden and it does appear to be improved. Possible impaction in sigmoid but unable to ascertain from this plain film. Patient with water bowel movements overnight. At this point we would not offer any kind of surgical intervention and our recommendation would be to continue with aggressive scheduled bowel movement which was ordered at consultation. This regimen should included scheduled enemas at least twice daily. At this time we will sign off. Available if needed. Thank you for involving us in the care of the patient.    -----------------------------------------------------------------  Leighton Nunez DO, PGY-2  General Surgery Resident   Pager#: [phone] [address], [place]. I have reviewed the resident's note and I either performed the key elements of the medical history and physical exam or was present with the resident when the key elements of the medical history and physical exam were performed. I have discussed the findings, established the care plan and recommendations with Resident.     Electronically signed by Leonardo Win IV, DO on 1/25/18 at 11:29 AM

## 2018-01-24 NOTE — PROGRESS NOTES
Nutrition Assessment    Type and Reason for Visit: Initial, Positive Nutrition Screen (Nausea/Vomiting, Home TF)    Nutrition Recommendations: As able, start continuous Tube Feedings of Jevity 1.2 (std with fiber) slowly advancing to goal rate of 35 mL/hr - will provide 1008 kcal, 47 gm protein, 15 gm fiber, and 678 mL free water. Suggest also providing additional free water flushes of 150 mL x 4 per day. Monitor TF tolerance/adequacy of intakes and restart of oral diet. Malnutrition Assessment:  · Malnutrition Status: At risk for malnutrition    Nutrition Diagnosis:   · Problem: Inadequate oral intake  · Etiology: related to Cognitive or neurological impairment     Signs and symptoms:  as evidenced by NPO status due to medical condition, need for Tube Feedings    Nutrition Assessment:  · Subjective Assessment: Pt admitted with constipation x 1 week along with intractable nausea and episodes of vomiting x 3 days. Since admission pt with multiple BM's. Pt at home recives Peptamen 1.0. Mom states on days when pt does take some PO the tube feeding runs at 80 mL/hr at night (total of 500 mL). On days when pt does not take PO, tube feeding provides 1000 mL of Peptamen. Pt currently remains NPO with no nutrition being provided. RN states physician requests a higher fiber containing TF formula and additional water flushes. Mom states pt recieves a free water bolus of 300 mL before tube feeding is started and takes milk and water during the day. · Nutrition-Focused Physical Findings: +PEG in place. +Active/Hypoactive bowel sounds. +Trace b/l LE edema.   · Wound Type: None  · Current Nutrition Therapies:  · Oral Diet Orders: NPO   · Oral Diet intake: NPO  · Tube Feeding (TF) Orders:   · Feeding Route: Gastrostomy  · Formula: Standard w/Fiber  · Rate (ml/hr):0 mL/hr - goal rate 35 mL/hr    · Volume (ml/day): 840 mL/day  · Duration: Continuous 24hrs  · Water Flushes: 150 mL flushes x 4 per day  · Current TF & Flush Orders Provides: Home TF provides about 1000 kcal and 40 gm protein  · Goal TF & Flush Orders Provides: Jevity 1.2 at goal rate of 35 mL/hr = 1008 kcal, 47 gm protein, 15 gm fiber, and 678 mL free water. · Anthropometric Measures:  · Ht: 4' 7\" (139.7 cm)   · Current Body Wt: 85 lb (38.6 kg)  · BMI Classification: BMI 18.5 - 24.9 Normal Weight  · Comparative Standards (Estimated Nutrition Needs):  · Estimated Daily Total Kcal: 8136-7231 kcals/day  · Estimated Daily Protein (g): 39-47 gm protein    Estimated Intake vs Estimated Needs: Intake Less Than Needs    Nutrition Risk Level: High    Nutrition Interventions:   Continue NPO - As able, start Tube Feedings of Jevity 1.2 (std with fiber) at 35 mL/hr = 1008 kcal, 47 gm protein, 15 gm fiber. Continued Inpatient Monitoring, Education Not Indicated    Nutrition Evaluation:   · Evaluation: Goals set   · Goals: Meet % of estimated nutrient needs with tube feedings and oral diet. · Monitoring: NPO Status, Skin Integrity, Fluid Balance, Weight, Comparative Standards, Pertinent Labs, Start of Tube Feedings. See Adult Nutrition Doc Flowsheet for more detail.      Electronically signed by Placido Ballard RD, LD on 1/24/18 at 1:20 PM    Contact Number: [phone]

## 2018-01-24 NOTE — PROGRESS NOTES
[facility]    Progress Note    1/24/2018    3:50 PM    Name:   Lalit Root  MRN:     [id number]     [place]:      [de-identified]   Room:   [address] [place] [address] Day:  1  Admit Date:  1/23/2018 12:31 AM    PCP:   Tunde Patel MD  Code Status:  Full Code    Subjective:     C/C:   Chief Complaint   Patient presents with    Constipation     3rd ER visit for constipation. Interval History Status: improved. Had 3 bowel movements overnight  Not indicating any sign of pain  No seizures after restarting Keppra    Brief History:   Admitted in ER with following history:  Charleen Clark a 25 y.o. female who presents with Constipation and emesis.  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   This is the third visit in the last 3 days.  Yesterday, patient was discharged with GoLYTELY and magnesium citrate. She also received 3 enemas. They state that despite this, patient has not had a bowel movement.  Mother states she has done multiple rectal exams on her and there is no stool In the rectum.  She had \"bilious\" emesis today Brianna Colemanat is unclear how much emesis the patient had. Suki Cocker came from the mouth, as well as G tube.  Parents have still been giving her feeds through the G-tube, but have not been feeding her by mouth due to concern that it'll make the constipation worse. Sarah Ashley think that her abdomen also appears more distended. Sarah Ashley also think the patient is uncomfortable and has continued to have abdominal pain.  No other complaints or concerns at this time     Had seizure while in ER, Keppra 1500 mg has been given per G-tube     Mother states that more than a week back she was admitted in ER with pneumonia, was given vancomycin which caused her diarrhea and the food stuff which came out was what she had taken 5 days before      Review of Systems:   As narrated by her mother    Constitutional:  negative for chills, fevers, sweats  Respiratory:  negative for cough, dyspnea on exertion, hemoptysis, shortness of breath, wheezing  Cardiovascular:  negative for chest pain, chest pressure/discomfort, lower extremity edema, palpitations  Gastrointestinal:  negative for abdominal pain, constipation, diarrhea, nausea, vomiting  Neurological:  negative for dizziness, headache    Medications: Allergies:     Allergies   Allergen Reactions    Clindamycin/Lincomycin     Gentamicin     Hydrocodone     Tape Adrian Princewick Tape] Rash     Redness that lasts a couple days       Current Meds:   Scheduled Meds:    ondansetron  4 mg Intravenous Once    ketorolac  15 mg Intravenous Once    CYSTEX  15 mL Oral Daily    PEPTAMEN 1 NEETU  1 Can Oral TID    lactobacillus  1 capsule Oral Daily    scopolamine  1 patch Transdermal Q72H    traZODone  50 mg Oral Nightly    doxazosin  1 mg Oral Nightly    esomeprazole  40 mg Oral Daily    Methylnaltrexone Bromide  1 tablet Oral Daily    magnesium citrate  150 mL Oral BID    sodium chloride flush  10 mL Intravenous 2 times per day    sodium chloride flush  10 mL Intravenous 2 times per day    enoxaparin  40 mg Subcutaneous Daily    lidocaine 1 % injection  5 mL Intradermal Once    sodium chloride flush  10 mL Intravenous 2 times per day    levETIRAcetam  1,500 mg Per G Tube BID    docusate sodium  100 mg Oral BID    polyethylene glycol  17 g Oral Daily     Continuous Infusions:    sodium chloride      sodium chloride 100 mL/hr at 01/23/18 1739     PRN Meds: LORazepam, clonazePAM, ibuprofen, levalbuterol, sodium chloride flush, fentanNYL, sodium chloride flush, potassium chloride **OR** potassium chloride **OR** potassium chloride, magnesium sulfate, acetaminophen, magnesium hydroxide, bisacodyl, ondansetron, nicotine, sodium chloride flush    Data:     Past Medical History:   has a past medical history of Breast lump in female; Central sleep apnea; Dystonia; GERD (gastroesophageal

## 2018-01-24 NOTE — PROGRESS NOTES
[facility], [facility]. Emesis [R11.10]  Emesis [R11.10]  Seizure ([address] [R56.9] . Vitals:    01/23/18 2011   BP: 129/89   Pulse: 73   Resp: 19   Temp: 98.8 °F (37.1 °C)   SpO2: 96%   . Patients home meds are   Prior to Admission medications    Medication Sig Start Date End Date Taking?  Authorizing Provider   Pediatric Multivitamins-Iron (MULTI-DELYN/IRON) LIQD Take one teaspoonful per g-tube every day 1/23/18 1/22/19  Birdie Lockwood MD   esomeprazole (NEXIUM) 20 MG delayed release capsule Take 1 capsule by g tube in the am and 1 capsule by g tube in the evening 1/23/18   Birdie Lockwood MD   Methylnaltrexone Bromide (RELISTOR) 150 MG TABS Take 1 tablet by mouth daily 1/21/18 Janeisamra Alves MD   magnesium citrate (CITROMA) SOLN Take 150 mLs by mouth 2 times daily for 14 doses 1/21/18 1/28/18  Rohit Alves MD   esomeprazole Magnesium (NEXIUM) 40 MG PACK Take 40 mg by mouth daily    Historical Provider, MD   levalbuterol Fabien Key) 1.25 MG/3ML nebulizer solution Take 3 mLs by nebulization every 6 hours as needed for Wheezing 1/17/18 1/24/18  Brady Moreno MD   ibuprofen (ADVIL;MOTRIN) 100 MG/5ML suspension Take 10 mg/kg by mouth every 4 hours as needed for Fever    Historical Provider, MD   levETIRAcetam (KEPPRA) 100 MG/ML solution Take 14ML two times a day by G-button  Patient taking differently: Take 15ML two times a day by G-button 12/28/17 12/28/18  Birdie Lockwood MD   [facility] [address] [facility] by Does not apply route Expires 12/21/2022 12/22/17   Raffy Muniz CNP   vitamin D (AQUEOUS VITAMIN D) 400 UNIT/ML LIQD 1 mL by Per G Tube route daily 1 teaspoon per g-tube daily  Patient taking differently: 2,000 Units by Per G Tube route nightly 1 teaspoon per g-tube daily 11/14/17 11/14/18  Birdie Lockwood MD   doxazosin (CARDURA) 1 MG tablet Take 1 tablet by mouth nightly 11/8/17 11/8/18  Birdie Lockwood MD   traMADol (ULTRAM) 50 MG tablet 1 tablet by Per G Tube route every 8 hours as needed for Pain 10/3/17 10/4/18  Brianna Callejas MD   Multiple Vitamins-Minerals (CERTAVITE/ANTIOXIDANTS) solution Give 5 ML per G Tube daily 7/21/17 7/21/18  Teresa Collins CNP   nystatin (MYCOSTATIN) 853712 UNIT/GM ointment Apply topically 2 times daily. 5/19/17 5/19/18  Brianna Callejas MD   traZODone (DESYREL) 50 MG tablet Take 1.5-2 tablets by mouth nightly 5/19/17 5/19/18  Brianna Callejas MD   Feeding Tubes - Sets (JAYDEN-KEY GASTROSTOMY KIT 18FR) MISC 3.5 cm 5/19/17 5/19/18  Brianna Callejas MD   polyethylene glycol (GLYCOLAX) powder 17 g by Per G Tube route daily 3/7/17 3/7/18  Brianna Callejas MD   NUTRITIONAL SUPPLEMENT LIQD 1 Can by Per G Tube route 3 times daily Peptamen 1 jovany 3/3/17 3/3/18  Odette Jefferson MD   Probiotic Product (ALIGN PO) Take 4 mg by mouth nightly     Historical Provider, MD   saccharomyces boulardii (FLORASTOR) 250 MG capsule Take 250 mg by mouth daily    Historical Provider, MD   Nutritional Supplements (PEPTAMEN 1 JOVANY) LIQD Take 1 Can by mouth 3 times daily Peptamen Adult 1/13/17 1/13/18  Brianna Callejas MD   magnesium hydroxide (MILK OF MAGNESIA CONCENTRATE) 2400 MG/10ML SUSP Take 8 mLs by mouth 2 times daily     Historical Provider, MD   [address] 1 each by Does not apply route daily as needed 2/3/16 2/13/18  Brianna Callejas MD   Misc Natural Products (CYSTEX) LIQD Take 15 mLs by mouth daily 2/3/16 2/13/18  Brianna Callejas MD   diazepam (DIASTAT) 10 MG GEL Place 10 mg rectally once as needed 2/3/16 6/12/18  Brianna Callejas MD   .  Assessment   Pt is resting comfortably, no distress noted. Pt is not SOB.  Pt take levealbuterol at home PRN    RR 16  Breath Sounds: cl/ Diminished      · Bronchodilator assessment at level  1  · Hyperinflation assessment at level   · Secretion Management assessment at level    ·   · [x]    Bronchodilator

## 2018-01-24 NOTE — PLAN OF CARE
Daniel Shaver [facility]    Second Visit Note  For more detailed information please refer to the progress note of the day      1/24/2018    5:24 PM    Name:   Tomasa Yu  MRN:     [id number]     Acct:      [de-identified]   Room:   [facility]/8759-58   Day:  1  Admit Date:  1/23/2018 12:31 AM    PCP:   Neto Contreras MD  Code Status:  Full Code        Pt vitals were reviewed   New labs were reviewed   Patient was seen    Updated plan :     1. Patient had only brown liquid coming out with further enema. 2. She is still nothing by mouth, discussed with the check for surgical team further plan  3.  Will check KUB in the morning        Nimisha Castañeda MD  1/24/2018  5:24 PM

## 2018-01-24 NOTE — PROGRESS NOTES
Smoking Cessation - topics covered   []  Health Risks  []  Benefits of Quitting   []  Smoking Cessation  [x]  Patient has no history of tobacco use  []  Patient is former smoker. [x]  No need for tobacco cessation education. []  Booklet given  []  Patient verbalizes understanding. []  Patient denies need for tobacco cessation education.   Rema Camejo  8:11 AM

## 2018-01-24 NOTE — PROCEDURES
[address]                   [facility], [address]                            ELECTROENCEPHALOGRAM REPORT    PATIENT NAME: Kary Salazar                        :        1995  MED REC NO:   [id number]                             ROOM:       4613  ACCOUNT NO:   [de-identified]                           ADMIT DATE: 2018  PROVIDER:     Anthony Tierney    DATE OF EE2018    EEG NUMBER:  [id number]. 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 MEDICATIONS:  Not listed. This is a 16-channel EEG with one EKG channel recording performed on a  patient described to be awake, drowsy, and asleep. The patient shows  delayed waking rhythms. Background activity consists of well-regulated  7-Hz activity in the 40- to 60-microvolt range, more prominent in the  posterior head areas, showing some reactivity to eye opening and closing. Over the anterior head regions, there is 10- to 15-Hz activity in the 20-  to 30-microvolt range. The record is prominent for moderate-degree of  medium-amplitude 4- to 7-Hz activity seen throughout all head areas during  waking state. With drowsiness and sleep, there is further intrusion of  slower frequencies in the theta and lesser degree in the delta band. This  record is not lateralized or epileptiform. Hyperventilation and photic  stimulation were not performed. IMPRESSION:  This EEG shows the presence of moderate diffuse slowly. This  EEG is concordant with diffuse encephalopathy. Clinical correlation is  warranted. EEG CODE NUMBER:  _____.         Tadeo Neri    D: 2018 13:02:37       T: 2018 14:15:41     [email or web address]  Job#: [id number]     Doc#: [id number]    CC:

## 2018-01-24 NOTE — PROGRESS NOTES
distress  Lungs: Respirations easy and regular without use of accessory muscles. Lungs clear. Heart:S1S2 - regular rhythm  Abdomen: Soft, NT, ND +BS, no masses  Skin:  No jaundice, no stigmata of chronic liver disease. Neuro: Awake, alert, follows requests. PERRLA. No focal deficits. Lab and Imaging Review   Recent Labs      01/23/18   0116   WBC  7.5   HGB  11.2*   MCV  84.9     Recent Labs      01/23/18   0116   AST  72*   ALT  22   BILIDIR  <0.08   BILITOT  0.28*   ALKPHOS  43     Recent Labs      01/23/18   0116  01/23/18   0500  01/23/18   1422  01/23/18   1536   NA  138   --   140   --    K  5.6*   --   5.9*  5.0   CL  96*   --   102   --    CO2  29   --   25   --    BUN  7   --   8   --    CREATININE  <0.20*   --   <0.20*   --    GLUCOSE  84  100  71   --    CALCIUM  8.3*   --   9.1   --      Recent Labs      01/23/18   0116   PROT  7.2         Assessment:   1. Constipation- patient as passes minimal amount of liquid stool yesterday evening       Plan:   1. KUB reviewed, shows slight improvement  2. Recommend adequate amounts of fluid via G-tube and high-fiber foods formula  3. Passive and active exercise are very useful in initiating bowel motility   4. Continue oral osmotic laxative (Miralax). Continue mineral oil enemas (however long-term administration can interfere with the absorption of certain fat-soluble vitamins, resulting in deficiencies). Psyllium (Metamucil) or other high fibre preparations such as Benefibre can be used to increase the bulk of the stool   5. Monitor electrolytes and correct as appropriate  6. If feasible (given patient's mentation): Toilet timing to take advantage of the gastro-colic reflex should be encouraged. Where possible she should be encouraged to sit on the toilet within 30 minutes of feeds. 7. Patients with Rett syndrome are vulnerable to GERD, especially if motor function is poor as seen in this patient . Continue PPIs  8.  Serial abdominal examinations  9. Discussed extensively with patients parents      This plan was formulated in collaboration with Dr. Amira Spencer . Thank you for allowing us to participate in this patient's care, please don't hesitate to call for questions. Sandhya Goldman MD, PGY-1  10:31 AM  1/24/2018     GI Attending Note Suresh Weir M.D)  Patient was seen and examined by the bedside . Above note edited by me as appropriate. Agree with above assessment and plan.

## 2018-01-25 ENCOUNTER — APPOINTMENT (OUTPATIENT)
Dept: GENERAL RADIOLOGY | Age: 23
DRG: 392 | End: 2018-01-25
Payer: COMMERCIAL

## 2018-01-25 VITALS
DIASTOLIC BLOOD PRESSURE: 64 MMHG | RESPIRATION RATE: 14 BRPM | HEIGHT: 55 IN | OXYGEN SATURATION: 96 % | BODY MASS INDEX: 19.09 KG/M2 | WEIGHT: 82.5 LBS | TEMPERATURE: 98.1 F | SYSTOLIC BLOOD PRESSURE: 118 MMHG | HEART RATE: 77 BPM

## 2018-01-25 PROCEDURE — 74018 RADEX ABDOMEN 1 VIEW: CPT

## 2018-01-25 PROCEDURE — 94660 CPAP INITIATION&MGMT: CPT

## 2018-01-25 PROCEDURE — 99239 HOSP IP/OBS DSCHRG MGMT >30: CPT | Performed by: INTERNAL MEDICINE

## 2018-01-25 PROCEDURE — 6370000000 HC RX 637 (ALT 250 FOR IP): Performed by: INTERNAL MEDICINE

## 2018-01-25 PROCEDURE — 6370000000 HC RX 637 (ALT 250 FOR IP): Performed by: STUDENT IN AN ORGANIZED HEALTH CARE EDUCATION/TRAINING PROGRAM

## 2018-01-25 PROCEDURE — 93005 ELECTROCARDIOGRAM TRACING: CPT

## 2018-01-25 PROCEDURE — 99231 SBSQ HOSP IP/OBS SF/LOW 25: CPT | Performed by: NURSE PRACTITIONER

## 2018-01-25 PROCEDURE — 6370000000 HC RX 637 (ALT 250 FOR IP): Performed by: NURSE PRACTITIONER

## 2018-01-25 PROCEDURE — 6370000000 HC RX 637 (ALT 250 FOR IP): Performed by: EMERGENCY MEDICINE

## 2018-01-25 PROCEDURE — 94762 N-INVAS EAR/PLS OXIMTRY CONT: CPT

## 2018-01-25 PROCEDURE — 2580000003 HC RX 258: Performed by: EMERGENCY MEDICINE

## 2018-01-25 RX ORDER — CLONAZEPAM 0.5 MG/1
0.75 TABLET ORAL 3 TIMES DAILY PRN
Qty: 15 TABLET | Refills: 0 | Status: SHIPPED | OUTPATIENT
Start: 2018-01-25 | End: 2018-02-09 | Stop reason: ALTCHOICE

## 2018-01-25 RX ORDER — ACETAMINOPHEN 325 MG/1
325 TABLET ORAL EVERY 4 HOURS PRN
Qty: 20 TABLET | Refills: 0 | Status: SHIPPED | OUTPATIENT
Start: 2018-01-25 | End: 2018-12-15 | Stop reason: ALTCHOICE

## 2018-01-25 RX ADMIN — DOCUSATE SODIUM 100 MG: 50 LIQUID ORAL at 11:38

## 2018-01-25 RX ADMIN — LEVETIRACETAM 1500 MG: 100 SOLUTION ORAL at 11:38

## 2018-01-25 RX ADMIN — MAGNESIUM CITRATE 150 ML: 1.75 LIQUID ORAL at 12:36

## 2018-01-25 RX ADMIN — Medication 10 ML: at 11:39

## 2018-01-25 RX ADMIN — POLYETHYLENE GLYCOL 3350 17 G: 17 POWDER, FOR SOLUTION ORAL at 11:37

## 2018-01-25 RX ADMIN — ESOMEPRAZOLE MAGNESIUM 40 MG: 20 CAPSULE, DELAYED RELEASE ORAL at 11:38

## 2018-01-25 ASSESSMENT — PAIN SCALES - GENERAL
PAINLEVEL_OUTOF10: 0
PAINLEVEL_OUTOF10: 1
PAINLEVEL_OUTOF10: 0

## 2018-01-25 NOTE — DISCHARGE SUMMARY
still been giving her feeds through the G-tube, but have not been feeding her by mouth due to concern that it'll make the constipation worse. Sarah Ashley think that her abdomen also appears more distended. Sarah Ashley also think the patient is uncomfortable and has continued to have abdominal pain.  No other complaints or concerns at this time     Had seizure while in ER, Keppra 1500 mg has been given per G-tube     Mother states that more than a week back she was admitted in ER with pneumonia, was given vancomycin which caused her diarrhea and the food stuff which came out was what she had taken 5 days before    Hospital Course:    improved. After multiple stool softeners, IV hydration and and enema she started having few bowel movements  Had seizure in hospital since she did not get her medicine for a few days, Keppra was restarted and she had no seizure after that.   She was on Ultram which can lower down the threshold for seizures which has been stopped  Today Had 1 bowel movement overnight- small in amount,per caregiver chilli came out she ate 5 days back  Not indicating any sign of pain  No seizures after restarting Keppra  Primary Problem  Intractable vomiting -resolved  Ileus-slowly improving  Constipation -resolved         Active Hospital Problems     Diagnosis Date Noted    Intractable vomiting [R11.10] 01/23/2018       Priority: High    Seizure ([facility] [address] [R56.9] 01/23/2018       Priority: High    Seizure disorder ([facility] [address] [G40.909]      History of recurrent pneumonia [Z87.01] 04/27/2017    Chronic respiratory failure with hypoxia and hypercapnia (HCC) [J96.11, J96.12] 04/09/2017    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 Resume home medications, stop ULTRAM-due to seizures   2. Home dietary regimen and high-fiber and diet as recommended  3.  Discharge home with home care, discussed with GI and also discussed with mother on phone        Significant therapeutic interventions: IV hydration, stool softeners and enema    Significant Diagnostic Studies:   Labs / Micro:  CBC:   Lab Results   Component Value Date    WBC 6.3 01/24/2018    RBC 3.77 01/24/2018    HGB 10.3 01/24/2018    HCT 34.2 01/24/2018    MCV 90.7 01/24/2018    MCH 27.3 01/24/2018    MCHC 30.1 01/24/2018    RDW 13.7 01/24/2018     01/24/2018     BMP:    Lab Results   Component Value Date    GLUCOSE 71 01/23/2018     01/23/2018    K 5.0 01/23/2018     01/23/2018    CO2 25 01/23/2018    ANIONGAP 13 01/23/2018    BUN 8 01/23/2018    CREATININE <0.20 01/23/2018    BUNCRER NOT REPORTED 01/23/2018    CALCIUM 9.1 01/23/2018    LABGLOM CANNOT BE CALCULATED 01/23/2018    GFRAA CANNOT BE CALCULATED 01/23/2018    GFR      01/23/2018    GFR NOT REPORTED 01/23/2018     HFP:    Lab Results   Component Value Date    PROT 7.2 01/23/2018     CMP:    Lab Results   Component Value Date    GLUCOSE 71 01/23/2018     01/23/2018    K 5.0 01/23/2018     01/23/2018    CO2 25 01/23/2018    BUN 8 01/23/2018    CREATININE <0.20 01/23/2018    ANIONGAP 13 01/23/2018    ALKPHOS 43 01/23/2018    ALT 22 01/23/2018    AST 72 01/23/2018    BILITOT 0.28 01/23/2018    LABALBU 3.7 01/23/2018    ALBUMIN 1.1 01/23/2018    LABGLOM CANNOT BE CALCULATED 01/23/2018    GFRAA CANNOT BE CALCULATED 01/23/2018    GFR      01/23/2018    GFR NOT REPORTED 01/23/2018    PROT 7.2 01/23/2018    CALCIUM 9.1 01/23/2018     PT/INR:  No results found for: PTINR  PTT:   Lab Results   Component Value Date    APTT 27.1 04/08/2017     FLP:  No results found for: CHOL, TRIG, HDL  U/A:    Lab Results   Component Value Date    COLORU YELLOW 01/20/2018    TURBIDITY CLEAR 01/20/2018    SPECGRAV 1.026 01/20/2018    HGBUR NEGATIVE 01/20/2018    PHUR 6.0 01/20/2018    PROTEINU NEGATIVE 01/20/2018    GLUCOSEU NEGATIVE 01/20/2018    KETUA NEGATIVE 01/20/2018    BILIRUBINUR NEGATIVE 01/20/2018    BILIRUBINUR Negative 02/13/2017    UROBILINOGEN Normal 01/20/2018    NITRU NEGATIVE 01/20/2018    LEUKOCYTESUR NEGATIVE 01/20/2018     TSH:    Lab Results   Component Value Date    TSH 0.68 04/08/2017         Radiology:    Ct Abdomen Pelvis Wo Iv Contrast Additional Contrast? None    Result Date: 1/21/2018  EXAMINATION: CT OF THE ABDOMEN AND PELVIS WITHOUT CONTRAST 1/21/2018 1:24 am TECHNIQUE: CT of the abdomen and pelvis was performed without the administration of intravenous contrast. Multiplanar reformatted images are provided for review. Dose modulation, iterative reconstruction, and/or weight based adjustment of the mA/kV was utilized to reduce the radiation dose to as low as reasonably achievable. COMPARISON: 04/08/2017 HISTORY: ORDERING SYSTEM PROVIDED HISTORY: Ab pain TECHNOLOGIST PROVIDED HISTORY: Additional Contrast?->None FINDINGS: Lower Chest: Again seen is chronic consolidation focally in the left lower lobe posteriorly. Organs: The visualized portions of the liver, spleen, pancreas and adrenal glands appear unremarkable within the constraints of a noncontrast exam.  No biliary ductal dilatation. There has been cholecystectomy. The kidneys appear normal in size without evidence of a contour distorting mass. No renal stone or hydronephrosis. No perinephric stranding. GI/Bowel: A G-tube is in place. Opacified loops of bowel are normal in caliber. There is fecal impaction of the rectum. Pelvis: The urinary bladder appears unremarkable. The pelvic organs demonstrate no acute abnormality. Peritoneum/Retroperitoneum: The abdominal aorta is normal in caliber. Bones/Soft Tissues: No acute findings. Electronic device again noted overlying the right lower quadrant within the subcutaneous tissues.      Rectal fecal urinary bladder. Xr Chest Portable    Result Date: 1/20/2018  EXAMINATION: SINGLE VIEW OF THE CHEST 1/20/2018 8:05 pm COMPARISON: None. HISTORY: ORDERING SYSTEM PROVIDED HISTORY: f/u pneumonia TECHNOLOGIST PROVIDED HISTORY: Reason for exam:->f/u pneumonia FINDINGS: The lungs are without acute focal process. There is no effusion or pneumothorax. The cardiomediastinal silhouette is stable. The osseous structures are stable. No acute process. Xr Chest Portable    Result Date: 1/16/2018  EXAMINATION: SINGLE VIEW OF THE CHEST 1/16/2018 5:51 am COMPARISON: 01/14/2018 HISTORY: ORDERING SYSTEM PROVIDED HISTORY: f/u pneumonia TECHNOLOGIST PROVIDED HISTORY: Reason for exam:->f/u pneumonia FINDINGS: Bilateral lung airspace disease identified not significantly changed Mild cardiomegaly is noted. There is no evidence of pneumothorax. The osseous structures are without acute process. Dextroconvex thoracic scoliosis is appreciated     Stable bilateral lung airspace disease which may represent pulmonary edema versus pneumonia     Xr Chest Portable    Result Date: 1/14/2018  EXAMINATION: SINGLE VIEW OF THE CHEST 1/14/2018 8:12 pm COMPARISON: January 13 HISTORY: ORDERING SYSTEM PROVIDED HISTORY: Desaturation TECHNOLOGIST PROVIDED HISTORY: Reason for exam:->Desaturation FINDINGS: Scoliotic curvature of thoracolumbar spine is noted. Heart size is stable. Multifocal developing airspace disease is noted bilaterally. There is no pneumothorax. Moderate bowel distention in the upper abdomen is noted. Diffuse airspace disease.   This is likely pulmonary edema         Consultations:    Consults:     Final Specialist Recommendations/Findings:   IP CONSULT TO HOSPITALIST  IP CONSULT TO GI  IP CONSULT TO GI  IP CONSULT TO NEUROLOGY  IP CONSULT TO GENERAL SURGERY  IP CONSULT TO HOME CARE NEEDS      The patient was seen and examined on day of discharge and this discharge summary is in conjunction with any daily progress note from day of discharge. Discharge plan:     Disposition: Home with [facility]    Physician Follow Up:     Birdie oLckwood MD  [address], [place]   [phone]             Requiring Further Evaluation/Follow Up POST HOSPITALIZATION/Incidental Findings: Follow closely with PCP    Diet: High-fiber and as recommended by dietitian    Activity: As tolerated        Discharge Medications:      Medication List      START taking these medications    acetaminophen 325 MG tablet  Commonly known as:  TYLENOL  Take 1 tablet by mouth every 4 hours as needed for Pain or Fever     docusate 50 MG/5ML liquid  Commonly known as:  COLACE  Take 10 mLs by mouth 2 times daily     MULTI-DELYN/IRON Liqd  Take one teaspoonful per g-tube every day        CHANGE how you take these medications    levETIRAcetam 100 MG/ML solution  Commonly known as:  KEPPRA  Take 14ML two times a day by G-button  What changed:  additional instructions     PEPTAMEN 1 NEETU Liqd  Take 1 Can by mouth 3 times daily Peptamen Adult  What changed:  Another medication with the same name was removed. Continue taking this medication, and follow the directions you see here. vitamin D 400 UNIT/ML Liqd  Commonly known as:  AQUEOUS VITAMIN D  1 mL by Per G Tube route daily 1 teaspoon per g-tube daily  What changed:  · how much to take  · when to take this  · additional instructions        CONTINUE taking these medications    ALIGN PO     CERTAVITE/ANTIOXIDANTS solution  Give 5 ML per G Tube daily     clonazePAM 0.5 MG tablet  Commonly known as:  KLONOPIN  Take 1.5 tablets by mouth 3 times daily as needed for Anxiety for up to 15 doses.      CYSTEX Liqd  Take 15 mLs by mouth daily     diazepam 10 MG Gel  Commonly known as:  DIASTAT  Place 10 mg rectally once as needed     doxazosin 1 MG tablet  Commonly known as:  CARDURA  Take 1 tablet by mouth nightly     esomeprazole Magnesium 40 MG Pack  Commonly known as:  Francisco Jules medication reconciliation, prescriptions for required medications, discharge plan and follow up. Electronically signed by   Veda Quintanilla MD  1/25/2018  3:00 PM      Thank you Dr. Fermin Carrera MD for the opportunity to be involved in this patient's care.

## 2018-01-25 NOTE — PROGRESS NOTES
Neurology Nurse Practitioner Progress Note      INTERVAL HISTORY: This is a 25 y.o.  female admitted 1/23/2018 for constipation & emesis. This is a follow-up neurology progress note. The patient was examined and the chart was reviewed. Discussed with the pt & RN. There were no acute events overnight. No new c/o. No more seizures since the last one on 1/23. Stable to be D/C'd.    HPI: Tana Simeon is a 25 y.o. female with H/O Rett syndrome with associated seizure disorder, dystonia (on Baclofen pump), PEG tube placement, scoliosis, ISELA, who was admitted 1/23/2018 for constipation & emesis. This is her 3rd ED visit for similar constipation. 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 Neurology was consulted for seizures. According to the mother, on the early morning of 1/23, pt had dilated pupils, was foaming at her mouth with stiffened limbs. Per mother pt gets seizure once every couple of weeks; in the past she used to have >10 seizures per day. Dr. Ildefonso Castelan, their neurologist, has been managing her seizures on Keppra 1500 mg per PEG BID. Per mother, the pt missed 1 dose of Keppra over the weekend. She got loaded with Keppra 1500 mg x 1 with therapeutic levels (33). Pt is known to our service from her recent admission on 1/13 when she was treated for pneumonia & Influenza A+; we saw her for her witnessed seizure. Pt has full time home care; uses wheel chair at baseline however, can walk with assistance.         enoxaparin  30 mg Subcutaneous Daily    ondansetron  4 mg Intravenous Once    ketorolac  15 mg Intravenous Once    CYSTEX  15 mL Oral Daily    PEPTAMEN 1 NEETU  1 Can Oral TID    lactobacillus  1 capsule Oral Daily    scopolamine  1 patch Transdermal Q72H    traZODone  50 mg Oral Nightly    doxazosin  1 mg Oral Nightly    esomeprazole  40 mg Oral Daily    Methylnaltrexone Bromide  1 tablet Oral Daily    magnesium citrate  150 mL Oral BID    sodium chloride flush  10 mL Intravenous 2 times per day    sodium chloride flush  10 mL Intravenous 2 times per day    lidocaine 1 % injection  5 mL Intradermal Once    sodium chloride flush  10 mL Intravenous 2 times per day    levETIRAcetam  1,500 mg Per G Tube BID    docusate sodium  100 mg Oral BID    polyethylene glycol  17 g Oral Daily       Past Medical History:   Diagnosis Date    Breast lump in female     one at 1 oclock and one at 9 oclock    Central sleep apnea 1/9/2017    Dystonia     GERD (gastroesophageal reflux disease)     Mild sleep apnea     not treated    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 rate 19, height 4' 7\" (1.397 m), weight 82 lb 8 oz (37.4 kg), last menstrual period 09/17/2017, SpO2 99 %. Neurological Examination:  Mental status   Alert; pt is non-verbal; developmentally delayed; doesn't follow commands consistently at baseline   Cranial nerves   Visual fields intact to visual threat. Good eye contact but no response on questioning  Face appears symmetrical  Rest CN examination deferred   Motor function  Generalized weakness.  Diminished muscle bulk; increased tone; L UE contracted    Sensory function Grossly intact   Cerebellar No visible involuntary movements or tremors   Reflex function Intact 2+ DTR and symmetric; plantars - equivocal   Gait                  Uses wheelchair at baseline; can walk with assistance, per

## 2018-01-25 NOTE — FLOWSHEET NOTE
Visit  Patient not able to respond but Caregiver Hailey Downing said that Patient seemed better today.  (patient was in bed not able to respond)    Assessment   spoke to caregiver who was present in the room was in good spirits and hopeful for patient. Intervention   talked to caregiver and told what a blessing she is. Plan  Spiritual Care will be available for spiritual and emotional support if needed.      01/25/18 1535   Encounter Summary   Services provided to: (care giver Bo Clark)   Referral/Consult From: [address] [place] [address] Other (Comment)  (care giver)   Place of Baptism None   Continue Visiting (1/25/18)   Complexity of Encounter High   Length of Encounter 15 minutes   Spiritual Assessment Completed Yes   Routine   Type Initial   Assessment Unable to respond  (caregiver was frendly)   Intervention Active listening   Outcome Expressed gratitude

## 2018-01-25 NOTE — PLAN OF CARE
Problem: Falls - Risk of  Goal: Absence of falls  Outcome: Completed Date Met: 01/25/18  Pt remains free from falls at this time. Floor free from obstacles, and bed is locked and in lowest position. Adequate lighting provided. Call light within reach. Patient unable to make needs known or call out, but home caregiver remains at bedside this shift. Problem: Risk for Impaired Skin Integrity  Goal: Tissue integrity - skin and mucous membranes  Structural intactness and normal physiological function of skin and  mucous membranes. Outcome: Completed Date Met: 01/25/18  Full skin assessment completed this shift. No new skin breakdown at this time. Pt repositioned q2h and prn. Pt kept clean and dry. Waffle mattress in place on bed, heels floated.

## 2018-01-25 NOTE — PROGRESS NOTES
Daniel Shaver 20    Progress Note    1/25/2018    2:28 PM    Name:   Holley Elliott  MRN:     [id number]     Arunalyside:      [de-identified]   Room:   [address] [place] [address]  IP Day:  2  Admit Date:  1/23/2018 12:31 AM    PCP:   Soraya Mcgee MD  Code Status:  Full Code    Subjective:     C/C:   Chief Complaint   Patient presents with    Constipation     3rd ER visit for constipation. Interval History Status: improved. Had 1 bowel movement overnight- small in amount,per caregiver chilli came out she ate 5 days back  Not indicating any sign of pain  No seizures after restarting Keppra    Brief History:   Admitted in ER with following history:  Carlo Ferrera a 25 y.o. female who presents with Constipation and emesis.  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   This is the third visit in the last 3 days.  Yesterday, patient was discharged with GoLYTELY and magnesium citrate. She also received 3 enemas. They state that despite this, patient has not had a bowel movement.  Mother states she has done multiple rectal exams on her and there is no stool In the rectum.  She had \"bilious\" emesis today Samantha Mend is unclear how much emesis the patient had. Elle Frederic came from the mouth, as well as G tube.  Parents have still been giving her feeds through the G-tube, but have not been feeding her by mouth due to concern that it'll make the constipation worse. Compalin Eaton think that her abdomen also appears more distended. China Sheffield also think the patient is uncomfortable and has continued to have abdominal pain.  No other complaints or concerns at this time     Had seizure while in ER, Keppra 1500 mg has been given per G-tube     Mother states that more than a week back she was admitted in ER with pneumonia, was given vancomycin which caused her diarrhea and the food stuff which came out was what she had taken 5 days before      Review of Systems: 01/20/2018 11:27 PM     Lab Results   Component Value Date/Time    CULTURE NO GROWTH 01/20/2018 11:27 PM    CULTURE  01/20/2018 11:27 PM     [facility] [address] [facility], [address] [place] [address] [phone]       Lab Results   Component Value Date    POCPH 7.385 01/14/2018    POCPCO2 52.5 01/14/2018    POCPO2 65.0 01/14/2018    POCHCO3 31.4 01/14/2018    NBEA NOT REPORTED 01/14/2018    PBEA 5 01/14/2018    BAM1IHJ 33 01/14/2018    LJVE1WXM 92 01/14/2018    FIO2 5.0 01/14/2018       Radiology:  CT abdomen and pelvis(1/21/18)  Rectal fecal impaction.  Otherwise no acute findings. X-ray abdominal series:(1/23/17)  No acute cardiopulmonary disease.       Mild nonobstructive gaseous distention of bowel.       Soft tissue density in the pelvis, likely moderately distended urinary   bladder. KUB(1/24/18)  Mild gas distention of small and large bowels, likely related to ileus,   improved since the prior study.  No evidence of bowel obstruction or   pneumoperitoneum. KUB(1/25/18)  Stable exam with mild gaseous distention of bowel/colon, likely representing   a mild ileus.          Physical Examination:        General appearance:  alert,Thin built and no distress  Mental Status:  Nonverbal  Heart:  regular rate and rhythm, no murmur  Abdomen:  soft, nontender, nondistended, sluggish bowel sounds, no masses, hepatomegaly, splenomegaly,baclofen pump in place right lower quadrant, G-tube in place  Extremities:  + pedal edema or calf pain with palpation,+ musculoskeletal deformities of both upper and lower extremities  Skin:  no gross lesions, rashes, induration    Assessment:        Primary Problem  Intractable vomiting -resolved  Ileus-slowly improving  Constipation -resolved   Active Hospital Problems    Diagnosis Date Noted    Intractable vomiting [R11.10] 01/23/2018     Priority: High    Seizure ([address] [R56.9] 01/23/2018     Priority: High    Seizure disorder ([address] [G40.909]     History of recurrent pneumonia

## 2018-01-26 LAB
EKG ATRIAL RATE: 87 BPM
EKG P AXIS: 45 DEGREES
EKG P-R INTERVAL: 160 MS
EKG Q-T INTERVAL: 408 MS
EKG QRS DURATION: 74 MS
EKG QTC CALCULATION (BAZETT): 490 MS
EKG R AXIS: 30 DEGREES
EKG T AXIS: 70 DEGREES
EKG VENTRICULAR RATE: 87 BPM

## 2018-01-30 ENCOUNTER — TELEPHONE (OUTPATIENT)
Dept: FAMILY MEDICINE CLINIC | Age: 23
End: 2018-01-30

## 2018-01-30 DIAGNOSIS — F84.2 RETT'S SYNDROME: Primary | Chronic | ICD-10-CM

## 2018-01-30 RX ORDER — MULTIVIT WITH IRON,MINERALS
LIQUID (ML) ORAL
Qty: 2 BOTTLE | Refills: 5 | Status: SHIPPED | OUTPATIENT
Start: 2018-01-30 | End: 2018-10-28 | Stop reason: SDUPTHER

## 2018-01-30 NOTE — TELEPHONE ENCOUNTER
Writer spoke with pharmacist and they advise that the old Rx had 9mgFe/15ml. The Centrum liquid has 10mgFe/5ml. Pharmacist advises that the patient would need 16 2/3ml to obtain equivalent amount of Fe from supplement.  Order pending provider review and approval.

## 2018-01-30 NOTE — TELEPHONE ENCOUNTER
Pharmacy contacted office and states current MVI ordered is no longer available. They do suggest they have a Centrum liquid with less than comparable Iron content or they do advise an additional Fe supplement. Please advise.        Health Maintenance   Topic Date Due    Cervical cancer screen  03/30/2017    Flu vaccine (1) 09/01/2017    DTaP/Tdap/Td vaccine (7 - Td) 11/27/2017    HIV screen  04/13/2018 (Originally 3/30/2011)    Chlamydia screen  04/13/2018 (Originally 10/12/2016)    Meningococcal (MCV) Vaccine Age 0-22 Years  Completed             (applicable per patient's age: Cancer Screenings, Depression Screening, Fall Risk Screening, Immunizations)    AST (U/L)   Date Value   01/23/2018 72 (H)     ALT (U/L)   Date Value   01/23/2018 22     BUN (mg/dL)   Date Value   01/23/2018 8      (goal A1C is < 7)   (goal LDL is <100) need 30-50% reduction from baseline     BP Readings from Last 3 Encounters:   01/25/18 118/64   01/21/18 111/73   01/21/18 134/87    (goal /80)      All Future Testing planned in [facility]:  Lab Frequency Next Occurrence   XR Chest Standard TWO VW Once 03/30/2017   Urine Culture Once 09/06/2017       Next Visit Date:  Future Appointments  Date Time Provider Kishan Reich   2/9/2018 11:00 AM [facility]            Patient Active Problem List:     Rett's syndrome     Scoliosis     Dystonia     Tremors of nervous system     Convulsions ([address]     Gastroesophageal reflux disease without esophagitis     Fibroadenoma of breast     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

## 2018-02-09 ENCOUNTER — OFFICE VISIT (OUTPATIENT)
Dept: FAMILY MEDICINE CLINIC | Age: 23
End: 2018-02-09
Payer: COMMERCIAL

## 2018-02-09 VITALS
RESPIRATION RATE: 16 BRPM | DIASTOLIC BLOOD PRESSURE: 68 MMHG | TEMPERATURE: 98.2 F | HEART RATE: 74 BPM | SYSTOLIC BLOOD PRESSURE: 112 MMHG

## 2018-02-09 DIAGNOSIS — R25.1 TREMORS OF NERVOUS SYSTEM: ICD-10-CM

## 2018-02-09 DIAGNOSIS — G40.909 SEIZURE DISORDER (HCC): ICD-10-CM

## 2018-02-09 DIAGNOSIS — M41.9 RESTRICTIVE LUNG DISEASE DUE TO KYPHOSCOLIOSIS: ICD-10-CM

## 2018-02-09 DIAGNOSIS — G47.31 CENTRAL SLEEP APNEA: ICD-10-CM

## 2018-02-09 DIAGNOSIS — K59.01 SLOW TRANSIT CONSTIPATION: ICD-10-CM

## 2018-02-09 DIAGNOSIS — J96.11 CHRONIC RESPIRATORY FAILURE WITH HYPOXIA AND HYPERCAPNIA (HCC): ICD-10-CM

## 2018-02-09 DIAGNOSIS — J96.12 CHRONIC RESPIRATORY FAILURE WITH HYPOXIA AND HYPERCAPNIA (HCC): ICD-10-CM

## 2018-02-09 DIAGNOSIS — K94.22 INFLAMMATION AT GASTROSTOMY TUBE SITE (HCC): ICD-10-CM

## 2018-02-09 DIAGNOSIS — G47.33 OSA (OBSTRUCTIVE SLEEP APNEA): ICD-10-CM

## 2018-02-09 DIAGNOSIS — G24.9 DYSTONIA: ICD-10-CM

## 2018-02-09 DIAGNOSIS — J98.4 RESTRICTIVE LUNG DISEASE DUE TO KYPHOSCOLIOSIS: ICD-10-CM

## 2018-02-09 DIAGNOSIS — R06.89 HYPOVENTILATION: ICD-10-CM

## 2018-02-09 DIAGNOSIS — K21.9 GASTROESOPHAGEAL REFLUX DISEASE, ESOPHAGITIS PRESENCE NOT SPECIFIED: ICD-10-CM

## 2018-02-09 DIAGNOSIS — F84.2 RETT'S SYNDROME: Primary | ICD-10-CM

## 2018-02-09 PROCEDURE — 99214 OFFICE O/P EST MOD 30 MIN: CPT | Performed by: PEDIATRICS

## 2018-02-09 RX ORDER — DIAZEPAM 10 MG/2ML
10 GEL RECTAL
Qty: 2 EACH | Refills: 5 | Status: CANCELLED | OUTPATIENT
Start: 2018-02-09 | End: 2018-02-09

## 2018-02-09 RX ORDER — OCTISALATE, AVOBENZONE, HOMOSALATE, AND OCTOCRYLENE 29.4; 29.4; 49; 25.48 MG/ML; MG/ML; MG/ML; MG/ML
4 LOTION TOPICAL NIGHTLY
Qty: 30 CAPSULE | Refills: 11 | Status: SHIPPED | OUTPATIENT
Start: 2018-02-09 | End: 2022-02-10 | Stop reason: SDUPTHER

## 2018-02-09 RX ORDER — SACCHAROMYCES BOULARDII 250 MG
250 CAPSULE ORAL DAILY
Qty: 30 CAPSULE | Refills: 11 | Status: SHIPPED | OUTPATIENT
Start: 2018-02-09 | End: 2022-02-10 | Stop reason: SDUPTHER

## 2018-02-09 RX ORDER — NYSTATIN 100000 U/G
OINTMENT TOPICAL
Qty: 30 G | Refills: 6 | Status: SHIPPED | OUTPATIENT
Start: 2018-02-09 | End: 2019-02-09

## 2018-02-09 RX ORDER — SODIUM PHOSPHATE, DIBASIC AND SODIUM PHOSPHATE, MONOBASIC 7; 19 G/133ML; G/133ML
1 ENEMA RECTAL DAILY PRN
Qty: 30 BOTTLE | Refills: 11 | Status: SHIPPED | OUTPATIENT
Start: 2018-02-09 | End: 2022-02-10 | Stop reason: SDUPTHER

## 2018-02-09 RX ORDER — DIAZEPAM 10 MG/2ML
10 GEL RECTAL
Qty: 2 EACH | Refills: 5 | Status: SHIPPED | OUTPATIENT
Start: 2018-02-09 | End: 2022-05-02 | Stop reason: SDUPTHER

## 2018-02-09 ASSESSMENT — ENCOUNTER SYMPTOMS
DIARRHEA: 1
TROUBLE SWALLOWING: 0
COUGH: 0
APNEA: 1
SHORTNESS OF BREATH: 0
ABDOMINAL PAIN: 0
CONSTIPATION: 1
EYE PAIN: 0
VOMITING: 0
WHEEZING: 0
EYE REDNESS: 0
PHOTOPHOBIA: 0
RHINORRHEA: 0
EYE DISCHARGE: 0
COLOR CHANGE: 0
STRIDOR: 0

## 2018-02-09 NOTE — PROGRESS NOTES
Gastrointestinal: Positive for constipation (recent constipationrequiring admission now improved) and diarrhea (occasional loose stools). Negative for abdominal pain and vomiting. Has a G-tube for medications and some tube feeds. She has a history of GERD controlled with nexium. Endocrine: Negative for polydipsia and polyphagia. Genitourinary: Negative for decreased urine volume, dysuria, hematuria and urgency. Skin: Negative for color change and rash. Allergic/Immunologic: Negative for immunocompromised state. Neurological: Positive for tremors (muscle spasms from her dystonia that appear just like her seizures all improved after placement of baclofen pump) and seizures (followed by neurology, Dr. Cristopher Stanley at [facility] and Dr. Abimbola Syed here in [place]). Negative for dizziness, light-headedness and headaches. Hematological: Negative for adenopathy. Does not bruise/bleed easily. Psychiatric/Behavioral: Negative for dysphoric mood and sleep disturbance. The patient is not nervous/anxious. Objective:   Physical Exam   Constitutional: She appears well-developed and well-nourished. No distress. In wheelchair   HENT:   Head: Normocephalic. Right Ear: External ear normal.   Left Ear: External ear normal.   Nose: Nose normal.   Mouth/Throat: Oropharynx is clear and moist.   Drooling  Increase cerumen bilateral ear canals   Eyes: EOM are normal. Pupils are equal, round, and reactive to light. Right eye exhibits no discharge. Left eye exhibits no discharge. No scleral icterus. Neck: Normal range of motion. No JVD present. No thyromegaly present. Head tilt to the left    Cardiovascular: Normal rate, regular rhythm and normal heart sounds. No murmur heard. Pulmonary/Chest: Effort normal and breath sounds normal. She has no wheezes. She has no rales. Abdominal: Soft. She exhibits no distension and no mass. There is no hepatosplenomegaly. There is no tenderness.

## 2018-02-17 DIAGNOSIS — G47.9 SLEEP DISORDER: ICD-10-CM

## 2018-02-19 RX ORDER — TRAZODONE HYDROCHLORIDE 50 MG/1
TABLET ORAL
Qty: 60 TABLET | Refills: 5 | Status: SHIPPED | OUTPATIENT
Start: 2018-02-19 | End: 2018-08-23 | Stop reason: SDUPTHER

## 2018-02-19 NOTE — TELEPHONE ENCOUNTER
emptying     Chronic respiratory failure with hypoxia and hypercapnia (HCC)     Myoclonus     Atelectasis     Dependence on enabling machine     Dependent on ventilator ([address]     Delay in development     Tonic-clonic seizures ([address]     History of recurrent pneumonia     Fistula     Encounter for removal of internal fixation device     Sialorrhea     Myoneural disorder (HCC)     Muscle spasticity     Bladder retention     Community acquired pneumonia of left lower lobe of lung (HCC)     Influenza A     Aspiration pneumonia (HCC)     Intractable vomiting     Seizure ([address]     Seizure disorder ([address]

## 2018-02-22 ENCOUNTER — HOSPITAL ENCOUNTER (OUTPATIENT)
Dept: GENERAL RADIOLOGY | Facility: CLINIC | Age: 23
Discharge: HOME | End: 2018-02-24
Payer: COMMERCIAL

## 2018-02-22 ENCOUNTER — TELEPHONE (OUTPATIENT)
Dept: FAMILY MEDICINE CLINIC | Age: 23
End: 2018-02-22

## 2018-02-22 DIAGNOSIS — R79.81 LOW OXYGEN SATURATION: ICD-10-CM

## 2018-02-22 DIAGNOSIS — R79.81 LOW OXYGEN SATURATION: Primary | ICD-10-CM

## 2018-02-22 PROCEDURE — 71046 X-RAY EXAM CHEST 2 VIEWS: CPT

## 2018-04-11 ENCOUNTER — TELEPHONE (OUTPATIENT)
Dept: FAMILY MEDICINE CLINIC | Age: 23
End: 2018-04-11

## 2018-04-11 DIAGNOSIS — S42.301A CLOSED FRACTURE OF RIGHT UPPER EXTREMITY, INITIAL ENCOUNTER: Primary | ICD-10-CM

## 2018-04-11 DIAGNOSIS — F84.2 RETT'S SYNDROME: ICD-10-CM

## 2018-04-11 PROBLEM — J10.1 INFLUENZA A: Status: RESOLVED | Noted: 2018-01-13 | Resolved: 2018-04-11

## 2018-04-11 RX ORDER — TRAMADOL HYDROCHLORIDE 50 MG/1
50 TABLET ORAL EVERY 8 HOURS PRN
Qty: 30 TABLET | Refills: 0 | Status: SHIPPED | OUTPATIENT
Start: 2018-04-11 | End: 2019-04-12

## 2018-04-30 ENCOUNTER — HOSPITAL ENCOUNTER (OUTPATIENT)
Age: 23
Discharge: HOME | End: 2018-05-02
Payer: COMMERCIAL

## 2018-04-30 ENCOUNTER — TELEPHONE (OUTPATIENT)
Dept: PULMONOLOGY | Age: 23
End: 2018-04-30

## 2018-04-30 ENCOUNTER — HOSPITAL ENCOUNTER (OUTPATIENT)
Dept: GENERAL RADIOLOGY | Age: 23
Discharge: HOME | End: 2018-05-02
Payer: COMMERCIAL

## 2018-04-30 DIAGNOSIS — G70.9 RESTRICTIVE LUNG MECHANICS DUE TO NEUROMUSCULAR DISEASE (HCC): Primary | Chronic | ICD-10-CM

## 2018-04-30 DIAGNOSIS — J98.4 RESTRICTIVE LUNG MECHANICS DUE TO NEUROMUSCULAR DISEASE (HCC): Primary | Chronic | ICD-10-CM

## 2018-04-30 DIAGNOSIS — G70.9 RESTRICTIVE LUNG MECHANICS DUE TO NEUROMUSCULAR DISEASE (HCC): Chronic | ICD-10-CM

## 2018-04-30 DIAGNOSIS — J18.9 PNEUMONIA OF LEFT LOWER LOBE DUE TO INFECTIOUS ORGANISM: ICD-10-CM

## 2018-04-30 DIAGNOSIS — J98.4 RESTRICTIVE LUNG MECHANICS DUE TO NEUROMUSCULAR DISEASE (HCC): Chronic | ICD-10-CM

## 2018-04-30 PROCEDURE — 71046 X-RAY EXAM CHEST 2 VIEWS: CPT

## 2018-05-04 ENCOUNTER — OFFICE VISIT (OUTPATIENT)
Dept: PULMONOLOGY | Age: 23
End: 2018-05-04
Payer: COMMERCIAL

## 2018-05-04 VITALS
SYSTOLIC BLOOD PRESSURE: 126 MMHG | DIASTOLIC BLOOD PRESSURE: 84 MMHG | HEIGHT: 55 IN | OXYGEN SATURATION: 97 % | WEIGHT: 85 LBS | TEMPERATURE: 97.8 F | HEART RATE: 73 BPM | RESPIRATION RATE: 12 BRPM | BODY MASS INDEX: 19.67 KG/M2

## 2018-05-04 DIAGNOSIS — J96.12 CHRONIC RESPIRATORY FAILURE WITH HYPERCAPNIA (HCC): ICD-10-CM

## 2018-05-04 DIAGNOSIS — F84.2 RETT SYNDROME: ICD-10-CM

## 2018-05-04 DIAGNOSIS — G70.9 RESTRICTIVE LUNG MECHANICS DUE TO NEUROMUSCULAR DISEASE (HCC): ICD-10-CM

## 2018-05-04 DIAGNOSIS — J40 TRACHEOBRONCHITIS: Primary | ICD-10-CM

## 2018-05-04 DIAGNOSIS — G47.31 CENTRAL SLEEP APNEA: ICD-10-CM

## 2018-05-04 DIAGNOSIS — J98.11 ATELECTASIS OF LEFT LUNG: ICD-10-CM

## 2018-05-04 DIAGNOSIS — J98.4 RESTRICTIVE LUNG MECHANICS DUE TO NEUROMUSCULAR DISEASE (HCC): ICD-10-CM

## 2018-05-04 PROCEDURE — 99214 OFFICE O/P EST MOD 30 MIN: CPT | Performed by: INTERNAL MEDICINE

## 2018-05-04 RX ORDER — LEVALBUTEROL INHALATION SOLUTION 1.25 MG/3ML
1 SOLUTION RESPIRATORY (INHALATION) EVERY 6 HOURS PRN
Qty: 120 ML | Refills: 11 | Status: SHIPPED | OUTPATIENT
Start: 2018-05-04 | End: 2018-08-10 | Stop reason: SDUPTHER

## 2018-05-16 DIAGNOSIS — E55.9 VITAMIN D DEFICIENCY: ICD-10-CM

## 2018-06-14 DIAGNOSIS — F51.01 PRIMARY INSOMNIA: Primary | ICD-10-CM

## 2018-06-14 RX ORDER — ZOLPIDEM TARTRATE 5 MG/1
5 TABLET ORAL NIGHTLY PRN
Qty: 30 TABLET | Refills: 0 | Status: SHIPPED | OUTPATIENT
Start: 2018-06-14 | End: 2018-06-18 | Stop reason: SDUPTHER

## 2018-06-14 RX ORDER — DOXAZOSIN MESYLATE 1 MG/1
1 TABLET ORAL NIGHTLY
Qty: 90 TABLET | Refills: 1 | Status: SHIPPED | OUTPATIENT
Start: 2018-06-14 | End: 2018-12-20 | Stop reason: SDUPTHER

## 2018-06-18 DIAGNOSIS — F51.01 PRIMARY INSOMNIA: ICD-10-CM

## 2018-06-18 RX ORDER — ZOLPIDEM TARTRATE 5 MG/1
TABLET ORAL
Qty: 60 TABLET | Refills: 0 | Status: SHIPPED | OUTPATIENT
Start: 2018-06-18 | End: 2018-07-31 | Stop reason: SDUPTHER

## 2018-06-29 ENCOUNTER — TELEPHONE (OUTPATIENT)
Dept: FAMILY MEDICINE CLINIC | Age: 23
End: 2018-06-29

## 2018-06-29 NOTE — TELEPHONE ENCOUNTER
Writer called and LM for mother to please call and verify what insurance she is using for the [address]. Pharmacy could not give me a straight answer as she has been paying cash or using a coupon card . taz

## 2018-07-06 ENCOUNTER — TELEPHONE (OUTPATIENT)
Dept: FAMILY MEDICINE CLINIC | Age: 23
End: 2018-07-06

## 2018-07-31 DIAGNOSIS — F51.01 PRIMARY INSOMNIA: ICD-10-CM

## 2018-07-31 NOTE — TELEPHONE ENCOUNTER
I believe the quantity should be #60 if the second dose on instructions is given. Please clarify.
cause     Prolonged QT interval     Urinary retention with incomplete bladder emptying     Chronic respiratory failure with hypoxia and hypercapnia (HCC)     Myoclonus     Atelectasis     Dependence on enabling machine     Dependent on ventilator ([address]     Delay in development     Tonic-clonic seizures ([address]     History of recurrent pneumonia     Fistula     Encounter for removal of internal fixation device     Sialorrhea     Myoneural disorder (HCC)     Muscle spasticity     Bladder retention     Community acquired pneumonia of left lower lobe of lung (HCC)     Aspiration pneumonia (HCC)     Intractable vomiting     Seizure ([address]     Seizure disorder ([address]

## 2018-08-01 RX ORDER — ZOLPIDEM TARTRATE 5 MG/1
TABLET ORAL
Qty: 60 TABLET | Refills: 0 | Status: ON HOLD | OUTPATIENT
Start: 2018-08-01 | End: 2018-12-20 | Stop reason: HOSPADM

## 2018-08-06 PROBLEM — S42.301D CLOSED FRACTURE OF SHAFT OF RIGHT HUMERUS WITH ROUTINE HEALING: Status: ACTIVE | Noted: 2018-04-24

## 2018-08-06 PROBLEM — M24.521: Status: ACTIVE | Noted: 2017-11-30

## 2018-08-06 PROBLEM — R19.7 DIARRHEA: Status: ACTIVE | Noted: 2018-08-06

## 2018-08-06 PROBLEM — R94.01 ABNORMAL ELECTROENCEPHALOGRAM: Status: ACTIVE | Noted: 2018-06-25

## 2018-08-06 PROBLEM — K59.00 CONSTIPATION: Status: ACTIVE | Noted: 2018-08-06

## 2018-08-06 PROBLEM — Z99.3 WHEELCHAIR BOUND: Status: ACTIVE | Noted: 2018-06-25

## 2018-08-06 PROBLEM — K21.9 GASTRIC REFLUX: Status: ACTIVE | Noted: 2018-08-06

## 2018-08-06 PROBLEM — G80.0 SPASTIC QUADRIPLEGIC CEREBRAL PALSY (HCC): Status: ACTIVE | Noted: 2018-02-26

## 2018-08-07 ENCOUNTER — TELEPHONE (OUTPATIENT)
Dept: FAMILY MEDICINE CLINIC | Age: 23
End: 2018-08-07

## 2018-08-07 DIAGNOSIS — G47.01 INSOMNIA DUE TO MEDICAL CONDITION: Primary | ICD-10-CM

## 2018-08-07 NOTE — TELEPHONE ENCOUNTER
[facility] pharmacy is chosen for the script to be sent to. I do not think this is where she wants this sent.   Please verify pharmacy and resend script for approval.

## 2018-08-07 NOTE — TELEPHONE ENCOUNTER
Yes she can have a script for the 10mg tablets if insurance will cover this. Please place script for refill.

## 2018-08-07 NOTE — TELEPHONE ENCOUNTER
Mother reports that insurance will not cover the 5mg Ambien. She does not have the prescription that was ordered 8-1-18 d/t insurance. Mother contacted office to see if provider can give her a 10 mg Ambien prescription instead. Mother reports that the insurance does not want to cover 60 pills of the 5mg. Please advise. Prescription is at [facility] awaiting to be filled.   Please advise  Health Maintenance   Topic Date Due    HIV screen  03/30/2011    Chlamydia screen  10/12/2016    DTaP/Tdap/Td vaccine (7 - Td) 11/27/2017    Cervical cancer screen  11/01/2018 (Originally 3/30/2017)    Flu vaccine (1) 09/01/2018    Meningococcal (MCV) Vaccine Age 0-22 Years  Completed             (applicable per patient's age: Cancer Screenings, Depression Screening, Fall Risk Screening, Immunizations)    AST (U/L)   Date Value   01/23/2018 72 (H)     ALT (U/L)   Date Value   01/23/2018 22     BUN (mg/dL)   Date Value   01/23/2018 8      (goal A1C is < 7)   (goal LDL is <100) need 30-50% reduction from baseline     BP Readings from Last 3 Encounters:   05/04/18 126/84   02/09/18 112/68   01/25/18 118/64    (goal /80)      All Future Testing planned in [facility]:  Lab Frequency Next Occurrence   Urine Culture Once 09/06/2017       Next Visit Date:  Future Appointments  Date Time Provider Kishan Reich   8/10/2018 1:45 PM Ericka Burrell MD Resp Spec [facility]   9/7/2018 9:30 AM Lenette Bennetts, MD Sameul Babinski [facility]            Patient Active Problem List:     Rett's syndrome     Scoliosis     Dystonia     Tremors of nervous system     Convulsions ([address]     Gastroesophageal reflux disease without esophagitis     Fibroadenoma of breast     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

## 2018-08-07 NOTE — TELEPHONE ENCOUNTER
LRF Ambien 5mg 8/1/2018  LOV 2/9/2018  RTO 6 months    OARRS 6/18/2018    Health Maintenance   Topic Date Due    HIV screen  03/30/2011    Chlamydia screen  10/12/2016    DTaP/Tdap/Td vaccine (7 - Td) 11/27/2017    Cervical cancer screen  11/01/2018 (Originally 3/30/2017)    Flu vaccine (1) 09/01/2018    Meningococcal (MCV) Vaccine Age 0-22 Years  Completed             (applicable per patient's age: Cancer Screenings, Depression Screening, Fall Risk Screening, Immunizations)    AST (U/L)   Date Value   01/23/2018 72 (H)     ALT (U/L)   Date Value   01/23/2018 22     BUN (mg/dL)   Date Value   01/23/2018 8      (goal A1C is < 7)   (goal LDL is <100) need 30-50% reduction from baseline     BP Readings from Last 3 Encounters:   05/04/18 126/84   02/09/18 112/68   01/25/18 118/64    (goal /80)      All Future Testing planned in [facility]:  Lab Frequency Next Occurrence   Urine Culture Once 09/06/2017       Next Visit Date:  Future Appointments  Date Time Provider Kishan Reich   8/10/2018 1:45 PM Tuan Martinez MD Resp Spec [facility]   9/7/2018 9:30 AM [facility]            Patient Active Problem List:     Rett's syndrome     Scoliosis     Dystonia     Tremors of nervous system     Convulsions ([address]     Gastroesophageal reflux disease without esophagitis     Fibroadenoma of breast     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 [facility])     Acute respiratory failure with hypercapnia ([address]     Skin infection at gastrostomy tube site [facility])     Acute on chronic respiratory failure with hypoxia and hypercapnia ([address]     Pneumonia due to infectious organism     Body temperature low     Hypotension     Nonverbal     Sepsis ([address]     Hypothermia due to non-environmental cause     Prolonged QT interval     Urinary retention with incomplete bladder emptying     Chronic respiratory failure with hypoxia and hypercapnia ([facility])     Myoclonus     Atelectasis     Dependence on enabling machine     Dependent on ventilator ([address]     Delay in development     Tonic-clonic seizures ([address]     History of recurrent pneumonia     Fistula     Encounter for removal of internal fixation device     Sialorrhea     Myoneural disorder ([facility])     Muscle spasticity     Bladder retention     Community acquired pneumonia of left lower lobe of lung ([facility])     Aspiration pneumonia ([facility])     Intractable vomiting     Seizure ([address]     Seizure disorder ([facility])     Abnormal electroencephalogram     Closed fracture of shaft of right humerus with routine healing     Constipation     Contracture of elbow joint, right     Diarrhea     Gastric reflux     Spastic quadriplegic cerebral palsy ([address]     Wheelchair bound

## 2018-08-08 RX ORDER — ZOLPIDEM TARTRATE 10 MG/1
10 TABLET ORAL NIGHTLY PRN
Qty: 30 TABLET | Refills: 0 | Status: SHIPPED | OUTPATIENT
Start: 2018-08-08 | End: 2018-09-10 | Stop reason: SDUPTHER

## 2018-08-10 ENCOUNTER — OFFICE VISIT (OUTPATIENT)
Dept: PULMONOLOGY | Age: 23
End: 2018-08-10
Payer: COMMERCIAL

## 2018-08-10 VITALS
HEIGHT: 55 IN | BODY MASS INDEX: 19.67 KG/M2 | SYSTOLIC BLOOD PRESSURE: 129 MMHG | RESPIRATION RATE: 12 BRPM | WEIGHT: 85 LBS | HEART RATE: 74 BPM | DIASTOLIC BLOOD PRESSURE: 81 MMHG | OXYGEN SATURATION: 99 %

## 2018-08-10 DIAGNOSIS — J96.12 CHRONIC RESPIRATORY FAILURE WITH HYPERCAPNIA (HCC): Primary | ICD-10-CM

## 2018-08-10 DIAGNOSIS — G47.31 CENTRAL SLEEP APNEA: ICD-10-CM

## 2018-08-10 DIAGNOSIS — F84.2 RETT SYNDROME: ICD-10-CM

## 2018-08-10 DIAGNOSIS — G70.9 RESTRICTIVE LUNG MECHANICS DUE TO NEUROMUSCULAR DISEASE (HCC): ICD-10-CM

## 2018-08-10 DIAGNOSIS — J98.4 RESTRICTIVE LUNG MECHANICS DUE TO NEUROMUSCULAR DISEASE (HCC): ICD-10-CM

## 2018-08-10 PROCEDURE — 99214 OFFICE O/P EST MOD 30 MIN: CPT | Performed by: INTERNAL MEDICINE

## 2018-08-10 RX ORDER — LEVOFLOXACIN 250 MG/1
250 TABLET ORAL DAILY
Qty: 7 TABLET | Refills: 0 | Status: SHIPPED | OUTPATIENT
Start: 2018-08-10 | End: 2018-08-17

## 2018-08-10 RX ORDER — LEVALBUTEROL INHALATION SOLUTION 1.25 MG/3ML
1 SOLUTION RESPIRATORY (INHALATION) EVERY 8 HOURS PRN
Qty: 120 ML | Refills: 11 | Status: ON HOLD | OUTPATIENT
Start: 2018-08-10 | End: 2018-12-20

## 2018-08-11 NOTE — PROGRESS NOTES
3 Years and older, IM 02/13/2017    MMR 04/15/1997, 09/04/2001    Meningococcal MCV4P (Menactra) 11/27/2007, 08/24/2012    Pneumococcal Polysaccharide ([id number]) 02/15/2001, 02/13/2017    Tdap (Boostrix, Adacel) 11/27/2007    Varicella (Varivax) 04/29/1999        ALLERGIES:      Allergies   Allergen Reactions    Clindamycin/Lincomycin     Gentamicin     Hydrocodone     Tape Kaylen Frock Tape] Rash     Redness that lasts a couple days         Home Meds:   Not in a hospital admission. REVIEW OF SYSTEMS:  Ros unable to perform due to nonverbal   Vitals:  /81 (Site: Right Arm)   Pulse 74   Resp 12   Ht 4' 7\" (1.397 m)   Wt 85 lb (38.6 kg)   SpO2 99% Comment: Room air at rest  BMI 19.76 kg/m²     PHYSICAL EXAM:    Small status female in wheelchair  Right upper extremity is in place because of fracture  Moves left arm spontaneously  Shallow breathing no wheeze   Severe kyphoscoliosis  Lungs clear. No bronchial sounds  Abdomen soft. PEG tube in place                  IMPRESSION:     Diagnosis Orders   1. Chronic respiratory failure with hypercapnia (HCC)     2. 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 Central sleep apnea-  bipap st - rt 16- max pressure 20 min epap -7      4. Rett syndrome          :                PLAN:         Levaquin to keep at hand for purulent bronchitis   Continue Trilogy vent  Secretion management  Return in 6 months      I hope this updates you on my evaluation and clinical thinking. Thank you for allowing me to participate in his care.      Sincerely,      Jerardo Vick MD

## 2018-08-28 NOTE — TELEPHONE ENCOUNTER
Prolonged QT interval     Urinary retention with incomplete bladder emptying     Chronic respiratory failure with hypoxia and hypercapnia ([facility])     Myoclonus     Atelectasis     Dependence on enabling machine     Dependent on ventilator ([address]     Delay in development     Tonic-clonic seizures ([address]     History of recurrent pneumonia     Fistula     Encounter for removal of internal fixation device     Sialorrhea     Myoneural disorder ([facility])     Muscle spasticity     Bladder retention     Community acquired pneumonia of left lower lobe of lung ([facility])     Aspiration pneumonia ([facility])     Intractable vomiting     Seizure ([address]     Seizure disorder ([facility])     Abnormal electroencephalogram     Closed fracture of shaft of right humerus with routine healing     Constipation     Contracture of elbow joint, right     Diarrhea     Gastric reflux     Spastic quadriplegic cerebral palsy ([address]     Wheelchair bound

## 2018-09-07 ENCOUNTER — OFFICE VISIT (OUTPATIENT)
Dept: FAMILY MEDICINE CLINIC | Age: 23
End: 2018-09-07
Payer: COMMERCIAL

## 2018-09-07 VITALS
WEIGHT: 83.8 LBS | SYSTOLIC BLOOD PRESSURE: 102 MMHG | HEART RATE: 76 BPM | BODY MASS INDEX: 19.39 KG/M2 | HEIGHT: 55 IN | DIASTOLIC BLOOD PRESSURE: 80 MMHG | RESPIRATION RATE: 16 BRPM | TEMPERATURE: 97.2 F

## 2018-09-07 DIAGNOSIS — K21.9 GASTROESOPHAGEAL REFLUX DISEASE, ESOPHAGITIS PRESENCE NOT SPECIFIED: ICD-10-CM

## 2018-09-07 DIAGNOSIS — M41.9 RESTRICTIVE LUNG DISEASE DUE TO KYPHOSCOLIOSIS: ICD-10-CM

## 2018-09-07 DIAGNOSIS — F51.01 PRIMARY INSOMNIA: ICD-10-CM

## 2018-09-07 DIAGNOSIS — E55.9 VITAMIN D DEFICIENCY: ICD-10-CM

## 2018-09-07 DIAGNOSIS — K59.01 SLOW TRANSIT CONSTIPATION: ICD-10-CM

## 2018-09-07 DIAGNOSIS — Z99.11 DEPENDENT ON VENTILATOR (HCC): ICD-10-CM

## 2018-09-07 DIAGNOSIS — R06.89 HYPOVENTILATION: ICD-10-CM

## 2018-09-07 DIAGNOSIS — G47.33 OSA (OBSTRUCTIVE SLEEP APNEA): ICD-10-CM

## 2018-09-07 DIAGNOSIS — R25.1 TREMORS OF NERVOUS SYSTEM: ICD-10-CM

## 2018-09-07 DIAGNOSIS — G47.31 CENTRAL SLEEP APNEA: ICD-10-CM

## 2018-09-07 DIAGNOSIS — J96.11 CHRONIC RESPIRATORY FAILURE WITH HYPOXIA AND HYPERCAPNIA (HCC): ICD-10-CM

## 2018-09-07 DIAGNOSIS — G40.909 SEIZURE DISORDER (HCC): ICD-10-CM

## 2018-09-07 DIAGNOSIS — Z23 NEED FOR TDAP VACCINATION: ICD-10-CM

## 2018-09-07 DIAGNOSIS — Z23 NEED FOR INFLUENZA VACCINATION: ICD-10-CM

## 2018-09-07 DIAGNOSIS — J98.4 RESTRICTIVE LUNG DISEASE DUE TO KYPHOSCOLIOSIS: ICD-10-CM

## 2018-09-07 DIAGNOSIS — G80.0 SPASTIC QUADRIPLEGIC CEREBRAL PALSY (HCC): ICD-10-CM

## 2018-09-07 DIAGNOSIS — F84.2 RETT'S SYNDROME: Primary | ICD-10-CM

## 2018-09-07 DIAGNOSIS — G24.9 DYSTONIA: ICD-10-CM

## 2018-09-07 DIAGNOSIS — J96.12 CHRONIC RESPIRATORY FAILURE WITH HYPOXIA AND HYPERCAPNIA (HCC): ICD-10-CM

## 2018-09-07 DIAGNOSIS — M62.838 MUSCLE SPASTICITY: ICD-10-CM

## 2018-09-07 PROCEDURE — 99214 OFFICE O/P EST MOD 30 MIN: CPT | Performed by: PEDIATRICS

## 2018-09-07 ASSESSMENT — ENCOUNTER SYMPTOMS
COLOR CHANGE: 0
TROUBLE SWALLOWING: 0
DIARRHEA: 1
COUGH: 0
EYE PAIN: 0
EYE REDNESS: 0
STRIDOR: 0
WHEEZING: 0
ABDOMINAL PAIN: 0
APNEA: 1
RHINORRHEA: 0
EYE DISCHARGE: 0
PHOTOPHOBIA: 0
VOMITING: 0
SHORTNESS OF BREATH: 0

## 2018-09-07 ASSESSMENT — PATIENT HEALTH QUESTIONNAIRE - PHQ9
SUM OF ALL RESPONSES TO PHQ QUESTIONS 1-9: 0
2. FEELING DOWN, DEPRESSED OR HOPELESS: 0
SUM OF ALL RESPONSES TO PHQ QUESTIONS 1-9: 0
1. LITTLE INTEREST OR PLEASURE IN DOING THINGS: 0
SUM OF ALL RESPONSES TO PHQ9 QUESTIONS 1 & 2: 0

## 2018-09-07 NOTE — PROGRESS NOTES
Subjective:      Patient ID: Deanna Grissom is a 26 y.o. female with mother. Visit Information    Have you changed or started any medications since your last visit including any over-the-counter medicines, vitamins, or herbal medicines? yes - Med list updated   Are you having any side effects from any of your medications? -  yes - Constipation/fatigue  Have you stopped taking any of your medications? Is so, why? -  yes - Med list updated    Have you seen any other physician or provider since your last visit? Yes - Records Obtained Neurology/Pulmonology/Ortho  Have you had any other diagnostic tests since your last visit? No  Have you been seen in the emergency room and/or had an admission to a hospital since we last saw you? No  Have you had your routine dental cleaning in the past 6 months? yes -      Have you activated your [facility] account? If not, what are your barriers?  Yes     Patient Care Team:  Eduin Carter MD as PCP - General (Internal Medicine)  Eduin Carter MD as PCP - S Attributed Provider  Kevin Jimenez MD as Consulting Physician (Pulmonology)    Medical History Review  Past Medical, Family, and Social History reviewed and does contribute to the patient presenting condition    Health Maintenance   Topic Date Due    HIV screen  03/30/2011    Chlamydia screen  10/12/2016    DTaP/Tdap/Td vaccine (7 - Td) 11/27/2017    Flu vaccine (1) 09/01/2018    Cervical cancer screen  11/01/2018 (Originally 3/30/2017)    Meningococcal (MCV) Vaccine Age 0-22 Years  Completed       PHQ Scores 9/7/2018   PHQ2 Score 0   PHQ9 Score 0     Interpretation of Total Score Depression Severity: 1-4 = Minimal depression, 5-9 = Mild depression, 10-14 = Moderate depression, 15-19 = Moderately severe depression, 20-27 = Severe depression    Current Outpatient Prescriptions   Medication Sig Dispense Refill    levalbuterol (XOPENEX) 1.25 MG/3ML nebulizer solution Take 3 mLs by nebulization every 8 hours as needed for Wheezing 120 mL 11    zolpidem (AMBIEN) 10 MG tablet Take 1 tablet by mouth nightly as needed for Sleep. . 30 tablet 0    doxazosin (CARDURA) 1 MG tablet Take 1 tablet by mouth nightly 90 tablet 1    vitamin D (AQUEOUS VITAMIN D) 400 UNIT/ML LIQD Take 1 Teaspoonful Per G-Tube once daily. 150 mL 5    traMADol (ULTRAM) 50 MG tablet 1 tablet by Per G Tube route every 8 hours as needed for Pain. . 30 tablet 0    magnesium hydroxide (MILK OF MAGNESIA CONCENTRATE) 2400 MG/10ML SUSP Take 8 mLs by mouth 2 times daily 10 mL 5    nystatin (MYCOSTATIN) 268879 UNIT/GM ointment Apply topically 2 times daily. (Patient taking differently: as needed Apply topically 2 times daily.) 30 g 6    saccharomyces boulardii (FLORASTOR) 250 MG capsule Take 1 capsule by mouth daily 30 capsule 11    Probiotic Product (ALIGN) 4 MG CAPS Take 4 mg by mouth nightly 30 capsule 11    Sodium Phosphates (FLEET) 7-19 GM/118ML Place 1 enema rectally daily as needed (constipation) 30 Bottle 11    diazepam (DIASTAT) 10 MG GEL Place 10 mg rectally once as needed (seizure) for up to 1 dose.  2 each 5    Multiple Vitamins-Minerals (MULTIVITAMIN WITH IRON-MINERALS) liquid 15 mLs by g-tube daily 2 Bottle 5    acetaminophen (TYLENOL) 325 MG tablet Take 1 tablet by mouth every 4 hours as needed for Pain or Fever 20 tablet 0    Pediatric Multivitamins-Iron (MULTI-DELYN/IRON) LIQD Take one teaspoonful per g-tube every day 150 mL 5    esomeprazole Magnesium (NEXIUM) 40 MG PACK Take 20 mg by mouth 2 times daily       ibuprofen (ADVIL;MOTRIN) 100 MG/5ML suspension Take 10 mg/kg by mouth every 4 hours as needed for Fever      levETIRAcetam (KEPPRA) 100 MG/ML solution Take 14ML two times a day by G-button (Patient taking differently: Take 15ML two times a day by G-button) 2520 mL 2    Handicap Placard MISC by Does not apply route Expires 12/21/2022 1 each 0    Feeding Tubes - Sets (JAYDEN-KEY GASTROSTOMY KIT 18FR) MISC 3.5 cm 1 each 11    esomeprazole (NEXIUM) 20 MG delayed release capsule Take one capsule by g-tube every morning and one capsule by g-tube every evening 60 capsule 5    zolpidem (AMBIEN) 5 MG tablet TAKE ONE TABLET BY G-TUBE EVERY NIGHT AT BEDTIME **MAY GIVE SECOND DOSE NIGHTLY IF NEEDED FOR SLEEP**. 60 tablet 0    docusate (COLACE) 50 MG/5ML liquid Take 10 mLs by mouth 2 times daily 120 mL 1    Methylnaltrexone Bromide (RELISTOR) 150 MG TABS Take 1 tablet by mouth daily 14 tablet 0    vitamin D (AQUEOUS VITAMIN D) 400 UNIT/ML LIQD 1 mL by Per G Tube route daily 1 teaspoon per g-tube daily (Patient taking differently: 2,000 Units by Per G Tube route nightly 1 teaspoon per g-tube daily) 150 mL 5    Nutritional Supplements (PEPTAMEN 1 NEETU) LIQD Take 1 Can by mouth 3 times daily Peptamen Adult 08694 mL 5    Misc Natural Products (CYSTEX) LIQD Take 15 mLs by mouth daily 450 mL 11     No current facility-administered medications for this visit. HPI    Patient presents today for routine follow-up. She does have chronic medical problems including Rett Syndrome, seizure disorder, spastic quadriplegia, dystonia, muscle tremors, hypercapnic respiratory failure, ISELA and central sleep apnea, restrictive lung disease due to kyphoscoliosis, hypoventilation, GERD and chronic constipation. She also has insomnia and vitamin D deficiency. She [place] [facility] [place] her Rett syndrome. She does see Dr. Brionna Fink, neurology in [place] and Dr. Brandy Escoto in [facility] routinely for her history of seizure disorder, spastic quadriplegia, dystonia and muscle tremors. Her mother reports she is still having 1-2 seizures per week. Her baclofen pump controls her dystonia and tremors well. They did see Dr. Brandy Escoto recently and had her pump refilled. She does get massage therapy every week.     She does follow with pulmonary for her history of hypercapneic respiratory failure, ISELA, central sleep apnea, restrictive lung disease, hypoventilation and chronic dependence on a ventilator. She takes Nexium for her GERD and this controls her symptoms well. She does get most of her nutrition by mouth but she does also have tube feeds with Peptamen which are going well. She gets 2 cans of peptimin with 300ml of water at 80 ml per hour from 8pm until 5am.  She is tolerating these feeds well. She is getting an enema on Monday, Wednesday and Friday. She use ambien for her history of insomnia and she is sleeping well with this. [facility]. She does have vitamin D deficiency and is using her something. She did just have a right arm fracture that is now healed. She has been cleared by ortho. They have no other concerns   cmw        Review of Systems   Reason unable to perform ROS: Pt nonverbal.   Constitutional: Negative for appetite change, fatigue, fever and unexpected weight change. HENT: Negative for congestion, postnasal drip, rhinorrhea, tinnitus and trouble swallowing. Eyes: Negative for photophobia, pain, discharge and redness. Respiratory: Positive for apnea (ISELA and Central sleep apnea). Negative for cough, shortness of breath, wheezing and stridor. Hypoventilation syndrome and hypercapnia - on ventilator at night   Cardiovascular: Negative for chest pain, palpitations and leg swelling. Gastrointestinal: Positive for constipation (now improved with 3x/weekly enemas) and diarrhea (occasional loose stools). Negative for abdominal pain and vomiting. Has a G-tube for medications and some tube feeds. She has a history of GERD controlled with nexium. Endocrine: Negative for polydipsia and polyphagia. Genitourinary: Negative for decreased urine volume, dysuria, hematuria and urgency. Skin: Negative for color change and rash. Allergic/Immunologic: Negative for immunocompromised state.    Neurological: Positive for tremors (muscle spasms from her dystonia that appear just like her seizures all improved after placement of baclofen pump) and seizures (followed by neurology, Dr. Ariel Murguia at [facility] and Dr. Kenia Murphy here in [place]). Negative for dizziness, light-headedness and headaches. Hematological: Negative for adenopathy. Does not bruise/bleed easily. Psychiatric/Behavioral: Negative for dysphoric mood and sleep disturbance. The patient is not nervous/anxious. Objective:     /80 (Site: Left Upper Arm, Position: Sitting, Cuff Size: Medium Adult)   Pulse 76   Temp 97.2 °F (36.2 °C) (Tympanic)   Resp 16   Ht 4' 7\" (1.397 m)   Wt 83 lb 12.8 oz (38 kg)   BMI 19.48 kg/m²        Physical Exam   Constitutional: She appears well-developed and well-nourished. No distress. In wheelchair   HENT:   Head: Normocephalic. Right Ear: External ear normal.   Left Ear: External ear normal.   Nose: Nose normal.   Mouth/Throat: Oropharynx is clear and moist.   Drooling  Increase cerumen bilateral ear canals   Eyes: Pupils are equal, round, and reactive to light. EOM are normal. Right eye exhibits no discharge. Left eye exhibits no discharge. No scleral icterus. Neck: Normal range of motion. No JVD present. No thyromegaly present. Head tilt to the left    Cardiovascular: Normal rate, regular rhythm and normal heart sounds. No murmur heard. Pulmonary/Chest: Effort normal and breath sounds normal. She has no wheezes. She has no rales. Abdominal: Soft. She exhibits no distension and no mass. There is no hepatosplenomegaly. There is no tenderness. Musculoskeletal: She exhibits no edema. Dystonia with contractures of upper and lower extremities - her extremities are much more easily movable. Neurological: She is alert. She displays atrophy. She exhibits abnormal muscle tone. She displays no seizure activity. Skin: Skin is warm. She is not diaphoretic.    She has a reddish / purplish discoloration to her feet and lower extremities. Nursing note and vitals reviewed. Assessment:      Diagnosis Orders   1. Rett's syndrome     2. Seizure disorder ([facility] [address]     3. Spastic quadriplegic cerebral palsy ([address]     4. Dystonia     5. Muscle spasticity     6. Tremors of nervous system     7. Chronic respiratory failure with hypoxia and hypercapnia (HCC)     8. Dependent on ventilator ([address]     9. ISELA (obstructive sleep apnea)     10. Central sleep apnea     11. Restrictive lung disease due to kyphoscoliosis     12. Hypoventilation     13. Gastroesophageal reflux disease, esophagitis presence not specified     14. Slow transit constipation     15. Primary insomnia     16. Vitamin D deficiency     17. Need for Tdap vaccination  Tdap (age 6y and older) IM ([address])   25. Need for influenza vaccination  INFLUENZA, QUADV, 3 YRS AND OLDER, IM, PF, PREFILL SYR OR SDV, 0.5ML (FLUZONE QUADV, PF)       Plan:     Proceed with continue current medications  Recommend subspecialty follow up as scheduled  Continue massage therapy  Tdap and flu vaccines recommended   Call with concerns       Mumtaz Oliveros received counseling on the following healthy behaviors: nutrition, exercise and medication adherence  Reviewed prior labs and health maintenance  Continue current medications, diet and exercise. Discussed use, benefit, and side effects of prescribed medications. Barriers to medication compliance addressed. Patient given educational materials - see patient instructions  Was a self-tracking handout given in paper form or via [email or web address]? No    Requested Prescriptions      No prescriptions requested or ordered in this encounter       All patient questions answered. Patient voiced understanding. Quality Measures    Body mass index is 19.48 kg/m². Normal. Weight control planned discussed Healthy diet and regular exercise. BP: 102/80 Blood pressure is normal. Treatment plan consists of No treatment change needed.     No results found for: [address],

## 2018-09-09 ASSESSMENT — ENCOUNTER SYMPTOMS: CONSTIPATION: 1

## 2018-09-10 DIAGNOSIS — G47.01 INSOMNIA DUE TO MEDICAL CONDITION: ICD-10-CM

## 2018-09-10 RX ORDER — ZOLPIDEM TARTRATE 10 MG/1
10 TABLET ORAL NIGHTLY PRN
Qty: 30 TABLET | Refills: 0 | Status: SHIPPED | OUTPATIENT
Start: 2018-09-10 | End: 2018-10-08 | Stop reason: SDUPTHER

## 2018-09-10 NOTE — TELEPHONE ENCOUNTER
LOV 9/7/18  LRF 8/8/18  RTO 6 months    Health Maintenance   Topic Date Due    HIV screen  03/30/2011    Chlamydia screen  10/12/2016    DTaP/Tdap/Td vaccine (7 - Td) 11/27/2017    Flu vaccine (1) 09/01/2018    Cervical cancer screen  11/01/2018 (Originally 3/30/2017)    Meningococcal (MCV) Vaccine Age 0-22 Years  Completed             (applicable per patient's age: Cancer Screenings, Depression Screening, Fall Risk Screening, Immunizations)    AST (U/L)   Date Value   01/23/2018 72 (H)     ALT (U/L)   Date Value   01/23/2018 22     BUN (mg/dL)   Date Value   01/23/2018 8      (goal A1C is < 7)   (goal LDL is <100) need 30-50% reduction from baseline     BP Readings from Last 3 Encounters:   09/07/18 102/80   08/10/18 129/81   05/04/18 126/84    (goal /80)      All Future Testing planned in [facility]:  Lab Frequency Next Occurrence   INFLUENZA, QUADV, 3 YRS AND OLDER, IM, PF, PREFILL SYR OR SDV, 0.5ML (FLUZONE QUADV, PF) Once 09/17/2018   Tdap (age 6y and older) IM ([address]) Once 09/17/2018       Next Visit Date:  Future Appointments  Date Time Provider Kishan Reich   3/29/2019 1:30 PM Ernesto Barksdale MD Resp Spec [id number]            Patient Active Problem List:     Rett's syndrome     Scoliosis     Dystonia     Tremors of nervous system     Convulsions ([address]     Gastroesophageal reflux disease without esophagitis     Fibroadenoma of breast     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 [facility])     Acute respiratory failure with hypercapnia ([address]     Skin infection at gastrostomy tube site [facility])     Acute on chronic respiratory failure with hypoxia and hypercapnia ([address]     Pneumonia due to infectious organism     Body temperature low     Hypotension     Nonverbal     Sepsis

## 2018-10-08 DIAGNOSIS — G47.01 INSOMNIA DUE TO MEDICAL CONDITION: ICD-10-CM

## 2018-10-09 RX ORDER — ZOLPIDEM TARTRATE 10 MG/1
10 TABLET ORAL NIGHTLY PRN
Qty: 30 TABLET | Refills: 2 | Status: SHIPPED | OUTPATIENT
Start: 2018-10-09 | End: 2019-01-17 | Stop reason: SDUPTHER

## 2018-10-31 ENCOUNTER — TELEPHONE (OUTPATIENT)
Dept: FAMILY MEDICINE CLINIC | Age: 23
End: 2018-10-31

## 2018-10-31 NOTE — TELEPHONE ENCOUNTER
SENTHILI .... Gaby Marcus from [employer] phoned today to notify office that patient will be moving out of the boundaries of there services and the  is working with patient to get services set up in the area where the patient is moving to.

## 2018-11-06 ENCOUNTER — TELEPHONE (OUTPATIENT)
Dept: FAMILY MEDICINE CLINIC | Age: 23
End: 2018-11-06

## 2018-11-06 DIAGNOSIS — E87.29 CARBON DIOXIDE RETENTION: Primary | ICD-10-CM

## 2018-11-06 DIAGNOSIS — J96.22 ACUTE ON CHRONIC RESPIRATORY FAILURE WITH HYPOXIA AND HYPERCAPNIA (HCC): ICD-10-CM

## 2018-11-06 DIAGNOSIS — J96.21 ACUTE ON CHRONIC RESPIRATORY FAILURE WITH HYPOXIA AND HYPERCAPNIA (HCC): ICD-10-CM

## 2018-11-06 DIAGNOSIS — R53.83 OTHER FATIGUE: ICD-10-CM

## 2018-12-10 ENCOUNTER — TELEPHONE (OUTPATIENT)
Dept: PULMONOLOGY | Age: 23
End: 2018-12-10

## 2018-12-10 DIAGNOSIS — J18.9 PNEUMONIA DUE TO INFECTIOUS ORGANISM, UNSPECIFIED LATERALITY, UNSPECIFIED PART OF LUNG: Primary | ICD-10-CM

## 2018-12-10 NOTE — TELEPHONE ENCOUNTER
Patient of [address] [place] [address] who is out of town. Mom calls, says she is getting a cold. She said its not back yet but because she breathes shallow, doctors usually cant hear her get into trouble. She suggests a chest xray? Or would you like to see her in the office?

## 2018-12-15 ENCOUNTER — HOSPITAL ENCOUNTER (EMERGENCY)
Age: 23
Discharge: HOME | End: 2018-12-15
Attending: EMERGENCY MEDICINE
Payer: COMMERCIAL

## 2018-12-15 ENCOUNTER — APPOINTMENT (OUTPATIENT)
Dept: GENERAL RADIOLOGY | Age: 23
End: 2018-12-15
Payer: COMMERCIAL

## 2018-12-15 VITALS
TEMPERATURE: 98.4 F | RESPIRATION RATE: 17 BRPM | HEART RATE: 98 BPM | BODY MASS INDEX: 19.76 KG/M2 | DIASTOLIC BLOOD PRESSURE: 73 MMHG | WEIGHT: 85 LBS | OXYGEN SATURATION: 94 % | SYSTOLIC BLOOD PRESSURE: 115 MMHG

## 2018-12-15 DIAGNOSIS — J06.9 VIRAL URI WITH COUGH: Primary | ICD-10-CM

## 2018-12-15 LAB
ADENOVIRUS PCR: NOT DETECTED
BORDETELLA PERTUSSIS PCR: NOT DETECTED
CHLAMYDIA PNEUMONIAE BY PCR: NOT DETECTED
CORONAVIRUS 229E PCR: NOT DETECTED
CORONAVIRUS HKU1 PCR: NOT DETECTED
CORONAVIRUS NL63 PCR: NOT DETECTED
CORONAVIRUS OC43 PCR: NOT DETECTED
DIRECT EXAM: NORMAL
HUMAN METAPNEUMOVIRUS PCR: NOT DETECTED
INFLUENZA A BY PCR: NOT DETECTED
INFLUENZA A H1 (2009) PCR: ABNORMAL
INFLUENZA A H1 PCR: ABNORMAL
INFLUENZA A H3 PCR: ABNORMAL
INFLUENZA B BY PCR: NOT DETECTED
Lab: NORMAL
MYCOPLASMA PNEUMONIAE PCR: NOT DETECTED
PARAINFLUENZA 1 PCR: NOT DETECTED
PARAINFLUENZA 2 PCR: NOT DETECTED
PARAINFLUENZA 3 PCR: NOT DETECTED
PARAINFLUENZA 4 PCR: NOT DETECTED
RESP SYNCYTIAL VIRUS PCR: DETECTED
RHINO/ENTEROVIRUS PCR: NOT DETECTED
SOURCE: ABNORMAL
SPECIMEN DESCRIPTION: NORMAL
STATUS: NORMAL

## 2018-12-15 PROCEDURE — 87804 INFLUENZA ASSAY W/OPTIC: CPT

## 2018-12-15 PROCEDURE — 87633 RESP VIRUS 12-25 TARGETS: CPT

## 2018-12-15 PROCEDURE — 99283 EMERGENCY DEPT VISIT LOW MDM: CPT

## 2018-12-15 PROCEDURE — 6370000000 HC RX 637 (ALT 250 FOR IP): Performed by: EMERGENCY MEDICINE

## 2018-12-15 PROCEDURE — 87581 M.PNEUMON DNA AMP PROBE: CPT

## 2018-12-15 PROCEDURE — 87486 CHLMYD PNEUM DNA AMP PROBE: CPT

## 2018-12-15 PROCEDURE — 87798 DETECT AGENT NOS DNA AMP: CPT

## 2018-12-15 PROCEDURE — 71046 X-RAY EXAM CHEST 2 VIEWS: CPT

## 2018-12-15 RX ORDER — ACETAMINOPHEN 160 MG/5ML
650 SOLUTION ORAL ONCE
Status: COMPLETED | OUTPATIENT
Start: 2018-12-15 | End: 2018-12-15

## 2018-12-15 RX ORDER — ACETAMINOPHEN 160 MG/5ML
15 SUSPENSION, ORAL (FINAL DOSE FORM) ORAL EVERY 6 HOURS PRN
Qty: 240 ML | Refills: 0 | Status: SHIPPED | OUTPATIENT
Start: 2018-12-15 | End: 2022-02-10 | Stop reason: SDUPTHER

## 2018-12-15 RX ADMIN — ACETAMINOPHEN 650 MG: 650 SOLUTION ORAL at 13:34

## 2018-12-15 ASSESSMENT — ENCOUNTER SYMPTOMS
SHORTNESS OF BREATH: 1
COUGH: 1
CONSTIPATION: 0
NAUSEA: 0
DIARRHEA: 0
VOMITING: 0

## 2018-12-15 NOTE — ED PROVIDER NOTES
(LABS / IMAGING / EKG):  Orders Placed This Encounter   Procedures    RAPID INFLUENZA A/B ANTIGENS    Respiratory Virus PCR Panel    XR CHEST STANDARD (2 VW)       MEDICATIONS ORDERED:  Orders Placed This Encounter   Medications    acetaminophen (TYLENOL) 160 MG/5ML solution 650 mg    acetaminophen (TYLENOL CHILDRENS) 160 MG/5ML suspension     Sig: Take 18.09 mLs by mouth every 6 hours as needed for Fever     Dispense:  240 mL     Refill:  0       DDX: Viral illness, flu, pneumonia, bronchitis    DIAGNOSTIC RESULTS / EMERGENCY DEPARTMENT COURSE / MDM     LABS:  Results for orders placed or performed during the hospital encounter of 12/15/18   RAPID INFLUENZA A/B ANTIGENS   Result Value Ref Range    Specimen Description . NASOPHARYNGEAL SWAB     Special Requests NOT REPORTED     Direct Exam DUPLICATE ORDER     Status FINAL 12/15/2018        IMPRESSION: Patient evaluated by myself and attending physician. Concern from parents for pneumonia. Patient with history of recurrent pneumonia, Rett syndrome worsening coughing and congestion. Parents requesting x-ray to rule out pneumonia. Does follow up with pulmonology. She was ordered last week but patient had been better. Febrile at 38.1. We'll continue with Tylenol, x-ray, viral panel with flu swab. RADIOLOGY:  Xr Chest Standard (2 Vw)    Result Date: 12/15/2018  EXAMINATION: TWO VIEWS OF THE CHEST 12/15/2018 1:56 pm COMPARISON: 04/30/2018 HISTORY: ORDERING SYSTEM PROVIDED HISTORY: cough, hx of recurrent pneumonia TECHNOLOGIST PROVIDED HISTORY: cough, hx of recurrent pneumonia FINDINGS: Frontal and lateral views of the chest are submitted for review. The cardiac silhouette is normal in size. Lung parenchyma is clear without focal airspace consolidation, sizeable pleural effusion, or pneumothorax. Trachea is midline. Osseous structures and soft tissues are grossly intact. Dextroscoliotic curvature of the thoracic spine.      No evidence for acute

## 2018-12-17 ENCOUNTER — CARE COORDINATION (OUTPATIENT)
Dept: CARE COORDINATION | Age: 23
End: 2018-12-17

## 2018-12-17 ENCOUNTER — TELEPHONE (OUTPATIENT)
Dept: PULMONOLOGY | Age: 23
End: 2018-12-17

## 2018-12-17 NOTE — CARE COORDINATION
Patient was called to follow up with most recent ER visit. There was no answer. A message was left on voicemail to have patient call back regarding ER visit. Office number given [phone].

## 2018-12-17 NOTE — TELEPHONE ENCOUNTER
The patient mom called and stated that the patient have been running a fever all weekend and is given motrin and tylenol and she have some sputum don't know the color. She is also having more seizures. When mom was on the phone with me her temp. Was 101.9. The patient did get CXR done which showed no pneumonia stated by the mother. Please advise what to do.

## 2018-12-18 ENCOUNTER — HOSPITAL ENCOUNTER (INPATIENT)
Age: 23
LOS: 2 days | Discharge: HOME HEALTH | DRG: 202 | End: 2018-12-20
Attending: EMERGENCY MEDICINE | Admitting: INTERNAL MEDICINE
Payer: COMMERCIAL

## 2018-12-18 ENCOUNTER — TELEPHONE (OUTPATIENT)
Dept: PULMONOLOGY | Age: 23
End: 2018-12-18

## 2018-12-18 ENCOUNTER — APPOINTMENT (OUTPATIENT)
Dept: GENERAL RADIOLOGY | Age: 23
DRG: 202 | End: 2018-12-18
Payer: COMMERCIAL

## 2018-12-18 DIAGNOSIS — J96.01 ACUTE RESPIRATORY FAILURE WITH HYPOXIA (HCC): Primary | ICD-10-CM

## 2018-12-18 DIAGNOSIS — J98.4 RESTRICTIVE LUNG MECHANICS DUE TO NEUROMUSCULAR DISEASE (HCC): ICD-10-CM

## 2018-12-18 DIAGNOSIS — G70.9 RESTRICTIVE LUNG MECHANICS DUE TO NEUROMUSCULAR DISEASE (HCC): ICD-10-CM

## 2018-12-18 PROBLEM — J96.00 ACUTE RESPIRATORY FAILURE (HCC): Status: ACTIVE | Noted: 2018-12-18

## 2018-12-18 LAB
ABSOLUTE EOS #: 0 K/UL (ref 0–0.4)
ABSOLUTE IMMATURE GRANULOCYTE: 0.22 K/UL (ref 0–0.3)
ABSOLUTE LYMPH #: 1.1 K/UL (ref 1–4.8)
ABSOLUTE MONO #: 0.22 K/UL (ref 0.1–0.8)
ALLEN TEST: ABNORMAL
ANION GAP SERPL CALCULATED.3IONS-SCNC: 10 MMOL/L (ref 9–17)
ATYPICAL LYMPHOCYTE ABSOLUTE COUNT: 0.11 K/UL
ATYPICAL LYMPHOCYTES: 1 %
BASOPHILS # BLD: 0 % (ref 0–2)
BASOPHILS ABSOLUTE: 0 K/UL (ref 0–0.2)
BUN BLDV-MCNC: 11 MG/DL (ref 6–20)
BUN/CREAT BLD: ABNORMAL (ref 9–20)
CALCIUM SERPL-MCNC: 9.5 MG/DL (ref 8.6–10.4)
CHLORIDE BLD-SCNC: 93 MMOL/L (ref 98–107)
CO2: 34 MMOL/L (ref 20–31)
CREAT SERPL-MCNC: 0.2 MG/DL (ref 0.5–0.9)
DIFFERENTIAL TYPE: ABNORMAL
EOSINOPHILS RELATIVE PERCENT: 0 % (ref 1–4)
FIO2: ABNORMAL
GFR AFRICAN AMERICAN: >60 ML/MIN
GFR NON-AFRICAN AMERICAN: >60 ML/MIN
GFR SERPL CREATININE-BSD FRML MDRD: ABNORMAL ML/MIN/{1.73_M2}
GFR SERPL CREATININE-BSD FRML MDRD: ABNORMAL ML/MIN/{1.73_M2}
GLUCOSE BLD-MCNC: 126 MG/DL (ref 70–99)
HCT VFR BLD CALC: 40.2 % (ref 36.3–47.1)
HEMOGLOBIN: 12.3 G/DL (ref 11.9–15.1)
IMMATURE GRANULOCYTES: 2 %
LYMPHOCYTES # BLD: 10 % (ref 24–44)
MCH RBC QN AUTO: 26.4 PG (ref 25.2–33.5)
MCHC RBC AUTO-ENTMCNC: 30.6 G/DL (ref 28.4–34.8)
MCV RBC AUTO: 86.3 FL (ref 82.6–102.9)
MODE: ABNORMAL
MONOCYTES # BLD: 2 % (ref 1–7)
MORPHOLOGY: NORMAL
NEGATIVE BASE EXCESS, ART: ABNORMAL (ref 0–2)
NRBC AUTOMATED: 0 PER 100 WBC
O2 DEVICE/FLOW/%: ABNORMAL
PATIENT TEMP: ABNORMAL
PDW BLD-RTO: 13.9 % (ref 11.8–14.4)
PLATELET # BLD: 307 K/UL (ref 138–453)
PLATELET ESTIMATE: ABNORMAL
PMV BLD AUTO: 10.1 FL (ref 8.1–13.5)
POC HCO3: 33.5 MMOL/L (ref 21–28)
POC O2 SATURATION: 98 % (ref 94–98)
POC PCO2 TEMP: ABNORMAL MM HG
POC PCO2: 54.7 MM HG (ref 35–48)
POC PH TEMP: ABNORMAL
POC PH: 7.39 (ref 7.35–7.45)
POC PO2 TEMP: ABNORMAL MM HG
POC PO2: 110.7 MM HG (ref 83–108)
POSITIVE BASE EXCESS, ART: 7 (ref 0–3)
POTASSIUM SERPL-SCNC: 4.6 MMOL/L (ref 3.7–5.3)
RBC # BLD: 4.66 M/UL (ref 3.95–5.11)
RBC # BLD: ABNORMAL 10*6/UL
SAMPLE SITE: ABNORMAL
SEG NEUTROPHILS: 85 % (ref 36–66)
SEGMENTED NEUTROPHILS ABSOLUTE COUNT: 9.35 K/UL (ref 1.8–7.7)
SODIUM BLD-SCNC: 137 MMOL/L (ref 135–144)
TCO2 (CALC), ART: 35 MMOL/L (ref 22–29)
WBC # BLD: 11 K/UL (ref 3.5–11.3)
WBC # BLD: ABNORMAL 10*3/UL

## 2018-12-18 PROCEDURE — 80048 BASIC METABOLIC PNL TOTAL CA: CPT

## 2018-12-18 PROCEDURE — 1200000000 HC SEMI PRIVATE

## 2018-12-18 PROCEDURE — 71045 X-RAY EXAM CHEST 1 VIEW: CPT

## 2018-12-18 PROCEDURE — 99284 EMERGENCY DEPT VISIT MOD MDM: CPT

## 2018-12-18 PROCEDURE — 36600 WITHDRAWAL OF ARTERIAL BLOOD: CPT

## 2018-12-18 PROCEDURE — 82803 BLOOD GASES ANY COMBINATION: CPT

## 2018-12-18 PROCEDURE — 85025 COMPLETE CBC W/AUTO DIFF WBC: CPT

## 2018-12-18 RX ORDER — ZOLPIDEM TARTRATE 5 MG/1
5 TABLET ORAL NIGHTLY PRN
Status: DISCONTINUED | OUTPATIENT
Start: 2018-12-19 | End: 2018-12-20 | Stop reason: HOSPADM

## 2018-12-18 RX ORDER — SODIUM CHLORIDE 0.9 % (FLUSH) 0.9 %
10 SYRINGE (ML) INJECTION EVERY 12 HOURS SCHEDULED
Status: DISCONTINUED | OUTPATIENT
Start: 2018-12-19 | End: 2018-12-20 | Stop reason: HOSPADM

## 2018-12-18 RX ORDER — SODIUM CHLORIDE 0.9 % (FLUSH) 0.9 %
10 SYRINGE (ML) INJECTION PRN
Status: DISCONTINUED | OUTPATIENT
Start: 2018-12-18 | End: 2018-12-20 | Stop reason: HOSPADM

## 2018-12-18 RX ORDER — ACETAMINOPHEN 325 MG/1
650 TABLET ORAL EVERY 4 HOURS PRN
Status: DISCONTINUED | OUTPATIENT
Start: 2018-12-18 | End: 2018-12-20 | Stop reason: HOSPADM

## 2018-12-18 RX ORDER — DOXAZOSIN 2 MG/1
1 TABLET ORAL NIGHTLY
Status: DISCONTINUED | OUTPATIENT
Start: 2018-12-19 | End: 2018-12-20 | Stop reason: HOSPADM

## 2018-12-18 RX ORDER — DIAZEPAM 10 MG/2ML
10 GEL RECTAL PRN
Status: DISCONTINUED | OUTPATIENT
Start: 2018-12-19 | End: 2018-12-20 | Stop reason: HOSPADM

## 2018-12-18 RX ORDER — PANTOPRAZOLE SODIUM 40 MG/1
40 TABLET, DELAYED RELEASE ORAL
Status: DISCONTINUED | OUTPATIENT
Start: 2018-12-19 | End: 2018-12-19

## 2018-12-18 RX ORDER — ACETAMINOPHEN 325 MG/1
650 TABLET ORAL EVERY 4 HOURS PRN
Status: DISCONTINUED | OUTPATIENT
Start: 2018-12-18 | End: 2018-12-19 | Stop reason: SDUPTHER

## 2018-12-18 RX ORDER — DOCUSATE SODIUM 100 MG/1
100 CAPSULE, LIQUID FILLED ORAL 2 TIMES DAILY PRN
Status: DISCONTINUED | OUTPATIENT
Start: 2018-12-18 | End: 2018-12-20 | Stop reason: HOSPADM

## 2018-12-18 RX ORDER — ONDANSETRON 2 MG/ML
4 INJECTION INTRAMUSCULAR; INTRAVENOUS EVERY 6 HOURS PRN
Status: DISCONTINUED | OUTPATIENT
Start: 2018-12-18 | End: 2018-12-20 | Stop reason: HOSPADM

## 2018-12-18 RX ORDER — LEVALBUTEROL INHALATION SOLUTION 1.25 MG/3ML
1 SOLUTION RESPIRATORY (INHALATION) EVERY 8 HOURS PRN
Status: DISCONTINUED | OUTPATIENT
Start: 2018-12-18 | End: 2018-12-20 | Stop reason: HOSPADM

## 2018-12-18 NOTE — ED NOTES
Pt resting comfortably on cot with family at bedside. Pt in NAD. Call light within reach, will continue to monitor.       Niesha Ortega RN  12/18/18 [phone]

## 2018-12-18 NOTE — ED PROVIDER NOTES
[facility]     Emergency Department     Faculty Note/ Attestation      Pt Name: Aby Smith                                       MRN: [id number]  Andresgfmackenzie 1995  Date of evaluation: 12/18/2018    Patients PCP:    Amy Ewing MD    Attestation  I performed a history and physical examination of the patient and discussed management with the resident. I reviewed the residents note and agree with the documented findings and plan of care. Any areas of disagreement are noted on the chart. I was personally present for the key portions of any procedures. I have documented in the chart those procedures where I was not present during the key portions. I have reviewed the emergency nurses triage note. I agree with the chief complaint, past medical history, past surgical history, allergies, medications, social and family history as documented unless otherwise noted below. For Physician Assistant/ Nurse Practitioner cases/documentation I have personally evaluated this patient and have completed at least one if not all key elements of the E/M (history, physical exam, and MDM). Additional findings are as noted. Initial Screens:             Vitals:    Vitals:    12/18/18 1631 12/18/18 1632 12/18/18 1638 12/18/18 1647   BP: (!) 107/56 (!) 107/56  110/71   Pulse: 68      Resp: 16      Temp: 97.9 °F (36.6 °C)      SpO2: (!) 88% 98% 100% 97%   Weight: 85 lb (38.6 kg)          CHIEF COMPLAINT       Chief Complaint   Patient presents with    Cough       Odalis Hill is a special needs young adult with a cough who was seen just 3 days ago and found to have RSV viral infection. She had a recorded desaturation and was on O2 at the time. She has previous issues with this.  The pt has been coughing family has been couging sat level on O2 dropped to 70s they brought her in when here Antibiotics and steroids did not help     DIAGNOSTIC RESULTS     RADIOLOGY:   XR CHEST STANDARD (2 VW)    (Results

## 2018-12-18 NOTE — ED NOTES
Pt resting comfortably with family at bedside. No distress noted, pt remains on monitor, no resp distress. Bed locked in lowest position, side rails up for protection, call light within reach.      Ayan Henley RN  12/18/18 [id number]

## 2018-12-19 PROBLEM — J21.0 ACUTE BRONCHIOLITIS DUE TO RESPIRATORY SYNCYTIAL VIRUS: Status: ACTIVE | Noted: 2018-12-19

## 2018-12-19 LAB
ABSOLUTE EOS #: 0.2 K/UL (ref 0–0.44)
ABSOLUTE IMMATURE GRANULOCYTE: 0.2 K/UL (ref 0–0.3)
ABSOLUTE LYMPH #: 2.48 K/UL (ref 1.1–3.7)
ABSOLUTE MONO #: 0.59 K/UL (ref 0.1–1.2)
ANION GAP SERPL CALCULATED.3IONS-SCNC: 15 MMOL/L (ref 9–17)
BASOPHILS # BLD: 1 % (ref 0–2)
BASOPHILS ABSOLUTE: 0.1 K/UL (ref 0–0.2)
BUN BLDV-MCNC: 11 MG/DL (ref 6–20)
BUN/CREAT BLD: ABNORMAL (ref 9–20)
CALCIUM SERPL-MCNC: 9.5 MG/DL (ref 8.6–10.4)
CHLORIDE BLD-SCNC: 92 MMOL/L (ref 98–107)
CO2: 30 MMOL/L (ref 20–31)
CREAT SERPL-MCNC: <0.2 MG/DL (ref 0.5–0.9)
DIFFERENTIAL TYPE: ABNORMAL
EOSINOPHILS RELATIVE PERCENT: 2 % (ref 1–4)
GFR AFRICAN AMERICAN: ABNORMAL ML/MIN
GFR NON-AFRICAN AMERICAN: ABNORMAL ML/MIN
GFR SERPL CREATININE-BSD FRML MDRD: ABNORMAL ML/MIN/{1.73_M2}
GFR SERPL CREATININE-BSD FRML MDRD: ABNORMAL ML/MIN/{1.73_M2}
GLUCOSE BLD-MCNC: 114 MG/DL (ref 70–99)
GLUCOSE BLD-MCNC: 140 MG/DL (ref 65–105)
HCT VFR BLD CALC: 38.2 % (ref 36.3–47.1)
HEMOGLOBIN: 11.4 G/DL (ref 11.9–15.1)
IMMATURE GRANULOCYTES: 2 %
KEPPRA: >100 UG/ML
LYMPHOCYTES # BLD: 25 % (ref 24–43)
MAGNESIUM: 2.6 MG/DL (ref 1.6–2.6)
MCH RBC QN AUTO: 26.1 PG (ref 25.2–33.5)
MCHC RBC AUTO-ENTMCNC: 29.8 G/DL (ref 28.4–34.8)
MCV RBC AUTO: 87.6 FL (ref 82.6–102.9)
MONOCYTES # BLD: 6 % (ref 3–12)
MORPHOLOGY: NORMAL
NRBC AUTOMATED: 0 PER 100 WBC
PDW BLD-RTO: 13.9 % (ref 11.8–14.4)
PLATELET # BLD: 300 K/UL (ref 138–453)
PLATELET ESTIMATE: ABNORMAL
PMV BLD AUTO: 11 FL (ref 8.1–13.5)
POTASSIUM SERPL-SCNC: 3.3 MMOL/L (ref 3.7–5.3)
RBC # BLD: 4.36 M/UL (ref 3.95–5.11)
RBC # BLD: ABNORMAL 10*6/UL
SEG NEUTROPHILS: 64 % (ref 36–65)
SEGMENTED NEUTROPHILS ABSOLUTE COUNT: 6.33 K/UL (ref 1.5–8.1)
SODIUM BLD-SCNC: 137 MMOL/L (ref 135–144)
WBC # BLD: 9.9 K/UL (ref 3.5–11.3)
WBC # BLD: ABNORMAL 10*3/UL

## 2018-12-19 PROCEDURE — 99254 IP/OBS CNSLTJ NEW/EST MOD 60: CPT | Performed by: INTERNAL MEDICINE

## 2018-12-19 PROCEDURE — 97162 PT EVAL MOD COMPLEX 30 MIN: CPT

## 2018-12-19 PROCEDURE — 2700000000 HC OXYGEN THERAPY PER DAY

## 2018-12-19 PROCEDURE — 6370000000 HC RX 637 (ALT 250 FOR IP): Performed by: STUDENT IN AN ORGANIZED HEALTH CARE EDUCATION/TRAINING PROGRAM

## 2018-12-19 PROCEDURE — 97110 THERAPEUTIC EXERCISES: CPT

## 2018-12-19 PROCEDURE — 80177 DRUG SCRN QUAN LEVETIRACETAM: CPT

## 2018-12-19 PROCEDURE — 6360000002 HC RX W HCPCS: Performed by: STUDENT IN AN ORGANIZED HEALTH CARE EDUCATION/TRAINING PROGRAM

## 2018-12-19 PROCEDURE — 1200000000 HC SEMI PRIVATE

## 2018-12-19 PROCEDURE — 97530 THERAPEUTIC ACTIVITIES: CPT

## 2018-12-19 PROCEDURE — 94660 CPAP INITIATION&MGMT: CPT

## 2018-12-19 PROCEDURE — 94640 AIRWAY INHALATION TREATMENT: CPT

## 2018-12-19 PROCEDURE — 85025 COMPLETE CBC W/AUTO DIFF WBC: CPT

## 2018-12-19 PROCEDURE — 99253 IP/OBS CNSLTJ NEW/EST LOW 45: CPT | Performed by: INTERNAL MEDICINE

## 2018-12-19 PROCEDURE — G8979 MOBILITY GOAL STATUS: HCPCS

## 2018-12-19 PROCEDURE — 2580000003 HC RX 258: Performed by: STUDENT IN AN ORGANIZED HEALTH CARE EDUCATION/TRAINING PROGRAM

## 2018-12-19 PROCEDURE — G8978 MOBILITY CURRENT STATUS: HCPCS

## 2018-12-19 PROCEDURE — 36415 COLL VENOUS BLD VENIPUNCTURE: CPT

## 2018-12-19 PROCEDURE — 82947 ASSAY GLUCOSE BLOOD QUANT: CPT

## 2018-12-19 PROCEDURE — 99223 1ST HOSP IP/OBS HIGH 75: CPT | Performed by: INTERNAL MEDICINE

## 2018-12-19 PROCEDURE — 83735 ASSAY OF MAGNESIUM: CPT

## 2018-12-19 PROCEDURE — 80048 BASIC METABOLIC PNL TOTAL CA: CPT

## 2018-12-19 PROCEDURE — 94760 N-INVAS EAR/PLS OXIMETRY 1: CPT

## 2018-12-19 RX ORDER — LEVETIRACETAM 100 MG/ML
1400 SOLUTION ORAL 2 TIMES DAILY
Status: DISCONTINUED | OUTPATIENT
Start: 2018-12-19 | End: 2018-12-20 | Stop reason: HOSPADM

## 2018-12-19 RX ORDER — ESOMEPRAZOLE MAGNESIUM 40 MG/1
20 FOR SUSPENSION ORAL DAILY
Status: DISCONTINUED | OUTPATIENT
Start: 2018-12-20 | End: 2018-12-20 | Stop reason: HOSPADM

## 2018-12-19 RX ORDER — LACTOBACILLUS RHAMNOSUS GG 10B CELL
1 CAPSULE ORAL NIGHTLY
Status: DISCONTINUED | OUTPATIENT
Start: 2018-12-19 | End: 2018-12-20 | Stop reason: HOSPADM

## 2018-12-19 RX ORDER — MULTIVIT-MIN/FERROUS GLUCONATE 9 MG/15 ML
15 LIQUID (ML) ORAL DAILY
Status: DISCONTINUED | OUTPATIENT
Start: 2018-12-19 | End: 2018-12-20 | Stop reason: HOSPADM

## 2018-12-19 RX ORDER — ESOMEPRAZOLE MAGNESIUM 40 MG/1
20 FOR SUSPENSION ORAL 2 TIMES DAILY
Status: DISCONTINUED | OUTPATIENT
Start: 2018-12-19 | End: 2018-12-19

## 2018-12-19 RX ORDER — METHYLPREDNISOLONE SODIUM SUCCINATE 40 MG/ML
40 INJECTION, POWDER, LYOPHILIZED, FOR SOLUTION INTRAMUSCULAR; INTRAVENOUS ONCE
Status: COMPLETED | OUTPATIENT
Start: 2018-12-19 | End: 2018-12-19

## 2018-12-19 RX ORDER — NUTRITIONAL SUPPLEMENT 0.04G-1/ML
1 LIQUID (ML) ORAL 3 TIMES DAILY
Status: DISCONTINUED | OUTPATIENT
Start: 2018-12-19 | End: 2018-12-19

## 2018-12-19 RX ORDER — LACTOBACILLUS RHAMNOSUS GG 10B CELL
1 CAPSULE ORAL DAILY
Status: DISCONTINUED | OUTPATIENT
Start: 2018-12-19 | End: 2018-12-20 | Stop reason: HOSPADM

## 2018-12-19 RX ORDER — LEVALBUTEROL INHALATION SOLUTION 1.25 MG/3ML
1.25 SOLUTION RESPIRATORY (INHALATION) 2 TIMES DAILY
Status: DISCONTINUED | OUTPATIENT
Start: 2018-12-19 | End: 2018-12-20 | Stop reason: HOSPADM

## 2018-12-19 RX ADMIN — LEVETIRACETAM 1400 MG: 100 SOLUTION ORAL at 11:02

## 2018-12-19 RX ADMIN — Medication 1 CAPSULE: at 20:31

## 2018-12-19 RX ADMIN — METHYLPREDNISOLONE SODIUM SUCCINATE 40 MG: 40 INJECTION, POWDER, FOR SOLUTION INTRAMUSCULAR; INTRAVENOUS at 03:23

## 2018-12-19 RX ADMIN — Medication 15 ML: at 11:03

## 2018-12-19 RX ADMIN — ENOXAPARIN SODIUM 30 MG: 30 INJECTION SUBCUTANEOUS at 11:03

## 2018-12-19 RX ADMIN — MAGNESIUM HYDROXIDE 24 ML: 400 SUSPENSION ORAL at 11:02

## 2018-12-19 RX ADMIN — LEVALBUTEROL HYDROCHLORIDE 1.25 MG: 1.25 SOLUTION RESPIRATORY (INHALATION) at 08:24

## 2018-12-19 RX ADMIN — Medication 10 ML: at 20:33

## 2018-12-19 RX ADMIN — Medication 10 ML: at 11:04

## 2018-12-19 RX ADMIN — LEVETIRACETAM 1400 MG: 100 SOLUTION ORAL at 20:32

## 2018-12-19 RX ADMIN — Medication 2000 UNITS: at 20:32

## 2018-12-19 RX ADMIN — DOXAZOSIN 1 MG: 2 TABLET ORAL at 20:32

## 2018-12-19 RX ADMIN — Medication 1 CAPSULE: at 11:03

## 2018-12-19 RX ADMIN — ZOLPIDEM TARTRATE 5 MG: 5 TABLET ORAL at 21:00

## 2018-12-19 RX ADMIN — LEVALBUTEROL HYDROCHLORIDE 1.25 MG: 1.25 SOLUTION RESPIRATORY (INHALATION) at 20:07

## 2018-12-19 RX ADMIN — SODIUM CHLORIDE, PRESERVATIVE FREE 10 ML: 5 INJECTION INTRAVENOUS at 22:09

## 2018-12-19 RX ADMIN — DOXAZOSIN 1 MG: 2 TABLET ORAL at 00:39

## 2018-12-19 ASSESSMENT — ENCOUNTER SYMPTOMS
EYE REDNESS: 0
STRIDOR: 0
SHORTNESS OF BREATH: 0
COUGH: 1

## 2018-12-19 NOTE — CONSULTS
positive airway pressure) dependence     Bladder retention 4/26/2017    Body temperature low     Breast lump in female     one at 3 oclock and one at 7 oclock    Cellulitis     left elbow    Cellulitis of left elbow     Central sleep apnea 1/9/2017    Chronic respiratory failure with hypoxia and hypercapnia (HCC) 4/9/2017    Closed fracture of shaft of right humerus with routine healing 4/24/2018    Community acquired pneumonia of left lower lobe of lung ([address] 1/13/2018    Constipation 8/6/2018    Contracture of elbow joint, right 11/30/2017    Convulsions ([address]     Dependence on enabling machine 4/27/2017    Dependent on ventilator ([address]     Diarrhea 8/6/2018    Dystonia     Encounter for removal of internal fixation device 4/11/2013    Overview:  Removal of spinal rods and baclofen pump on 4.12.13 for suspected infection    Fibroadenoma of breast     Fistula     G tube feedings ([address]     Gastric reflux 8/6/2018    Gastroesophageal reflux disease without esophagitis 10/20/2015    GERD (gastroesophageal reflux disease)     History of recurrent pneumonia 4/27/2017    Hypercapnic respiratory failure ([address]     Hypotension 3/20/2017     Updating Deprecated Diagnoses    Hypothermia due to non-environmental cause 4/8/2017    Kyphoscoliosis     Mild sleep apnea     not treated    Muscle spasticity 4/8/2013    Myoclonus     Myoneural disorder (HCC)     Neuromuscular disease (HCC)     Nonverbal     ISELA (obstructive sleep apnea)     Pneumonia due to infectious organism     left lower lob    Pneumonia of left lower lobe due to infectious organism ([address] 2/28/2017    Prolonged Q-T interval on ECG     Prolonged QT interval 4/8/2017    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 . 4/11/18 4/12/19 Yes Gavin Laboy MD   magnesium hydroxide (MILK OF MAGNESIA CONCENTRATE) 2400 MG/10ML SUSP Take 8 mLs by mouth 2 times daily 2/9/18  Yes Gavin Laboy MD   saccharomyces boulardii (FLORASTOR) 250 MG capsule Take 1 capsule by mouth daily 2/9/18  Yes Gavin Laboy MD   Probiotic Product (ALIGN) 4 MG CAPS Take 4 mg by mouth nightly 2/9/18  Yes Gavin Laboy MD   Sodium Phosphates (FLEET) 7-19 GM/118ML Place 1 enema rectally daily as needed (constipation) 2/9/18  Yes Gavin Laboy MD   diazepam (DIASTAT) 10 MG GEL Place 10 mg rectally once as needed (seizure) for up to 1 dose. 2/9/18 9/7/19 Yes Gavin Laboy MD   esomeprazole Magnesium (NEXIUM) 40 MG PACK Take 20 mg by mouth 2 times daily    Yes Historical Provider, MD   ibuprofen (ADVIL;MOTRIN) 100 MG/5ML suspension Take 10 mg/kg by mouth every 4 hours as needed for Fever   Yes Historical Provider, MD   levETIRAcetam (KEPPRA) 100 MG/ML solution Take 14ML two times a day by G-button  Patient taking differently: Take 15ML two times a day by G-button 12/28/17 12/28/18 Yes Gavin Laboy MD   esomeprazole ([employer]) 20 MG delayed release capsule Take one capsule by g-tube every morning and one capsule by g-tube every evening 8/28/18 8/28/19  Gavin Laboy MD   levalbuterol ([place]) 1.25 MG/3ML nebulizer solution Take 3 mLs by nebulization every 8 hours as needed for Wheezing 8/10/18 9/9/18  April Clarke MD   zolpidem (AMBIEN) 5 MG tablet TAKE ONE TABLET BY G-TUBE EVERY NIGHT AT BEDTIME **MAY GIVE SECOND DOSE NIGHTLY IF NEEDED FOR SLEEP**. 8/1/18 7/31/19  Gavin Laboy MD   nystatin (MYCOSTATIN) 635022 UNIT/GM ointment Apply topically 2 times daily. Patient taking differently: as needed Apply topically 2 times daily.  2/9/18 2/9/19 Immunology: negative for postnasal drip or seasonal allergies  Hematological and Lymphatic: negative for bleeding problems, swollen lymph nodes  Respiratory: positive for cough negative for hemoptysis, pleuritic pain, shortness of breath, sputum changes or wheezing  Cardiovascular: negative for edema or palpitations  Gastrointestinal: negative for abdominal pain, change in bowel habits or nausea/vomiting  Genito-Urinary: negative for dysuria or urinary frequency/urgency  Musculoskeletal: negative for joint pain or joint swelling  Neurological: negative for numbness/tingling, seizures or weakness  Dermatological: negative for pruritus or rash    PHYSICAL EXAMINATION     VITAL SIGNS:   /79   Pulse 65   Temp 98.4 °F (36.9 °C) (Axillary)   Resp 14   Ht 4' 7\" (1.397 m)   Wt 76 lb (34.5 kg)   LMP 11/24/2018 (Exact Date)   SpO2 97%   BMI 17.66 kg/m²     SYSTEMIC EXAMINATION:   General appearance - alert, well appearing, and in no distress  Mental status - alert, Awake but noncommunicative  Mouth - mucous membranes moist, pharynx normal without lesions  Neck - supple, no significant adenopathy  Chest - patient has poor inspiratory effort. No rhonchi or wheezing heard.   Not much at a moment for the patient comfortable  Heart - normal rate, regular rhythm, normal S1, S2, no murmurs, rubs, clicks or gallops  Abdomen - soft, nontender, nondistended, no masses or organomegaly, PEG tube in situ, baclofen pump felt under skin in left lower quadrant  Neurological - alert, awake, does not follow commands  Extremities - peripheral pulses normal, no pedal edema, no clubbing or cyanosis      DATA REVIEW     Medications: Current Inpatient  Scheduled Meds:   CENTRUM/CERTA-JARED with minerals oral  15 mL Oral Daily    lactobacillus  1 capsule Oral Nightly    lactobacillus  1 capsule Oral Daily    levETIRAcetam  1,400 mg Per G Tube BID    esomeprazole Magnesium  20 mg Per G Tube BID    levalbuterol  1.25 mg Nebulization

## 2018-12-19 NOTE — CONSULTS
bruits. Pulmonary/Chest: Clear to auscultation, without wheezes, rales, or rhonchi. Generally diminished  Cardiovascular: Regular rate and rhythm without murmurs, rubs, or gallops. Abdomen: Soft, non tender. Bowel sounds normal. Button site clean  All four Extremities: No cyanosis, clubbing, edema, or effusions. Neurologic: Pt non verbal, tracks, does not follow commands  Skin: Warm and dry with good turgor. Medical Decision Making -Laboratory:   I have independently reviewed/ordered the following labs:    CBC with Differential:   Recent Labs      187  18   0531   WBC  11.0  9.9   HGB  12.3  11.4*   HCT  40.2  38.2   PLT  307  300   LYMPHOPCT  10*  25   MONOPCT  2  6     BMP:   Recent Labs      18   0531   NA  137  137   K  4.6  3.3*   CL  93*  92*   CO2  34*  30   BUN  11  11   CREATININE  0.20*  <0.20*   MG   --   2.6     Hepatic Function Panel: No results for input(s): PROT, LABALBU, BILIDIR, IBILI, BILITOT, ALKPHOS, ALT, AST in the last 72 hours. No results for input(s): RPR in the last 72 hours. No results for input(s): HIV in the last 72 hours. No results for input(s): BC in the last 72 hours.   Lab Results   Component Value Date    MUCUS 1+ 2018    RBC 4.36 2018    TRICHOMONAS NOT REPORTED 2018    WBC 9.9 2018    YEAST NOT REPORTED 2018    TURBIDITY CLEAR 2018     Lab Results   Component Value Date    CREATININE <0.20 2018    GLUCOSE 114 2018       Medical Decision Making-Imagin/18 CXR  Narrative   EXAMINATION:   SINGLE XRAY VIEW OF THE CHEST       2018 6:03 pm       COMPARISON:   Chest radiograph performed 12/15/2018.       HISTORY:   ORDERING SYSTEM PROVIDED HISTORY: hypoxia, hx of RSV   TECHNOLOGIST PROVIDED HISTORY:   hypoxia, hx of RSV       FINDINGS:   There is mild chronic pulmonary change.  There is no definite acute   consolidation or effusion.  There is no pneumothorax.  The mediastinal

## 2018-12-19 NOTE — ED NOTES
Pt resting comfortably on cot with mother at bedside, awaiting bed upstairs. Call light within reach, will continue to monitor.       Kristal Do RN  12/18/18 2033

## 2018-12-19 NOTE — DISCHARGE INSTR - COC
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 ([address] J98.4, G70.9    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 ([address] G70.9, G47.36    Cellulitis of left elbow L03.114    Pneumonia of left lower lobe due to infectious organism ([address] J18.1    Acute respiratory failure with hypercapnia ([facility]) J96.02    Skin infection at gastrostomy tube site ([address] K94.22, L08.9    Acute on chronic respiratory failure with hypoxia and hypercapnia ([facility]) J96.21, J96.22    Pneumonia due to infectious organism J18.9    Body temperature low R68.89    Hypotension I95.9    Nonverbal R47.01    Sepsis ([facility]) A41.9    Hypothermia due to non-environmental cause R68.0    Prolonged QT interval R94.31    Urinary retention with incomplete bladder emptying R33.9    Chronic respiratory failure with hypoxia and hypercapnia (HCC) J96.11, J96.12    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    Atelectasis J98.11    Dependence on enabling machine Z99.89    Dependent on ventilator ([address] Z99.11    Delay in development R62.50    Tonic-clonic seizures ([address] G40.409    History of recurrent pneumonia Z87.01    Fistula L98.8    Encounter for removal of internal fixation device Z47.2    Sialorrhea K11.7    Myoneural disorder ([facility]) G70.9    Muscle spasticity M62.838    Bladder retention R33.9    Community acquired pneumonia of left lower lobe of lung ([address] J18.1    Aspiration pneumonia ([facility]) J69.0    Intractable vomiting R11.10    Seizure ([address] R56.9    Seizure disorder ([address] G40.909    Abnormal electroencephalogram R94.01    Closed fracture of shaft of right

## 2018-12-19 NOTE — H&P
([address] 1/16/2018    Atelectasis 4/27/2017    BiPAP (biphasic positive airway pressure) dependence     Bladder retention 4/26/2017    Body temperature low     Breast lump in female     one at 3 oclock and one at 7 oclock    Cellulitis     left elbow    Cellulitis of left elbow     Central sleep apnea 1/9/2017    Chronic respiratory failure with hypoxia and hypercapnia (HCC) 4/9/2017    Closed fracture of shaft of right humerus with routine healing 4/24/2018    Community acquired pneumonia of left lower lobe of lung ([address] 1/13/2018    Constipation 8/6/2018    Contracture of elbow joint, right 11/30/2017    Convulsions ([address]     Dependence on enabling machine 4/27/2017    Dependent on ventilator ([address]     Diarrhea 8/6/2018    Dystonia     Encounter for removal of internal fixation device 4/11/2013    Overview:  Removal of spinal rods and baclofen pump on 4.12.13 for suspected infection    Fibroadenoma of breast     Fistula     G tube feedings ([address]     Gastric reflux 8/6/2018    Gastroesophageal reflux disease without esophagitis 10/20/2015    GERD (gastroesophageal reflux disease)     History of recurrent pneumonia 4/27/2017    Hypercapnic respiratory failure ([address]     Hypotension 3/20/2017     Updating Deprecated Diagnoses    Hypothermia due to non-environmental cause 4/8/2017    Kyphoscoliosis     Mild sleep apnea     not treated    Muscle spasticity 4/8/2013    Myoclonus     Myoneural disorder (HCC)     Neuromuscular disease (HCC)     Nonverbal     ISELA (obstructive sleep apnea)     Pneumonia due to infectious organism     left lower lob    Pneumonia of left lower lobe due to infectious organism ([address] 2/28/2017    Prolonged Q-T interval on ECG     Prolonged QT interval 4/8/2017    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 ([address] 1/9/2017    Rett's syndrome     Scoliosis     Seizure ([address] 1/23/2018    Seizure disorder ([address]     Seizures ([address]     Sepsis

## 2018-12-20 VITALS
TEMPERATURE: 98.6 F | HEIGHT: 55 IN | HEART RATE: 77 BPM | BODY MASS INDEX: 18.08 KG/M2 | DIASTOLIC BLOOD PRESSURE: 58 MMHG | RESPIRATION RATE: 14 BRPM | WEIGHT: 78.1 LBS | OXYGEN SATURATION: 96 % | SYSTOLIC BLOOD PRESSURE: 109 MMHG

## 2018-12-20 DIAGNOSIS — R33.9 URINARY RETENTION WITH INCOMPLETE BLADDER EMPTYING: Primary | ICD-10-CM

## 2018-12-20 LAB
ABSOLUTE EOS #: 0 K/UL (ref 0–0.4)
ABSOLUTE IMMATURE GRANULOCYTE: 0.26 K/UL (ref 0–0.3)
ABSOLUTE LYMPH #: 3.3 K/UL (ref 1–4.8)
ABSOLUTE MONO #: 1.45 K/UL (ref 0.1–0.8)
ANION GAP SERPL CALCULATED.3IONS-SCNC: 14 MMOL/L (ref 9–17)
BASOPHILS # BLD: 0 % (ref 0–2)
BASOPHILS ABSOLUTE: 0 K/UL (ref 0–0.2)
BUN BLDV-MCNC: 22 MG/DL (ref 6–20)
BUN/CREAT BLD: ABNORMAL (ref 9–20)
CALCIUM SERPL-MCNC: 9.3 MG/DL (ref 8.6–10.4)
CHLORIDE BLD-SCNC: 96 MMOL/L (ref 98–107)
CO2: 29 MMOL/L (ref 20–31)
CREAT SERPL-MCNC: 0.21 MG/DL (ref 0.5–0.9)
DIFFERENTIAL TYPE: ABNORMAL
EOSINOPHILS RELATIVE PERCENT: 0 % (ref 1–4)
GFR AFRICAN AMERICAN: >60 ML/MIN
GFR NON-AFRICAN AMERICAN: >60 ML/MIN
GFR SERPL CREATININE-BSD FRML MDRD: ABNORMAL ML/MIN/{1.73_M2}
GFR SERPL CREATININE-BSD FRML MDRD: ABNORMAL ML/MIN/{1.73_M2}
GLUCOSE BLD-MCNC: 101 MG/DL (ref 70–99)
GLUCOSE BLD-MCNC: 99 MG/DL (ref 65–105)
HCT VFR BLD CALC: 40.1 % (ref 36.3–47.1)
HEMOGLOBIN: 12.2 G/DL (ref 11.9–15.1)
IMMATURE GRANULOCYTES: 2 %
LYMPHOCYTES # BLD: 25 % (ref 24–44)
MCH RBC QN AUTO: 26.9 PG (ref 25.2–33.5)
MCHC RBC AUTO-ENTMCNC: 30.4 G/DL (ref 28.4–34.8)
MCV RBC AUTO: 88.5 FL (ref 82.6–102.9)
MONOCYTES # BLD: 11 % (ref 1–7)
MORPHOLOGY: NORMAL
NRBC AUTOMATED: 0 PER 100 WBC
PDW BLD-RTO: 13.5 % (ref 11.8–14.4)
PLATELET # BLD: 307 K/UL (ref 138–453)
PLATELET ESTIMATE: ABNORMAL
PMV BLD AUTO: 10.8 FL (ref 8.1–13.5)
POTASSIUM SERPL-SCNC: 3.9 MMOL/L (ref 3.7–5.3)
RBC # BLD: 4.53 M/UL (ref 3.95–5.11)
RBC # BLD: ABNORMAL 10*6/UL
SEG NEUTROPHILS: 62 % (ref 36–66)
SEGMENTED NEUTROPHILS ABSOLUTE COUNT: 8.19 K/UL (ref 1.8–7.7)
SODIUM BLD-SCNC: 139 MMOL/L (ref 135–144)
WBC # BLD: 13.2 K/UL (ref 3.5–11.3)
WBC # BLD: ABNORMAL 10*3/UL

## 2018-12-20 PROCEDURE — 2580000003 HC RX 258: Performed by: STUDENT IN AN ORGANIZED HEALTH CARE EDUCATION/TRAINING PROGRAM

## 2018-12-20 PROCEDURE — 80048 BASIC METABOLIC PNL TOTAL CA: CPT

## 2018-12-20 PROCEDURE — 6360000002 HC RX W HCPCS: Performed by: STUDENT IN AN ORGANIZED HEALTH CARE EDUCATION/TRAINING PROGRAM

## 2018-12-20 PROCEDURE — 85025 COMPLETE CBC W/AUTO DIFF WBC: CPT

## 2018-12-20 PROCEDURE — 82947 ASSAY GLUCOSE BLOOD QUANT: CPT

## 2018-12-20 PROCEDURE — 99232 SBSQ HOSP IP/OBS MODERATE 35: CPT | Performed by: INTERNAL MEDICINE

## 2018-12-20 PROCEDURE — 6370000000 HC RX 637 (ALT 250 FOR IP): Performed by: STUDENT IN AN ORGANIZED HEALTH CARE EDUCATION/TRAINING PROGRAM

## 2018-12-20 PROCEDURE — 97110 THERAPEUTIC EXERCISES: CPT

## 2018-12-20 PROCEDURE — 97530 THERAPEUTIC ACTIVITIES: CPT

## 2018-12-20 PROCEDURE — 94762 N-INVAS EAR/PLS OXIMTRY CONT: CPT

## 2018-12-20 PROCEDURE — 36415 COLL VENOUS BLD VENIPUNCTURE: CPT

## 2018-12-20 PROCEDURE — 94660 CPAP INITIATION&MGMT: CPT

## 2018-12-20 PROCEDURE — 99239 HOSP IP/OBS DSCHRG MGMT >30: CPT | Performed by: INTERNAL MEDICINE

## 2018-12-20 PROCEDURE — 94640 AIRWAY INHALATION TREATMENT: CPT

## 2018-12-20 RX ORDER — LEVALBUTEROL INHALATION SOLUTION 1.25 MG/3ML
1 SOLUTION RESPIRATORY (INHALATION) EVERY 8 HOURS PRN
Qty: 120 ML | Refills: 11 | Status: SHIPPED | OUTPATIENT
Start: 2018-12-20 | End: 2020-07-17 | Stop reason: SDUPTHER

## 2018-12-20 RX ORDER — DOXAZOSIN MESYLATE 1 MG/1
1 TABLET ORAL NIGHTLY
Qty: 90 TABLET | Refills: 0 | Status: SHIPPED | OUTPATIENT
Start: 2018-12-20 | End: 2019-03-23 | Stop reason: SDUPTHER

## 2018-12-20 RX ADMIN — Medication 15 ML: at 09:06

## 2018-12-20 RX ADMIN — MAGNESIUM HYDROXIDE 8 ML: 400 SUSPENSION ORAL at 09:07

## 2018-12-20 RX ADMIN — LEVETIRACETAM 1400 MG: 100 SOLUTION ORAL at 09:07

## 2018-12-20 RX ADMIN — Medication 1 CAPSULE: at 09:05

## 2018-12-20 RX ADMIN — LEVALBUTEROL HYDROCHLORIDE 1.25 MG: 1.25 SOLUTION RESPIRATORY (INHALATION) at 08:11

## 2018-12-20 RX ADMIN — Medication 10 ML: at 09:08

## 2018-12-20 RX ADMIN — SODIUM CHLORIDE, PRESERVATIVE FREE 10 ML: 5 INJECTION INTRAVENOUS at 09:08

## 2018-12-20 RX ADMIN — ENOXAPARIN SODIUM 30 MG: 30 INJECTION SUBCUTANEOUS at 09:05

## 2018-12-20 ASSESSMENT — ENCOUNTER SYMPTOMS
COUGH: 1
EYE REDNESS: 0
SHORTNESS OF BREATH: 0
VOMITING: 0
STRIDOR: 0

## 2018-12-20 NOTE — CARE COORDINATION
[facility] [place] [facility]  Written by: Vibha Villa RN    Patient Name: Idris Gunderson  Attending Provider: Georgette Boyer MD  Admit Date: 2018  4:25 PM  MRN: [id number]  Account: [de-identified]                     : 1995  Discharge Date: 2018      Disposition: home with mom, Home Care with [facility] [place] notified [place] of transition home.  Faxed SHAWN/AVS, HC order and facesheet to [address] [place], [place] [address]ate Dr RN

## 2018-12-21 ENCOUNTER — CARE COORDINATION (OUTPATIENT)
Dept: CASE MANAGEMENT | Age: 23
End: 2018-12-21

## 2018-12-21 DIAGNOSIS — J21.0 ACUTE BRONCHIOLITIS DUE TO RESPIRATORY SYNCYTIAL VIRUS: ICD-10-CM

## 2018-12-21 DIAGNOSIS — J96.01 ACUTE RESPIRATORY FAILURE WITH HYPOXIA (HCC): Primary | ICD-10-CM

## 2018-12-21 DIAGNOSIS — E46 MALNUTRITION RELATED TO CHRONIC DISEASE (HCC): ICD-10-CM

## 2018-12-21 PROCEDURE — 1111F DSCHRG MED/CURRENT MED MERGE: CPT | Performed by: PEDIATRICS

## 2018-12-22 NOTE — CARE COORDINATION
[facility] Transitions Initial Follow Up Call    Call within 2 business days of discharge: Yes    Patient: Emil Combs Patient : 1995   MRN: [id number]    Reason for Admission: acute resp failure, RSV, hypoxia  Discharge Date: 18   RARS: Readmission Risk Score: 10, CMRS 12     Spoke with: pt mom Estrada Acuña    Call to pt mom who states pt doing well. States her breathing has been much better  Writer reviewed role of CTC following discharge- v/u. Contact information provided. Agreeable to transition calls   Confirms levalbuterol picked up as well as Dulera inhaler which pt is unable to use  States she is waiting to hear from home care to call her back with visit time  States she will call this week to schedule appointments  States meds on AVS are correct  Medications reviewed and reconciled.  1111F complete   Denies needs  Encouraged call writer/ CTC or providers if questions or concerns- v/u       Facility: [facility]     Non-face-to-face services provided:  Scheduled appointment with PCP-pt mom calling to schedule  Scheduled appointment with Specialist-pt mom calling to schedule  Obtained and reviewed discharge summary and/or continuity of care documents  Communication with home health agencies or other community services the patient is currently using-pt mom waiting for them to call back for visit time  Assessment and support for treatment adherence and medication management-1111F complete    Care Transitions 24 Hour Call    Do you have any ongoing symptoms?:  No  Do you have a copy of your discharge instructions?:  Yes  Do you have all of your prescriptions and are they filled?:  Yes  Have you been contacted by a [facility]EEANNA [facility]URTADO Pharmacist?:  No  Have you scheduled your follow up appointment?:  No  Were you discharged with any Home Care or Post Acute Services:  Yes  Post Acute Services:  [facility] [place] you feel like you have everything you need to keep you well at home?:  Yes  Care Transitions Interventions Follow Up  No future appointments.     Audrey Hirsch RN

## 2018-12-26 ENCOUNTER — CARE COORDINATION (OUTPATIENT)
Dept: CASE MANAGEMENT | Age: 23
End: 2018-12-26

## 2018-12-26 NOTE — CARE COORDINATION
[facility] Transitions Follow Up Call    2018    Patient: Joseph Henderson  Patient : 1995   MRN: [id number]  Reason for Admission: acute resp failure, RSV, hypoxia  Discharge Date: 18 RARS: Readmission Risk Score: 10  CMRS: 12       Spoke with: Ginette Romero, legal guardian and mother. Outreach to family/legal guardian for care transitions. Spoke with Ginette Romero. Ginette Romero reports Manuel Humphreys is doing well, denies symptoms related to admission. Mother denies questions/changes to medications at the time of outreach. Mother confirms resumption of care with [facility]. Family deny new needs/concerns at the time of outreach. Care Transitions Subsequent and Final Call    Subsequent and Final Calls  Do you have any ongoing symptoms?:  No  Have your medications changed?:  No  Do you have any questions related to your medications?:  No  Do you currently have any active services?:  Yes  Are you currently active with any services?:  Home Health  Do you have any needs or concerns that I can assist you with?:  No  Identified Barriers:  Impairment, Other  Care Transitions Interventions  Other Interventions: Follow Up  No future appointments.     Kimber Carvalho MA

## 2018-12-28 NOTE — PROGRESS NOTES
BRONCHOSPASM/BRONCHOCONSTRICTION     [x]         IMPROVE AERATION/BREATH SOUNDS  [x]   ADMINISTER BRONCHODILATOR THERAPY AS APPROPRIATE  [x]   ASSESS BREATH SOUNDS  []   IMPLEMENT AEROSOL/MDI PROTOCOL  [x]   PATIENT EDUCATION AS NEEDED      NON INVASIVE VENTILATION  PROVIDE OPTIMAL VENTILATION/ACCEPTABLE SP02  IMPLEMENT NON INVASIVE VENTILATION PROTOCOL  ASSESSMENT SKIN INTEGRITY  PATIENT EDUCATION AS NEEDED  BIPAP AS NEEDED
BRONCHOSPASM/BRONCHOCONSTRICTION     [x]         IMPROVE AERATION/BREATH SOUNDS  [x]   ADMINISTER BRONCHODILATOR THERAPY AS APPROPRIATE  [x]   ASSESS BREATH SOUNDS  []   IMPLEMENT AEROSOL/MDI PROTOCOL  [x]   PATIENT EDUCATION AS NEEDED    NONINVASIVE VENTILATION    PROVIDE OPTIMAL VENTILATION/ACCEPTABLE SPO2   IMPLEMENT NONINVASIVE VENTILATION PROTOCOL   MAINTAIN ACCEPTABLE SPO2   ASSESS SKIN INTEGRITY/BREAKDOWN SCORE   PATIENT EDUCATION AS NEEDED   BIPAP AS NEEDED
[facility], [facility]  Occupational Therapy Not Seen Note    DATE: 2018  Name: Mary Harkins  : 1995  MRN: [id number]    Patient not available for Occupational Therapy due to:    [] Testing:    [] Hemodialysis    [] Blood Transfusion in Progress    []Refusal by Patient:    [] Surgery/Procedure:    [] Strict Bedrest    [] Sedation    [] Spine Precautions     [] Pt being transferred to palliative care at this time. Spoke with pt/family and OT services to be defered. [] Pt independent with functional mobility and functional tasks. Pt with no OT acute care needs at this time, will defer OT eval.    [x] Other: defer OT evaluation, per mother pt is dependent for all ADL care at home at baseline.      Next Scheduled Treatment: Defer OT evaluation    Signature: JATINDER Shelton/IVONNE
[facility] Taylorr[facility] Acute respiratory failure ([facility] [address] [J96.00] . Vitals:    12/19/18 0034   BP:    Pulse:    Resp: 13   Temp:    SpO2:    . Patients home meds are   Prior to Admission medications    Medication Sig Start Date End Date Taking? Authorizing Provider   acetaminophen (TYLENOL CHILDRENS) 160 MG/5ML suspension Take 18.09 mLs by mouth every 6 hours as needed for Fever 12/15/18  Yes Caden García DO   vitamin D (AQUEOUS VITAMIN D) 400 UNIT/ML LIQD Take one teaspoonful by G-Tube every day 11/26/18  Yes Cheyenne Morris MD   Multiple Vitamins-Minerals (MULTIVITAMIN WITH IRON-MINERALS) liquid Take 15 mLs by mouth daily 10/29/18 10/29/19 Yes Cheyenne Morris MD   zolpidem (AMBIEN) 10 MG tablet Take 1 tablet by mouth nightly as needed for Sleep. . 10/9/18 10/9/19 Yes Cheyenne Morris MD   doxazosin (CARDURA) 1 MG tablet Take 1 tablet by mouth nightly 6/14/18 6/14/19 Yes Cheyenne Morris MD   traMADol (ULTRAM) 50 MG tablet 1 tablet by Per G Tube route every 8 hours as needed for Pain. . 4/11/18 4/12/19 Yes Cheyenne Morris MD   magnesium hydroxide (MILK OF MAGNESIA CONCENTRATE) 2400 MG/10ML SUSP Take 8 mLs by mouth 2 times daily 2/9/18  Yes Cheyenne Morris MD   saccharomyces boulardii (FLORASTOR) 250 MG capsule Take 1 capsule by mouth daily 2/9/18  Yes Cheyenne Morris MD   Probiotic Product (ALIGN) 4 MG CAPS Take 4 mg by mouth nightly 2/9/18  Yes Cheyenne Morris MD   Sodium Phosphates (FLEET) 7-19 GM/118ML Place 1 enema rectally daily as needed (constipation) 2/9/18  Yes Cheyenne Morris MD   diazepam (DIASTAT) 10 MG GEL Place 10 mg rectally once as needed (seizure) for up to 1 dose.  2/9/18 9/7/19 Yes Cheyenne Morris MD   esomeprazole Magnesium (NEXIUM) 40 MG PACK Take 20 mg by mouth 2 times daily    Yes Historical Provider, MD   ibuprofen (ADVIL;MOTRIN) 100 MG/5ML suspension Take 10 mg/kg by
Nutrition Assessment (Enteral Nutrition)    Type and Reason for Visit: Reassess    Nutrition Recommendations: Continue dental soft diet and nocturnal TF of Vital AF 1.2 (semi-elemental) @ 40 mL/hr until 480 mLs infused ~> 576 kcals, 36 gms protein and 389 mLs H20. Nutrition Assessment:  Pt remains nutritionally compromised aeb severe malnutrition. RN stated that TF ran at goal rate during the night w/ no complications. Pt's mother states that the pt does nocturnal tube feeds at night and eats during the day. Will continue to monitor TF tolerance and adjust TF as needed. Malnutrition Assessment:  · Malnutrition Status: Meets the criteria for severe malnutrition  · Context: Chronic illness  · Findings of the 6 clinical characteristics of malnutrition (Minimum of 2 out of 6 clinical characteristics is required to make the diagnosis of moderate or severe Protein Calorie Malnutrition based on AND/ASPEN Guidelines):    1. Weight Loss-7.5% loss or greater, in 3 months  2. Fat Loss-Moderate subcutaneous fat loss, Orbital, Triceps  3. Muscle Loss-Moderate muscle mass loss, Temples (temporalis muscle), Clavicles (pectoralis and deltoids)  4. Fluid Accumulation-No significant fluid accumulation,    5.   Strength-Not measured    Nutrition Risk Level: High    Nutrition Needs:  · Estimated Daily Total Kcal: 25-28 kcal/kg ~>863-966 kcals/d  · Estimated Daily Protein (g): 1.1-1.3 gm/kg ~>38-45 gms/d    Nutrition Diagnosis:   · Problem: Severe malnutrition  · Etiology: related to Insufficient energy/nutrient consumption (medical conditions)     Signs and symptoms:  as evidenced by NPO status due to medical condition, Weight loss greater than or equal to 7.5% in 3 months, Moderate muscle loss, Moderate loss of subcutaneous fat (Need for EN to meet pt's est'd needs)    Objective Information:  · Nutrition-Focused Physical Findings: active bowel sounds, frail appearance  · Wound Type: None  · Current Nutrition
Ok to discharge per dr. Galindo Partida.
Patient mother was contacted and patient has follow-up with Pulmonologist specialist.
Physical Therapy    Facility/Department: [facility]  Initial Assessment    NAME: Mauricio Swift  : 1995  MRN: [id number]    Date of Service: 2018  The patient is a 26 y.o. female with significant past medical history of Rett disorder, chronic respiratory failure on BiPAP at night, seizure disorder presented with cough, fever and hypoxia. Parents report that patient's symptoms began about a week ago with cough. Patient unable to expectorate. She developed high grade fever (Tmax 102.5 F) and diarrhea 3 days ago. She also began to get short of breath with O2 sats dropping to 70s. She was also requiring increasing amounts of supplemental oxygen (as high as 5 L/min to achieve sPO2 >95%) on BiPAP at night. She previously needed 2 L of 02 at night but has required 02 all day as well in the past 3 days. She also had 3 observed seizure episodes lasting 2 mins each, and has been noticed to be more somnolent. Parents deny vomiting, decreased urine output. Mother and 3year old sibling who live with the patient are also ill with cough and diarrhea. Patient presented to the ED 3 days prior and was diagnosed with RSV infection  Parents have given levaquin and prednisone without improvement in symptoms, and brought patient to ED today due to non-improvement of symptoms  On presentation to the ED, labs showed WBC 11, ABG showed pCO2 54.7, pO2 110, HCO3 33.5. Chest xray showed no acute cardiopulmonary process. Discharge Recommendations:  24 hour supervision or assist, Home with nursing aide   PT Equipment Recommendations  Equipment Needed: No    Patient Diagnosis(es): The encounter diagnosis was Acute respiratory failure with hypoxia ([address] has a past medical history of Abnormal electroencephalogram; Acute on chronic respiratory failure with hypoxia and hypercapnia ([address] Acute respiratory failure with hypercapnia ([address] Aspiration pneumonia ([address] Atelectasis;  BiPAP (biphasic positive airway pressure)
[facility]  Internal Medicine Residency Program  Inpatient Daily Progress Note  ______________________________________________________________________________    Patient: Earlene Man  YOB: 1995   MRN: [id number]    Acct: [de-identified]     Admit date: 12/18/2018  Today's date: 12/19/18  Number of days in the hospital: 1  Expected Discharge Date: 12/20/18    Admitting Diagnosis: Acute bronchiolitis due to respiratory syncytial virus    Subjective:   Pt seen and Chart reviewed. No seizure episodes reported so far. HR reported to occasionally jump to 140s. Mother says this is normal for her when patient gets excited. Review of Systems   Constitutional: Negative for chills and fever. Eyes: Negative for redness. Respiratory: Positive for cough. Negative for shortness of breath and stridor. Genitourinary: Negative for decreased urine volume. Neurological: Negative for seizures. Psychiatric/Behavioral: The patient is not hyperactive. Objective:   Vital Sign:  /68   Pulse 67   Temp 98.2 °F (36.8 °C) (Axillary)   Resp 14   Ht 4' 7\" (1.397 m)   Wt 79 lb 8 oz (36.1 kg)   LMP 11/24/2018 (Exact Date)   SpO2 98%   BMI 18.48 kg/m²       Physical Exam:  General appearance:   alert, well appearing, and in no distress, on BiPAP  Mental Status: alert non verbal  Neurologic:  alert, non verbal, increased muscle tone.   Lungs:  clear to auscultation, no wheezes, rales or rhonchi, symmetric air entry  Heart[de-identified] normal rate, regular rhythm, normal S1, S2, no murmurs, rubs, clicks or gallops  Abdomen:  G-tube in situ, soft, nontender, nondistended, no organomegaly  Extremities: peripheral pulses normal, no pedal edema, no clubbing or cyanosis   Skin: normal coloration and turgor, no rashes, no suspicious skin lesions noted    Medications:  Scheduled Medications   CENTRUM/CERTA-JARED with minerals oral  15 mL Oral Daily    lactobacillus  1 capsule
Senior Note      This is a 26 y.o. female admitted 12/18/2018 for Acute respiratory failure ([facility] [J96.00]. See H&P of Dr Sandra Cadena for more details. History of Rett syndrome, was presenting to the ED on Sunday due to increased fatigue and having a seizure that morning. Patient had respiratory panel drawn and was positive for RSV. Parents showing the patient had a temperature measured at home as high as 102.1 yesterday and patient has been more fatigued and requiring increased oxygen normal dose 2 L at home has been increased to 5 L and saturations improved to 95%. 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 *        Plan   Continue home medications of sher*probiotic, milk of magnesia twice a day, Keppra 17.5 mL twice a day, Nexium 40 mg per day, align probiotic 1 tablet per day, vitamin D 2000 units per day, Centrum multivitamin daily, doxazosin, Ambien when necessary, patient currently has intrathecal baclofen pump at 1000 µg per day and has been changed recently will not be due for next dose in a few months.    RSV. Hypertonic saline aerosol. Symptomatic treatment. Will give one dose IV Solu-Medrol ankle monitor respiratory response. Patient has chronic respiratory failure with oxygen 2 L at night due to central sleep apnea. Will hold on Ribavarin. Will hold on pulmonary consult. Xopenex nebulizer.  Trilogy vent if patient's family can bring in,
somnolence. In the ER her SaO2 on RA was 88%, WBC 11 and ABG pCO2 54, pO2 110. CXR showed no acute process. Pt was admitted for further care and given a hypertonic saline aerosol, a one time dose of 40mg IV solumedrol. She seems to have responded well. Pt currently on 2L O2 NC with SaO2 100%. No further seizures. On exam her lungs are clear. Pt is awake and will track but is nonverbal and does not follow commands    Mom is at the bedside and states that she is more sleepy than normal but better than yesterday. Per RN, no acute events. CURRENT EVALUATION :12/20/2018    Afebrile  VS stable    On room air, comfortable  Has residual dry cough    Sister and parents at bedside. Younger sister and mother had infection before the patient. Patient stable. Family eager to take her home. Labs:  RSV PCR - detected  K 3.3  Cr <0.2  WBC 9.9  Hgb 11.4    Discussed with patient, RN, family. I have personally reviewed the past medical history, past surgical history, medications, social history, and family history, and I have updated the database accordingly.   Past Medical History:     Past Medical History:   Diagnosis Date    Abnormal electroencephalogram 6/25/2018    Acute on chronic respiratory failure with hypoxia and hypercapnia (HCC)     Acute respiratory failure with hypercapnia (HCC)     Aspiration pneumonia (HCC) 1/16/2018    Atelectasis 4/27/2017    BiPAP (biphasic positive airway pressure) dependence     Bladder retention 4/26/2017    Body temperature low     Breast lump in female     one at 3 oclock and one at 7 oclock    Cellulitis     left elbow    Cellulitis of left elbow     Central sleep apnea 1/9/2017    Chronic respiratory failure with hypoxia and hypercapnia (HCC) 4/9/2017    Closed fracture of shaft of right humerus with routine healing 4/24/2018    Community acquired pneumonia of left lower lobe of lung ([facility] [address] 1/13/2018    Constipation 8/6/2018    Contracture of elbow
virus    Malnutrition related to chronic disease ([facility] [address]  Resolved Problems:    * No resolved hospital problems. *     1. Acute bronchiolitis due to RSV. Improving. Continue supportive care with intranasal O2 and rehydration. prn albuterol and pulm toileting. Droplet precautions. Pulmonology following. 2. Acute on chronic respiratory failure with hx of restrictive lung disease and central sleep apnea. Resolving. BiPAP at night. 3. Seizure disorder. Continue keppra via G-tube. 4. Dysphagia S/p G-tube. Continue tube feeding. 5. Rett disorder       Other: continue home medications including Nexium, probiotic, multivitamin, Vit D, doxazosin and ambien prn for sleep. Neena Neri MD  PGY-1 Resident  Internal Medicine  [facility]  12/20/2018    Attending Physician Statement  I have discussed the care of Sonny Slater, including pertinent history and exam findings with the resident. I have reviewed the key elements of all parts of the encounter with the resident. I have seen and examined the patient with the resident and the key elements of all parts of the encounter have been performed by me.         Mars Leahy MD  Attending and Faculty Internal Medicine  [facility]  Internal Medicine [address] [facility] [address]   12/20/18

## 2018-12-31 ENCOUNTER — CARE COORDINATION (OUTPATIENT)
Dept: CASE MANAGEMENT | Age: 23
End: 2018-12-31

## 2019-01-04 PROBLEM — J21.0 ACUTE BRONCHIOLITIS DUE TO RESPIRATORY SYNCYTIAL VIRUS: Status: RESOLVED | Noted: 2018-12-19 | Resolved: 2019-01-04

## 2019-01-04 PROBLEM — J96.00 ACUTE RESPIRATORY FAILURE (HCC): Status: RESOLVED | Noted: 2018-12-18 | Resolved: 2019-01-04

## 2019-01-04 NOTE — DISCHARGE SUMMARY
Infections: none      PATIENT'S DISCHARGE CONDITION:      Stable       DISCHARGE MEDICATIONS    Discharge Medication List as of 12/20/2018  3:29 PM      START taking these medications    Details   mometasone-formoterol (DULERA) 100-5 MCG/ACT inhaler Inhale 2 puffs into the lungs 2 times daily, Disp-1 Inhaler, R-2Normal         CONTINUE these medications which have CHANGED    Details   levalbuterol (XOPENEX) 1.25 MG/3ML nebulizer solution Take 3 mLs by nebulization every 8 hours as needed for Wheezing, Disp-120 mL, R-11Normal         CONTINUE these medications which have NOT CHANGED    Details   acetaminophen (TYLENOL CHILDRENS) 160 MG/5ML suspension Take 18.09 mLs by mouth every 6 hours as needed for Fever, Disp-240 mL, R-0Print      vitamin D (AQUEOUS VITAMIN D) 400 UNIT/ML LIQD Take one teaspoonful by G-Tube every day, Disp-150 mL, R-5Normal      Multiple Vitamins-Minerals (MULTIVITAMIN WITH IRON-MINERALS) liquid Take 15 mLs by mouth daily, Disp-2 Bottle, R-5Normal      zolpidem (AMBIEN) 10 MG tablet Take 1 tablet by mouth nightly as needed for Sleep. ., Disp-30 tablet, R-2Normal      doxazosin (CARDURA) 1 MG tablet Take 1 tablet by mouth nightly, Disp-90 tablet, R-1Normal      traMADol (ULTRAM) 50 MG tablet 1 tablet by Per G Tube route every 8 hours as needed for Pain. ., Disp-30 tablet, R-0Normal      magnesium hydroxide (MILK OF MAGNESIA CONCENTRATE) 2400 MG/10ML SUSP Take 8 mLs by mouth 2 times daily, Oral, 2 TIMES DAILY Starting Fri 2/9/2018, Disp-10 mL, R-5, Print      nystatin (MYCOSTATIN) 594038 UNIT/GM ointment Apply topically 2 times daily. , Disp-30 g, R-6, Normal      saccharomyces boulardii (FLORASTOR) 250 MG capsule Take 1 capsule by mouth daily, Disp-30 capsule, R-11Print      Probiotic Product (ALIGN) 4 MG CAPS Take 4 mg by mouth nightly, Disp-30 capsule, R-11Print      Sodium Phosphates (FLEET) 7-19 GM/118ML Place 1 enema rectally daily as needed (constipation), Disp-30 Bottle, R-11Print

## 2019-01-17 DIAGNOSIS — G47.01 INSOMNIA DUE TO MEDICAL CONDITION: ICD-10-CM

## 2019-01-18 ENCOUNTER — OFFICE VISIT (OUTPATIENT)
Dept: PULMONOLOGY | Age: 24
End: 2019-01-18
Payer: COMMERCIAL

## 2019-01-18 VITALS
BODY MASS INDEX: 16.79 KG/M2 | SYSTOLIC BLOOD PRESSURE: 123 MMHG | RESPIRATION RATE: 12 BRPM | HEIGHT: 58 IN | WEIGHT: 80 LBS | DIASTOLIC BLOOD PRESSURE: 80 MMHG | OXYGEN SATURATION: 98 % | HEART RATE: 85 BPM

## 2019-01-18 DIAGNOSIS — J98.4 RESTRICTIVE LUNG MECHANICS DUE TO NEUROMUSCULAR DISEASE (HCC): Primary | ICD-10-CM

## 2019-01-18 DIAGNOSIS — G47.31 CENTRAL SLEEP APNEA: ICD-10-CM

## 2019-01-18 DIAGNOSIS — Z23 NEED FOR INFLUENZA VACCINATION: ICD-10-CM

## 2019-01-18 DIAGNOSIS — J96.12 CHRONIC RESPIRATORY FAILURE WITH HYPOXIA AND HYPERCAPNIA (HCC): ICD-10-CM

## 2019-01-18 DIAGNOSIS — J96.11 CHRONIC RESPIRATORY FAILURE WITH HYPOXIA AND HYPERCAPNIA (HCC): ICD-10-CM

## 2019-01-18 DIAGNOSIS — G70.9 RESTRICTIVE LUNG MECHANICS DUE TO NEUROMUSCULAR DISEASE (HCC): Primary | ICD-10-CM

## 2019-01-18 DIAGNOSIS — J96.12 CHRONIC RESPIRATORY FAILURE WITH HYPERCAPNIA (HCC): ICD-10-CM

## 2019-01-18 PROCEDURE — 90686 IIV4 VACC NO PRSV 0.5 ML IM: CPT | Performed by: INTERNAL MEDICINE

## 2019-01-18 PROCEDURE — 90471 IMMUNIZATION ADMIN: CPT | Performed by: INTERNAL MEDICINE

## 2019-01-18 PROCEDURE — 99214 OFFICE O/P EST MOD 30 MIN: CPT | Performed by: INTERNAL MEDICINE

## 2019-01-18 RX ORDER — ZOLPIDEM TARTRATE 10 MG/1
10 TABLET ORAL NIGHTLY PRN
Qty: 30 TABLET | Refills: 2 | Status: SHIPPED | OUTPATIENT
Start: 2019-01-18 | End: 2019-04-12 | Stop reason: SDUPTHER

## 2019-01-18 RX ORDER — PREDNISONE 10 MG/1
30 TABLET ORAL DAILY
Qty: 15 TABLET | Refills: 0 | Status: SHIPPED | OUTPATIENT
Start: 2019-01-18 | End: 2019-01-23

## 2019-01-18 RX ORDER — LEVOFLOXACIN 250 MG/1
250 TABLET ORAL DAILY
Qty: 5 TABLET | Refills: 0 | Status: SHIPPED | OUTPATIENT
Start: 2019-01-18 | End: 2019-01-23

## 2019-03-23 DIAGNOSIS — R33.9 URINARY RETENTION WITH INCOMPLETE BLADDER EMPTYING: ICD-10-CM

## 2019-03-25 RX ORDER — DOXAZOSIN MESYLATE 1 MG/1
1 TABLET ORAL NIGHTLY
Qty: 90 TABLET | Refills: 1 | Status: SHIPPED | OUTPATIENT
Start: 2019-03-25 | End: 2019-09-22 | Stop reason: SDUPTHER

## 2019-03-29 ENCOUNTER — OFFICE VISIT (OUTPATIENT)
Dept: FAMILY MEDICINE CLINIC | Age: 24
End: 2019-03-29
Payer: COMMERCIAL

## 2019-03-29 VITALS
HEART RATE: 68 BPM | RESPIRATION RATE: 16 BRPM | DIASTOLIC BLOOD PRESSURE: 62 MMHG | BODY MASS INDEX: 17.84 KG/M2 | OXYGEN SATURATION: 97 % | WEIGHT: 85 LBS | SYSTOLIC BLOOD PRESSURE: 96 MMHG | HEIGHT: 58 IN

## 2019-03-29 DIAGNOSIS — H10.9 CONJUNCTIVITIS OF BOTH EYES, UNSPECIFIED CONJUNCTIVITIS TYPE: Primary | ICD-10-CM

## 2019-03-29 PROCEDURE — 99213 OFFICE O/P EST LOW 20 MIN: CPT | Performed by: NURSE PRACTITIONER

## 2019-03-29 RX ORDER — POLYMYXIN B SULFATE AND TRIMETHOPRIM 1; 10000 MG/ML; [USP'U]/ML
1 SOLUTION OPHTHALMIC 4 TIMES DAILY
Qty: 1 BOTTLE | Refills: 0 | Status: SHIPPED | OUTPATIENT
Start: 2019-03-29 | End: 2019-04-05

## 2019-03-29 ASSESSMENT — ENCOUNTER SYMPTOMS
PHOTOPHOBIA: 0
FOREIGN BODY SENSATION: 0
DIARRHEA: 0
VOMITING: 0
EYE REDNESS: 1
EYE ITCHING: 1
RHINORRHEA: 0
EYE DISCHARGE: 1

## 2019-04-12 DIAGNOSIS — G47.01 INSOMNIA DUE TO MEDICAL CONDITION: ICD-10-CM

## 2019-04-12 RX ORDER — ZOLPIDEM TARTRATE 10 MG/1
10 TABLET ORAL NIGHTLY PRN
Qty: 30 TABLET | Refills: 2 | Status: SHIPPED | OUTPATIENT
Start: 2019-04-12 | End: 2019-07-24 | Stop reason: SDUPTHER

## 2019-04-12 NOTE — TELEPHONE ENCOUNTER
LRF 1/18/2019 #30 RF2  LOV 9/7/2018  RTO 6 Months    OARRS 1/18/2019    Health Maintenance   Topic Date Due    Varicella Vaccine (2 of 2 - 2-dose childhood series) 03/30/2000    HIV screen  03/30/2011    Chlamydia screen  10/12/2016    Cervical cancer screen  03/30/2017    DTaP/Tdap/Td vaccine (7 - Td) 11/27/2017    HPV vaccine  Completed    Flu vaccine  Completed    Pneumococcal 0-64 years Vaccine  Completed             (applicable per patient's age: Cancer Screenings, Depression Screening, Fall Risk Screening, Immunizations)    AST (U/L)   Date Value   01/23/2018 72 (H)     ALT (U/L)   Date Value   01/23/2018 22     BUN (mg/dL)   Date Value   12/20/2018 22 (H)      (goal A1C is < 7)   (goal LDL is <100) need 30-50% reduction from baseline     BP Readings from Last 3 Encounters:   03/29/19 96/62   01/18/19 123/80   12/20/18 (!) 109/58    (goal /80)      All Future Testing planned in [facility]:  Lab Frequency Next Occurrence   Blood Gas, Arterial Once 11/06/2018       Next Visit Date:  Future Appointments   Date Time Provider Department Center   4/26/2019  2:20 PM MD Eliu Lema [facility]   7/19/2019  1:45 PM Emna Pierce MD Resp Spec [facility]            Patient Active Problem List:     Rett's syndrome     Scoliosis     Dystonia     Tremors of nervous system     Convulsions ([address]     Gastroesophageal reflux disease without esophagitis     Fibroadenoma of breast     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 [facility])     Acute respiratory failure with hypercapnia ([address]     Skin infection at gastrostomy tube site [facility])     Acute on chronic respiratory failure with hypoxia and hypercapnia ([address]     Pneumonia due to infectious organism     Body temperature low Hypotension     Nonverbal     Sepsis ([address]     Hypothermia due to non-environmental cause     Prolonged QT interval     Urinary retention with incomplete bladder emptying     Chronic respiratory failure with hypoxia and hypercapnia ([facility])     Myoclonus     Atelectasis     Dependence on enabling machine     Dependent on ventilator ([address]     Delay in development     Tonic-clonic seizures ([address]     History of recurrent pneumonia     Fistula     Encounter for removal of internal fixation device     Sialorrhea     Myoneural disorder (HCC)     Muscle spasticity     Bladder retention     Community acquired pneumonia of left lower lobe of lung (HCC)     Aspiration pneumonia ([facility])     Intractable vomiting     Seizure ([address]     Seizure disorder ([facility])     Abnormal electroencephalogram     Closed fracture of shaft of right humerus with routine healing     Constipation     Contracture of elbow joint, right     Diarrhea     Gastric reflux     Spastic quadriplegic cerebral palsy ([address]     Wheelchair bound     Malnutrition related to chronic disease ([address]

## 2019-05-04 ENCOUNTER — HOSPITAL ENCOUNTER (EMERGENCY)
Facility: CLINIC | Age: 24
Discharge: ANOTHER HOSPITAL | End: 2019-05-04
Attending: EMERGENCY MEDICINE
Payer: COMMERCIAL

## 2019-05-04 ENCOUNTER — APPOINTMENT (OUTPATIENT)
Dept: GENERAL RADIOLOGY | Facility: CLINIC | Age: 24
End: 2019-05-04
Payer: COMMERCIAL

## 2019-05-04 ENCOUNTER — HOSPITAL ENCOUNTER (INPATIENT)
Age: 24
LOS: 3 days | Discharge: HOME HEALTH | DRG: 177 | End: 2019-05-07
Attending: INTERNAL MEDICINE | Admitting: INTERNAL MEDICINE
Payer: COMMERCIAL

## 2019-05-04 ENCOUNTER — NURSE TRIAGE (OUTPATIENT)
Dept: OTHER | Age: 24
End: 2019-05-04

## 2019-05-04 VITALS
HEIGHT: 55 IN | SYSTOLIC BLOOD PRESSURE: 103 MMHG | RESPIRATION RATE: 20 BRPM | TEMPERATURE: 99.7 F | HEART RATE: 124 BPM | OXYGEN SATURATION: 97 % | WEIGHT: 80 LBS | DIASTOLIC BLOOD PRESSURE: 64 MMHG | BODY MASS INDEX: 18.52 KG/M2

## 2019-05-04 DIAGNOSIS — E87.6 HYPOKALEMIA: ICD-10-CM

## 2019-05-04 DIAGNOSIS — J18.9 PNEUMONIA DUE TO ORGANISM: Primary | ICD-10-CM

## 2019-05-04 PROBLEM — E43 SEVERE MALNUTRITION (HCC): Status: ACTIVE | Noted: 2019-05-04

## 2019-05-04 LAB
-: ABNORMAL
-: NORMAL
ALBUMIN SERPL-MCNC: 4.1 G/DL (ref 3.5–5.2)
ALBUMIN/GLOBULIN RATIO: 1.3 (ref 1–2.5)
ALP BLD-CCNC: 75 U/L (ref 35–104)
ALT SERPL-CCNC: 16 U/L (ref 5–33)
AMORPHOUS: ABNORMAL
ANION GAP SERPL CALCULATED.3IONS-SCNC: 14 MMOL/L (ref 9–17)
AST SERPL-CCNC: 16 U/L
BACTERIA: ABNORMAL
BILIRUB SERPL-MCNC: 0.2 MG/DL (ref 0.3–1.2)
BILIRUBIN URINE: NEGATIVE
BUN BLDV-MCNC: 13 MG/DL (ref 6–20)
BUN/CREAT BLD: ABNORMAL (ref 9–20)
CALCIUM SERPL-MCNC: 9 MG/DL (ref 8.6–10.4)
CASTS UA: ABNORMAL /LPF (ref 0–8)
CHLORIDE BLD-SCNC: 102 MMOL/L (ref 98–107)
CO2: 26 MMOL/L (ref 20–31)
COLOR: YELLOW
CREAT SERPL-MCNC: <0.4 MG/DL (ref 0.5–0.9)
CRYSTALS, UA: ABNORMAL /HPF
DIRECT EXAM: NORMAL
EPITHELIAL CELLS UA: ABNORMAL /HPF (ref 0–5)
GFR AFRICAN AMERICAN: ABNORMAL ML/MIN
GFR NON-AFRICAN AMERICAN: ABNORMAL ML/MIN
GFR SERPL CREATININE-BSD FRML MDRD: ABNORMAL ML/MIN/{1.73_M2}
GFR SERPL CREATININE-BSD FRML MDRD: ABNORMAL ML/MIN/{1.73_M2}
GLUCOSE BLD-MCNC: 184 MG/DL (ref 70–99)
GLUCOSE URINE: ABNORMAL
KETONES, URINE: NEGATIVE
LACTIC ACID: 3.8 MMOL/L (ref 0.5–2.2)
LEUKOCYTE ESTERASE, URINE: ABNORMAL
Lab: NORMAL
MAGNESIUM: 2 MG/DL (ref 1.6–2.6)
MUCUS: ABNORMAL
NITRITE, URINE: NEGATIVE
OTHER OBSERVATIONS UA: ABNORMAL
PH UA: 8.5 (ref 5–8)
POTASSIUM SERPL-SCNC: 2.9 MMOL/L (ref 3.7–5.3)
POTASSIUM SERPL-SCNC: 3.1 MMOL/L (ref 3.7–5.3)
PROTEIN UA: NEGATIVE
RBC UA: ABNORMAL /HPF (ref 0–4)
REASON FOR REJECTION: NORMAL
RENAL EPITHELIAL, UA: ABNORMAL /HPF
SODIUM BLD-SCNC: 142 MMOL/L (ref 135–144)
SPECIFIC GRAVITY UA: 1.02 (ref 1–1.03)
SPECIMEN DESCRIPTION: NORMAL
TOTAL PROTEIN: 7.3 G/DL (ref 6.4–8.3)
TRICHOMONAS: ABNORMAL
TROPONIN INTERP: NORMAL
TROPONIN T: NORMAL NG/ML
TROPONIN, HIGH SENSITIVITY: 13 NG/L (ref 0–14)
TURBIDITY: CLEAR
URINE HGB: ABNORMAL
UROBILINOGEN, URINE: NORMAL
WBC UA: ABNORMAL /HPF (ref 0–5)
YEAST: ABNORMAL
ZZ NTE CLEAN UP: ORDERED TEST: NORMAL
ZZ NTE WITH NAME CLEAN UP: SPECIMEN SOURCE: NORMAL

## 2019-05-04 PROCEDURE — 2580000003 HC RX 258: Performed by: INTERNAL MEDICINE

## 2019-05-04 PROCEDURE — 93005 ELECTROCARDIOGRAM TRACING: CPT

## 2019-05-04 PROCEDURE — 6360000002 HC RX W HCPCS: Performed by: EMERGENCY MEDICINE

## 2019-05-04 PROCEDURE — 36415 COLL VENOUS BLD VENIPUNCTURE: CPT

## 2019-05-04 PROCEDURE — 2500000003 HC RX 250 WO HCPCS: Performed by: INTERNAL MEDICINE

## 2019-05-04 PROCEDURE — 6370000000 HC RX 637 (ALT 250 FOR IP): Performed by: EMERGENCY MEDICINE

## 2019-05-04 PROCEDURE — 2580000003 HC RX 258: Performed by: EMERGENCY MEDICINE

## 2019-05-04 PROCEDURE — 99284 EMERGENCY DEPT VISIT MOD MDM: CPT

## 2019-05-04 PROCEDURE — 84132 ASSAY OF SERUM POTASSIUM: CPT

## 2019-05-04 PROCEDURE — 2700000000 HC OXYGEN THERAPY PER DAY

## 2019-05-04 PROCEDURE — 2060000000 HC ICU INTERMEDIATE R&B

## 2019-05-04 PROCEDURE — 6360000002 HC RX W HCPCS: Performed by: INTERNAL MEDICINE

## 2019-05-04 PROCEDURE — 87040 BLOOD CULTURE FOR BACTERIA: CPT

## 2019-05-04 PROCEDURE — 84484 ASSAY OF TROPONIN QUANT: CPT

## 2019-05-04 PROCEDURE — 6370000000 HC RX 637 (ALT 250 FOR IP): Performed by: INTERNAL MEDICINE

## 2019-05-04 PROCEDURE — 87804 INFLUENZA ASSAY W/OPTIC: CPT

## 2019-05-04 PROCEDURE — 96361 HYDRATE IV INFUSION ADD-ON: CPT

## 2019-05-04 PROCEDURE — 99223 1ST HOSP IP/OBS HIGH 75: CPT | Performed by: INTERNAL MEDICINE

## 2019-05-04 PROCEDURE — 94640 AIRWAY INHALATION TREATMENT: CPT

## 2019-05-04 PROCEDURE — 83605 ASSAY OF LACTIC ACID: CPT

## 2019-05-04 PROCEDURE — 94762 N-INVAS EAR/PLS OXIMTRY CONT: CPT

## 2019-05-04 PROCEDURE — 96365 THER/PROPH/DIAG IV INF INIT: CPT

## 2019-05-04 PROCEDURE — 80053 COMPREHEN METABOLIC PANEL: CPT

## 2019-05-04 PROCEDURE — 81001 URINALYSIS AUTO W/SCOPE: CPT

## 2019-05-04 PROCEDURE — 85025 COMPLETE CBC W/AUTO DIFF WBC: CPT

## 2019-05-04 PROCEDURE — 96375 TX/PRO/DX INJ NEW DRUG ADDON: CPT

## 2019-05-04 PROCEDURE — 83735 ASSAY OF MAGNESIUM: CPT

## 2019-05-04 PROCEDURE — 71045 X-RAY EXAM CHEST 1 VIEW: CPT

## 2019-05-04 PROCEDURE — 87086 URINE CULTURE/COLONY COUNT: CPT

## 2019-05-04 RX ORDER — ESOMEPRAZOLE MAGNESIUM 40 MG/1
20 FOR SUSPENSION ORAL 2 TIMES DAILY
Status: DISCONTINUED | OUTPATIENT
Start: 2019-05-04 | End: 2019-05-04

## 2019-05-04 RX ORDER — POTASSIUM CHLORIDE 20 MEQ/1
40 TABLET, EXTENDED RELEASE ORAL PRN
Status: DISCONTINUED | OUTPATIENT
Start: 2019-05-04 | End: 2019-05-07 | Stop reason: HOSPADM

## 2019-05-04 RX ORDER — OCTISALATE, AVOBENZONE, HOMOSALATE, AND OCTOCRYLENE 29.4; 29.4; 49; 25.48 MG/ML; MG/ML; MG/ML; MG/ML
4 LOTION TOPICAL NIGHTLY
Status: DISCONTINUED | OUTPATIENT
Start: 2019-05-04 | End: 2019-05-07 | Stop reason: HOSPADM

## 2019-05-04 RX ORDER — 0.9 % SODIUM CHLORIDE 0.9 %
1000 INTRAVENOUS SOLUTION INTRAVENOUS ONCE
Status: COMPLETED | OUTPATIENT
Start: 2019-05-04 | End: 2019-05-04

## 2019-05-04 RX ORDER — ONDANSETRON 2 MG/ML
4 INJECTION INTRAMUSCULAR; INTRAVENOUS EVERY 6 HOURS PRN
Status: DISCONTINUED | OUTPATIENT
Start: 2019-05-04 | End: 2019-05-07 | Stop reason: HOSPADM

## 2019-05-04 RX ORDER — IPRATROPIUM BROMIDE AND ALBUTEROL SULFATE 2.5; .5 MG/3ML; MG/3ML
1 SOLUTION RESPIRATORY (INHALATION)
Status: DISCONTINUED | OUTPATIENT
Start: 2019-05-04 | End: 2019-05-05

## 2019-05-04 RX ORDER — ALBUTEROL SULFATE 2.5 MG/3ML
2.5 SOLUTION RESPIRATORY (INHALATION)
Status: DISCONTINUED | OUTPATIENT
Start: 2019-05-04 | End: 2019-05-07 | Stop reason: HOSPADM

## 2019-05-04 RX ORDER — SODIUM CHLORIDE 0.9 % (FLUSH) 0.9 %
10 SYRINGE (ML) INJECTION PRN
Status: DISCONTINUED | OUTPATIENT
Start: 2019-05-04 | End: 2019-05-07 | Stop reason: HOSPADM

## 2019-05-04 RX ORDER — LEVETIRACETAM 100 MG/ML
1500 SOLUTION ORAL 2 TIMES DAILY
Status: DISCONTINUED | OUTPATIENT
Start: 2019-05-04 | End: 2019-05-07 | Stop reason: HOSPADM

## 2019-05-04 RX ORDER — NICOTINE 21 MG/24HR
1 PATCH, TRANSDERMAL 24 HOURS TRANSDERMAL DAILY PRN
Status: DISCONTINUED | OUTPATIENT
Start: 2019-05-04 | End: 2019-05-07 | Stop reason: HOSPADM

## 2019-05-04 RX ORDER — POTASSIUM BICARBONATE 25 MEQ/1
25 TABLET, EFFERVESCENT ORAL ONCE
Status: DISCONTINUED | OUTPATIENT
Start: 2019-05-04 | End: 2019-05-04

## 2019-05-04 RX ORDER — ZOLPIDEM TARTRATE 5 MG/1
10 TABLET ORAL NIGHTLY PRN
Status: DISCONTINUED | OUTPATIENT
Start: 2019-05-04 | End: 2019-05-07 | Stop reason: HOSPADM

## 2019-05-04 RX ORDER — ALBUTEROL SULFATE 2.5 MG/3ML
2.5 SOLUTION RESPIRATORY (INHALATION) EVERY 4 HOURS PRN
Status: DISCONTINUED | OUTPATIENT
Start: 2019-05-04 | End: 2019-05-07 | Stop reason: HOSPADM

## 2019-05-04 RX ORDER — DIAZEPAM 10 MG/2ML
10 GEL RECTAL
Status: ACTIVE | OUTPATIENT
Start: 2019-05-04 | End: 2019-05-04

## 2019-05-04 RX ORDER — SODIUM PHOSPHATE, DIBASIC AND SODIUM PHOSPHATE, MONOBASIC 7; 19 G/133ML; G/133ML
1 ENEMA RECTAL DAILY PRN
Status: DISCONTINUED | OUTPATIENT
Start: 2019-05-04 | End: 2019-05-07 | Stop reason: HOSPADM

## 2019-05-04 RX ORDER — ACETAMINOPHEN 325 MG/1
650 TABLET ORAL EVERY 4 HOURS PRN
Status: DISCONTINUED | OUTPATIENT
Start: 2019-05-04 | End: 2019-05-04 | Stop reason: SDUPTHER

## 2019-05-04 RX ORDER — NUTRITIONAL SUPPLEMENT 0.04G-1/ML
1 LIQUID (ML) ORAL 3 TIMES DAILY
Status: DISCONTINUED | OUTPATIENT
Start: 2019-05-04 | End: 2019-05-04 | Stop reason: RX

## 2019-05-04 RX ORDER — MULTIVIT-MIN/FERROUS GLUCONATE 9 MG/15 ML
15 LIQUID (ML) ORAL DAILY
Status: DISCONTINUED | OUTPATIENT
Start: 2019-05-04 | End: 2019-05-07 | Stop reason: HOSPADM

## 2019-05-04 RX ORDER — ACETAMINOPHEN 650 MG/1
650 SUPPOSITORY RECTAL ONCE
Status: COMPLETED | OUTPATIENT
Start: 2019-05-04 | End: 2019-05-04

## 2019-05-04 RX ORDER — ACETAMINOPHEN 160 MG/5ML
15 SOLUTION ORAL EVERY 6 HOURS PRN
Status: DISCONTINUED | OUTPATIENT
Start: 2019-05-04 | End: 2019-05-07 | Stop reason: HOSPADM

## 2019-05-04 RX ORDER — 0.9 % SODIUM CHLORIDE 0.9 %
250 INTRAVENOUS SOLUTION INTRAVENOUS ONCE
Status: COMPLETED | OUTPATIENT
Start: 2019-05-04 | End: 2019-05-04

## 2019-05-04 RX ORDER — SODIUM CHLORIDE 9 MG/ML
INJECTION, SOLUTION INTRAVENOUS CONTINUOUS
Status: DISCONTINUED | OUTPATIENT
Start: 2019-05-04 | End: 2019-05-07 | Stop reason: HOSPADM

## 2019-05-04 RX ORDER — SODIUM CHLORIDE 0.9 % (FLUSH) 0.9 %
10 SYRINGE (ML) INJECTION EVERY 12 HOURS SCHEDULED
Status: DISCONTINUED | OUTPATIENT
Start: 2019-05-04 | End: 2019-05-07 | Stop reason: HOSPADM

## 2019-05-04 RX ORDER — POTASSIUM CHLORIDE 7.45 MG/ML
10 INJECTION INTRAVENOUS PRN
Status: DISCONTINUED | OUTPATIENT
Start: 2019-05-04 | End: 2019-05-07 | Stop reason: HOSPADM

## 2019-05-04 RX ORDER — POTASSIUM CHLORIDE 20MEQ/15ML
20 LIQUID (ML) ORAL ONCE
Status: COMPLETED | OUTPATIENT
Start: 2019-05-04 | End: 2019-05-04

## 2019-05-04 RX ORDER — LACTOBACILLUS RHAMNOSUS GG 10B CELL
1 CAPSULE ORAL DAILY
Status: DISCONTINUED | OUTPATIENT
Start: 2019-05-04 | End: 2019-05-04

## 2019-05-04 RX ORDER — DOXAZOSIN 2 MG/1
1 TABLET ORAL NIGHTLY
Status: DISCONTINUED | OUTPATIENT
Start: 2019-05-04 | End: 2019-05-07 | Stop reason: HOSPADM

## 2019-05-04 RX ADMIN — MAGNESIUM HYDROXIDE 24 ML: 400 SUSPENSION ORAL at 21:11

## 2019-05-04 RX ADMIN — Medication 10 ML: at 21:12

## 2019-05-04 RX ADMIN — ACETAMINOPHEN 650 MG: 650 SUPPOSITORY RECTAL at 07:51

## 2019-05-04 RX ADMIN — DOXAZOSIN 1 MG: 2 TABLET ORAL at 21:10

## 2019-05-04 RX ADMIN — POTASSIUM BICARBONATE 40 MEQ: 782 TABLET, EFFERVESCENT ORAL at 21:11

## 2019-05-04 RX ADMIN — ACETAMINOPHEN 578.92 MG: 650 SOLUTION ORAL at 23:49

## 2019-05-04 RX ADMIN — CEFTRIAXONE SODIUM 1 G: 1 INJECTION, POWDER, FOR SOLUTION INTRAMUSCULAR; INTRAVENOUS at 12:21

## 2019-05-04 RX ADMIN — SODIUM CHLORIDE 250 ML: 9 INJECTION, SOLUTION INTRAVENOUS at 19:13

## 2019-05-04 RX ADMIN — LEVETIRACETAM 1500 MG: 100 SOLUTION ORAL at 21:11

## 2019-05-04 RX ADMIN — SODIUM CHLORIDE 1000 ML: 9 INJECTION, SOLUTION INTRAVENOUS at 07:29

## 2019-05-04 RX ADMIN — ACETAMINOPHEN 578.92 MG: 650 SOLUTION ORAL at 15:39

## 2019-05-04 RX ADMIN — CEFTRIAXONE SODIUM 1 G: 1 INJECTION, POWDER, FOR SOLUTION INTRAMUSCULAR; INTRAVENOUS at 07:36

## 2019-05-04 RX ADMIN — DOCUSATE SODIUM 100 MG: 50 LIQUID ORAL at 21:11

## 2019-05-04 RX ADMIN — IPRATROPIUM BROMIDE AND ALBUTEROL SULFATE 1 AMPULE: .5; 3 SOLUTION RESPIRATORY (INHALATION) at 11:38

## 2019-05-04 RX ADMIN — FAMOTIDINE 20 MG: 10 INJECTION, SOLUTION INTRAVENOUS at 12:21

## 2019-05-04 RX ADMIN — ENOXAPARIN SODIUM 40 MG: 40 INJECTION SUBCUTANEOUS at 12:21

## 2019-05-04 RX ADMIN — SODIUM CHLORIDE: 9 INJECTION, SOLUTION INTRAVENOUS at 12:21

## 2019-05-04 RX ADMIN — IPRATROPIUM BROMIDE AND ALBUTEROL SULFATE 1 AMPULE: .5; 3 SOLUTION RESPIRATORY (INHALATION) at 15:10

## 2019-05-04 RX ADMIN — AZITHROMYCIN MONOHYDRATE 500 MG: 500 INJECTION, POWDER, LYOPHILIZED, FOR SOLUTION INTRAVENOUS at 08:02

## 2019-05-04 RX ADMIN — FAMOTIDINE 20 MG: 10 INJECTION, SOLUTION INTRAVENOUS at 21:10

## 2019-05-04 RX ADMIN — POTASSIUM CHLORIDE 20 MEQ: 40 SOLUTION ORAL at 08:38

## 2019-05-04 RX ADMIN — AZITHROMYCIN 500 MG: 500 INJECTION, POWDER, LYOPHILIZED, FOR SOLUTION INTRAVENOUS at 13:00

## 2019-05-04 RX ADMIN — IPRATROPIUM BROMIDE AND ALBUTEROL SULFATE 1 AMPULE: .5; 3 SOLUTION RESPIRATORY (INHALATION) at 20:40

## 2019-05-04 ASSESSMENT — ENCOUNTER SYMPTOMS
EYE REDNESS: 0
DIARRHEA: 0
COUGH: 1
RHINORRHEA: 0
SHORTNESS OF BREATH: 0
EYE DISCHARGE: 0
VOMITING: 0
SORE THROAT: 0

## 2019-05-04 ASSESSMENT — PAIN DESCRIPTION - PAIN TYPE
TYPE: CHRONIC PAIN

## 2019-05-04 ASSESSMENT — PAIN DESCRIPTION - LOCATION
LOCATION: GENERALIZED
LOCATION: GENERALIZED

## 2019-05-04 ASSESSMENT — PAIN SCALES - GENERAL
PAINLEVEL_OUTOF10: 0

## 2019-05-04 ASSESSMENT — PAIN DESCRIPTION - FREQUENCY: FREQUENCY: CONTINUOUS

## 2019-05-04 ASSESSMENT — PAIN DESCRIPTION - DESCRIPTORS: DESCRIPTORS: ACHING

## 2019-05-04 NOTE — DISCHARGE INSTR - COC
Continuity of Care Form    Patient Name: Samuel Griggs   :  1995  MRN:  [id number]    Admit date:  2019  Discharge date:  2019      Code Status Order: Full Code   Advance Directives:   [facility] Documentation     Date/Time Healthcare Directive Type of Healthcare Directive Copy in [address] [place] [address] [address] Agent's Name Healthcare Agent's Phone Number    [phone]  No, patient does not have an advance directive for healthcare treatment -- -- -- -- --          Admitting Physician:  Vivian Barton MD  PCP: Reva Hope MD    Discharging Nurse: Gian Aguilar 23 Unit/Room#: 8698/0721-92  Discharging Unit Phone Number: [phone]    Emergency Contact:   Extended Emergency Contact Information  Primary Emergency Contact: Maris Abdul  Address: [address] Phone: [phone]  Relation: Parent  Secondary Emergency Contact: Tina Ortega [address] Phone: [phone]  Mobile Phone: [phone]  Relation: Other    Past Surgical History:  Past Surgical History:   Procedure Laterality Date    BACLOFEN PUMP IMPLANTATION  19920929    BACLOFEN PUMP IMPLANTATION  2016    CHOLECYSTECTOMY  [id number]    GASTRIC FUNDOPLICATION  [id number]    SPINAL FUSION  [id number]    removal of spinal and baclofen pump 2013       Immunization History:   Immunization History   Administered Date(s) Administered    DTaP 1996, 1996, 1996, 04/15/1997, 2001    HIB PRP-T (ActHIB, Hiberix) 1996, 1996, 1996, 04/15/1997    HPV Gardasil 9-valent 2007, 2008, 2008    Hepatitis A 2009, 2011    Hepatitis B (Recombivax HB) 1996, 1996, 1996    IPV (Ipol) 1996, 1996, 1997, 2001    Influenza Vaccine, unspecified formulation 10/27/2014    Influenza Virus Vaccine 10/12/2015    Influenza, Ova Cake, 3 Years and older, IM (Fluzone 3 yrs and older or Afluria 5 yrs and older) 02/13/2017    Influenza, Ova Cake, 3 yrs and older, IM, PF (Fluzone 3 yrs and older or Afluria 5 yrs and older) 01/18/2019    MMR 04/15/1997, 09/04/2001    Meningococcal MCV4P (Menactra) 11/27/2007, 08/24/2012    Pneumococcal Polysaccharide (Tagbypofw01) 02/15/2001, 02/13/2017    Tdap (Boostrix, Adacel) 11/27/2007    Varicella (Varivax) 04/29/1999       Active Problems:  Patient Active Problem List   Diagnosis Code    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    Scoliosis M41.9    Dystonia G24.9    Tremors of nervous system R25.1    Convulsions ([facility]) R56.9    Gastroesophageal reflux disease without esophagitis K21.9    Fibroadenoma of breast D24.9    Vitamin D deficiency E55.9    Central sleep apnea G47.31    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 ([facility]) J98.4, G70.9    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 ([facility]) G70.9, G47.36    Cellulitis of left elbow L03.114    Pneumonia of left lower lobe due to infectious organism ([facility] J18.1    Acute respiratory failure with hypercapnia ([facility]) J96.02    Skin infection at gastrostomy tube site ([facility] K94.22, L08.9    Acute on chronic respiratory failure with hypoxia and hypercapnia ([facility]) J96.21, J96.22    Pneumonia due to infectious organism J18.9    Body temperature low R68.89    Hypotension I95.9    Nonverbal R47.01    Sepsis ([facility]) A41.9    Hypothermia due to non-environmental cause R68.0    Prolonged QT interval R94.31    Urinary retention with incomplete bladder emptying R33.9    Chronic respiratory failure with hypoxia and hypercapnia ([facility]) J96.11, J96.12    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    Atelectasis J98.11    Dependence on enabling machine Z99.89    Dependent on ventilator (HCC) Z99.11    Delay in development R62.50    Tonic-clonic seizures ([facility] G40.409    History of recurrent pneumonia Z87.01    Fistula L98.8    Encounter for removal of internal fixation device Z47.2    Sialorrhea K11.7    Myoneural disorder ([facility]) G70.9    Muscle spasticity M62.838    Bladder retention R33.9    Community acquired pneumonia of left lower lobe of lung ([facility] [address] J18.1    Aspiration pneumonia ([facility]) J69.0    Intractable vomiting R11.10    Seizure ([facility] R56.9    Seizure disorder ([facility]) G40.909    Abnormal electroencephalogram R94.01    Closed fracture of shaft of right humerus with routine healing S42.301D    Constipation K59.00    Contracture of elbow joint, right M24.521    Diarrhea R19.7    Gastric reflux K21.9    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    Wheelchair bound Z99.3    Malnutrition related to chronic disease ([facility]) E46    Pneumonia J18.9    Severe malnutrition ([facility]) E43       Isolation/Infection:   Isolation          No Isolation            Nurse Assessment:  Last Vital Signs: BP (!) 94/56   Pulse 113   Temp 99.6 °F (37.6 °C) (Axillary)   Resp 18   Ht 4' 7\" (1.397 m)   Wt 79 lb 5.9 oz (36 kg)   SpO2 99%   BMI 18.45 kg/m²     Last documented pain score (0-10 scale): Pain Level: 0(pt nonverbal, no signs of distress)  Last Weight:   Wt Readings from Last 1 Encounters:   05/04/19 79 lb 5.9 oz (36 kg)     Mental Status:  {IP PT MENTAL STATUS:62291}    IV Access:  - None    Nursing Mobility/ADLs:  Walking   Dependent  Transfer  Dependent  Bathing  Dependent  Dressing  Dependent  Toileting  Dependent  Feeding  Dependent  Med Admin  Dependent  Med Delivery   crushed    Wound Care Documentation and Therapy:        Elimination:  Continence:   · Bowel: No  · Bladder: No and ***  Urinary Catheter: None   Colostomy/Ileostomy/Ileal Conduit: No       Date of Last BM: 5/7/2019    No intake or output data in the 24 hours ending 05/04/19 1504  No intake/output data recorded. Safety Concerns:      At Risk for Falls and Aspiration Risk    Impairments/Disabilities:      Speech and Contractures - ***    Nutrition Therapy:  Current Nutrition Therapy:   - Tube Feedings:  Semi-elemental    Routes of Feeding: Gastrostomy Tube  Liquids: No Liquids  Daily Fluid Restriction: no  Last Modified Barium Swallow with Video (Video Swallowing Test): not done    Treatments at the Time of Hospital Discharge:   Respiratory Treatments: ***  Oxygen Therapy:  {Therapy; copd oxygen:33569}  Ventilator:    - BiPAP   IPAP: 12 cmH20, EPAP: 6 cmH2O only when sleeping    Rehab Therapies: {THERAPEUTIC INTERVENTION:7847847672}  Weight Bearing Status/Restrictions: No weight bearing restirctions  Other Medical Equipment (for information only, NOT a DME order):  {EQUIPMENT:943857042}  Other Treatments: ***    Patient's personal belongings (please select all that are sent with patient):  None    RN SIGNATURE:  Electronically signed by Cindy Dumont RN on 5/7/19 at 11:49 AM    CASE MANAGEMENT/SOCIAL WORK SECTION    Inpatient Status Date: 05/04/2019    Readmission Risk Assessment Score:  Readmission Risk              Risk of Unplanned Readmission:        Cori Castillo , [facility], [address]        Phone: [phone]       Fax: [phone]          Discharging to Facility/ Agency   · Name: Rosemarie Rubin  · Address: [address] [place] [address] [place] [facility], [facility]  · Phone: [phone]  · Fax: [phone]    Dialysis Facility (if applicable)   · Name:  · Address:  · Dialysis Schedule:  · Phone:  · Fax:    / signature: Electronically signed by Les Morris RN on 5/4/19 at 3:04 PM    PHYSICIAN SECTION    Prognosis: Fair    Condition at Discharge: Stable    Rehab Potential (if transferring to Rehab): Fair    Recommended Labs or Other Treatments After Discharge:     Physician Certification: I certify the above information and transfer of Alexander Gongora  is necessary for the continuing treatment of the diagnosis listed and that she requires [address] for greater 30 days.      Update Admission H&P: No change in H&P    PHYSICIAN SIGNATURE:  Electronically signed by Lupis Crabtree MD on 5/7/19 at 11:55 AM

## 2019-05-04 NOTE — ED NOTES
Mother states pt has had a moist cough x2 days. Pt also has had a fast heart rate and a fever x1 day.       Dorina Anderson RN  05/04/19 6537

## 2019-05-04 NOTE — PLAN OF CARE
Problem: Restraint Use - Nonviolent/Non-Self-Destructive Behavior:  Goal: Absence of restraint indications  Description  Absence of restraint indications  Outcome: Met This Shift   Pt only needing bilateral wrist restraints at night while using BiPAP.

## 2019-05-04 NOTE — PROGRESS NOTES
fluid accumulation, Moderate muscle loss(Need for EN to meet daily est'd needs)    Objective Information:  ·    · Wound Type: None  · Current Nutrition Therapies:  · Oral Diet Orders: General   · Oral Diet intake: 0%  · Oral Nutrition Supplement (ONS) Orders: None  · Anthropometric Measures:  · Ht: 4' 7\" (139.7 cm)   · Current Body Wt: 79 lb (35.8 kg)  · Admission Body Wt: 80 lb (36.3 kg)  · % Weight Change:  ,  78-85 lbs over 1 yr per EMR   · Ideal Body Wt: 78 lb (35.4 kg), % Ideal Body 103% (adm/ideal)  · BMI Classification: BMI <18.5 Underweight    Nutrition Interventions:   Continue current diet(Start tube feeding as able )  Continued Inpatient Monitoring, Education not appropriate at this time    Nutrition Evaluation:   · Evaluation: Goals set   · Goals: Pt to meet % of est'd needs via PO/EN    · Monitoring: Nutrition Progression, Meal Intake, Supplement Intake, I&O, Weight, Pertinent Labs, Monitor Bowel Function      Electronically signed by Tonya Summers RD, LD on 5/4/19 at 1:45 PM    Contact Number: [phone]    Addendum: 3:00 - Consult for TF ordering and management   -Recommend Vital AF 1.2 (Semi-elemental) @ goal rate of 30 mL/hr x 24 hrs ~>864 kcals, 54 gms protein  -Will monitor EN/PO; adjust as needed

## 2019-05-04 NOTE — ED PROVIDER NOTES
[facility]  [address] [id number]  Phone: [phone]    eMERGENCY dEPARTMENT eNCOUnter          Pt Name: Kaylan Boudreaux  MRN: [id number]  [place] 1995  Date of evaluation: 5/4/2019      CHIEF COMPLAINT       Chief Complaint   Patient presents with    Fever    Tachycardia    Cough       HISTORY OF PRESENT ILLNESS      HPI      Kaylan Boudreaux is a 22 y.o. female who presents with cough and fever that began yesterday. Patient has been doing well otherwise. She is quadriplegic and her mother is her caretaker. Was admitted last in December but otherwise has been doing well. No known recent sick contacts. Mother is concerned about pneumonia as this does occur somewhat frequently. No recent antibiotic use. Patient is nonverbal.  Mother reports her feeding tube has been functioning normally. Denies any other symptoms    REVIEW OF SYSTEMS         Review of Systems   Constitutional: Positive for fever. HENT: Positive for congestion. Negative for rhinorrhea and sore throat. Eyes: Negative for discharge and redness. Respiratory: Positive for cough. Negative for shortness of breath. Cardiovascular: Negative for chest pain. Gastrointestinal: Negative for diarrhea and vomiting. Skin: Negative for rash. All other systems reviewed and are negative.          PAST MEDICAL HISTORY    has a past medical history of Abnormal electroencephalogram, Acute on chronic respiratory failure with hypoxia and hypercapnia (HCC), Acute respiratory failure with hypercapnia (HCC), Aspiration pneumonia (HCC), Atelectasis, BiPAP (biphasic positive airway pressure) dependence, Bladder retention, Body temperature low, Breast lump in female, Cellulitis, Cellulitis of left elbow, Central sleep apnea, Chronic respiratory failure with hypoxia and hypercapnia (HCC), Closed fracture of shaft of right humerus with routine healing, Community acquired pneumonia of left lower lobe of lung ([facility] [address] Constipation, Contracture of elbow joint, right, Convulsions ([address] Dependence on enabling machine, Dependent on ventilator ([address] Diarrhea, Dystonia, Encounter for removal of internal fixation device, Fibroadenoma of breast, Fistula, G tube feedings ([facility]), Gastric reflux, Gastroesophageal reflux disease without esophagitis, GERD (gastroesophageal reflux disease), History of recurrent pneumonia, Hypercapnic respiratory failure ([address] Hypotension, Hypothermia due to non-environmental cause, Kyphoscoliosis, Mild sleep apnea, Muscle spasticity, Myoclonus, Myoneural disorder ([facility]), Neuromuscular disease ([address] Nonverbal, ISELA (obstructive sleep apnea), Pneumonia due to infectious organism, Pneumonia of left lower lobe due to infectious organism [facility]), Prolonged Q-T interval on ECG, Prolonged QT interval, 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 SURGICAL HISTORY      has a past surgical history that includes baclofen pump implantation (41153322); Gastric fundoplication (95905779); Spinal fusion (94828084); Cholecystectomy (03219875); and baclofen pump implantation (06/28/2016). CURRENT MEDICATIONS       Previous Medications    ACETAMINOPHEN (TYLENOL CHILDRENS) 160 MG/5ML SUSPENSION    Take 18.09 mLs by mouth every 6 hours as needed for Fever    DIAZEPAM (DIASTAT) 10 MG GEL    Place 10 mg rectally once as needed (seizure) for up to 1 dose.     DOCUSATE (COLACE) 50 MG/5ML LIQUID    Take 10 mLs by mouth 2 times daily    DOXAZOSIN (CARDURA) 1 MG TABLET    Take 1 tablet by mouth nightly    ESOMEPRAZOLE MAGNESIUM (NEXIUM) 40 MG PACK    Take 20 mg by mouth 2 times daily     FEEDING TUBES - SETS (JAYDEN-KEY GASTROSTOMY KIT 18FR) MISC    3.5 cm    HANDICAP PLACARD MISC    by Does not apply route Expires 12/21/2022    IBUPROFEN (ADVIL;MOTRIN) 100 MG/5ML SUSPENSION    Take 10 mg/kg by mouth every 4 hours as needed for Fever    LEVALBUTEROL (XOPENEX) 1.25 MG/3ML NEBULIZER SOLUTION    Take 3 mLs by nebulization every 8 hours as needed for Wheezing    LEVETIRACETAM (KEPPRA) 100 MG/ML SOLUTION    Take 14ML two times a day by G-button    MAGNESIUM HYDROXIDE (MILK OF MAGNESIA CONCENTRATE) 2400 MG/10ML SUSP    Take 8 mLs by mouth 2 times daily    METHYLNALTREXONE BROMIDE (RELISTOR) 150 MG TABS    Take 1 tablet by mouth daily    MISC NATURAL PRODUCTS (CYSTEX) LIQD    Take 15 mLs by mouth daily    MOMETASONE-FORMOTEROL (DULERA) 100-5 MCG/ACT INHALER    Inhale 2 puffs into the lungs 2 times daily    MULTIPLE VITAMINS-MINERALS (MULTIVITAMIN WITH IRON-MINERALS) LIQUID    Take 15 mLs by mouth daily    NUTRITIONAL SUPPLEMENTS (PEPTAMEN 1 NEETU) LIQD    Take 1 Can by mouth 3 times daily Peptamen Adult    PEDIATRIC MULTIVITAMINS-IRON (MULTI-DELYN/IRON) LIQD    Take one teaspoonful per g-tube every day    PROBIOTIC PRODUCT (ALIGN) 4 MG CAPS    Take 4 mg by mouth nightly    SACCHAROMYCES BOULARDII (FLORASTOR) 250 MG CAPSULE    Take 1 capsule by mouth daily    SODIUM PHOSPHATES (FLEET) 7-19 GM/118ML    Place 1 enema rectally daily as needed (constipation)    VITAMIN D (AQUEOUS VITAMIN D) 400 UNIT/ML LIQD    Take one teaspoonful by G-Tube every day    ZOLPIDEM (AMBIEN) 10 MG TABLET    Take 1 tablet by mouth nightly as needed for Sleep for up to 30 days. ALLERGIES     is allergic to clindamycin/lincomycin; gentamicin; hydrocodone; and tape [adhesive tape]. FAMILY HISTORY     indicated that her mother is alive. She indicated that her father is alive. She indicated that her brother is alive.      family history includes High Blood Pressure in her mother. SOCIAL HISTORY      reports that she has never smoked. She has never used smokeless tobacco. She reports that she does not drink alcohol or use drugs. PHYSICAL EXAM     INITIAL VITALS:  height is 4' 7\" (1.397 m) and weight is 36.3 kg (80 lb). Her temporal temperature is 101.8 °F (38.8 °C). Her blood pressure is 111/72 and her pulse is 148. Her respiration is 22 and oxygen saturation is 91%. Physical Exam   Constitutional: She appears well-developed and well-nourished. Non-toxic appearance. She does not appear ill. No distress. HENT:   Head: Normocephalic and atraumatic. Right Ear: Tympanic membrane, external ear and ear canal normal. Tympanic membrane is not erythematous. Left Ear: Tympanic membrane, external ear and ear canal normal. Tympanic membrane is not erythematous. Nose: Nose normal.   Mouth/Throat: Uvula is midline, oropharynx is clear and moist and mucous membranes are normal. No oropharyngeal exudate, posterior oropharyngeal edema or tonsillar abscesses. Eyes: EOM and lids are normal.   Neck: No tracheal deviation present. Cardiovascular: Regular rhythm. Tachycardia present. Pulmonary/Chest: Effort normal and breath sounds normal. No accessory muscle usage. No respiratory distress. She has no wheezes. Abdominal: Soft. There is no tenderness. Neurological: She is alert. GCS eye subscore is 4. GCS verbal subscore is 5. GCS motor subscore is 6.    baseline mental status per mother. Skin: Skin is warm and dry. No obvious rashes. Psychiatric: She has a normal mood and affect. Her speech is normal.   Vitals reviewed. DIFFERENTIAL DIAGNOSIS/ MDM:     Plan at this time will be to initiate a septic workup and start antibiotics after cultures have been drawn. We'll most likely need to admit. She is tachycardic. Probable respiratory infection based on symptoms and clinical presentation.     DIAGNOSTIC RESULTS     EKG: All EKG's are interpreted by the Emergency Department Physician who either signs or Co-signs this chart in the absence of a cardiologist.        RADIOLOGY:   I directly visualized the following  images and reviewed the radiologist interpretations:  XR CHEST PORTABLE   Final Result   Infiltrate at the left lung base. No results found.     LABS:  Results for orders placed or performed during the hospital encounter of 05/04/19   CBC Auto Differential   Result Value Ref Range    WBC 13.4 (H) 3.5 - 11.0 k/uL    RBC 4.32 4.0 - 5.2 m/uL    Hemoglobin 11.6 (L) 12.0 - 16.0 g/dL    Hematocrit 36.1 36 - 46 %    MCV 83.5 80 - 100 fL    MCH 26.8 26 - 34 pg    MCHC 32.0 31 - 37 g/dL    RDW 14.5 12.5 - 15.4 %    Platelets 426 196 - 690 k/uL    MPV 8.5 6.0 - 12.0 fL    NRBC Automated NOT REPORTED per 100 WBC    Differential Type NOT REPORTED     Seg Neutrophils PENDING %    Lymphocytes PENDING %    Monocytes PENDING %    Eosinophils % PENDING %    Basophils PENDING %    Immature Granulocytes NOT REPORTED 0 %    Segs Absolute PENDING k/uL    Absolute Lymph # PENDING k/uL    Absolute Mono # PENDING k/uL    Absolute Eos # PENDING k/uL    Basophils # PENDING 0.0 - 0.2 k/uL    Absolute Immature Granulocyte NOT REPORTED 0.00 - 0.30 k/uL    WBC Morphology NOT REPORTED     RBC Morphology NOT REPORTED     Platelet Estimate NOT REPORTED    Troponin   Result Value Ref Range    Troponin, High Sensitivity 13 0 - 14 ng/L    Troponin T NOT REPORTED <0.03 ng/mL    Troponin Interp NOT REPORTED        EMERGENCY DEPARTMENT COURSE:     The patient was given the following medications:  Orders Placed This Encounter   Medications    0.9 % sodium chloride bolus    cefTRIAXone (ROCEPHIN) 1 g in sterile water 10 mL IV syringe    azithromycin (ZITHROMAX) 500 mg in D5W 250ml addavial    acetaminophen (TYLENOL) suppository 650 mg        Vitals:    Vitals:    05/04/19 0652 05/04/19 0659   BP: 111/72    Pulse: 148    Resp: (!) 122 22   Temp: 101.8 °F (38.8 °C)    TempSrc: Temporal    SpO2: 91%    Weight: 36.3 kg (80 lb)    Height: 4' 7\" (1.397 m)      -------------------------  BP: 111/72, Temp: 101.8 °F (38.8 °C), Pulse: 148, Resp: 22      Re-evaluation Notes     patient signed over to Dr. Edgardo Vincent at 7:15 AM.      CONSULTS:    None    CRITICAL CARE:     None    PROCEDURES:    None    (Please note that portions of this note were completed with a voice recognition program.  Efforts were made to edit the dictations but occasionally words are mis-transcribed.)    Nadir Coello DO  Attending Emergency Physician        Nadir Coello DO  05/04/19 6556

## 2019-05-04 NOTE — CONSULTS
Consult to Pulmonology  Consult performed by: Lalo Nava MD  Consult ordered by: Shelby Vilchis MD        Consult by Dr Karena Bravo     Patient - Brittney Adame   MRN -  [id number]   Fela # - [de-identified]   - 1995        Date of evaluation -  2019    REASON FOR THE CONSULTATION:  Fever , chills sob   HISTORY OF PRESENT ILLNESS:    Brittney Adame is a 22y.o. year old female known to me from office . Has rld and csa . She has 1 day hx of weakness and tiredness . She is non verbal , hx by her mother . Pt had fever and more coughing   She started prednisone and levoquin which she has at home - gave one dose but pt was tachycardic   So took her to er and has left lower lobe pneumonia.             Immunization   Immunization History   Administered Date(s) Administered    DTaP 1996, 1996, 1996, 04/15/1997, 2001    HIB PRP-T (ActHIB, Hiberix) 1996, 1996, 1996, 04/15/1997    HPV Gardasil 9-valent 2007, 2008, 2008    Hepatitis A 2009, 2011    Hepatitis B (Recombivax HB) 1996, 1996, 1996    IPV (Ipol) 1996, 1996, 1997, 2001    Influenza Vaccine, unspecified formulation 10/27/2014    Influenza Virus Vaccine 10/12/2015    Influenza, Genetta Kiang, 3 Years and older, IM (Fluzone 3 yrs and older or Afluria 5 yrs and older) 2017    Influenza, Genetta Kiang, 3 yrs and older, IM, PF (Fluzone 3 yrs and older or Afluria 5 yrs and older) 2019    MMR 04/15/1997, 2001    Meningococcal MCV4P (Menactra) 2007, 2012    Pneumococcal Polysaccharide ([id number]) 02/15/2001, 2017    Tdap (Boostrix, Adacel) 2007    Varicella (Varivax) 1999        Pneumococcal Vaccine     [x] Up to date    [] Indicated   [] Refused  [] Contraindicated       Influenza Vaccine   [x] Up to date    [] Indicated   [] Refused  [] Contraindicated              PAST MEDICAL HISTORY:       Diagnosis Date    Abnormal electroencephalogram 6/25/2018    Acute on chronic respiratory failure with hypoxia and hypercapnia (HCC)     Acute respiratory failure with hypercapnia (HCC)     Aspiration pneumonia (HCC) 1/16/2018    Atelectasis 4/27/2017    BiPAP (biphasic positive airway pressure) dependence     Bladder retention 4/26/2017    Body temperature low     Breast lump in female     one at 3 oclock and one at 7 oclock    Cellulitis     left elbow    Cellulitis of left elbow     Central sleep apnea 1/9/2017    Chronic respiratory failure with hypoxia and hypercapnia (HCC) 4/9/2017    Closed fracture of shaft of right humerus with routine healing 4/24/2018    Community acquired pneumonia of left lower lobe of lung ([address] 1/13/2018    Constipation 8/6/2018    Contracture of elbow joint, right 11/30/2017    Convulsions ([address]     Dependence on enabling machine 4/27/2017    Dependent on ventilator ([address]     Diarrhea 8/6/2018    Dystonia     Encounter for removal of internal fixation device 4/11/2013    Overview:  Removal of spinal rods and baclofen pump on 4.12.13 for suspected infection    Fibroadenoma of breast     Fistula     G tube feedings ([address]     Gastric reflux 8/6/2018    Gastroesophageal reflux disease without esophagitis 10/20/2015    GERD (gastroesophageal reflux disease)     History of recurrent pneumonia 4/27/2017    Hypercapnic respiratory failure ([address]     Hypotension 3/20/2017     Updating Deprecated Diagnoses    Hypothermia due to non-environmental cause 4/8/2017    Kyphoscoliosis     Mild sleep apnea     not treated    Muscle spasticity 4/8/2013    Myoclonus     Myoneural disorder (HCC)     Neuromuscular disease (HCC)     Nonverbal     ISELA (obstructive sleep apnea)     Pneumonia due to infectious organism     left lower lob    Pneumonia of left lower lobe due to infectious organism ([address] 2/28/2017    Prolonged Q-T interval on ECG     Prolonged QT interval 4/8/2017    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 ([facility] 1/9/2017    Rett's syndrome     Scoliosis     Seizure ([facility] 1/23/2018    Seizure disorder ([address]     Seizures (HCC)     Sepsis ([facility]     Sialorrhea     Skin infection at gastrostomy tube site ([address]     Sleep-related hypoventilation     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 ([facility] 2/14/2017    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    Tonic clonic seizures (HCC)     Tremors of nervous system     Urinary retention with incomplete bladder emptying     Vitamin D deficiency     Wheelchair bound          Family History:       Problem Relation Age of Onset    High Blood Pressure Mother        SURGICAL HISTORY:   Past Surgical History:   Procedure Laterality Date    BACLOFEN PUMP IMPLANTATION  [id number]    BACLOFEN PUMP IMPLANTATION  06/28/2016    CHOLECYSTECTOMY  [id number]    GASTRIC FUNDOPLICATION  [id number]    SPINAL FUSION  [id number]    removal of spinal and baclofen pump 04/01/2013           SOCIAL AND OCCUPATIONAL HEALTH:      TOBACCO:   reports that she has never smoked. She has never used smokeless tobacco.  ETOH:   reports that she does not drink alcohol. ALLERGIES:    Allergies   Allergen Reactions    Clindamycin/Lincomycin     Gentamicin     Hydrocodone     Tape [place]lee [place] Tape] Rash     Redness that lasts a couple days       Home Meds:   Prior to Admission medications    Medication Sig Start Date End Date Taking? Authorizing Provider   zolpidem (AMBIEN) 10 MG tablet Take 1 tablet by mouth nightly as needed for Sleep for up to 30 days.  4/12/19 5/12/19 Yes Aneudy Mack MD   doxazosin (CARDURA) 1 MG tablet Take 1 tablet by mouth nightly 3/25/19 9/21/19 Yes Aneudy Mack MD   levalbuterol [facility]) 1.25 MG/3ML nebulizer solution Take 3 mLs by nebulization every 8 hours as needed for Wheezing 12/20/18 5/4/19 Yes Jennifer Mittal MD   acetaminophen ([address]) 160 MG/5ML suspension Take 18.09 mLs by mouth every 6 hours as needed for Fever 12/15/18  Yes Caden García,    vitamin D (AQUEOUS VITAMIN D) 400 UNIT/ML LIQD Take one teaspoonful by G-Tube every day 11/26/18  Yes Christian Veras MD   Multiple Vitamins-Minerals (MULTIVITAMIN WITH IRON-MINERALS) liquid Take 15 mLs by mouth daily 10/29/18 10/29/19 Yes Christian Veras MD   magnesium hydroxide (MILK OF MAGNESIA CONCENTRATE) 2400 MG/10ML SUSP Take 8 mLs by mouth 2 times daily 2/9/18  Yes Christian Veras MD   saccharomyces boulardii (FLORASTOR) 250 MG capsule Take 1 capsule by mouth daily 2/9/18  Yes Christian Veras MD   Probiotic Product (ALIGN) 4 MG CAPS Take 4 mg by mouth nightly 2/9/18  Yes Christian Veras MD   Sodium Phosphates (FLEET) 7-19 GM/118ML Place 1 enema rectally daily as needed (constipation) 2/9/18  Yes Christian Veras MD   diazepam (DIASTAT) 10 MG GEL Place 10 mg rectally once as needed (seizure) for up to 1 dose.  2/9/18 9/7/19 Yes Christian Veras MD   esomeprazole Magnesium (NEXIUM) 40 MG PACK Take 20 mg by mouth 2 times daily    Yes Historical Provider, MD   ibuprofen (ADVIL;MOTRIN) 100 MG/5ML suspension Take 10 mg/kg by mouth every 4 hours as needed for Fever   Yes Historical Provider, MD   levETIRAcetam (KEPPRA) 100 MG/ML solution Take 14ML two times a day by G-button  Patient taking differently: Take 15ML two times a day by G-button 12/28/17 5/4/19 Yes Christian Veras MD   Handicap Placard [id number] [facility] by Does not apply route Expires 12/21/2022 12/22/17  Yes KYLE Rogers - CNP   Feeding Tubes - Sets (JAYDEN-KEY GASTROSTOMY KIT 18FR) MISC 3.5 cm 5/19/17 9/7/19 Yes Christian Veras MD   Nutritional Supplements (PEPTAMEN 1 NEETU) LIQD Take 1 Can by mouth 3 times daily Peptamen Adult 1/13/17 5/4/19 Yes Christian Veras MD   mometasone-formoterol (DULERA) 100-5 MCG/ACT inhaler Inhale 2 puffs into the lungs 2 times daily  Patient taking differently: Inhale 2 puffs into the lungs 2 times daily  12/20/18   Darrick Ortega MD   docusate (COLACE) 50 MG/5ML liquid Take 10 mLs by mouth 2 times daily 1/25/18   Heather Camejo MD   Pediatric Multivitamins-Iron (MULTI-DELYN/IRON) LIQD Take one teaspoonful per g-tube every day 1/23/18 1/22/19  Chinyere Ling MD   Methylnaltrexone Bromide (RELISTOR) 150 MG TABS Take 1 tablet by mouth daily 1/21/18   Negar Zambrano MD   [facility] SLUSSFORS) LIQD Take 15 mLs by mouth daily 2/3/16 2/13/18  Chinyere Ling MD     CURRENT MEDS :  Scheduled Meds:   CYSTEX  15 mL Oral Daily    PEPTAMEN 1 NEETU  1 Can Oral TID    esomeprazole Magnesium  20 mg Oral BID    Methylnaltrexone Bromide  1 tablet Oral Daily    docusate  100 mg Oral BID    magnesium hydroxide  24 mL Oral BID    saccharomyces boulardii  250 mg Oral Daily    ALIGN  4 mg Oral Nightly    multivitamin with iron-minerals  15 mL Oral Daily    vitamin D  400 Units Oral Daily    mometasone-formoterol  2 puff Inhalation BID    doxazosin  1 mg Oral Nightly    sodium chloride flush  10 mL Intravenous 2 times per day    enoxaparin  40 mg Subcutaneous Daily    ipratropium-albuterol  1 ampule Inhalation Q4H WA    azithromycin  500 mg Intravenous Q24H    And    cefTRIAXone (ROCEPHIN) IV  1 g Intravenous Q24H    famotidine (PEPCID) injection  20 mg Intravenous BID       Continuous Infusions:   sodium chloride             REVIEW OF SYSTEMS:  Ros unable to perform due to non verbal   Vitals:  BP (!) 94/56   Pulse 113   Temp 99.9 °F (37.7 °C) (Axillary)   Resp 18   Ht 4' 7\" (1.397 m)   Wt 79 lb 5.9 oz (36 kg)   SpO2 99%   BMI 18.45 kg/m²     PHYSICAL EXAM:  General Appearance:    Lethargic , cooperative, no distress, appears stated age   Head:    Normocephalic, without obvious abnormality, atraumatic      Eyes:    PERRL, conjunctiva/corneas clear, EOM's intact   Ears:    Normal TM's and 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 *          Hypokalemia      PLAN:      Cont 5 days azithromycin - dose adjusted   Cont ceftriaxone for 7 days   Cont iv fluids   Replace K   Start tf   Dc dulera   Cont bronchodilator   D/w pt's mother       I hope this updates you on my evaluation and clinical thinking. Thank you for allowing me to participate in his care.      Sincerely,      Electronically signed by Tu Ortiz MD on 5/4/2019 at 12:17 PM

## 2019-05-04 NOTE — CARE COORDINATION
Case Management Initial Discharge Plan  Rayna Gamino             Met with:patient & mom to discuss discharge plans. Information verified: address, contacts, phone number, , insurance Yes  PCP: Patience Acosta MD  Date of last visit: 2 months ago    Insurance Provider: Deisy Melchor, [place], and [place]. Discharge Planning    Living Arrangements:  Family Members   Support Systems:  Family Members, Home Care Staff, Parent    Home has 2 stories  ramp stairs to climb to get into front door, 1 flight stairs to climb to reach second floor  Location of bedroom/bathroom in home upstairs. House has an elevator    Patient able to perform ADL's:Dependent    Current Services (outpatient & in home) [facility]  DME equipment: bed, wheelchair, oxygen  DME provider: [facility]    Pharmacy: [address] Medications:  No  Does patient want to participate in local refill/ meds to beds program?  No    Potential Assistance Needed:  Home Care    Patient agreeable to home care: Yes  Freedom of choice provided:  yes    Prior SNF/Rehab Placement and Facility: none  Agreeable to SNF/Rehab: No  [place] of choice provided: n/a   Evaluation: n/a    Expected Discharge date:  19  Patient expects to be discharged to:  home  Follow Up Appointment: Best Day/ Time: Monday AM    Transportation provider: mom  Transportation arrangements needed for discharge: No    Readmission Risk              Risk of Unplanned Readmission:        11             Does patient have a readmission risk score greater than 14?: No  If yes, follow-up appointment must be made within 7 days of discharge. Discharge Plan: home with mom and [facility].. Current.            Electronically signed by Beth Kahn RN on 19 at 3:01 PM

## 2019-05-04 NOTE — ED PROVIDER NOTES
ADDENDUM:      Care of this patient was assumed from Dr Mary Kate Collins. The patient was seen for Fever; Tachycardia; and Cough  . The patient's initial evaluation and plan have been discussed with the prior provider who initially evaluated the patient. Nursing Notes, Past Medical Hx, Past Surgical Hx, Social Hx, Allergies, and Family Hx were all reviewed. Diagnostic Results     EKG: All EKG's are interpreted by the Emergency Department Physician who either signs or Co-signs this chart in the absence of a cardiologist.    And his tach rate of 129 bpm, pupils 112 ms, respirations 70 ms QT corrected is 577 ms axis for the airway was 41° no acute ST-T wave changes seen    RADIOLOGY:All plain film, CT, MRI, and formal ultrasound images (except ED bedside ultrasound) are read by the radiologist and the images and interpretations are reviewed by the emergency physician. Studies:      XR CHEST PORTABLE   Final Result   Infiltrate at the left lung base.              LABS:   Labs Reviewed   LACTIC ACID - Abnormal; Notable for the following components:       Result Value    Lactic Acid 3.8 (*)     All other components within normal limits   COMPREHENSIVE METABOLIC PANEL - Abnormal; Notable for the following components:    Glucose 184 (*)     CREATININE <0.40 (*)     Potassium 2.9 (*)     Total Bilirubin 0.20 (*)     All other components within normal limits   CBC WITH AUTO DIFFERENTIAL - Abnormal; Notable for the following components:    WBC 13.4 (*)     Hemoglobin 11.6 (*)     Seg Neutrophils 92 (*)     Lymphocytes 2 (*)     Eosinophils % 0 (*)     Segs Absolute 12.33 (*)     Absolute Lymph # 0.27 (*)     All other components within normal limits   CULTURE BLOOD #1   CULTURE BLOOD #1   RAPID INFLUENZA A/B ANTIGENS   MAGNESIUM   TROPONIN   SPECIMEN REJECTION       RECENT VITALS:  BP: 103/64, Temp: 99.7 °F (37.6 °C), Pulse: 124, Resp: 20     ED Course     The patient was given the following medications:  Orders Placed This Encounter   Medications    0.9 % sodium chloride bolus    cefTRIAXone (ROCEPHIN) 1 g in sterile water 10 mL IV syringe    azithromycin (ZITHROMAX) 500 mg in D5W 250ml addavial    acetaminophen (TYLENOL) suppository 650 mg    DISCONTD: potassium bicarbonate (K-LYTE) disintegrating tablet 25 mEq    potassium chloride 20 MEQ/15ML (10%) oral solution 20 mEq         Medical Decision Making      Transferred to [facility]    Disposition     CLINICAL IMPRESSION:  1. Pneumonia due to organism    2. Hypokalemia        PATIENT REFERRED TO:  No follow-up provider specified.     DISCHARGE MEDICATIONS:  Discharge Medication List as of 5/4/2019 10:17 AM          DISPOSITION:   Transferred in stabilized condition  Donna Casanova MD  (Please note that portions of this note were completed with a voice recognition program.  Efforts were made to edit the dictations but occasionally words are mis-transcribed.)      Donna Casanova MD  05/15/19 [id number]

## 2019-05-04 NOTE — PROGRESS NOTES
Pt admitted from [place] ED. Pt vitals:  Temp 99.9 SpO2 98% on 2L BP 94/56. Mom at bedside, admission info obtained.  Dr Leah Sorenson notified of pt's arrival.

## 2019-05-04 NOTE — H&P
[facility]    HISTORY AND PHYSICAL EXAMINATION            Date:   5/4/2019  Patient name:  Judith Ghosh  Date of admission:  5/4/2019 10:54 AM  MRN:   [id number]  Account:  [de-identified]  YOB: 1995  PCP:    Chase Coles MD  Room:   5397/6300-34  Code Status:    Full Code    Chief Complaint:   Fever  Tachycardia  Cough    History Obtained From:     mother, electronic medical record    History of Present Illness:     Transferred from [facility] ER with following history:  Judith Ghosh is a 22 y.o. female who presents with cough and fever that began yesterday. Patient has been doing well otherwise. She is quadriplegic and her mother is her caretaker. Was admitted last in December but otherwise has been doing well. No known recent sick contacts. Mother is concerned about pneumonia as this does occur somewhat frequently. No recent antibiotic use. Patient is nonverbal.  Mother reports her feeding tube has been functioning normally. Denies any other symptoms    Patient is nonverbal and all information is provided by her mother, she states that so far she was not expectorating but today she is having some expectoration. Mother is requesting to feed to be changed to continuous because of increased secretions, so far she was getting tube feeds nightly and was taking orally in morning.   She stays on BiPAP at night and mother is requesting for distress at night while on BiPAP since she tends to pull the mask out     Past Medical History:     Past Medical History:   Diagnosis Date    Abnormal electroencephalogram 6/25/2018    Acute on chronic respiratory failure with hypoxia and hypercapnia (HCC)     Acute respiratory failure with hypercapnia (HCC)     Aspiration pneumonia ([facility] [address] 1/16/2018    Atelectasis 4/27/2017    BiPAP (biphasic positive airway pressure) dependence     Bladder retention 4/26/2017    Body temperature low  Breast lump in female     one at 3 oclock and one at 7 oclock    Cellulitis     left elbow    Cellulitis of left elbow     Central sleep apnea 1/9/2017    Chronic respiratory failure with hypoxia and hypercapnia (HCC) 4/9/2017    Closed fracture of shaft of right humerus with routine healing 4/24/2018    Community acquired pneumonia of left lower lobe of lung ([address] 1/13/2018    Constipation 8/6/2018    Contracture of elbow joint, right 11/30/2017    Convulsions ([address]     Dependence on enabling machine 4/27/2017    Dependent on ventilator ([address]     Diarrhea 8/6/2018    Dystonia     Encounter for removal of internal fixation device 4/11/2013    Overview:  Removal of spinal rods and baclofen pump on 4.12.13 for suspected infection    Fibroadenoma of breast     Fistula     G tube feedings ([address]     Gastric reflux 8/6/2018    Gastroesophageal reflux disease without esophagitis 10/20/2015    GERD (gastroesophageal reflux disease)     History of recurrent pneumonia 4/27/2017    Hypercapnic respiratory failure ([address]     Hypotension 3/20/2017     Updating Deprecated Diagnoses    Hypothermia due to non-environmental cause 4/8/2017    Kyphoscoliosis     Mild sleep apnea     not treated    Muscle spasticity 4/8/2013    Myoclonus     Myoneural disorder (HCC)     Neuromuscular disease (HCC)     Nonverbal     ISELA (obstructive sleep apnea)     Pneumonia due to infectious organism     left lower lob    Pneumonia of left lower lobe due to infectious organism ([address] 2/28/2017    Prolonged Q-T interval on ECG     Prolonged QT interval 4/8/2017    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 2/9/18 9/7/19 Yes Mike Gan MD   esomeprazole Magnesium (NEXIUM) 40 MG PACK Take 20 mg by mouth 2 times daily    Yes Historical Provider, MD   ibuprofen (ADVIL;MOTRIN) 100 MG/5ML suspension Take 10 mg/kg by mouth every 4 hours as needed for Fever   Yes Historical Provider, MD   levETIRAcetam (KEPPRA) 100 MG/ML solution Take 14ML two times a day by G-button  Patient taking differently: Take 15ML two times a day by G-button 12/28/17 5/4/19 Yes Mike Gan MD   Handicap [address] [facility] by Does not apply route Expires 12/21/2022 12/22/17  Yes KYLE Elias - CNP   Feeding Tubes - Sets (JAYDEN-KEY GASTROSTOMY KIT 18FR) MISC 3.5 cm 5/19/17 9/7/19 Yes Mike Gan MD   Nutritional Supplements (PEPTAMEN 1 NEETU) LIQD Take 1 Can by mouth 3 times daily Peptamen Adult 1/13/17 5/4/19 Yes Mike Gan MD   mometasone-formoterol (DULERA) 100-5 MCG/ACT inhaler Inhale 2 puffs into the lungs 2 times daily  Patient taking differently: Inhale 2 puffs into the lungs 2 times daily  12/20/18   Caryle Jude, MD   docusate (COLACE) 50 MG/5ML liquid Take 10 mLs by mouth 2 times daily 1/25/18   Nicole Griffin MD   Pediatric Multivitamins-Iron (MULTI-DELYN/IRON) LIQD Take one teaspoonful per g-tube every day 1/23/18 1/22/19  Mike Gan MD   Methylnaltrexone Bromide (RELISTOR) 150 MG TABS Take 1 tablet by mouth daily 1/21/18   Saul Heredia MD   Misc Natural Products (CYSTEX) LIQD Take 15 mLs by mouth daily 2/3/16 2/13/18  Mike Gan MD        Allergies:     Clindamycin/lincomycin; Gentamicin; Hydrocodone; and Tape [adhesive tape]    Social History:     Tobacco:    reports that she has never smoked.  She has never used smokeless tobacco.  Alcohol:      reports that she does not drink alcohol. Drug Use:  reports that she does not use drugs. Family History:     Family History   Problem Relation Age of Onset    High Blood Pressure Mother        Review of Systems:     Positive and Negative as described in HPI. CONSTITUTIONAL:  + for fevers,   HEENT:  negative for vision, hearing changes, runny nose, throat pain  RESPIRATORY:  negative for shortness of breath,+ cough with increased secretions, . CARDIOVASCULAR:  negative for chest pain, palpitations  GASTROINTESTINAL:  negative for nausea, vomiting, diarrhea, constipation, change in bowel habits, abdominal pain   GENITOURINARY:  negative for difficulty of urination, burning with urination, frequency   INTEGUMENT:  negative for rash, skin lesions, easy bruising   HEMATOLOGIC/LYMPHATIC:  negative for swelling/edema   ALLERGIC/IMMUNOLOGIC:  negative for urticaria , itching  ENDOCRINE:  negative increase in drinking, increase in urination, hot or cold intolerance  MUSCULOSKELETAL:  negative joint pains, muscle aches, swelling of joints  NEUROLOGICAL:  negative for headaches, dizziness, lightheadedness, numbness, pain, tingling extremities, she is quadriplegic  BEHAVIOR/PSYCH:  negative for depression, anxiety    Physical Exam:   BP (!) 94/56   Pulse 113   Temp 99.6 °F (37.6 °C) (Axillary)   Resp 20   Ht 4' 7\" (1.397 m)   Wt 79 lb 5.9 oz (36 kg)   SpO2 98%   BMI 18.45 kg/m²   Temp (24hrs), Av.3 °F (37.9 °C), Min:99.6 °F (37.6 °C), Max:101.8 °F (38.8 °C)    No results for input(s): POCGLU in the last 72 hours. No intake or output data in the 24 hours ending 19 1530    General Appearance:  alert,in no acute distress, nonverbal  Mental status: Unable to evaluate  Head:  normocephalic, atraumatic.   Eye: no icterus, redness, pupils equal and reactive, extraocular eye movements intact, conjunctiva clear  Ear: normal external ear, no discharge, hearing intact  Nose:  no drainage noted  Mouth: mucous membranes moist  Neck: supple, no carotid bruits, thyroid not palpable  Lungs: + Left basal rales  Cardiovascular: normal rate, regular rhythm, no murmur, gallop, rub.   Abdomen: Soft, nontender, nondistended, normal bowel sounds, no hepatomegaly or splenomegaly,+ PEG tube in place  Neurologic: Has quadriplegia  Skin: No gross lesions, rashes, bruising or bleeding on exposed skin area  Extremities:  peripheral pulses palpable, + pedal edema with redness of skin which is always care as per mother  Psych: Unable to evaluate    Investigations:      Laboratory Testing:  Recent Results (from the past 24 hour(s))   Magnesium    Collection Time: 05/04/19  7:15 AM   Result Value Ref Range    Magnesium 2.0 1.6 - 2.6 mg/dL   Lactic Acid    Collection Time: 05/04/19  7:15 AM   Result Value Ref Range    Lactic Acid 3.8 (H) 0.5 - 2.2 mmol/L   Comprehensive Metabolic Panel    Collection Time: 05/04/19  7:15 AM   Result Value Ref Range    Glucose 184 (H) 70 - 99 mg/dL    BUN 13 6 - 20 mg/dL    CREATININE <0.40 (L) 0.50 - 0.90 mg/dL    Bun/Cre Ratio NOT REPORTED 9 - 20    Calcium 9.0 8.6 - 10.4 mg/dL    Sodium 142 135 - 144 mmol/L    Potassium 2.9 (LL) 3.7 - 5.3 mmol/L    Chloride 102 98 - 107 mmol/L    CO2 26 20 - 31 mmol/L    Anion Gap 14 9 - 17 mmol/L    Alkaline Phosphatase 75 35 - 104 U/L    ALT 16 5 - 33 U/L    AST 16 <32 U/L    Total Bilirubin 0.20 (L) 0.3 - 1.2 mg/dL    Total Protein 7.3 6.4 - 8.3 g/dL    Alb 4.1 3.5 - 5.2 g/dL    Albumin/Globulin Ratio 1.3 1.0 - 2.5    GFR Non- CANNOT BE CALCULATED >60 mL/min    GFR  CANNOT BE CALCULATED >60 mL/min    GFR Comment          GFR Staging NOT REPORTED    CBC Auto Differential    Collection Time: 05/04/19  7:15 AM   Result Value Ref Range    WBC 13.4 (H) 3.5 - 11.0 k/uL    RBC 4.32 4.0 - 5.2 m/uL    Hemoglobin 11.6 (L) 12.0 - 16.0 g/dL    Hematocrit 36.1 36 - 46 %    MCV 83.5 80 - 100 fL    MCH 26.8 26 - 34 pg    MCHC 32.0 31 - 37 g/dL    RDW 14.5 12.5 - 15.4 %    Platelets 722 410 - 657 k/uL    MPV 8.5 6.0 - 12.0 fL    NRBC Automated NOT REPORTED per 100 WBC    Differential Type NOT REPORTED     Immature Granulocytes NOT REPORTED 0 %    Absolute Immature Granulocyte NOT REPORTED 0.00 - 0.30 k/uL    WBC Morphology NOT REPORTED     RBC Morphology NOT REPORTED     Platelet Estimate NOT REPORTED     Seg Neutrophils 92 (H) 36 - 66 %    Lymphocytes 2 (L) 24 - 44 %    Monocytes 6 2 - 11 %    Eosinophils % 0 (L) 1 - 4 %    Basophils 0 0 - 2 %    Segs Absolute 12.33 (H) 1.8 - 7.7 k/uL    Absolute Lymph # 0.27 (L) 1.0 - 4.8 k/uL    Absolute Mono # 0.80 0.1 - 1.2 k/uL    Absolute Eos # 0.00 0.0 - 0.4 k/uL    Basophils # 0.00 0.0 - 0.2 k/uL    Morphology Normal    Troponin    Collection Time: 05/04/19  7:15 AM   Result Value Ref Range    Troponin, High Sensitivity 13 0 - 14 ng/L    Troponin T NOT REPORTED <0.03 ng/mL    Troponin Interp NOT REPORTED    EKG 12 Lead    Collection Time: 05/04/19  7:34 AM   Result Value Ref Range    Ventricular Rate 129 BPM    Atrial Rate 129 BPM    P-R Interval 112 ms    QRS Duration 70 ms    Q-T Interval 394 ms    QTc Calculation (Bazett) 577 ms    R Axis 41 degrees    T Axis 34 degrees   Rapid influenza A/B antigens    Collection Time: 05/04/19  7:50 AM   Result Value Ref Range    Specimen Description . NASOPHARYNGEAL SWAB     Special Requests NOT REPORTED     Direct Exam       PRESUMPTIVE NEGATIVE for Influenza A + B antigens. PCR testing to confirm this result is available upon request.  Specimen will be saved in the laboratory for 7 days. Please call [phone] if PCR testing is indicated. SPECIMEN REJECTION    Collection Time: 05/04/19  8:11 AM   Result Value Ref Range    Specimen Source . NASOPHARYNGEAL SWAB     Ordered Test RSAD     Reason for Rejection       Unable to perform testing: Repeat testing not warranted.     - NOT REPORTED Imaging/Diagnostics:    CXR:  Infiltrate at the left lung base. Assessment :      Primary Problem  Community acquired pneumonia of left lower lobe of lung [facility])    Active Hospital Problems    Diagnosis Date Noted    Severe malnutrition ([facility] [address] [E43] 05/04/2019     Priority: High    Community acquired pneumonia of left lower lobe of lung ([address] [J18.1] 01/13/2018     Priority: High    Seizure disorder ([facility] [address] [G40.909]      Priority: Medium    Spastic quadriplegic cerebral palsy ([facility] [address] [G80.0] 02/26/2018    Myoneural disorder ([facility] [address] [G70.9] 09/07/2017    History of recurrent pneumonia [Z87.01] 04/27/2017    Nonverbal [R47.01] 03/20/2017    Central sleep apnea [G47.31] 01/09/2017    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 Started on IV Rocephin and Zithromax  2. Resume home medications  3. DVT prophylaxis  4. Dietitians consult for tube feeding  5. Consult pulmonary    Consultations:   IP CONSULT TO PULMONOLOGY  IP CONSULT TO DIETITIAN     Patient is admitted as inpatient status because of co-morbidities listed above, severity of signs and symptoms as outlined, requirement for current medical therapies and most importantly because of direct risk to patient if care not provided in a hospital setting.     Kristian Hernandez MD  5/4/2019  3:30 PM    Copy sent to Dr. Ganesh Ramos MD

## 2019-05-04 NOTE — TELEPHONE ENCOUNTER
Reason for Disposition   [1] Heart beating very rapidly (e.g., > 140 / minute) AND [2] present now  (Exception: during exercise)    Answer Assessment - Initial Assessment Questions  1. DESCRIPTION: \"Please describe your heart rate or heart beat that you are having\" (e.g., fast/slow, regular/irregular, skipped or extra beats, \"palpitations\")      180 bpm per pulse x  2. ONSET: \"When did it start? \" (Minutes, hours or days)       60 min s ago  3. DURATION: \"How long does it last\" (e.g., seconds, minutes, hours)     60 mins and stil 2 180 beats   4. PATTERN \"Does it come and go, or has it been constant since it started? \"  \"Does it get worse with exertion? \"   \"Are you feeling it now?\"        5. TAP: \"Using your hand, can you tap out what you are feeling on a chair or table in front of you, so that I can hear? \" (Note: not all patients can do this)          6. HEART RATE: \"Can you tell me your heart rate? \" \"How many beats in 15 seconds? \"  (Note: not all patients can do this)        180   7. RECURRENT SYMPTOM: \"Have you ever had this before? \" If so, ask: \"When was the last time? \" and \"What happened that time? \"       Pt has hx of RATT, and  Seizures   8. CAUSE: \"What do you think is causing the palpitations? \"      Fever 103.  9. CARDIAC HISTORY: \"Do you have any history of heart disease? \" (e.g., heart attack, angina, bypass surgery, angioplasty, arrhythmia)         10. OTHER SYMPTOMS: \"Do you have any other symptoms? \" (e.g., dizziness, chest pain, sweating, difficulty breathing)       Seizure 2 hours ago. Fever 103.0 currently   11. PREGNANCY: \"Is there any chance you are pregnant? \" \"When was your last menstrual period? \"    Protocols used: HEART RATE AND HEARTBEAT QUESTIONS-[facility]

## 2019-05-05 LAB
ANION GAP SERPL CALCULATED.3IONS-SCNC: 8 MMOL/L (ref 9–17)
BUN BLDV-MCNC: 7 MG/DL (ref 6–20)
BUN/CREAT BLD: ABNORMAL (ref 9–20)
CALCIUM SERPL-MCNC: 8.2 MG/DL (ref 8.6–10.4)
CHLORIDE BLD-SCNC: 107 MMOL/L (ref 98–107)
CO2: 22 MMOL/L (ref 20–31)
CREAT SERPL-MCNC: <0.2 MG/DL (ref 0.5–0.9)
CULTURE: NO GROWTH
GFR AFRICAN AMERICAN: ABNORMAL ML/MIN
GFR NON-AFRICAN AMERICAN: ABNORMAL ML/MIN
GFR SERPL CREATININE-BSD FRML MDRD: ABNORMAL ML/MIN/{1.73_M2}
GFR SERPL CREATININE-BSD FRML MDRD: ABNORMAL ML/MIN/{1.73_M2}
GLUCOSE BLD-MCNC: 102 MG/DL (ref 70–99)
GLUCOSE BLD-MCNC: 85 MG/DL (ref 65–105)
HCT VFR BLD CALC: 31.9 % (ref 36.3–47.1)
HEMOGLOBIN: 10 G/DL (ref 11.9–15.1)
Lab: NORMAL
MCH RBC QN AUTO: 27.9 PG (ref 25.2–33.5)
MCHC RBC AUTO-ENTMCNC: 31.3 G/DL (ref 28.4–34.8)
MCV RBC AUTO: 89.1 FL (ref 82.6–102.9)
NRBC AUTOMATED: 0 PER 100 WBC
PDW BLD-RTO: 14.4 % (ref 11.8–14.4)
PLATELET # BLD: 244 K/UL (ref 138–453)
PMV BLD AUTO: 10.5 FL (ref 8.1–13.5)
POTASSIUM SERPL-SCNC: 4.1 MMOL/L (ref 3.7–5.3)
RBC # BLD: 3.58 M/UL (ref 3.95–5.11)
SODIUM BLD-SCNC: 137 MMOL/L (ref 135–144)
SPECIMEN DESCRIPTION: NORMAL
WBC # BLD: 9.1 K/UL (ref 3.5–11.3)

## 2019-05-05 PROCEDURE — 82947 ASSAY GLUCOSE BLOOD QUANT: CPT

## 2019-05-05 PROCEDURE — 36415 COLL VENOUS BLD VENIPUNCTURE: CPT

## 2019-05-05 PROCEDURE — 99232 SBSQ HOSP IP/OBS MODERATE 35: CPT | Performed by: INTERNAL MEDICINE

## 2019-05-05 PROCEDURE — 6370000000 HC RX 637 (ALT 250 FOR IP): Performed by: INTERNAL MEDICINE

## 2019-05-05 PROCEDURE — 85027 COMPLETE CBC AUTOMATED: CPT

## 2019-05-05 PROCEDURE — 94660 CPAP INITIATION&MGMT: CPT

## 2019-05-05 PROCEDURE — 6360000002 HC RX W HCPCS: Performed by: INTERNAL MEDICINE

## 2019-05-05 PROCEDURE — 2500000003 HC RX 250 WO HCPCS: Performed by: INTERNAL MEDICINE

## 2019-05-05 PROCEDURE — 99233 SBSQ HOSP IP/OBS HIGH 50: CPT | Performed by: INTERNAL MEDICINE

## 2019-05-05 PROCEDURE — 97110 THERAPEUTIC EXERCISES: CPT

## 2019-05-05 PROCEDURE — 80048 BASIC METABOLIC PNL TOTAL CA: CPT

## 2019-05-05 PROCEDURE — 2060000000 HC ICU INTERMEDIATE R&B

## 2019-05-05 PROCEDURE — 94640 AIRWAY INHALATION TREATMENT: CPT

## 2019-05-05 PROCEDURE — 97162 PT EVAL MOD COMPLEX 30 MIN: CPT

## 2019-05-05 PROCEDURE — 94762 N-INVAS EAR/PLS OXIMTRY CONT: CPT

## 2019-05-05 PROCEDURE — 2580000003 HC RX 258: Performed by: INTERNAL MEDICINE

## 2019-05-05 PROCEDURE — 2700000000 HC OXYGEN THERAPY PER DAY

## 2019-05-05 RX ORDER — LEVALBUTEROL INHALATION SOLUTION 0.63 MG/3ML
0.63 SOLUTION RESPIRATORY (INHALATION)
Status: DISCONTINUED | OUTPATIENT
Start: 2019-05-05 | End: 2019-05-07 | Stop reason: HOSPADM

## 2019-05-05 RX ORDER — METOPROLOL TARTRATE 5 MG/5ML
2.5 INJECTION INTRAVENOUS EVERY 6 HOURS PRN
Status: DISCONTINUED | OUTPATIENT
Start: 2019-05-05 | End: 2019-05-07 | Stop reason: HOSPADM

## 2019-05-05 RX ADMIN — CEFTRIAXONE SODIUM 1 G: 1 INJECTION, POWDER, FOR SOLUTION INTRAMUSCULAR; INTRAVENOUS at 11:02

## 2019-05-05 RX ADMIN — METOPROLOL TARTRATE 2.5 MG: 5 INJECTION, SOLUTION INTRAVENOUS at 11:02

## 2019-05-05 RX ADMIN — Medication 15 ML: at 08:56

## 2019-05-05 RX ADMIN — ENOXAPARIN SODIUM 30 MG: 30 INJECTION SUBCUTANEOUS at 08:50

## 2019-05-05 RX ADMIN — IPRATROPIUM BROMIDE AND ALBUTEROL SULFATE 1 AMPULE: .5; 3 SOLUTION RESPIRATORY (INHALATION) at 08:40

## 2019-05-05 RX ADMIN — Medication 400 UNITS: at 08:56

## 2019-05-05 RX ADMIN — DOCUSATE SODIUM 100 MG: 50 LIQUID ORAL at 22:32

## 2019-05-05 RX ADMIN — ACETAMINOPHEN 578.92 MG: 650 SOLUTION ORAL at 09:15

## 2019-05-05 RX ADMIN — LEVETIRACETAM 1500 MG: 100 SOLUTION ORAL at 08:50

## 2019-05-05 RX ADMIN — FAMOTIDINE 20 MG: 10 INJECTION, SOLUTION INTRAVENOUS at 22:36

## 2019-05-05 RX ADMIN — LEVALBUTEROL HYDROCHLORIDE 0.63 MG: 0.63 SOLUTION RESPIRATORY (INHALATION) at 13:49

## 2019-05-05 RX ADMIN — MAGNESIUM HYDROXIDE 24 ML: 400 SUSPENSION ORAL at 08:50

## 2019-05-05 RX ADMIN — AZITHROMYCIN DIHYDRATE 250 MG: 500 INJECTION, POWDER, LYOPHILIZED, FOR SOLUTION INTRAVENOUS at 11:56

## 2019-05-05 RX ADMIN — LEVALBUTEROL HYDROCHLORIDE 0.63 MG: 0.63 SOLUTION RESPIRATORY (INHALATION) at 21:18

## 2019-05-05 RX ADMIN — Medication 10 ML: at 08:50

## 2019-05-05 RX ADMIN — IBUPROFEN 386 MG: 100 SUSPENSION ORAL at 22:29

## 2019-05-05 RX ADMIN — MAGNESIUM HYDROXIDE 24 ML: 400 SUSPENSION ORAL at 22:28

## 2019-05-05 RX ADMIN — Medication 10 ML: at 22:33

## 2019-05-05 RX ADMIN — FAMOTIDINE 20 MG: 10 INJECTION, SOLUTION INTRAVENOUS at 08:51

## 2019-05-05 RX ADMIN — DOXAZOSIN 1 MG: 2 TABLET ORAL at 22:28

## 2019-05-05 RX ADMIN — LEVETIRACETAM 1500 MG: 100 SOLUTION ORAL at 22:28

## 2019-05-05 RX ADMIN — DOCUSATE SODIUM 100 MG: 50 LIQUID ORAL at 08:51

## 2019-05-05 ASSESSMENT — PAIN SCALES - WONG BAKER: WONGBAKER_NUMERICALRESPONSE: 6

## 2019-05-05 ASSESSMENT — PAIN DESCRIPTION - FREQUENCY: FREQUENCY: INTERMITTENT

## 2019-05-05 ASSESSMENT — PAIN DESCRIPTION - DESCRIPTORS: DESCRIPTORS: PATIENT UNABLE TO DESCRIBE

## 2019-05-05 ASSESSMENT — PAIN DESCRIPTION - PAIN TYPE: TYPE: CHRONIC PAIN

## 2019-05-05 ASSESSMENT — PAIN DESCRIPTION - LOCATION: LOCATION: GENERALIZED

## 2019-05-05 ASSESSMENT — PAIN SCALES - GENERAL: PAINLEVEL_OUTOF10: 0

## 2019-05-05 ASSESSMENT — PAIN DESCRIPTION - ORIENTATION: ORIENTATION: RIGHT;LEFT

## 2019-05-05 ASSESSMENT — PAIN DESCRIPTION - ONSET: ONSET: ON-GOING

## 2019-05-05 NOTE — PROGRESS NOTES
Pt's HR reaching 140s while awake. Primary paged, PRN Lopressor ordered. Pulmonology requesting pt be on 2L NC. Pt not following directions and removing NC. Bilateral wrist restraints ordered. Pt's mother at bedside.

## 2019-05-05 NOTE — PROGRESS NOTES
Daniel Shaver 20    Progress Note    5/5/2019    3:00 PM    Name:   Samuel Griggs  MRN:     [id number]     [place]:      [de-identified]   Room:   [address] [place] [address] Day:  1  Admit Date:  5/4/2019 10:54 AM    PCP:   Reva Hope MD  Code Status:  Full Code    Subjective:     C/C:   Fever  Tachycardia  Cough  Interval History Status:  T-max 101.5  Still gets tachycardic  Needs to monitor oxygen via nasal cannula per pulmonary  Getting PEG tube feeding. Needs speech and swallow evaluation in morning to decide if she should continue oral feeding in addition are not        Brief History:   [facility] Osmany is a 22 y.o. female who presents with cough and fever that began yesterday. Brittney Jeffries has been doing well otherwise. Crystal España is quadriplegic and her mother is her caretaker. Edson Belle admitted last in December but otherwise has been doing well. Maritza Mcintosherin known recent sick contacts. Anitha Underwood is concerned about pneumonia as this does occur somewhat frequently.  No recent antibiotic use.  Patient is nonverbal.  Mother reports her feeding tube has been functioning normally.  Denies any other symptoms     Patient is nonverbal and all information is provided by her mother, she states that so far she was not expectorating but today she is having some expectoration. Mother is requesting to feed to be changed to continuous because of increased secretions, so far she was getting tube feeds nightly and was taking orally in morning.   She stays on BiPAP at night and mother is requesting for distress at night while on BiPAP since she tends to pull the mask out            Review of Systems:     Constitutional:  negative for chills, fevers, sweats  Respiratory:  negative for cough, dyspnea on exertion, hemoptysis, shortness of breath, wheezing  Cardiovascular:  negative for chest pain, chest pressure/discomfort, lower extremity edema, palpitations  Gastrointestinal:  negative for abdominal pain, constipation, diarrhea, nausea, vomiting  Neurological:  negative for dizziness, headache    Medications: Allergies:     Allergies   Allergen Reactions    Clindamycin/Lincomycin     Gentamicin     Hydrocodone     Tape Ryan Sow Tape] Rash     Redness that lasts a couple days       Current Meds:   Scheduled Meds:    levalbuterol  0.63 mg Nebulization Q6H WA    docusate  100 mg Oral BID    magnesium hydroxide  24 mL Oral BID    ALIGN  4 mg Oral Nightly    CENTRUM/CERTA-JARED with minerals oral  15 mL Oral Daily    vitamin D  400 Units Oral Daily    doxazosin  1 mg Oral Nightly    sodium chloride flush  10 mL Intravenous 2 times per day    famotidine (PEPCID) injection  20 mg Intravenous BID    enoxaparin  30 mg Subcutaneous Daily    levETIRAcetam  1,500 mg Oral BID    azithromycin  250 mg Intravenous Q24H    And    cefTRIAXone (ROCEPHIN) IV  1 g Intravenous Q24H     Continuous Infusions:    sodium chloride 75 mL/hr at 05/04/19 1221     PRN Meds: metoprolol, fleet, albuterol, zolpidem, sodium chloride flush, ondansetron, nicotine, albuterol, acetaminophen, ibuprofen, potassium chloride **OR** potassium alternative oral replacement **OR** potassium chloride    Data:     Past Medical History:   has a past medical history of Abnormal electroencephalogram, Acute on chronic respiratory failure with hypoxia and hypercapnia (HCC), Acute respiratory failure with hypercapnia (HCC), Aspiration pneumonia (HCC), Atelectasis, BiPAP (biphasic positive airway pressure) dependence, Bladder retention, Body temperature low, Breast lump in female, Cellulitis, Cellulitis of left elbow, Central sleep apnea, Chronic respiratory failure with hypoxia and hypercapnia (HCC), Closed fracture of shaft of right humerus with routine healing, Community acquired pneumonia of left lower lobe of lung ([address] Constipation, Contracture of elbow joint, right, Convulsions ([address] Dependence on enabling machine, Dependent on ventilator ([address] Diarrhea, Dystonia, Encounter for removal of internal fixation device, Fibroadenoma of breast, Fistula, G tube feedings ([facility]), Gastric reflux, Gastroesophageal reflux disease without esophagitis, GERD (gastroesophageal reflux disease), History of recurrent pneumonia, Hypercapnic respiratory failure ([address] Hypotension, Hypothermia due to non-environmental cause, Kyphoscoliosis, Mild sleep apnea, Muscle spasticity, Myoclonus, Myoneural disorder ([facility]), Neuromuscular disease ([address] Nonverbal, ISELA (obstructive sleep apnea), Pneumonia due to infectious organism, Pneumonia of left lower lobe due to infectious organism [facility]), Prolonged Q-T interval on ECG, Prolonged QT interval, 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 Social History:   reports that she has never smoked. She has never used smokeless tobacco. She reports that she does not drink alcohol or use drugs. Family History:   Family History   Problem Relation Age of Onset    High Blood Pressure Mother        Vitals:  BP (!) 94/56   Pulse 95   Temp 99.1 °F (37.3 °C)   Resp 19   Ht 4' 7\" (1.397 m)   Wt 79 lb 5.9 oz (36 kg)   SpO2 100%   BMI 18.45 kg/m²   Temp (24hrs), Av.8 °F (37.7 °C), Min:98.5 °F (36.9 °C), Max:101.5 °F (38.6 °C)    No results for input(s): POCGLU in the last 72 hours. I/O (24Hr):     Intake/Output Summary (Last 24 hours) at 2019 1500  Last data filed at 2019 0600  Gross per 24 hour   Intake 2208 ml   Output 750 ml   Net 1458 ml Labs:    Hematology:  Recent Labs     05/04/19  0715 05/05/19  0748   WBC 13.4* 9.1   HGB 11.6* 10.0*   HCT 36.1 31.9*    244     Chemistry:  Recent Labs     05/04/19  0715 05/04/19  1929 05/05/19  0748     --  137   K 2.9* 3.1* 4.1     --  107   CO2 26  --  22   GLUCOSE 184*  --  102*   BUN 13  --  7   CREATININE <0.40*  --  <0.20*   MG 2.0  --   --    ANIONGAP 14  --  8*   LABGLOM CANNOT BE CALCULATED  --  CANNOT BE CALCULATED   GFRAA CANNOT BE CALCULATED  --  CANNOT BE CALCULATED   CALCIUM 9.0  --  8.2*     Recent Labs     05/04/19 0715   PROT 7.3   LABALBU 4.1   AST 16   ALT 16   ALKPHOS 75   BILITOT 0.20*         Lab Results   Component Value Date/Time    SPECIAL LT WRIST 3ML 05/04/2019 07:36 PM     Lab Results   Component Value Date/Time    CULTURE NO GROWTH 18 HOURS 05/04/2019 07:36 PM       Lab Results   Component Value Date    POCPH 7.395 12/18/2018    POCPCO2 54.7 12/18/2018    POCPO2 110.7 12/18/2018    POCHCO3 33.5 12/18/2018    NBEA NOT REPORTED 12/18/2018    PBEA 7 12/18/2018    BVH1VNV 35 12/18/2018    BRGK4SQJ 98 12/18/2018    FIO2 NOT REPORTED 12/18/2018       Radiology:    Xr Chest Portable    Result Date: 5/4/2019  EXAMINATION: SINGLE XRAY VIEW OF THE CHEST 5/4/2019 7:14 am COMPARISON: Chest radiograph dated 12/18/2018 HISTORY: ORDERING SYSTEM PROVIDED HISTORY: Cough TECHNOLOGIST PROVIDED HISTORY: Cough Ordering Physician Provided Reason for Exam: cough, fever, tachycardia Acuity: Acute Type of Exam: Initial FINDINGS: Cardiac and mediastinal shadows are normal.  Infiltrate at the left lung base. No convincing pleural effusions. No pneumothorax. No free air below the diaphragm. Mild dextroscoliosis of the thoracic spine. Infiltrate at the left lung base.        Physical Examination:        General appearance:  alert, cooperative and no distress  Mental Status:  oriented to person, place and time and normal affect  Lungs:  clear to auscultation bilaterally, normal effort  Heart:  regular rate and rhythm, no murmur  Abdomen:  soft, nontender, nondistended, normal bowel sounds, no masses, hepatomegaly, splenomegaly  Extremities:  no edema, redness, tenderness in the calves  Skin:  no gross lesions, rashes, induration    Assessment:        Primary Problem  Community acquired pneumonia of left lower lobe of lung ([facility]  Tachycardia  Active Hospital Problems    Diagnosis Date Noted    Severe malnutrition ([facility] [E43] 05/04/2019     Priority: High    Community acquired pneumonia of left lower lobe of lung ([facility] [J18.1] 01/13/2018     Priority: High    Seizure disorder ([facility] [G40.909]      Priority: Medium    Spastic quadriplegic cerebral palsy ([facility] [G80.0] 02/26/2018    Myoneural disorder ([facility] [G70.9] 09/07/2017    History of recurrent pneumonia [Z87.01] 04/27/2017    Nonverbal [R47.01] 03/20/2017    Central sleep apnea [G47.31] 01/09/2017    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 Start Lopressor 2.5 mg every 6 hours for tachycardia, hold for systolic blood pressure less than 100  2. 2 L oxygen via nasal cannula  3. Soft restraints to avoid pulling of oxygen  4. Each and swallow evaluation morning  5.  Continue antibiotics and supportive treatment    Kristan Jennings MD  5/5/2019  3:00 PM

## 2019-05-05 NOTE — PLAN OF CARE
Daniel Shaver 20    Second Visit Note  For more detailed information please refer to the progress note of the day      5/5/2019    5:42 PM    Name:   Chase Figueroa  MRN:     [id number]     Acct:      [de-identified]   Room:   0131/0188-01   Day:  1  Admit Date:  5/4/2019 10:54 AM    PCP:   Alyssa Abraham MD  Code Status:  Full Code        Pt vitals were reviewed   New labs were reviewed   Patient was seen    Updated plan :     1. Heart rate has improved to 89, blood pressure 107/70  2. No other new complaint  3.  Pulmonary recommended 5 days of azithromycin and ceftriaxone for 7 days        Roderick Harada, MD  5/5/2019  5:42 PM

## 2019-05-05 NOTE — PROGRESS NOTES
Pt had questionable starring seizure which only lasted a few seconds, heart rate elevated to 160's for a few seconds then back down to baseline , no desats noted , mom said she doesn't need any meds for this.

## 2019-05-05 NOTE — CONSULTS
Consults    Consult by Dr Ginette Vazquez     Patient - Femi Vincent   MRN -  [id number]   [facility]   - 1995        Date of evaluation -  2019    REASON FOR THE CONSULTATION:  Fever , chills sob   HISTORY OF PRESENT ILLNESS:    Femi Vincent is a 22y.o. year old female   Febrile overnight   Tachy             Immunization   Immunization History   Administered Date(s) Administered    DTaP 1996, 1996, 1996, 04/15/1997, 2001    HIB PRP-T (ActHIB, Hiberix) 1996, 1996, 1996, 04/15/1997    HPV Gardasil 9-valent 2007, 2008, 2008    Hepatitis A 2009, 2011    Hepatitis B (Recombivax HB) 1996, 1996, 1996    IPV (Ipol) 1996, 1996, 1997, 2001    Influenza Vaccine, unspecified formulation 10/27/2014    Influenza Virus Vaccine 10/12/2015    Influenza, Mao Earl, 3 Years and older, IM (Fluzone 3 yrs and older or Afluria 5 yrs and older) 2017    Influenza, Mao Earl, 3 yrs and older, IM, PF (Fluzone 3 yrs and older or Afluria 5 yrs and older) 2019    MMR 04/15/1997, 2001    Meningococcal MCV4P (Menactra) 2007, 2012    Pneumococcal Polysaccharide ([id number]) 02/15/2001, 2017    Tdap (Boostrix, Adacel) 2007    Varicella (Varivax) 1999        Pneumococcal Vaccine     [x] Up to date    [] Indicated   [] Refused  [] Contraindicated       Influenza Vaccine   [x] Up to date    [] Indicated   [] Refused  [] Contraindicated              PAST MEDICAL HISTORY:       Diagnosis Date    Abnormal electroencephalogram 2018    Acute on chronic respiratory failure with hypoxia and hypercapnia (HCC)     Acute respiratory failure with hypercapnia (HCC)     Aspiration pneumonia ([address] 2018    Atelectasis 2017    BiPAP (biphasic positive airway pressure) dependence     Bladder retention 2017    Body temperature low     Breast lump in female     one at 3 oclock and one at 7 oclock    Cellulitis     left elbow    Cellulitis of left elbow     Central sleep apnea 1/9/2017    Chronic respiratory failure with hypoxia and hypercapnia (HCC) 4/9/2017    Closed fracture of shaft of right humerus with routine healing 4/24/2018    Community acquired pneumonia of left lower lobe of lung ([address] 1/13/2018    Constipation 8/6/2018    Contracture of elbow joint, right 11/30/2017    Convulsions ([address]     Dependence on enabling machine 4/27/2017    Dependent on ventilator ([address]     Diarrhea 8/6/2018    Dystonia     Encounter for removal of internal fixation device 4/11/2013    Overview:  Removal of spinal rods and baclofen pump on 4.12.13 for suspected infection    Fibroadenoma of breast     Fistula     G tube feedings ([address]     Gastric reflux 8/6/2018    Gastroesophageal reflux disease without esophagitis 10/20/2015    GERD (gastroesophageal reflux disease)     History of recurrent pneumonia 4/27/2017    Hypercapnic respiratory failure ([address]     Hypotension 3/20/2017     Updating Deprecated Diagnoses    Hypothermia due to non-environmental cause 4/8/2017    Kyphoscoliosis     Mild sleep apnea     not treated    Muscle spasticity 4/8/2013    Myoclonus     Myoneural disorder (HCC)     Neuromuscular disease (HCC)     Nonverbal     ISELA (obstructive sleep apnea)     Pneumonia due to infectious organism     left lower lob    Pneumonia of left lower lobe due to infectious organism ([address] 2/28/2017    Prolonged Q-T interval on ECG     Prolonged QT interval 4/8/2017    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 ([address] 1/9/2017    Rett's syndrome     Scoliosis     Seizure ([address] 1/23/2018    Seizure disorder ([address]     Seizures ([address]     Sepsis ([address]     Sialorrhea     Skin infection at gastrostomy tube site ([address]     Sleep-related hypoventilation     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 ([address] 2/14/2017    Spastic quadriplegic cerebral palsy ([facility] 2/26/2018    Tonic clonic seizures (HCC)     Tremors of nervous system     Urinary retention with incomplete bladder emptying     Vitamin D deficiency     Wheelchair bound          Family History:       Problem Relation Age of Onset    High Blood Pressure Mother        SURGICAL HISTORY:   Past Surgical History:   Procedure Laterality Date    BACLOFEN PUMP IMPLANTATION  [id number]    BACLOFEN PUMP IMPLANTATION  06/28/2016    CHOLECYSTECTOMY  [id number]    GASTRIC FUNDOPLICATION  [id number]    SPINAL FUSION  [id number]    removal of spinal and baclofen pump 04/01/2013           SOCIAL AND OCCUPATIONAL HEALTH:      TOBACCO:   reports that she has never smoked. She has never used smokeless tobacco.  ETOH:   reports that she does not drink alcohol. ALLERGIES:    Allergies   Allergen Reactions    Clindamycin/Lincomycin     Gentamicin     Hydrocodone     Tape Diana Jordan Tape] Rash     Redness that lasts a couple days       Home Meds:   Prior to Admission medications    Medication Sig Start Date End Date Taking? Authorizing Provider   zolpidem (AMBIEN) 10 MG tablet Take 1 tablet by mouth nightly as needed for Sleep for up to 30 days.  4/12/19 5/12/19 Yes Leodan Love MD   doxazosin (CARDURA) 1 MG tablet Take 1 tablet by mouth nightly 3/25/19 9/21/19 Yes Leodan Love MD   levalbuterol Levonia Essex) 1.25 MG/3ML nebulizer solution Take 3 mLs by nebulization every 8 hours as needed for Wheezing 12/20/18 5/4/19 Yes Jacinta Rivers MD   acetaminophen (TYLENOL CHILDRENS) 160 MG/5ML suspension Take 18.09 mLs by mouth every 6 hours as needed for Fever 12/15/18  Yes Caden García DO   vitamin D (AQUEOUS VITAMIN D) 400 UNIT/ML LIQD Take one teaspoonful by G-Tube every day 11/26/18  Yes Leodan Love MD   Multiple Vitamins-Minerals (MULTIVITAMIN WITH IRON-MINERALS) liquid Take 15 mLs by mouth daily 10/29/18 10/29/19 Yes Eugenia Collins MD Ervin   magnesium hydroxide (MILK OF MAGNESIA CONCENTRATE) 2400 MG/10ML SUSP Take 8 mLs by mouth 2 times daily 2/9/18  Yes Kim Soliz MD   saccharomyces boulardii (FLORASTOR) 250 MG capsule Take 1 capsule by mouth daily 2/9/18  Yes Kim Soliz MD   Probiotic Product (ALIGN) 4 MG CAPS Take 4 mg by mouth nightly 2/9/18  Yes Kim Soliz MD   Sodium Phosphates (FLEET) 7-19 GM/118ML Place 1 enema rectally daily as needed (constipation) 2/9/18  Yes Kim Soliz MD   diazepam (DIASTAT) 10 MG GEL Place 10 mg rectally once as needed (seizure) for up to 1 dose.  2/9/18 9/7/19 Yes Kim Soliz MD   esomeprazole Magnesium (NEXIUM) 40 MG PACK Take 20 mg by mouth 2 times daily    Yes Historical Provider, MD   ibuprofen (ADVIL;MOTRIN) 100 MG/5ML suspension Take 10 mg/kg by mouth every 4 hours as needed for Fever   Yes Historical Provider, MD   levETIRAcetam (KEPPRA) 100 MG/ML solution Take 14ML two times a day by G-button  Patient taking differently: Take 15ML two times a day by G-button 12/28/17 5/4/19 Yes Kim Soliz MD   Genesis Reynoso [id number] [facility] by Does not apply route Expires 12/21/2022 12/22/17  Yes Dk Mask, APRN - CNP   Feeding Tubes - Sets (JAYDEN-KEY GASTROSTOMY KIT 18FR) MISC 3.5 cm 5/19/17 9/7/19 Yes Kim Soliz MD   Nutritional Supplements (PEPTAMEN 1 NEETU) LIQD Take 1 Can by mouth 3 times daily Peptamen Adult 1/13/17 5/4/19 Yes Kim Soliz MD   mometasone-formoterol (DULERA) 100-5 MCG/ACT inhaler Inhale 2 puffs into the lungs 2 times daily  Patient taking differently: Inhale 2 puffs into the lungs 2 times daily  12/20/18   Paulina Stark MD   docusate (COLACE) 50 MG/5ML liquid Take 10 mLs by mouth 2 times daily 1/25/18   Prakash Matos MD   Pediatric Multivitamins-Iron (MULTI-DELYN/IRON) LIQD Take one teaspoonful per g-tube every day 1/23/18 1/22/19  Kim Soliz MD Methylnaltrexone Bromide (RELISTOR) 150 MG TABS Take 1 tablet by mouth daily 1/21/18   Yoni Hazel MD   [facility] Natural Products SLUSSFORS) LIQD Take 15 mLs by mouth daily 2/3/16 2/13/18  Patience Acosta MD     CURRENT MEDS :  Scheduled Meds:   levalbuterol  0.63 mg Nebulization Q6H WA    docusate  100 mg Oral BID    magnesium hydroxide  24 mL Oral BID    ALIGN  4 mg Oral Nightly    CENTRUM/CERTA-JARED with minerals oral  15 mL Oral Daily    vitamin D  400 Units Oral Daily    doxazosin  1 mg Oral Nightly    sodium chloride flush  10 mL Intravenous 2 times per day    famotidine (PEPCID) injection  20 mg Intravenous BID    enoxaparin  30 mg Subcutaneous Daily    levETIRAcetam  1,500 mg Oral BID    azithromycin  250 mg Intravenous Q24H    And    cefTRIAXone (ROCEPHIN) IV  1 g Intravenous Q24H       Continuous Infusions:   sodium chloride 75 mL/hr at 05/04/19 1221           REVIEW OF SYSTEMS:  Ros unable to perform due to non verbal   Vitals:  BP (!) 94/56   Pulse 95   Temp 99.1 °F (37.3 °C)   Resp 19   Ht 4' 7\" (1.397 m)   Wt 79 lb 5.9 oz (36 kg)   SpO2 100%   BMI 18.45 kg/m²     PHYSICAL EXAM:  General Appearance:    Lethargic , cooperative, no distress, appears stated age   Head:    Normocephalic, without obvious abnormality, atraumatic      Eyes:    PERRL, conjunctiva/corneas clear, EOM's intact   Ears:    Normal TM's and external ear canals, both ears       Throat:   Lips, mucosa, dry     Neck:   Supple, symmetrical, trachea midline, no adenopathy;     thyroid:  no enlargement/tenderness/nodules;    Back:     Scoliosis and khyphosis    Lungs:       Left basal dec bs and bb    Chest Wall:    No tenderness or deformity      Heart:    Regular rate tachy and rhythm, S1 and S2 normal, no murmur, rub        or gallop no rvh                           Abdomen:                                                 Pulses:                              Skin:

## 2019-05-05 NOTE — FLOWSHEET NOTE
Calm;Approachable;Passive; Hopeful   Intervention Explored feelings, thoughts, concerns;Prayer;Quinnesec;Nurtured hope;Discussed meaning/purpose   Outcome Comfort; Acceptance;Expressed gratitude

## 2019-05-05 NOTE — PLAN OF CARE
Problem: Restraint Use - Nonviolent/Non-Self-Destructive Behavior:  Goal: Absence of restraint indications  Description  Absence of restraint indications   5/5/2019 1810 by Diego Alvarado RN  Outcome: Ongoing   No active signs of learning. Bilateral wrist restraints still needed.

## 2019-05-05 NOTE — PROGRESS NOTES
Physical Therapy    Facility/Department: [facility] 4B STEPDOWN  Initial Assessment    NAME: Emilia Mathis  : 1995  MRN: [id number]    Date of Service: 2019    Discharge Recommendations:  Further therapy recommended at discharge. Assessment   Body structures, Functions, Activity limitations: Decreased functional mobility ; Decreased safe awareness;Decreased endurance;Decreased balance;Decreased strength  Assessment: Pt admitted for peumonia, hx of qudraplegia, non verbal, on feeding tube at night. Treatment Diagnosis: Impiared fucntion  Prognosis: Fair  Decision Making: Medium Complexity  Barriers to Learning: Cognitive barriers  REQUIRES PT FOLLOW UP: Yes  Activity Tolerance  Activity Tolerance: Treatment limited secondary to medical complications (free text)(HR in high 130's)       Patient Diagnosis(es): There were no encounter diagnoses.      has a past medical history of Abnormal electroencephalogram, Acute on chronic respiratory failure with hypoxia and hypercapnia (HCC), Acute respiratory failure with hypercapnia (HCC), Aspiration pneumonia (HCC), Atelectasis, BiPAP (biphasic positive airway pressure) dependence, Bladder retention, Body temperature low, Breast lump in female, Cellulitis, Cellulitis of left elbow, Central sleep apnea, Chronic respiratory failure with hypoxia and hypercapnia (HCC), Closed fracture of shaft of right humerus with routine healing, Community acquired pneumonia of left lower lobe of lung ([address] Constipation, Contracture of elbow joint, right, Convulsions ([address] Dependence on enabling machine, Dependent on ventilator ([address] Diarrhea, Dystonia, Encounter for removal of internal fixation device, Fibroadenoma of breast, Fistula, G tube feedings ([address] Gastric reflux, Gastroesophageal reflux disease without esophagitis, GERD (gastroesophageal reflux disease), History of recurrent pneumonia, Hypercapnic respiratory failure ([address] Hypotension, Hypothermia due to non-environmental cause, Kyphoscoliosis, Mild sleep apnea, Muscle spasticity, Myoclonus, Myoneural disorder (HCC), Neuromuscular disease (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 [address] Rett's syndrome, Scoliosis, Seizure ([address] Seizure disorder ([address] Seizures ([address] Sepsis ([address] Sialorrhea, Skin infection at gastrostomy tube site [facility]), Sleep-related hypoventilation, 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 ([address] Spastic quadriplegic cerebral palsy ([address] Tonic clonic seizures ([facility] [address] Tremors of nervous system, Urinary retention with incomplete bladder emptying, Vitamin D deficiency, and Wheelchair bound. has a past surgical history that includes baclofen pump implantation (82819785); Gastric fundoplication (72899491); Spinal fusion (02921409); Cholecystectomy (07180509); and baclofen pump implantation (06/28/2016). Restrictions  Restrictions/Precautions  Restrictions/Precautions: Fall Risk  Implants present? : (Baclofen pump)  Position Activity Restriction  Other position/activity restrictions: Pt quadraplegic, has feeding tube for hihgt, non verbal. has 24/7 assist at home, able to pivot to w/c with 1 person assist.  Vision/Hearing        Subjective  General  Patient assessed for rehabilitation services?: Yes  Additional Pertinent Hx:  Judith Ghosh is a 22 y.o. female who presents with cough and fever , being treated for pneumonia.  Pt is quadraplegic, and non verbal.   Family / Caregiver Present: Yes(Mother present)  Referral Date : 05/04/19  Diagnosis: Pneumonia  Follows Commands: Impaired  Other (Comment): Non verbal  Pain Screening  Patient Currently in Pain: Denies  Pain Assessment  Pain Assessment: Faces  Hathaway-Baker Pain Rating: Hurts even more(with ROM)  Pain Type: Chronic pain  Pain Location: Generalized  Pain Orientation: Right;Left  Pain Descriptors: Patient unable to describe  Pain Frequency: Intermittent  Pain Onset: On-going  Oxygen Therapy  SpO2: 98 %  O2 Device: None (Room air)       Orientation     Social/Functional History  Social/Functional History  Lives With: (Mother, step father and younger sibling)  Type of Home: House  Home Layout: Two level, Bed/Bath upstairs  Home Access: Ramped entrance, Elevator  Bathroom Shower/Tub: Walk-in shower  Bathroom Toilet: Standard  Bathroom Equipment: Built-in shower seat  Bathroom Accessibility: Wheelchair accessible  Home Equipment: [address] bed(Power seat in the [place])  Receives Help From: Family, Home health([facility] 40 hrs/week, M-F.)  ADL Assistance: Needs assistance  Ambulation Assistance: Needs assistance(Mostly in w/c, at times max a few steps)  Transfer Assistance: Needs assistance  Active : No  Patient's  Info: parents  Mode of Transportation: [place]  Additional Comments: Pt has 24/7 assistnace from [facility] and family. Pt has feeding tube at night, able to eat during day time. Cognition        Objective          PROM RLE (degrees)  RLE General PROM: PT with spasticity/stiffness, diffculty with ROM at Knee/Hip ~70 degrees, ankle to neutral, R foot turned outwards. PROM LLE (degrees)  LLE General PROM: L LE > R LE spasticity, ankle DF to neutral, Very difficult to perfrom ROM on L LE due to spasticity, and HR in 130's. Able to flex Left knee twice ~ 40 degrees  PROM RUE (degrees)  RUE General PROM: Contractures at all joint noted. PROM LUE (degrees)  LUE General PROM: Contractures at all joints noted. Spine  Cervical: Pt in bed with head tilted to left side. Strength Other  Other: Unable to perform MMT due to pt has difficulty following commands and is non verbal.         Bed mobility  Comment: Deferred bed mobility per pt's mother, pt too tired and sleepy, also HR in 130's.    Transfers  Comment: NT

## 2019-05-05 NOTE — PLAN OF CARE
Problem: Nutrition  Goal: Optimal nutrition therapy  5/4/2019 1355 by Germaine Bell RD, LD  Outcome: Ongoing  Note:   Nutrition Problem: Severe malnutrition  Intervention: Food and/or Nutrient Delivery: Continue current diet(Start tube feeding as able )  Nutritional Goals: Pt to meet % of est'd needs via PO/EN      Problem: Restraint Use - Nonviolent/Non-Self-Destructive Behavior:  Goal: Absence of restraint indications  Description  Absence of restraint indications  5/5/2019 0135 by Hermila Dhillon RN  Outcome: Ongoing  5/4/2019 1801 by Aki Fan RN  Outcome: Met This Shift

## 2019-05-05 NOTE — PROGRESS NOTES
Occupational Therapy Not Seen Note    DATE: 2019  Name: Alex Whiting  : 1995  MRN: [id number]    Patient not available for Occupational Therapy due to: Other: Pt at baseline function. quadriplegic, non-verbal, and dependent for all ADLs per pt mom. Will defer to PT for PROM.  discussed with pt mom and in agreement    Next Scheduled Treatment: NA     Electronically signed by JATINDER Grossman/L on 2019 at 2:47 PM

## 2019-05-06 ENCOUNTER — APPOINTMENT (OUTPATIENT)
Dept: GENERAL RADIOLOGY | Age: 24
DRG: 177 | End: 2019-05-06
Attending: INTERNAL MEDICINE
Payer: COMMERCIAL

## 2019-05-06 LAB
ABSOLUTE EOS #: 0 K/UL (ref 0–0.4)
ABSOLUTE IMMATURE GRANULOCYTE: ABNORMAL K/UL (ref 0–0.3)
ABSOLUTE LYMPH #: 0.27 K/UL (ref 1–4.8)
ABSOLUTE MONO #: 0.8 K/UL (ref 0.1–1.2)
BASOPHILS # BLD: 0 % (ref 0–2)
BASOPHILS ABSOLUTE: 0 K/UL (ref 0–0.2)
DIFFERENTIAL TYPE: ABNORMAL
EKG ATRIAL RATE: 129 BPM
EKG P-R INTERVAL: 112 MS
EKG Q-T INTERVAL: 394 MS
EKG QRS DURATION: 70 MS
EKG QTC CALCULATION (BAZETT): 577 MS
EKG R AXIS: 41 DEGREES
EKG T AXIS: 34 DEGREES
EKG VENTRICULAR RATE: 129 BPM
EOSINOPHILS RELATIVE PERCENT: 0 % (ref 1–4)
GLUCOSE BLD-MCNC: 72 MG/DL (ref 65–105)
GLUCOSE BLD-MCNC: 92 MG/DL (ref 65–105)
HCT VFR BLD CALC: 36.1 % (ref 36–46)
HEMOGLOBIN: 11.6 G/DL (ref 12–16)
IMMATURE GRANULOCYTES: ABNORMAL %
LYMPHOCYTES # BLD: 2 % (ref 24–44)
MCH RBC QN AUTO: 26.8 PG (ref 26–34)
MCHC RBC AUTO-ENTMCNC: 32 G/DL (ref 31–37)
MCV RBC AUTO: 83.5 FL (ref 80–100)
MONOCYTES # BLD: 6 % (ref 2–11)
MORPHOLOGY: NORMAL
NRBC AUTOMATED: ABNORMAL PER 100 WBC
PDW BLD-RTO: 14.5 % (ref 12.5–15.4)
PLATELET # BLD: 263 K/UL (ref 140–450)
PLATELET ESTIMATE: ABNORMAL
PMV BLD AUTO: 8.5 FL (ref 6–12)
PROCALCITONIN: 0.23 NG/ML
RBC # BLD: 4.32 M/UL (ref 4–5.2)
RBC # BLD: ABNORMAL 10*6/UL
SEG NEUTROPHILS: 92 % (ref 36–66)
SEGMENTED NEUTROPHILS ABSOLUTE COUNT: 12.33 K/UL (ref 1.8–7.7)
WBC # BLD: 13.4 K/UL (ref 3.5–11)
WBC # BLD: ABNORMAL 10*3/UL

## 2019-05-06 PROCEDURE — 94762 N-INVAS EAR/PLS OXIMTRY CONT: CPT

## 2019-05-06 PROCEDURE — 6370000000 HC RX 637 (ALT 250 FOR IP): Performed by: INTERNAL MEDICINE

## 2019-05-06 PROCEDURE — 99233 SBSQ HOSP IP/OBS HIGH 50: CPT | Performed by: INTERNAL MEDICINE

## 2019-05-06 PROCEDURE — 94660 CPAP INITIATION&MGMT: CPT

## 2019-05-06 PROCEDURE — 97110 THERAPEUTIC EXERCISES: CPT

## 2019-05-06 PROCEDURE — 92611 MOTION FLUOROSCOPY/SWALLOW: CPT

## 2019-05-06 PROCEDURE — 82947 ASSAY GLUCOSE BLOOD QUANT: CPT

## 2019-05-06 PROCEDURE — 99232 SBSQ HOSP IP/OBS MODERATE 35: CPT | Performed by: INTERNAL MEDICINE

## 2019-05-06 PROCEDURE — 2060000000 HC ICU INTERMEDIATE R&B

## 2019-05-06 PROCEDURE — 36415 COLL VENOUS BLD VENIPUNCTURE: CPT

## 2019-05-06 PROCEDURE — 94640 AIRWAY INHALATION TREATMENT: CPT

## 2019-05-06 PROCEDURE — 6360000002 HC RX W HCPCS: Performed by: INTERNAL MEDICINE

## 2019-05-06 PROCEDURE — 74230 X-RAY XM SWLNG FUNCJ C+: CPT

## 2019-05-06 PROCEDURE — 2500000003 HC RX 250 WO HCPCS: Performed by: INTERNAL MEDICINE

## 2019-05-06 PROCEDURE — 2580000003 HC RX 258: Performed by: INTERNAL MEDICINE

## 2019-05-06 PROCEDURE — 84145 PROCALCITONIN (PCT): CPT

## 2019-05-06 RX ORDER — NICOTINE POLACRILEX 4 MG
15 LOZENGE BUCCAL PRN
Status: DISCONTINUED | OUTPATIENT
Start: 2019-05-06 | End: 2019-05-07 | Stop reason: HOSPADM

## 2019-05-06 RX ORDER — DEXTROSE MONOHYDRATE 25 G/50ML
12.5 INJECTION, SOLUTION INTRAVENOUS PRN
Status: DISCONTINUED | OUTPATIENT
Start: 2019-05-06 | End: 2019-05-07 | Stop reason: HOSPADM

## 2019-05-06 RX ORDER — DEXTROSE MONOHYDRATE 50 MG/ML
100 INJECTION, SOLUTION INTRAVENOUS PRN
Status: DISCONTINUED | OUTPATIENT
Start: 2019-05-06 | End: 2019-05-07 | Stop reason: HOSPADM

## 2019-05-06 RX ADMIN — LEVETIRACETAM 1500 MG: 100 SOLUTION ORAL at 08:49

## 2019-05-06 RX ADMIN — AZITHROMYCIN DIHYDRATE 250 MG: 500 INJECTION, POWDER, LYOPHILIZED, FOR SOLUTION INTRAVENOUS at 12:46

## 2019-05-06 RX ADMIN — DOCUSATE SODIUM 100 MG: 50 LIQUID ORAL at 08:51

## 2019-05-06 RX ADMIN — FAMOTIDINE 20 MG: 10 INJECTION, SOLUTION INTRAVENOUS at 22:18

## 2019-05-06 RX ADMIN — DOCUSATE SODIUM 100 MG: 50 LIQUID ORAL at 22:17

## 2019-05-06 RX ADMIN — LEVALBUTEROL HYDROCHLORIDE 0.63 MG: 0.63 SOLUTION RESPIRATORY (INHALATION) at 08:25

## 2019-05-06 RX ADMIN — MAGNESIUM HYDROXIDE 24 ML: 400 SUSPENSION ORAL at 08:51

## 2019-05-06 RX ADMIN — CEFTRIAXONE SODIUM 1 G: 1 INJECTION, POWDER, FOR SOLUTION INTRAMUSCULAR; INTRAVENOUS at 12:46

## 2019-05-06 RX ADMIN — Medication 10 ML: at 08:51

## 2019-05-06 RX ADMIN — LEVALBUTEROL HYDROCHLORIDE 0.63 MG: 0.63 SOLUTION RESPIRATORY (INHALATION) at 14:54

## 2019-05-06 RX ADMIN — Medication 10 ML: at 22:19

## 2019-05-06 RX ADMIN — LEVETIRACETAM 1500 MG: 100 SOLUTION ORAL at 22:18

## 2019-05-06 RX ADMIN — Medication 15 ML: at 08:49

## 2019-05-06 RX ADMIN — ENOXAPARIN SODIUM 30 MG: 30 INJECTION SUBCUTANEOUS at 08:50

## 2019-05-06 RX ADMIN — MAGNESIUM HYDROXIDE 24 ML: 400 SUSPENSION ORAL at 22:15

## 2019-05-06 RX ADMIN — LEVALBUTEROL HYDROCHLORIDE 0.63 MG: 0.63 SOLUTION RESPIRATORY (INHALATION) at 22:49

## 2019-05-06 RX ADMIN — FAMOTIDINE 20 MG: 10 INJECTION, SOLUTION INTRAVENOUS at 08:51

## 2019-05-06 RX ADMIN — Medication 400 UNITS: at 08:49

## 2019-05-06 RX ADMIN — DOXAZOSIN 1 MG: 2 TABLET ORAL at 22:16

## 2019-05-06 ASSESSMENT — PAIN SCALES - GENERAL
PAINLEVEL_OUTOF10: 0
PAINLEVEL_OUTOF10: 0

## 2019-05-06 NOTE — PROGRESS NOTES
Consults  Consult by Dr Quyen Oliver     Patient - Samuel Griggs   MRN -  [id number]   [facility]   - 1995        Date of evaluation -  2019    REASON FOR THE CONSULTATION:  Fever , chills sob   HISTORY OF PRESENT ILLNESS:    Samuel Griggs is a 22y.o. year old female   Febrile overnight but spike better   Tachicardia better   More awake   On ra sats improved     Immunization   Immunization History   Administered Date(s) Administered    DTaP 1995, 1995, 1995, 04/15/1997, 2001    HIB PRP-T (ActHIB, Hiberix) 1996, 1996, 1996, 04/15/1997    HPV Gardasil 9-valent 2007, 2008, 2008    Hepatitis A 2009, 2011    Hepatitis B (Recombivax HB) 1996, 1996, 1996    IPV (Ipol) 1996, 1996, 1997, 2001    Influenza Vaccine, unspecified formulation 10/27/2014    Influenza Virus Vaccine 10/12/2015    Influenza, Ernestine Agreste, 3 Years and older, IM (Fluzone 3 yrs and older or Afluria 5 yrs and older) 2017    Influenza, Ernestine Agreste, 3 yrs and older, IM, PF (Fluzone 3 yrs and older or Afluria 5 yrs and older) 2019    MMR 04/15/1997, 2001    Meningococcal MCV4P (Menactra) 2007, 2012    Pneumococcal Polysaccharide ([id number]) 02/15/2001, 2017    Tdap (Boostrix, Adacel) 2007    Varicella (Varivax) 1999        Pneumococcal Vaccine     [x] Up to date    [] Indicated   [] Refused  [] Contraindicated       Influenza Vaccine   [x] Up to date    [] Indicated   [] Refused  [] Contraindicated              PAST MEDICAL HISTORY:       Diagnosis Date    Abnormal electroencephalogram 2018    Acute on chronic respiratory failure with hypoxia and hypercapnia (HCC)     Acute respiratory failure with hypercapnia (HCC)     Aspiration pneumonia ([address] 2018    Atelectasis 2017    BiPAP (biphasic positive airway pressure) dependence     Bladder retention 2017    Body temperature low     Breast lump in female     one at 3 oclock and one at 7 oclock    Cellulitis     left elbow    Cellulitis of left elbow     Central sleep apnea 1/9/2017    Chronic respiratory failure with hypoxia and hypercapnia (HCC) 4/9/2017    Closed fracture of shaft of right humerus with routine healing 4/24/2018    Community acquired pneumonia of left lower lobe of lung ([address] 1/13/2018    Constipation 8/6/2018    Contracture of elbow joint, right 11/30/2017    Convulsions ([address]     Dependence on enabling machine 4/27/2017    Dependent on ventilator ([address]     Diarrhea 8/6/2018    Dystonia     Encounter for removal of internal fixation device 4/11/2013    Overview:  Removal of spinal rods and baclofen pump on 4.12.13 for suspected infection    Fibroadenoma of breast     Fistula     G tube feedings ([address]     Gastric reflux 8/6/2018    Gastroesophageal reflux disease without esophagitis 10/20/2015    GERD (gastroesophageal reflux disease)     History of recurrent pneumonia 4/27/2017    Hypercapnic respiratory failure ([address]     Hypotension 3/20/2017     Updating Deprecated Diagnoses    Hypothermia due to non-environmental cause 4/8/2017    Kyphoscoliosis     Mild sleep apnea     not treated    Muscle spasticity 4/8/2013    Myoclonus     Myoneural disorder (HCC)     Neuromuscular disease (HCC)     Nonverbal     ISELA (obstructive sleep apnea)     Pneumonia due to infectious organism     left lower lob    Pneumonia of left lower lobe due to infectious organism ([address] 2/28/2017    Prolonged Q-T interval on ECG     Prolonged QT interval 4/8/2017    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 ([address] 1/9/2017    Rett's syndrome     Scoliosis     Seizure ([address] 1/23/2018    Seizure disorder ([address]     Seizures ([address]     Sepsis ([address]     Sialorrhea     Skin infection at gastrostomy tube site ([address]     Sleep-related hypoventilation     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 ([facility] 2/14/2017    Spastic quadriplegic cerebral palsy ([facility] 2/26/2018    Tonic clonic seizures (HCC)     Tremors of nervous system     Urinary retention with incomplete bladder emptying     Vitamin D deficiency     Wheelchair bound          Family History:       Problem Relation Age of Onset    High Blood Pressure Mother        SURGICAL HISTORY:   Past Surgical History:   Procedure Laterality Date    BACLOFEN PUMP IMPLANTATION  [id number]    BACLOFEN PUMP IMPLANTATION  06/28/2016    CHOLECYSTECTOMY  [id number]    GASTRIC FUNDOPLICATION  [id number]    SPINAL FUSION  [id number]    removal of spinal and baclofen pump 04/01/2013           SOCIAL AND OCCUPATIONAL HEALTH:      TOBACCO:   reports that she has never smoked. She has never used smokeless tobacco.  ETOH:   reports that she does not drink alcohol. ALLERGIES:    Allergies   Allergen Reactions    Clindamycin/Lincomycin     Gentamicin     Hydrocodone     Tape Augie Neth Tape] Rash     Redness that lasts a couple days       Home Meds:   Prior to Admission medications    Medication Sig Start Date End Date Taking? Authorizing Provider   zolpidem (AMBIEN) 10 MG tablet Take 1 tablet by mouth nightly as needed for Sleep for up to 30 days.  4/12/19 5/12/19 Yes Alyssa Abraham MD   doxazosin (CARDURA) 1 MG tablet Take 1 tablet by mouth nightly 3/25/19 9/21/19 Yes Alyssa Abraham MD   levalbuterol Tereasa Poet) 1.25 MG/3ML nebulizer solution Take 3 mLs by nebulization every 8 hours as needed for Wheezing 12/20/18 5/4/19 Yes Antwan Dozier MD   acetaminophen (TYLENOL CHILDRENS) 160 MG/5ML suspension Take 18.09 mLs by mouth every 6 hours as needed for Fever 12/15/18  Yes Caden García DO   vitamin D (AQUEOUS VITAMIN D) 400 UNIT/ML LIQD Take one teaspoonful by G-Tube every day 11/26/18  Yes Alyssa Abraham MD   Multiple Vitamins-Minerals (MULTIVITAMIN WITH IRON-MINERALS) liquid Take 15 mLs by mouth daily 10/29/18 10/29/19 Yes Donnie Bence, MD   magnesium hydroxide (MILK OF MAGNESIA CONCENTRATE) 2400 MG/10ML SUSP Take 8 mLs by mouth 2 times daily 2/9/18  Yes Donnie Bence, MD   saccharomyces boulardii (FLORASTOR) 250 MG capsule Take 1 capsule by mouth daily 2/9/18  Yes Donnie Bence, MD   Probiotic Product (ALIGN) 4 MG CAPS Take 4 mg by mouth nightly 2/9/18  Yes Donnie Bence, MD   Sodium Phosphates (FLEET) 7-19 GM/118ML Place 1 enema rectally daily as needed (constipation) 2/9/18  Yes Donnie Bence, MD   diazepam (DIASTAT) 10 MG GEL Place 10 mg rectally once as needed (seizure) for up to 1 dose.  2/9/18 9/7/19 Yes Donnie Bence, MD   esomeprazole Magnesium (NEXIUM) 40 MG PACK Take 20 mg by mouth 2 times daily    Yes Historical Provider, MD   ibuprofen (ADVIL;MOTRIN) 100 MG/5ML suspension Take 10 mg/kg by mouth every 4 hours as needed for Fever   Yes Historical Provider, MD   levETIRAcetam (KEPPRA) 100 MG/ML solution Take 14ML two times a day by G-button  Patient taking differently: Take 15ML two times a day by G-button 12/28/17 5/4/19 Yes Donnie Bence, MD   Handicap [address] [facility] by Does not apply route Expires 12/21/2022 12/22/17  Yes KYLE Elizondo - CNP   Feeding Tubes - Sets (JAYDEN-KEY GASTROSTOMY KIT 18FR) MISC 3.5 cm 5/19/17 9/7/19 Yes Donnie Bence, MD   Nutritional Supplements (PEPTAMEN 1 NEETU) LIQD Take 1 Can by mouth 3 times daily Peptamen Adult 1/13/17 5/4/19 Yes Donnie Bence, MD   mometasone-formoterol (DULERA) 100-5 MCG/ACT inhaler Inhale 2 puffs into the lungs 2 times daily  Patient taking differently: Inhale 2 puffs into the lungs 2 times daily  12/20/18   Mary Joe MD   docusate (COLACE) 50 MG/5ML liquid Take 10 mLs by mouth 2 times daily 1/25/18   Marty Beltre MD   Pediatric Multivitamins-Iron (MULTI-DELYN/IRON) LIQD Take one teaspoonful per g-tube every day 1/23/18 1/22/19  Tamiko Ritter MD   Methylnaltrexone Bromide (RELISTOR) 150 MG TABS Take 1 tablet by mouth daily 1/21/18   Glenna Greenberg MD   [place] [employer] SLUSSFORS) LIQD Take 15 mLs by mouth daily 2/3/16 2/13/18  Tamiko Ritter MD     CURRENT MEDS :  Scheduled Meds:   levalbuterol  0.63 mg Nebulization Q6H WA    docusate  100 mg Oral BID    magnesium hydroxide  24 mL Oral BID    ALIGN  4 mg Oral Nightly    CENTRUM/CERTA-JARED with minerals oral  15 mL Oral Daily    vitamin D  400 Units Oral Daily    doxazosin  1 mg Oral Nightly    sodium chloride flush  10 mL Intravenous 2 times per day    famotidine (PEPCID) injection  20 mg Intravenous BID    enoxaparin  30 mg Subcutaneous Daily    levETIRAcetam  1,500 mg Oral BID    azithromycin  250 mg Intravenous Q24H    And    cefTRIAXone (ROCEPHIN) IV  1 g Intravenous Q24H       Continuous Infusions:   dextrose      sodium chloride 75 mL/hr at 05/04/19 1221           REVIEW OF SYSTEMS:  Ros unable to perform due to non verbal   Vitals:  /77   Pulse 76   Temp 98.6 °F (37 °C) (Oral)   Resp 16   Ht 4' 7\" (1.397 m)   Wt 81 lb 5.6 oz (36.9 kg)   SpO2 98%   BMI 18.91 kg/m²     PHYSICAL EXAM:  General Appearance:    Lethargic , cooperative, no distress, appears stated age   Head:    Normocephalic, without obvious abnormality, atraumatic      Eyes:    PERRL, conjunctiva/corneas clear, EOM's intact   Ears:    Normal TM's and external ear canals, both ears       Throat:   Lips, mucosa, dry     Neck:   Supple, symmetrical, trachea midline, no adenopathy;     thyroid:  no enlargement/tenderness/nodules;    Back:     Scoliosis and khyphosis    Lungs:       Left basal dec bs and bb    Chest Wall:    No tenderness or deformity      Heart:    Regular rate tachy and rhythm, S1 and S2 normal, no murmur, rub        or gallop no rvh                           Abdomen:                                                 Pulses: Skin:                                                 Soft, non-tender, bowel sounds active all four quadrants,     no masses, no organomegaly peg          2+ and symmetric all extremities     Dry          Clubbing No  Lower ext edema No1+   [] , 2 +  [] , 3+   []  Upper ext edema No             CBC:   Recent Labs     05/04/19  0715 05/05/19  0748   WBC 13.4* 9.1   HGB 11.6* 10.0*    244     BMP:    Recent Labs     05/04/19  0715 05/04/19  1929 05/05/19  0748     --  137   K 2.9* 3.1* 4.1     --  107   CO2 26  --  22   BUN 13  --  7   CREATININE <0.40*  --  <0.20*   GLUCOSE 184*  --  102*     Hepatic:   Recent Labs     05/04/19 0715   AST 16   ALT 16   BILITOT 0.20*   ALKPHOS 75     Amylase: No results found for: AMYLASE  Lipase:   Lab Results   Component Value Date    LIPASE 24 01/23/2018     Troponin: No results for input(s): TROPONINI in the last 72 hours. BNP: No results for input(s): BNP in the last 72 hours. Lipids: No results for input(s): CHOL, HDL in the last 72 hours. Invalid input(s): LDLCALCU  ABGs: No results found for: PHART, PO2ART, PWO2UAV  INR: No results for input(s): INR in the last 72 hours. 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  *          Hypokalemia      PLAN:      Cont3/ 5 days azithromycin - dose adjusted   Cont ceftriaxone for3/ 7 days   Cont iv fluids   Dc duoneb   Use xopenex - help with tachycardia   Speech eval noted   Can dc in am on ceftin and azithromycin to complete 5 days of azithromycin and 7 days of ceftin   D/w pt 's mother       I hope this updates you on my evaluation and clinical thinking. Thank you for allowing me to participate in his care.      Sincerely,      Electronically signed by Sasha Napier MD on 5/6/2019 at 3:38 PM

## 2019-05-06 NOTE — PROCEDURES
INSTRUMENTAL SWALLOW REPORT  MODIFIED BARIUM SWALLOW    NAME: Brittney Adame   : 1995  MRN: [id number]       Date of Eval: 2019              Referring Diagnosis(es):      Past Medical History:  has a past medical history of Abnormal electroencephalogram, Acute on chronic respiratory failure with hypoxia and hypercapnia (HCC), Acute respiratory failure with hypercapnia (HCC), Aspiration pneumonia ([address] Atelectasis, BiPAP (biphasic positive airway pressure) dependence, Bladder retention, Body temperature low, Breast lump in female, Cellulitis, Cellulitis of left elbow, Central sleep apnea, Chronic respiratory failure with hypoxia and hypercapnia (HCC), Closed fracture of shaft of right humerus with routine healing, Community acquired pneumonia of left lower lobe of lung ([address] Constipation, Contracture of elbow joint, right, Convulsions ([address] Dependence on enabling machine, Dependent on ventilator ([address] Diarrhea, Dystonia, Encounter for removal of internal fixation device, Fibroadenoma of breast, Fistula, G tube feedings ([address] Gastric reflux, Gastroesophageal reflux disease without esophagitis, GERD (gastroesophageal reflux disease), History of recurrent pneumonia, Hypercapnic respiratory failure ([address] Hypotension, Hypothermia due to non-environmental cause, Kyphoscoliosis, Mild sleep apnea, Muscle spasticity, Myoclonus, Myoneural disorder (HCC), Neuromuscular disease ([address] Nonverbal, ISELA (obstructive sleep apnea), Pneumonia due to infectious organism, Pneumonia of left lower lobe due to infectious organism [facility]), Prolonged Q-T interval on ECG, Prolonged QT interval, 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 Past Surgical History:  has a past surgical history that includes baclofen pump implantation ([id number]); Gastric fundoplication ([id number]); Spinal fusion ([id number]); Cholecystectomy (64008347); and baclofen pump implantation (06/28/2016). Current Diet Solid Consistency: Regular  Current Diet Liquid Consistency: Nectar       Type of Study: Initial MBS         Patient Complaints/Reason for Referral:  Adam Morris was referred for a MBS to assess the efficiency of his/her swallow function, assess for aspiration, and to make recommendations regarding safe dietary consistencies, effective compensatory strategies, and safe eating environment. Onset of problem:     Radha Chino a 22 y.o. female who presents with cough and fever that began yesterday. Bob Barthel has been doing well otherwise. Demetris Abreu is quadriplegic and her mother is her caretaker. Romulo Ayleen admitted last in December but otherwise has been doing well. Holly Cm known recent sick contacts. Gregory Fountain is concerned about pneumonia as this does occur somewhat frequently.  No recent antibiotic use.  Patient is nonverbal.  Mother reports her feeding tube has been functioning normally.  Denies any other symptoms     Patient is nonverbal and all information is provided by her mother, she states that so far she was not expectorating but today she is having some expectoration. Mother is requesting to feed to be changed to continuous because of increased secretions, so far she was getting tube feeds nightly and was taking orally in morning. She stays on BiPAP at night and mother is requesting for distress at night while on BiPAP since she tends to pull the mask out     Behavior/Cognition/Vision/Hearing:  Behavior/Cognition: Alert; Cooperative    Impressions:  Patient presents with  safe swallow for Regular diet with nectar thick liquids as evidenced by no aspiration noted with consistencies tested. Recommend small sips and bites, only feed when alert and awake and upright at 90 degrees for all PO intake. Recommend close monitoring for overt/clinical s/s of aspiration and D/C PO intake and complete Modified Barium Swallow Study should they occur. Results and recommendations reported to RN. Patient Position: Lateral and Patient Degrees: 90      Consistencies Administered: Soft solid;Puree;Nectar  teaspoon;Nectar straw; Thin straw    Compensatory Swallowing Strategies Attempted: Upright as possible for all oral intake  Postural Changes and/or Swallow Maneuvers Trialed: Upright 90 degrees      Recommended Diet:  Solid consistency: Regular  Liquid consistency: Nectar  Liquid administration via: Cup;Straw    Medication administration: Meds in puree    Safe Swallow Protocol:  Supervision: 1:1  Compensatory Swallowing Strategies: Alternate solids and liquids;Small bites/sips;Eat/Feed slowly;Upright as possible for all oral intake    Recommendations/Treatment  Requires SLP Intervention: Yes        D/C Recommendations: 24 hour supervision/assistance  Postural Changes and/or Swallow Maneuvers: Upright 90 degrees      Recommended Exercises:    Therapeutic Interventions: Diet tolerance monitoring         Education: Images and recommendations were reviewed with pt following this exam.   Patient Education: yes, mom and dad  Patient Education Response: Verbalizes understanding    Prognosis  Prognosis for safe diet advancement: good  Duration/Frequency of Treatment  Duration/Frequency of Treatment: 1-2X      Goals:    Long Term:     To Maximize safety with intake, optimize nutrition/hydration and minimize risk for aspiration. Short Term:  Goals:  The patient will tolerate recommended diet without observed clinical signs of aspiration      Oral Preparation / Oral Phase  Oral Phase: Impaired  Oral Phase: Slow yet functional mastication for consistencies tested. Mild decreased bolus formation with spillover and mild swallow delay    Pharyngeal Phase  Pharyngeal Phase: Impaired  Pharyngeal: Thin straw: + trace penetration with trace silent aspiration 1/5 swallows. Nectar straw/tsp: No penetration with no aspiration min-mod vallec/pyriform stasis. Puree/Fruit and Cookie: No penetration, no aspiration min vallec/pyriform stasis    Dysphagia Outcome Severity Scale: Level 5: Mild dysphagia- Distant supervision.  May need one diet consistency restricted  Penetration-Aspiration Scale (PAS): 8 - Material Enters the airway, passes below the vocal folds, and no effort is made to eject(with thin only)      Esophageal Phase  Esophageal Screen: [facility]        Pain   Patient Currently in Pain: No  Pain Level: 0  Pain Type: Chronic pain  Pain Location: Generalized        Therapy Time:   Individual Concurrent Group Co-treatment   Time In 1345         Time Out 1400         Minutes 15                   oJcelyne Candelaria, 5/6/2019, 2:35 PM

## 2019-05-06 NOTE — PROGRESS NOTES
Bilat wrist restraints removed on writer's arrival. Restraints used at night when pt is wearing bi-pap. Order d'c'd.

## 2019-05-06 NOTE — PROGRESS NOTES
Physical Therapy    DATE: 2019  NAME: Korey Menon  MRN: [id number]   : 1995    Discharge Recommendations: Further therapy recommended at discharge. Subjective: Per RN, patient okay for PT. Patient supine in bed. Mother/Primary caregiver present. Pain: TAWNY - vitals stable   Patient follows: No Commands  Is patient on ventilator: No  Is patient on sedation: No  Precautions: Pt quadraplegic, has feeding tube for nihgt, non verbal. has 24/7 assist at home, able to pivot to w/c with 1 person assist.        Therapeutic exercises:  B Gastroc stretch 3x20\"  B PROM to B UE/LE. Reps on R UE x5. Reps on L UE x10. Long sustained slow stretch on R UE. Limited B knee flexion, R>L worse, ~20 deg max of L knee flexion. Goals  Short Term Goals  Short term goal 1: Pt able to tolerte P/AAROM all 4 extremtiies to prevetn further contracture/maintain ROM For positioning  Short term goal 2: Pt able to assist in bed mobility at max a x 1   Short term goal 3:  Pt able to progress to sitting at EOB For 10 minutes mod A for static balance. Short term goal 4: Progress pt to pivot transfers at max a 1 , with 2nd person CGA/SBA for safety. Plan: Progress functional mobility as medically appropriate.    Time In: 1540  Time Out: 1556  Time Coded Minutes (treatment minutes): 16  Rehab Potential: Fair  Treatments/week: 4x/week      Ruddy Stratton, PTA

## 2019-05-06 NOTE — PROGRESS NOTES
Nutrition Assessment (Enteral Nutrition)    Type and Reason for Visit: Reassess    Nutrition Recommendations: Will monitor for results of swallow eval. Vital AF at 30 ml per hour meets all nutrition needs (864kcal, 54 g protein)    Nutrition Assessment: Pt is stable. See plan    Malnutrition Assessment:  · Malnutrition Status: Meets the criteria for severe malnutrition  · Context: Acute illness or injury  · Findings of the 6 clinical characteristics of malnutrition (Minimum of 2 out of 6 clinical characteristics is required to make the diagnosis of moderate or severe Protein Calorie Malnutrition based on AND/ASPEN Guidelines):  1. Energy Intake-Less than or equal to 75% of estimated energy requirement, (x 1 day)    2. Weight Loss-No significant weight loss,    3. Fat Loss-Moderate subcutaneous fat loss, Orbital, Triceps  4. Muscle Loss-Moderate muscle mass loss, Temples (temporalis muscle), Clavicles (pectoralis and deltoids)  5. Fluid Accumulation-Mild fluid accumulation, Extremities  6.  Strength-Not measured    Nutrition Risk Level:  Moderate    Nutrition Needs:  · Estimated Daily Total Kcal: 25-30 kcal/kg ~>900-1100 kcals/d   · Estimated Daily Protein (g): 1.2-1.3 gm/kg ~>43-47 gms/d   Nutrition Diagnosis:   · Problem: Severe malnutrition  · Etiology: related to Insufficient energy/nutrient consumption(medical condition)     Signs and symptoms:  as evidenced by Moderate loss of subcutaneous fat, Localized or generalized fluid accumulation, Moderate muscle loss(Need for EN to meet daily est'd needs)    Objective Information:  · Nutrition-Focused Physical Findings: +BM  · Current Nutrition Therapies:  · Oral Diet Orders: General   · Tube Feeding (TF) Orders:   · Formula: Semi-elemental  · Rate (ml/hr):30 ml per hour    · Volume (ml/day): 720 ml  · Duration: Continuous  · Goal TF & Flush Orders Provides: 864 kcal, 54 g protein  · Anthropometric Measures:  · Ht: 4' 7\" (139.7 cm)   · Current Body Wt: 81 lb (36.7 kg)  · Admission Body Wt: 80 lb (36.3 kg)  · Weight Change: 78-85 lbs over 1 yr per EMR    · Ideal Body Wt: 78 lb (35.4 kg), % Ideal Body 103% (adm/ideal)  · BMI Classification: BMI <18.5 Underweight    Nutrition Interventions:   Continue current diet(Start tube feeding as able )  Swallow Evaluation    Nutrition Evaluation:   · Evaluation: Progressing toward goals   · Goals: Pt to meet % of est'd needs via PO/EN   · Monitoring: TF Intake, TF Tolerance, Meal Intake, Monitor Bowel Function      Electronically signed by Timmy Buckley RD, LD on 5/6/19 at 2:26 PM    Contact Number: [phone]

## 2019-05-06 NOTE — PLAN OF CARE
Problem: Nutrition  Goal: Optimal nutrition therapy  Outcome: Met This Shift     Problem: Restraint Use - Nonviolent/Non-Self-Destructive Behavior:  Goal: Absence of restraint indications  Description  Absence of restraint indications   Outcome: Met This Shift  Goal: Absence of restraint-related injury  Description  Absence of restraint-related injury   Outcome: Met This Shift     Problem: Musculor/Skeletal Functional Status  Goal: Highest potential functional level  Outcome: Met This Shift  Goal: Absence of falls  Outcome: Met This Shift

## 2019-05-06 NOTE — PROGRESS NOTES
[facility]    Progress Note    5/6/2019    2:45 PM    Name:   Haley Villanueva  MRN:     [id number]     Acct:      [de-identified]   Room:   [address] [place] [address] Day:  2  Admit Date:  5/4/2019 10:54 AM    PCP:   Vinnie Quinonez MD  Code Status:  Full Code    Subjective:     C/C: Cough    Interval History Status: not changed. No acute issues overnight  Baseline developmental delay  Mother at bedside  Plans for swallow evaluation today  Baseline non verbal     Brief History:     Familia España a 22 y.o. female who presents with cough and fever that began yesterday. Yael Kaur has been doing well otherwise. Be Lua is quadriplegic and her mother is her caretaker. Jose  admitted last in December but otherwise has been doing well. Ermelinda Baker known recent sick contacts. Dany Briseida is concerned about pneumonia as this does occur somewhat frequently.  No recent antibiotic use.  Patient is nonverbal.  Mother reports her feeding tube has been functioning normally.  Denies any other symptoms     Patient is nonverbal and all information is provided by her mother, she states that so far she was not expectorating but today she is having some expectoration. Mother is requesting to feed to be changed to continuous because of increased secretions, so far she was getting tube feeds nightly and was taking orally in morning. She stays on BiPAP at night and mother is requesting for distress at night while on BiPAP since she tends to pull the mask out       Review of Systems:     Unable to assess - baseline developmental delay, non verbal     Medications: Allergies:     Allergies   Allergen Reactions    Clindamycin/Lincomycin     Gentamicin     Hydrocodone     Tape Tereasa Pluck Tape] Rash     Redness that lasts a couple days       Current Meds:   Scheduled Meds:    levalbuterol  0.63 mg Nebulization Q6H WA    docusate  100 mg Oral BID    magnesium hydroxide  24 mL Oral BID Kyphoscoliosis, Mild sleep apnea, Muscle spasticity, Myoclonus, Myoneural disorder (HCC), Neuromuscular disease ([address] Nonverbal, ISELA (obstructive sleep apnea), Pneumonia due to infectious organism, Pneumonia of left lower lobe due to infectious organism [facility]), Prolonged Q-T interval on ECG, Prolonged QT interval, 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 Social History:   reports that she has never smoked. She has never used smokeless tobacco. She reports that she does not drink alcohol or use drugs. Family History:   Family History   Problem Relation Age of Onset    High Blood Pressure Mother        Vitals:  /77   Pulse 76   Temp 98.6 °F (37 °C) (Oral)   Resp 16   Ht 4' 7\" (1.397 m)   Wt 81 lb 5.6 oz (36.9 kg)   SpO2 98%   BMI 18.91 kg/m²   Temp (24hrs), Av.1 °F (37.3 °C), Min:98.6 °F (37 °C), Max:100.1 °F (37.8 °C)    Recent Labs     19  1939 19  0735 19  0844   POCGLU 85 72 92       I/O (24Hr):     Intake/Output Summary (Last 24 hours) at 2019 1445  Last data filed at 2019 0553  Gross per 24 hour   Intake 2361 ml   Output 1100 ml   Net 1261 ml       Labs:    Hematology:  Recent Labs     19  0715 19  0748   WBC 13.4* 9.1   RBC 4.32 3.58*   HGB 11.6* 10.0*   HCT 36.1 31.9*   MCV 83.5 89.1   MCH 26.8 27.9   MCHC 32.0 31.3   RDW 14.5 14.4    244   MPV 8.5 10.5     Chemistry:  Recent Labs     19  0715 19  1929 19  0748     --  137   K 2.9* 3.1* 4.1     --

## 2019-05-07 VITALS
HEIGHT: 55 IN | OXYGEN SATURATION: 99 % | WEIGHT: 81.35 LBS | HEART RATE: 69 BPM | SYSTOLIC BLOOD PRESSURE: 128 MMHG | BODY MASS INDEX: 18.83 KG/M2 | RESPIRATION RATE: 20 BRPM | TEMPERATURE: 98.4 F | DIASTOLIC BLOOD PRESSURE: 82 MMHG

## 2019-05-07 PROCEDURE — 2580000003 HC RX 258: Performed by: INTERNAL MEDICINE

## 2019-05-07 PROCEDURE — 94640 AIRWAY INHALATION TREATMENT: CPT

## 2019-05-07 PROCEDURE — 94660 CPAP INITIATION&MGMT: CPT

## 2019-05-07 PROCEDURE — 94761 N-INVAS EAR/PLS OXIMETRY MLT: CPT

## 2019-05-07 PROCEDURE — 99239 HOSP IP/OBS DSCHRG MGMT >30: CPT | Performed by: INTERNAL MEDICINE

## 2019-05-07 PROCEDURE — 2500000003 HC RX 250 WO HCPCS: Performed by: INTERNAL MEDICINE

## 2019-05-07 PROCEDURE — 6360000002 HC RX W HCPCS: Performed by: INTERNAL MEDICINE

## 2019-05-07 PROCEDURE — 6370000000 HC RX 637 (ALT 250 FOR IP): Performed by: INTERNAL MEDICINE

## 2019-05-07 RX ORDER — AZITHROMYCIN 250 MG/1
250 TABLET, FILM COATED ORAL DAILY
Qty: 1 TABLET | Refills: 0 | Status: SHIPPED | OUTPATIENT
Start: 2019-05-07 | End: 2019-05-09

## 2019-05-07 RX ORDER — AZITHROMYCIN 250 MG/1
250 TABLET, FILM COATED ORAL DAILY
Qty: 1 TABLET | Refills: 0 | Status: SHIPPED | OUTPATIENT
Start: 2019-05-07 | End: 2019-05-07 | Stop reason: SDUPTHER

## 2019-05-07 RX ORDER — CEFUROXIME AXETIL 500 MG/1
500 TABLET ORAL 2 TIMES DAILY
Qty: 8 TABLET | Refills: 0 | Status: SHIPPED | OUTPATIENT
Start: 2019-05-07 | End: 2019-05-23 | Stop reason: SDUPTHER

## 2019-05-07 RX ADMIN — Medication 10 ML: at 11:55

## 2019-05-07 RX ADMIN — ENOXAPARIN SODIUM 30 MG: 30 INJECTION SUBCUTANEOUS at 11:56

## 2019-05-07 RX ADMIN — FAMOTIDINE 20 MG: 10 INJECTION, SOLUTION INTRAVENOUS at 11:58

## 2019-05-07 RX ADMIN — LEVALBUTEROL HYDROCHLORIDE 0.63 MG: 0.63 SOLUTION RESPIRATORY (INHALATION) at 09:13

## 2019-05-07 RX ADMIN — LEVETIRACETAM 1500 MG: 100 SOLUTION ORAL at 11:57

## 2019-05-07 RX ADMIN — Medication 15 ML: at 11:54

## 2019-05-07 RX ADMIN — MAGNESIUM HYDROXIDE 24 ML: 400 SUSPENSION ORAL at 11:56

## 2019-05-07 RX ADMIN — IBUPROFEN 386 MG: 100 SUSPENSION ORAL at 11:52

## 2019-05-07 RX ADMIN — Medication 400 UNITS: at 11:53

## 2019-05-07 ASSESSMENT — PAIN SCALES - GENERAL
PAINLEVEL_OUTOF10: 0
PAINLEVEL_OUTOF10: 0
PAINLEVEL_OUTOF10: 5
PAINLEVEL_OUTOF10: 0

## 2019-05-07 NOTE — CARE COORDINATION
Discharge order noted. AVS with completed SHAWN faxed to [facility].      Discharge [facility] Case Management Department  Written by: Johann Araujo RN    Patient Name: Diann Lovell  Attending Provider: Cassandra Verdugo MD  Admit Date: 2019 10:54 AM  MRN: [id number]  Account: [de-identified]                     : 1995  Discharge Date:  19       Disposition: home with family & Miller Hodge RN

## 2019-05-07 NOTE — DISCHARGE SUMMARY
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 *      Admission Condition:  fair     Discharged Condition: stable    Hospital Stay:     Hospital Course:    Jose Shaikh a 22 y.o. female with PMH significant for developmental delay and quadriplegia who presented with cough and fever. No known recent sick contacts. Mother is concerned about pneumonia which occurs frequently.  No recent antibiotic use.  Patient is nonverbal.  Mother reports her feeding tube has been functioning normally, patient is able to tolerate oral intake during the day. Patient underwent swallow study demonstrating aspiration of thin liquids. Pulmonology consulted, d/c on course of abx for pneumonia.        Significant therapeutic interventions:   - Swallow evaluation - aspiration with thin liquids  - Pulmonology following - continue abx, bronchodilators  - Medications reviewed  - Continue home medications  - Restraints as needed, uses at home for BIPAP per mother  - Continue IV antibiotics - change to PO  - DC today on abx through PEG tube         Significant Diagnostic Studies:   Labs / Micro:  CBC:   Lab Results   Component Value Date    WBC 9.1 05/05/2019    RBC 3.58 05/05/2019    HGB 10.0 05/05/2019    HCT 31.9 05/05/2019    MCV 89.1 05/05/2019    MCH 27.9 05/05/2019    MCHC 31.3 05/05/2019    RDW 14.4 05/05/2019     05/05/2019     BMP:    Lab Results   Component Value Date    GLUCOSE 102 05/05/2019     05/05/2019    K 4.1 05/05/2019     05/05/2019    CO2 22 05/05/2019    ANIONGAP 8 05/05/2019    BUN 7 05/05/2019    CREATININE <0.20 05/05/2019    BUNCRER NOT REPORTED 05/05/2019    CALCIUM 8.2 05/05/2019    LABGLOM CANNOT BE CALCULATED 05/05/2019    GFRAA CANNOT BE CALCULATED 05/05/2019    GFR      05/05/2019    GFR NOT REPORTED 05/05/2019     HFP:    Lab Results   Component Value Date    PROT 7.3 05/04/2019     CMP:    Lab Results   Component Value Date    GLUCOSE 102 05/05/2019     05/05/2019    K 4.1 05/05/2019     05/05/2019    CO2 22 05/05/2019    BUN 7 05/05/2019    CREATININE <0.20 05/05/2019    ANIONGAP 8 05/05/2019    ALKPHOS 75 05/04/2019    ALT 16 05/04/2019    AST 16 05/04/2019    BILITOT 0.20 05/04/2019    LABALBU 4.1 05/04/2019    ALBUMIN 1.3 05/04/2019    LABGLOM CANNOT BE CALCULATED 05/05/2019    GFRAA CANNOT BE CALCULATED 05/05/2019    GFR      05/05/2019    GFR NOT REPORTED 05/05/2019    PROT 7.3 05/04/2019    CALCIUM 8.2 05/05/2019     PT/INR:  No results found for: PTINR  PTT:   Lab Results   Component Value Date    APTT 27.1 04/08/2017     FLP:  No results found for: CHOL, TRIG, HDL  U/A:    Lab Results   Component Value Date    COLORU YELLOW 05/04/2019    TURBIDITY CLEAR 05/04/2019    SPECGRAV 1.022 05/04/2019    HGBUR MODERATE 05/04/2019    PHUR 8.5 05/04/2019    PROTEINU NEGATIVE 05/04/2019    GLUCOSEU 1+ 05/04/2019    KETUA NEGATIVE 05/04/2019    BILIRUBINUR NEGATIVE 05/04/2019    BILIRUBINUR Negative 02/13/2017 UROBILINOGEN Normal 05/04/2019    NITRU NEGATIVE 05/04/2019    LEUKOCYTESUR SMALL 05/04/2019     TSH:    Lab Results   Component Value Date    TSH 0.68 04/08/2017         Radiology:    Xr Chest Portable    Result Date: 5/4/2019  EXAMINATION: SINGLE XRAY VIEW OF THE CHEST 5/4/2019 7:14 am COMPARISON: Chest radiograph dated 12/18/2018 HISTORY: ORDERING SYSTEM PROVIDED HISTORY: Cough TECHNOLOGIST PROVIDED HISTORY: Cough Ordering Physician Provided Reason for Exam: cough, fever, tachycardia Acuity: Acute Type of Exam: Initial FINDINGS: Cardiac and mediastinal shadows are normal.  Infiltrate at the left lung base. No convincing pleural effusions. No pneumothorax. No free air below the diaphragm. Mild dextroscoliosis of the thoracic spine. Infiltrate at the left lung base. Fl Modified Barium Swallow W Video    Result Date: 5/6/2019  EXAMINATION: MODIFIED BARIUM SWALLOW WAS PERFORMED IN CONJUNCTION WITH SPEECH PATHOLOGY SERVICES TECHNIQUE: Fluoroscopic evaluation of the swallowing mechanism was performed with multiple consistency of barium product. FLUOROSCOPY DOSE AND TYPE OR TIME AND EXPOSURES: 2.9 minutes 2.9 gy cm2 COMPARISON: None HISTORY: ORDERING SYSTEM PROVIDED HISTORY: dysphagia TECHNOLOGIST PROVIDED HISTORY: dysphagia FINDINGS: Patient swallowed pudding, soft solid, and regular solid consistencies without laryngeal penetration or tracheal aspiration. Patient also small thick liquid by spoon and straw without laryngeal penetration or tracheal aspiration, however there is mild pharyngeal residue. Trace laryngeal penetration of thin liquid with trace tracheal aspiration on 1 of several trials. Trace laryngeal penetration/tracheal aspiration of thin liquid. Otherwise, no laryngeal penetration or tracheal aspiration of the remaining tested consistencies. Please see separate speech pathology report for full discussion of findings and recommendations.          Consultations:    Consults:     Final Specialist Recommendations/Findings:   IP CONSULT TO PULMONOLOGY  IP CONSULT TO DIETITIAN  IP CONSULT TO HOME CARE NEEDS      The patient was seen and examined on day of discharge and this discharge summary is in conjunction with any daily progress note from day of discharge. Discharge plan:     Disposition: Home with [address] [facility]    Physician Follow Up:     [address]  [address] [place] [address], [facility] [address]  [phone]           Requiring Further Evaluation/Follow Up POST HOSPITALIZATION/Incidental Findings:     Diet: regular diet    Activity: As tolerated    Instructions to Patient: take medications as prescribed     Discharge Medications:      Medication List      START taking these medications    azithromycin 250 MG tablet  Commonly known as:  ZITHROMAX  Take 1 tablet by mouth daily for 2 days     cefUROXime 500 MG tablet  Commonly known as:  CEFTIN  Take 1 tablet by mouth 2 times daily for 4 days        CHANGE how you take these medications    levETIRAcetam 100 MG/ML solution  Commonly known as:  KEPPRA  Take 14ML two times a day by G-button  What changed:  additional instructions        CONTINUE taking these medications    acetaminophen 160 MG/5ML suspension  Commonly known as:  TYLENOL CHILDRENS  Take 18.09 mLs by mouth every 6 hours as needed for Fever     ALIGN 4 MG Caps  Take 4 mg by mouth nightly     CYSTEX Liqd  Take 15 mLs by mouth daily     diazepam 10 MG Gel  Commonly known as:  DIASTAT  Place 10 mg rectally once as needed (seizure) for up to 1 dose.      docusate 50 MG/5ML liquid  Commonly known as:  COLACE  Take 10 mLs by mouth 2 times daily     doxazosin 1 MG tablet  Commonly known as:  CARDURA  Take 1 tablet by mouth nightly     esomeprazole Magnesium 40 MG Pack  Commonly known as:  [address] 7-19 GM/118ML  Place 1 enema rectally daily as needed (constipation)     Handicap Placard Misc  by Does not apply route Expires 12/21/2022 ibuprofen 100 MG/5ML suspension  Commonly known as:  ADVIL;MOTRIN     levalbuterol 1.25 MG/3ML nebulizer solution  Commonly known as:  XOPENEX  Take 3 mLs by nebulization every 8 hours as needed for Wheezing     magnesium hydroxide 2400 MG/10ML Susp  Commonly known as:  MILK OF MAGNESIA CONCENTRATE  Take 8 mLs by mouth 2 times daily     Methylnaltrexone Bromide 150 MG Tabs  Commonly known as:  RELISTOR  Take 1 tablet by mouth daily     JAYDEN-KEY GASTROSTOMY KIT 18FR Misc  3.5 cm     mometasone-formoterol 100-5 MCG/ACT inhaler  Commonly known as:  DULERA  Inhale 2 puffs into the lungs 2 times daily     MULTI-DELYN/IRON Liqd  Take one teaspoonful per g-tube every day     multivitamin with iron-minerals liquid  Take 15 mLs by mouth daily     PEPTAMEN 1 NEETU Liqd  Take 1 Can by mouth 3 times daily Peptamen Adult     saccharomyces boulardii 250 MG capsule  Commonly known as:  FLORASTOR  Take 1 capsule by mouth daily     vitamin D 400 UNIT/ML Liqd  Commonly known as:  AQUEOUS VITAMIN D  Take one teaspoonful by G-Tube every day     zolpidem 10 MG tablet  Commonly known as:  AMBIEN  Take 1 tablet by mouth nightly as needed for Sleep for up to 30 days. Where to Get Your Medications      These medications were sent to [facility] [address] [facility], [address] [place] [address], [address] [place] [address] [address]    Phone:  [phone]   · azithromycin 250 MG tablet  · cefUROXime 500 MG tablet         Time Spent on discharge is  35 mins in patient examination, evaluation, counseling as well as medication reconciliation, prescriptions for required medications, discharge plan and follow up. Electronically signed by   Jeni Rivers MD  5/7/2019  4:31 PM      Thank you Dr. Ganesh Ramos MD for the opportunity to be involved in this patient's care.

## 2019-05-07 NOTE — PROGRESS NOTES
CENTRUM/CERTA-JARED with minerals oral  15 mL Oral Daily    vitamin D  400 Units Oral Daily    doxazosin  1 mg Oral Nightly    sodium chloride flush  10 mL Intravenous 2 times per day    famotidine (PEPCID) injection  20 mg Intravenous BID    enoxaparin  30 mg Subcutaneous Daily    levETIRAcetam  1,500 mg Oral BID    azithromycin  250 mg Intravenous Q24H    And    cefTRIAXone (ROCEPHIN) IV  1 g Intravenous Q24H     Continuous Infusions:    dextrose      sodium chloride 75 mL/hr at 05/04/19 1221     PRN Meds: glucose, dextrose, glucagon (rDNA), dextrose, metoprolol, fleet, albuterol, zolpidem, sodium chloride flush, ondansetron, nicotine, albuterol, acetaminophen, ibuprofen, potassium chloride **OR** potassium alternative oral replacement **OR** potassium chloride    Data:     Past Medical History:   has a past medical history of Abnormal electroencephalogram, Acute on chronic respiratory failure with hypoxia and hypercapnia (HCC), Acute respiratory failure with hypercapnia (HCC), Aspiration pneumonia (HCC), Atelectasis, BiPAP (biphasic positive airway pressure) dependence, Bladder retention, Body temperature low, Breast lump in female, Cellulitis, Cellulitis of left elbow, Central sleep apnea, Chronic respiratory failure with hypoxia and hypercapnia (HCC), Closed fracture of shaft of right humerus with routine healing, Community acquired pneumonia of left lower lobe of lung ([address] Constipation, Contracture of elbow joint, right, Convulsions ([address] Dependence on enabling machine, Dependent on ventilator ([address] Diarrhea, Dystonia, Encounter for removal of internal fixation device, Fibroadenoma of breast, Fistula, G tube feedings ([address] Gastric reflux, Gastroesophageal reflux disease without esophagitis, GERD (gastroesophageal reflux disease), History of recurrent pneumonia, Hypercapnic respiratory failure ([address] Hypotension, Hypothermia due to non-environmental cause, Kyphoscoliosis, Mild sleep apnea, Muscle spasticity, Myoclonus, Myoneural disorder (HCC), Neuromuscular disease ([address] Nonverbal, ISELA (obstructive sleep apnea), Pneumonia due to infectious organism, Pneumonia of left lower lobe due to infectious organism 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), Sleep-related hypoventilation, 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 ([address] Spastic quadriplegic cerebral palsy ([address] Tonic clonic seizures ([address] Tremors of nervous system, Urinary retention with incomplete bladder emptying, Vitamin D deficiency, and Wheelchair bound. Social History:   reports that she has never smoked. She has never used smokeless tobacco. She reports that she does not drink alcohol or use drugs. Family History:   Family History   Problem Relation Age of Onset    High Blood Pressure Mother        Vitals:  /79   Pulse 116   Temp 98.4 °F (36.9 °C)   Resp 20   Ht 4' 7\" (1.397 m)   Wt 81 lb 5.6 oz (36.9 kg)   SpO2 99%   BMI 18.91 kg/m²   Temp (24hrs), Av.6 °F (37 °C), Min:98.4 °F (36.9 °C), Max:98.9 °F (37.2 °C)    Recent Labs     19  1939 19  0735 19  0844   POCGLU 85 72 92       I/O (24Hr):     Intake/Output Summary (Last 24 hours) at 2019 0825  Last data filed at 2019 2300  Gross per 24 hour   Intake 1347 ml   Output 1650 ml   Net -303 ml       Labs:    Hematology:  Recent Labs     19  0748   WBC 9.1   RBC 3.58*   HGB 10.0*   HCT 31.9*   MCV 89.1   MCH 27.9   MCHC 31.3   RDW 14.4      MPV 10.5     Chemistry:  Recent Labs     19  1929 19  0748   NA  --  137   K 3.1* 4.1   CL  --  107   CO2  --  22   GLUCOSE  --  102*   BUN  --  7   CREATININE  --  <0.20*   ANIONGAP  --  8*   LABGLOM  --  CANNOT BE CALCULATED   GFRAA --  CANNOT BE CALCULATED   CALCIUM  --  8.2*     Recent Labs     05/05/19  1939 05/06/19  0735 05/06/19  0844   POCGLU 85 72 92         Lab Results   Component Value Date/Time    SPECIAL LT WRIST 3ML 05/04/2019 07:36 PM     Lab Results   Component Value Date/Time    CULTURE NO GROWTH 3 DAYS 05/04/2019 07:36 PM       Lab Results   Component Value Date    POCPH 7.395 12/18/2018    POCPCO2 54.7 12/18/2018    POCPO2 110.7 12/18/2018    POCHCO3 33.5 12/18/2018    NBEA NOT REPORTED 12/18/2018    PBEA 7 12/18/2018    HYJ9WHM 35 12/18/2018    JHQI0NGL 98 12/18/2018    FIO2 NOT REPORTED 12/18/2018         Physical Examination:        General appearance:  no distress  Mental Status:  Developmental delay, baseline non verbal   Lungs:  clear to auscultation bilaterally, normal effort  Heart:  regular rate and rhythm  Abdomen:  soft, nontender, nondistended, normal bowel sounds  Extremities:  no edema, redness, tenderness in the calves  Skin:  no gross lesions, rashes, induration    Assessment:        Primary Problem  Community acquired pneumonia of left lower lobe of lung ([address]    Active Hospital Problems    Diagnosis Date Noted    Severe malnutrition ([address] [E43] 05/04/2019    Spastic quadriplegic cerebral palsy ([address] [G80.0] 02/26/2018    Seizure disorder ([address] [G40.909]     Community acquired pneumonia of left lower lobe of lung ([address] [J18.1] 01/13/2018    Myoneural disorder ([address] [G70.9] 09/07/2017    History of recurrent pneumonia [Z87.01] 04/27/2017    Nonverbal [R47.01] 03/20/2017    Central sleep apnea [G47.31] 01/09/2017    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 ([address] [J98.4, G70.9] 01/09/2017    Gastroesophageal reflux disease without esophagitis [K21.9] 10/20/2015    Vitamin D deficiency [E55.9] 10/20/2015    Delay in development [R62.50] 04/08/2013    Rett's syndrome [F84.2] 12/17/2012       Plan:        - Labs and imaging reviewed  - Swallow evaluation - aspiration with thin liquids  - Pulmonology following - continue abx, bronchodilators  - Medications reviewed  - Continue home medications  - Restraints as needed, uses at home for BIPAP per mother  - Continue IV antibiotics - change to PO  - DC today on abx through PEG tube       Paulo Soto MD  5/7/2019  8:25 AM

## 2019-05-07 NOTE — PROGRESS NOTES
CLINICAL PHARMACY NOTE: MEDS TO [address] Select Patient?: No  Total # of Prescriptions Filled: 2   The following medications were delivered to the patient:  · Azithromycin  · Cefuroxime   Total # of Interventions Completed: 1  Time Spent (min): 15    Additional Documentation:  Pt has g-tube. Catina researched liquid vs crush. Tablets ok to crush. No info on liquids. Gave tablets.

## 2019-05-08 ENCOUNTER — TELEPHONE (OUTPATIENT)
Dept: FAMILY MEDICINE CLINIC | Age: 24
End: 2019-05-08

## 2019-05-08 NOTE — TELEPHONE ENCOUNTER
[facility] Transitions Initial Follow Up Call    Outreach made within 2 business days of discharge: Yes    Patient: Alexander Gongora Patient : 1995   MRN: [id number]  Reason for Admission: Pneumonia  Discharge Date: 19       Spoke with: Thompson Lucas    Discharge department/facility: [facility] Interactive Patient Contact:  Was patient able to fill all prescriptions: Yes  Was patient instructed to bring all medications to the follow-up visit: Yes  Is patient taking all medications as directed in the discharge summary?  Yes  Does patient understand their discharge instructions: Yes  Does patient have questions or concerns that need addressed prior to 7-14 day follow up office visit: no    Scheduled appointment with PCP within 7-14 days    Follow Up  Future Appointments   Date Time Provider Kishan Reich   5/10/2019  1:00 PM Kassandra Ugalde MD [facility] [address] [facility]   2019  1:45 PM Danny Moe MD Resp Spec [address] [place] [address]

## 2019-05-10 ENCOUNTER — OFFICE VISIT (OUTPATIENT)
Dept: FAMILY MEDICINE CLINIC | Age: 24
End: 2019-05-10
Payer: COMMERCIAL

## 2019-05-10 VITALS
RESPIRATION RATE: 20 BRPM | HEART RATE: 86 BPM | TEMPERATURE: 98.4 F | SYSTOLIC BLOOD PRESSURE: 90 MMHG | DIASTOLIC BLOOD PRESSURE: 68 MMHG

## 2019-05-10 DIAGNOSIS — J96.11 CHRONIC RESPIRATORY FAILURE WITH HYPOXIA AND HYPERCAPNIA (HCC): ICD-10-CM

## 2019-05-10 DIAGNOSIS — J69.0 ASPIRATION PNEUMONIA OF LEFT LOWER LOBE, UNSPECIFIED ASPIRATION PNEUMONIA TYPE (HCC): ICD-10-CM

## 2019-05-10 DIAGNOSIS — J18.9 PNEUMONIA OF LEFT LOWER LOBE DUE TO INFECTIOUS ORGANISM: Primary | ICD-10-CM

## 2019-05-10 DIAGNOSIS — J96.21 ACUTE ON CHRONIC RESPIRATORY FAILURE WITH HYPOXIA AND HYPERCAPNIA (HCC): ICD-10-CM

## 2019-05-10 DIAGNOSIS — J96.12 CHRONIC RESPIRATORY FAILURE WITH HYPOXIA AND HYPERCAPNIA (HCC): ICD-10-CM

## 2019-05-10 DIAGNOSIS — J96.22 ACUTE ON CHRONIC RESPIRATORY FAILURE WITH HYPOXIA AND HYPERCAPNIA (HCC): ICD-10-CM

## 2019-05-10 LAB
CULTURE: NORMAL
Lab: NORMAL
SPECIMEN DESCRIPTION: NORMAL

## 2019-05-10 PROCEDURE — 1111F DSCHRG MED/CURRENT MED MERGE: CPT | Performed by: PEDIATRICS

## 2019-05-10 PROCEDURE — 99495 TRANSJ CARE MGMT MOD F2F 14D: CPT | Performed by: PEDIATRICS

## 2019-05-10 ASSESSMENT — PATIENT HEALTH QUESTIONNAIRE - PHQ9: DEPRESSION UNABLE TO ASSESS: FUNCTIONAL CAPACITY MOTIVATION LIMITS ACCURACY

## 2019-05-10 NOTE — PROGRESS NOTES
MG TABS  Take 1 tablet by mouth daily             Misc Natural Products (CYSTEX) LIQD  Take 15 mLs by mouth daily             Multiple Vitamins-Minerals (MULTIVITAMIN WITH IRON-MINERALS) liquid  Take 15 mLs by mouth daily             Nutritional Supplements (PEPTAMEN 1 NEETU) LIQD  Take 1 Can by mouth 3 times daily Peptamen Adult             Pediatric Multivitamins-Iron (MULTI-DELYN/IRON) LIQD  Take one teaspoonful per g-tube every day             Probiotic Product (ALIGN) 4 MG CAPS  Take 4 mg by mouth nightly             saccharomyces boulardii (FLORASTOR) 250 MG capsule  Take 1 capsule by mouth daily             Sodium Phosphates (FLEET) 7-19 GM/118ML  Place 1 enema rectally daily as needed (constipation)             vitamin D (AQUEOUS VITAMIN D) 400 UNIT/ML LIQD  Take one teaspoonful by G-Tube every day             zolpidem (AMBIEN) 10 MG tablet  Take 1 tablet by mouth nightly as needed for Sleep for up to 30 days. Medications marked \"taking\" at this time  Outpatient Medications Marked as Taking for the 5/10/19 encounter (Office Visit) with Didier Benítez MD   Medication Sig Dispense Refill    [] cefUROXime (CEFTIN) 500 MG tablet Take 1 tablet by mouth 2 times daily for 4 days 8 tablet 0    zolpidem (AMBIEN) 10 MG tablet Take 1 tablet by mouth nightly as needed for Sleep for up to 30 days.  30 tablet 2    doxazosin (CARDURA) 1 MG tablet Take 1 tablet by mouth nightly 90 tablet 1    acetaminophen (TYLENOL CHILDRENS) 160 MG/5ML suspension Take 18.09 mLs by mouth every 6 hours as needed for Fever 240 mL 0    vitamin D (AQUEOUS VITAMIN D) 400 UNIT/ML LIQD Take one teaspoonful by G-Tube every day 150 mL 5    Multiple Vitamins-Minerals (MULTIVITAMIN WITH IRON-MINERALS) liquid Take 15 mLs by mouth daily 2 Bottle 5    magnesium hydroxide (MILK OF MAGNESIA CONCENTRATE) 2400 MG/10ML SUSP Take 8 mLs by mouth 2 times daily 10 mL 5    saccharomyces boulardii (FLORASTOR) 250 MG capsule course of several days. They did also do a swallow study which did show penetration/aspiration with thin liquids so her pneumonia was suspected to be secondary to aspiration. There are any thickening her liquids so no changes made. She was discharged home with 2 more days of Zithromax and 4 more days of Ceftin. Her mother states that she is much improved however she still does have some nasal congestion and a very slight congested cough. She is still getting 2 treatments per day. Her mother has no other concerns. cmw        Review of Systems   Reason unable to perform ROS: Pt nonverbal.   Constitutional: Negative for appetite change, fatigue, fever and unexpected weight change. HENT: Positive for congestion. Negative for postnasal drip, rhinorrhea, tinnitus and trouble swallowing. Eyes: Negative for photophobia, pain, discharge and redness. Respiratory: Positive for apnea (ISELA and Central sleep apnea) and cough (still with some congested cough). Negative for shortness of breath, wheezing and stridor. Hypoventilation syndrome and hypercapnia - on ventilator at night   Cardiovascular: Negative for chest pain, palpitations and leg swelling. Gastrointestinal: Negative for abdominal pain, constipation, diarrhea and vomiting. Endocrine: Negative for polydipsia and polyphagia. Genitourinary: Negative for decreased urine volume, dysuria, hematuria and urgency. Skin: Negative for color change and rash. Allergic/Immunologic: Negative for immunocompromised state. Neurological: Positive for tremors (chronic) and seizures (chronic). Negative for dizziness, light-headedness and headaches. Hematological: Negative for adenopathy. Does not bruise/bleed easily. Psychiatric/Behavioral: Negative for dysphoric mood and sleep disturbance. The patient is not nervous/anxious.         Vitals:    05/10/19 1309   BP: 90/68   Site: Right Upper Arm   Position: Sitting   Cuff Size: Child   Pulse: 86   Resp: 20

## 2019-05-12 ASSESSMENT — ENCOUNTER SYMPTOMS
APNEA: 1
CONSTIPATION: 0
PHOTOPHOBIA: 0
COUGH: 1
STRIDOR: 0
EYE DISCHARGE: 0
COLOR CHANGE: 0
TROUBLE SWALLOWING: 0
DIARRHEA: 0
SHORTNESS OF BREATH: 0
RHINORRHEA: 0
VOMITING: 0
ABDOMINAL PAIN: 0
EYE REDNESS: 0
WHEEZING: 0
EYE PAIN: 0

## 2019-05-15 ENCOUNTER — OFFICE VISIT (OUTPATIENT)
Dept: FAMILY MEDICINE CLINIC | Age: 24
End: 2019-05-15
Payer: COMMERCIAL

## 2019-05-15 VITALS
HEART RATE: 72 BPM | RESPIRATION RATE: 18 BRPM | DIASTOLIC BLOOD PRESSURE: 64 MMHG | SYSTOLIC BLOOD PRESSURE: 100 MMHG | TEMPERATURE: 98.7 F

## 2019-05-15 DIAGNOSIS — R25.1 TREMORS OF NERVOUS SYSTEM: ICD-10-CM

## 2019-05-15 DIAGNOSIS — F51.01 PRIMARY INSOMNIA: ICD-10-CM

## 2019-05-15 DIAGNOSIS — K21.9 GASTROESOPHAGEAL REFLUX DISEASE, ESOPHAGITIS PRESENCE NOT SPECIFIED: ICD-10-CM

## 2019-05-15 DIAGNOSIS — G40.909 SEIZURE DISORDER (HCC): ICD-10-CM

## 2019-05-15 DIAGNOSIS — E55.9 VITAMIN D DEFICIENCY: ICD-10-CM

## 2019-05-15 DIAGNOSIS — M62.838 MUSCLE SPASTICITY: ICD-10-CM

## 2019-05-15 DIAGNOSIS — Z99.11 DEPENDENT ON VENTILATOR (HCC): ICD-10-CM

## 2019-05-15 DIAGNOSIS — G24.9 DYSTONIA: ICD-10-CM

## 2019-05-15 DIAGNOSIS — J98.4 RESTRICTIVE LUNG DISEASE DUE TO KYPHOSCOLIOSIS: ICD-10-CM

## 2019-05-15 DIAGNOSIS — J96.12 CHRONIC RESPIRATORY FAILURE WITH HYPOXIA AND HYPERCAPNIA (HCC): ICD-10-CM

## 2019-05-15 DIAGNOSIS — F84.2 RETT'S SYNDROME: ICD-10-CM

## 2019-05-15 DIAGNOSIS — M41.9 RESTRICTIVE LUNG DISEASE DUE TO KYPHOSCOLIOSIS: ICD-10-CM

## 2019-05-15 DIAGNOSIS — R06.89 HYPOVENTILATION: ICD-10-CM

## 2019-05-15 DIAGNOSIS — G47.33 OSA (OBSTRUCTIVE SLEEP APNEA): ICD-10-CM

## 2019-05-15 DIAGNOSIS — Z00.00 ANNUAL PHYSICAL EXAM: Primary | ICD-10-CM

## 2019-05-15 DIAGNOSIS — J96.11 CHRONIC RESPIRATORY FAILURE WITH HYPOXIA AND HYPERCAPNIA (HCC): ICD-10-CM

## 2019-05-15 DIAGNOSIS — G47.31 CENTRAL SLEEP APNEA: ICD-10-CM

## 2019-05-15 DIAGNOSIS — K59.01 SLOW TRANSIT CONSTIPATION: ICD-10-CM

## 2019-05-15 DIAGNOSIS — G80.0 SPASTIC QUADRIPLEGIC CEREBRAL PALSY (HCC): ICD-10-CM

## 2019-05-15 PROCEDURE — 99395 PREV VISIT EST AGE 18-39: CPT | Performed by: PEDIATRICS

## 2019-05-15 ASSESSMENT — ENCOUNTER SYMPTOMS
STRIDOR: 0
VOMITING: 0
COUGH: 0
DIARRHEA: 1
EYE PAIN: 0
CONSTIPATION: 1
COLOR CHANGE: 0
TROUBLE SWALLOWING: 0
RHINORRHEA: 0
EYE REDNESS: 0
ABDOMINAL PAIN: 0
WHEEZING: 0
APNEA: 1
PHOTOPHOBIA: 0
SHORTNESS OF BREATH: 0
EYE DISCHARGE: 0

## 2019-05-15 ASSESSMENT — PATIENT HEALTH QUESTIONNAIRE - PHQ9: DEPRESSION UNABLE TO ASSESS: FUNCTIONAL CAPACITY MOTIVATION LIMITS ACCURACY

## 2019-05-15 NOTE — PROGRESS NOTES
Subjective:      Patient ID: Etta Bai is a 22 y.o. female. Visit Information    Have you changed or started any medications since your last visit including any over-the-counter medicines, vitamins, or herbal medicines? no   Are you having any side effects from any of your medications? -  no  Have you stopped taking any of your medications? Is so, why? -  no    Have you seen any other physician or provider since your last visit? Yes - Records Obtained  Have you had any other diagnostic tests since your last visit? Yes - Records Obtained  Have you been seen in the emergency room and/or had an admission to a hospital since we last saw you? Yes - Records Obtained  Have you had your routine dental cleaning in the past 6 months? no    Have you activated your [employer] account? If not, what are your barriers?  Yes     Patient Care Team:  Jordan Lawrence MD as PCP - General (Internal Medicine)  Marlon Robles MD as Consulting Physician (Pulmonology)    Medical History Review  Past Medical, Family, and Social History reviewed and does contribute to the patient presenting condition    Health Maintenance   Topic Date Due    Cervical cancer screen  03/30/2017    DTaP/Tdap/Td vaccine (7 - Td) 11/27/2017    Varicella Vaccine (2 of 2 - 2-dose childhood series) 06/05/2019 (Originally 3/30/2000)    HIV screen  05/15/2020 (Originally 3/30/2011)    Chlamydia screen  05/15/2020 (Originally 10/12/2016)    HPV vaccine  Completed    Flu vaccine  Completed    Pneumococcal 0-64 years Vaccine  Completed       PHQ Scores 9/7/2018   PHQ2 Score 0   PHQ9 Score 0     Interpretation of Total Score DepressionSeverity: 1-4 = Minimal depression, 5-9 = Mild depression, 10-14 = Moderate depression, 15-19 = Moderately severe depression, 20-27 = Severe depression    Current Outpatient Medications   Medication Sig Dispense Refill    doxazosin (CARDURA) 1 MG tablet Take 1 tablet by mouth nightly 90 tablet 1    acetaminophen (TYLENOL CHILDRENS) 160 MG/5ML suspension Take 18.09 mLs by mouth every 6 hours as needed for Fever 240 mL 0    vitamin D (AQUEOUS VITAMIN D) 400 UNIT/ML LIQD Take one teaspoonful by G-Tube every day 150 mL 5    Multiple Vitamins-Minerals (MULTIVITAMIN WITH IRON-MINERALS) liquid Take 15 mLs by mouth daily 2 Bottle 5    magnesium hydroxide (MILK OF MAGNESIA CONCENTRATE) 2400 MG/10ML SUSP Take 8 mLs by mouth 2 times daily 10 mL 5    saccharomyces boulardii (FLORASTOR) 250 MG capsule Take 1 capsule by mouth daily 30 capsule 11    Probiotic Product (ALIGN) 4 MG CAPS Take 4 mg by mouth nightly 30 capsule 11    Sodium Phosphates (FLEET) 7-19 GM/118ML Place 1 enema rectally daily as needed (constipation) 30 Bottle 11    diazepam (DIASTAT) 10 MG GEL Place 10 mg rectally once as needed (seizure) for up to 1 dose. 2 each 5    Methylnaltrexone Bromide (RELISTOR) 150 MG TABS Take 1 tablet by mouth daily 14 tablet 0    esomeprazole Magnesium (NEXIUM) 40 MG PACK Take 20 mg by mouth 2 times daily       ibuprofen (ADVIL;MOTRIN) 100 MG/5ML suspension Take 10 mg/kg by mouth every 4 hours as needed for Fever      Handicap Placard MISC by Does not apply route Expires 12/21/2022 1 each 0    Feeding Tubes - Sets (JAYDEN-KEY GASTROSTOMY KIT 18FR) MISC 3.5 cm 1 each 11    levalbuterol (XOPENEX) 1.25 MG/3ML nebulizer solution Take 3 mLs by nebulization every 8 hours as needed for Wheezing 120 mL 11    Pediatric Multivitamins-Iron (MULTI-DELYN/IRON) LIQD Take one teaspoonful per g-tube every day 150 mL 5    levETIRAcetam (KEPPRA) 100 MG/ML solution Take 14ML two times a day by G-button (Patient taking differently: Take 20ML two times a day by G-button) 2520 mL 2    Nutritional Supplements (PEPTAMEN 1 NEETU) LIQD Take 1 Can by mouth 3 times daily Peptamen Adult 46490 mL 5    Misc Natural Products (CYSTEX) LIQD Take 15 mLs by mouth daily 450 mL 11     No current facility-administered medications for this visit.         Patient presents to Objective:     /64 (Site: Right Upper Arm, Position: Sitting, Cuff Size: Medium Adult)   Pulse 72   Temp 98.7 °F (37.1 °C) (Tympanic)   Resp 18   Breastfeeding? No      Physical Exam   Constitutional: She appears well-developed and well-nourished. No distress. In wheelchair   HENT:   Head: Normocephalic. Right Ear: External ear normal.   Left Ear: External ear normal.   Nose: Nose normal.   Mouth/Throat: Oropharynx is clear and moist.   Drooling  Increase cerumen bilateral ear canals   Eyes: Pupils are equal, round, and reactive to light. EOM are normal. Right eye exhibits no discharge. Left eye exhibits no discharge. No scleral icterus. Neck: Normal range of motion. No JVD present. No thyromegaly present. Head tilt to the left    Cardiovascular: Normal rate, regular rhythm and normal heart sounds. No murmur heard. Pulmonary/Chest: Effort normal and breath sounds normal. She has no wheezes. She has no rales. Abdominal: Soft. She exhibits no distension and no mass. There is no hepatosplenomegaly. There is no tenderness. Musculoskeletal: She exhibits no edema. Dystonia with contractures of upper and lower extremities - her extremities are much more easily movable. Neurological: She is alert. She displays atrophy. She exhibits abnormal muscle tone. She displays no seizure activity. Skin: Skin is warm. She is not diaphoretic. She has a reddish / purplish discoloration to her feet and lower extremities. Nursing note and vitals reviewed. Assessment:      Diagnosis Orders   1. Annual physical exam     2. Rett's syndrome     3. Seizure disorder ([address]     4. Spastic quadriplegic cerebral palsy ([address]     5. Dystonia     6. Muscle spasticity     7. Tremors of nervous system     8. Chronic respiratory failure with hypoxia and hypercapnia (HCC)     9. Dependent on ventilator ([address]     10. ISELA (obstructive sleep apnea)     11. Central sleep apnea     12.  Restrictive lung

## 2019-05-22 ENCOUNTER — HOSPITAL ENCOUNTER (OUTPATIENT)
Facility: CLINIC | Age: 24
Discharge: HOME | End: 2019-05-24
Payer: COMMERCIAL

## 2019-05-22 ENCOUNTER — HOSPITAL ENCOUNTER (OUTPATIENT)
Dept: GENERAL RADIOLOGY | Facility: CLINIC | Age: 24
Discharge: HOME | End: 2019-05-24
Payer: COMMERCIAL

## 2019-05-22 DIAGNOSIS — J18.9 PNEUMONIA OF LEFT LOWER LOBE DUE TO INFECTIOUS ORGANISM: ICD-10-CM

## 2019-05-22 PROCEDURE — 71046 X-RAY EXAM CHEST 2 VIEWS: CPT

## 2019-05-23 DIAGNOSIS — J18.9 PNEUMONIA OF LEFT LOWER LOBE DUE TO INFECTIOUS ORGANISM: Primary | ICD-10-CM

## 2019-05-23 RX ORDER — AZITHROMYCIN 250 MG/1
250 TABLET, FILM COATED ORAL SEE ADMIN INSTRUCTIONS
Qty: 6 TABLET | Refills: 0 | Status: SHIPPED | OUTPATIENT
Start: 2019-05-23 | End: 2019-05-28

## 2019-05-23 RX ORDER — CEFUROXIME AXETIL 500 MG/1
500 TABLET ORAL 2 TIMES DAILY
Qty: 20 TABLET | Refills: 0 | Status: SHIPPED | OUTPATIENT
Start: 2019-05-23 | End: 2019-06-02

## 2019-06-17 DIAGNOSIS — E55.9 VITAMIN D DEFICIENCY: ICD-10-CM

## 2019-06-19 NOTE — TELEPHONE ENCOUNTER
Hypotension     Nonverbal     Sepsis ([address]     Hypothermia due to non-environmental cause     Prolonged QT interval     Urinary retention with incomplete bladder emptying     Chronic respiratory failure with hypoxia and hypercapnia ([facility])     Myoclonus     Atelectasis     Dependence on enabling machine     Dependent on ventilator ([address]     Delay in development     Tonic-clonic seizures ([address]     History of recurrent pneumonia     Fistula     Encounter for removal of internal fixation device     Sialorrhea     Myoneural disorder (HCC)     Muscle spasticity     Bladder retention     Community acquired pneumonia of left lower lobe of lung (HCC)     Aspiration pneumonia ([facility])     Intractable vomiting     Seizure ([address]     Seizure disorder ([facility])     Abnormal electroencephalogram     Closed fracture of shaft of right humerus with routine healing     Constipation     Contracture of elbow joint, right     Diarrhea     Gastric reflux     Spastic quadriplegic cerebral palsy ([address]     Wheelchair bound     Malnutrition related to chronic disease ([address]     Pneumonia     Severe malnutrition (HCC)     Aspiration pneumonia ([address]

## 2019-06-21 ENCOUNTER — HOSPITAL ENCOUNTER (OUTPATIENT)
Facility: CLINIC | Age: 24
Discharge: HOME | End: 2019-06-23
Payer: COMMERCIAL

## 2019-06-21 ENCOUNTER — HOSPITAL ENCOUNTER (OUTPATIENT)
Dept: GENERAL RADIOLOGY | Facility: CLINIC | Age: 24
Discharge: HOME | End: 2019-06-23
Payer: COMMERCIAL

## 2019-06-21 DIAGNOSIS — J18.9 PNEUMONIA OF LEFT LOWER LOBE DUE TO INFECTIOUS ORGANISM: ICD-10-CM

## 2019-06-21 PROCEDURE — 71046 X-RAY EXAM CHEST 2 VIEWS: CPT

## 2019-06-24 ENCOUNTER — HOSPITAL ENCOUNTER (OUTPATIENT)
Age: 24
Setting detail: SPECIMEN
Discharge: HOME | End: 2019-06-24
Payer: COMMERCIAL

## 2019-06-24 ENCOUNTER — TELEPHONE (OUTPATIENT)
Dept: FAMILY MEDICINE CLINIC | Age: 24
End: 2019-06-24

## 2019-06-24 DIAGNOSIS — R19.5 STRONG ODOR OF STOOLS: ICD-10-CM

## 2019-06-24 DIAGNOSIS — R19.5 ABNORMAL STOOLS: Primary | ICD-10-CM

## 2019-06-24 DIAGNOSIS — R10.9 ABDOMINAL DISCOMFORT: Primary | ICD-10-CM

## 2019-06-24 DIAGNOSIS — R19.5 OFFENSIVE ODOR OF FECES: ICD-10-CM

## 2019-06-24 NOTE — TELEPHONE ENCOUNTER
Mother reports that patient's stools are very foul smelling and she is having a lot of abdominal discomfort. Mother is requesting and order for C-diff. She will bring specimen into office Lab.   Okay to order this for patient  Health Maintenance   Topic Date Due    Varicella Vaccine (2 of 2 - 2-dose childhood series) 03/30/2000    Cervical cancer screen  03/30/2017    DTaP/Tdap/Td vaccine (7 - Td) 11/27/2017    HIV screen  05/15/2020 (Originally 3/30/2011)    Chlamydia screen  05/15/2020 (Originally 10/12/2016)    HPV vaccine  Completed    Flu vaccine  Completed    Pneumococcal 0-64 years Vaccine  Completed             (applicable per patient's age: Cancer Screenings, Depression Screening, Fall Risk Screening, Immunizations)    AST (U/L)   Date Value   05/04/2019 16     ALT (U/L)   Date Value   05/04/2019 16     BUN (mg/dL)   Date Value   05/05/2019 7      (goal A1C is < 7)   (goal LDL is <100) need 30-50% reduction from baseline     BP Readings from Last 3 Encounters:   05/15/19 100/64   05/10/19 90/68   05/07/19 128/82    (goal /80)      All Future Testing planned in [facility]:  Lab Frequency Next Occurrence   Blood Gas, Arterial Once 11/06/2018       Next Visit Date:  Future Appointments   Date Time Provider Kishan Rachana   7/19/2019  1:45 PM Roxann Solomon MD Resp Spec [id number]            Patient Active Problem List:     Rett's syndrome     Scoliosis     Dystonia     Tremors of nervous system     Convulsions ([address]     Gastroesophageal reflux disease without esophagitis     Fibroadenoma of breast     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

## 2019-06-25 LAB
C DIFF AG + TOXIN: NEGATIVE
SPECIMEN DESCRIPTION: NORMAL

## 2019-07-24 DIAGNOSIS — G47.01 INSOMNIA DUE TO MEDICAL CONDITION: ICD-10-CM

## 2019-07-24 RX ORDER — ZOLPIDEM TARTRATE 10 MG/1
10 TABLET ORAL NIGHTLY PRN
Qty: 30 TABLET | Refills: 2 | Status: SHIPPED | OUTPATIENT
Start: 2019-07-24 | End: 2019-10-18 | Stop reason: SDUPTHER

## 2019-08-15 ENCOUNTER — TELEPHONE (OUTPATIENT)
Dept: FAMILY MEDICINE CLINIC | Age: 24
End: 2019-08-15

## 2019-08-15 DIAGNOSIS — R82.90 ABNORMAL URINE ODOR: Primary | ICD-10-CM

## 2019-08-15 NOTE — TELEPHONE ENCOUNTER
Mother requesting order for urinalysis. Believes patient may have UTI due to strong smelling urine odor present. Would like to compete today.  Order pended for provider review/approval.

## 2019-08-16 ENCOUNTER — HOSPITAL ENCOUNTER (OUTPATIENT)
Age: 24
Setting detail: SPECIMEN
Discharge: HOME | End: 2019-08-16
Payer: COMMERCIAL

## 2019-08-16 DIAGNOSIS — R82.90 ABNORMAL URINE ODOR: ICD-10-CM

## 2019-08-16 LAB
-: ABNORMAL
AMORPHOUS: ABNORMAL
BACTERIA: ABNORMAL
BILIRUBIN URINE: NEGATIVE
CASTS UA: ABNORMAL /LPF (ref 0–2)
COLOR: YELLOW
CRYSTALS, UA: ABNORMAL /HPF
EPITHELIAL CELLS UA: ABNORMAL /HPF (ref 0–5)
GLUCOSE URINE: NEGATIVE
KETONES, URINE: NEGATIVE
LEUKOCYTE ESTERASE, URINE: ABNORMAL
MUCUS: ABNORMAL
NITRITE, URINE: NEGATIVE
OTHER OBSERVATIONS UA: ABNORMAL
PH UA: 8 (ref 5–8)
PROTEIN UA: NEGATIVE
RBC UA: ABNORMAL /HPF (ref 0–2)
RENAL EPITHELIAL, UA: ABNORMAL /HPF
SPECIFIC GRAVITY UA: 1 (ref 1–1.03)
TRICHOMONAS: ABNORMAL
TURBIDITY: ABNORMAL
URINE HGB: ABNORMAL
UROBILINOGEN, URINE: NORMAL
WBC UA: ABNORMAL /HPF (ref 0–5)
YEAST: ABNORMAL

## 2019-08-18 LAB
CULTURE: ABNORMAL
Lab: ABNORMAL
SPECIMEN DESCRIPTION: ABNORMAL

## 2019-08-19 ENCOUNTER — TELEPHONE (OUTPATIENT)
Dept: FAMILY MEDICINE CLINIC | Age: 24
End: 2019-08-19

## 2019-08-19 DIAGNOSIS — N39.0 URINARY TRACT INFECTION WITHOUT HEMATURIA, SITE UNSPECIFIED: Primary | ICD-10-CM

## 2019-08-19 RX ORDER — SULFAMETHOXAZOLE AND TRIMETHOPRIM 200; 40 MG/5ML; MG/5ML
20 SUSPENSION ORAL 2 TIMES DAILY
Qty: 400 ML | Refills: 0 | Status: SHIPPED | OUTPATIENT
Start: 2019-08-19 | End: 2019-08-29 | Stop reason: SDUPTHER

## 2019-08-23 ENCOUNTER — TELEPHONE (OUTPATIENT)
Dept: FAMILY MEDICINE CLINIC | Age: 24
End: 2019-08-23

## 2019-08-29 ENCOUNTER — TELEPHONE (OUTPATIENT)
Dept: FAMILY MEDICINE CLINIC | Age: 24
End: 2019-08-29

## 2019-08-29 DIAGNOSIS — N39.0 URINARY TRACT INFECTION WITHOUT HEMATURIA, SITE UNSPECIFIED: ICD-10-CM

## 2019-08-29 RX ORDER — SULFAMETHOXAZOLE AND TRIMETHOPRIM 200; 40 MG/5ML; MG/5ML
20 SUSPENSION ORAL 2 TIMES DAILY
Qty: 400 ML | Refills: 0 | Status: SHIPPED | OUTPATIENT
Start: 2019-08-29 | End: 2019-09-08

## 2019-09-06 ENCOUNTER — OFFICE VISIT (OUTPATIENT)
Dept: PULMONOLOGY | Age: 24
End: 2019-09-06
Payer: COMMERCIAL

## 2019-09-06 VITALS
WEIGHT: 90 LBS | OXYGEN SATURATION: 99 % | DIASTOLIC BLOOD PRESSURE: 81 MMHG | HEIGHT: 57 IN | SYSTOLIC BLOOD PRESSURE: 130 MMHG | RESPIRATION RATE: 14 BRPM | BODY MASS INDEX: 19.41 KG/M2 | HEART RATE: 90 BPM

## 2019-09-06 DIAGNOSIS — J96.12 CHRONIC RESPIRATORY FAILURE WITH HYPERCAPNIA (HCC): Primary | ICD-10-CM

## 2019-09-06 DIAGNOSIS — G47.31 CENTRAL SLEEP APNEA: ICD-10-CM

## 2019-09-06 DIAGNOSIS — J98.4 RESTRICTIVE LUNG MECHANICS DUE TO NEUROMUSCULAR DISEASE (HCC): ICD-10-CM

## 2019-09-06 DIAGNOSIS — G70.9 RESTRICTIVE LUNG MECHANICS DUE TO NEUROMUSCULAR DISEASE (HCC): ICD-10-CM

## 2019-09-06 PROCEDURE — 99213 OFFICE O/P EST LOW 20 MIN: CPT | Performed by: INTERNAL MEDICINE

## 2019-09-06 RX ORDER — PREDNISONE 10 MG/1
30 TABLET ORAL DAILY
Qty: 15 TABLET | Refills: 0 | Status: SHIPPED | OUTPATIENT
Start: 2019-09-06 | End: 2019-09-11

## 2019-09-06 RX ORDER — LEVOFLOXACIN 500 MG/1
250 TABLET, FILM COATED ORAL DAILY
Qty: 3 TABLET | Refills: 0 | Status: SHIPPED | OUTPATIENT
Start: 2019-09-06 | End: 2019-09-11

## 2019-09-06 NOTE — PROGRESS NOTES
REASON FOR THE CONSULTATION:  Chronic Hypercapnic respiratory failure  HISTORY OF PRESENT ILLNESS:    Travis Hood is a 22y.o. year old female   Accompanied by her caregiver since her hospitalization no further respiratory issues have cropped up she is on treatment for UTI with Bactrim. Is being fed via PEG tube appears comfortable without any wheezing or shortness of breath  Last visit   Since last visit, patient had been in the hospital for influenza infection. Since discharge, she had been doing well until last week when I received a call from her mother that she had more secretions and sounded wheezy chest x-ray was ordered which showed left basilar atelectasis and I started her empirically on Levaquin. Since then patient has been doing well. Secretions are better. She is not wheezing. She is using albuterol only when she wheezes which is when she gets upper respiratory infection. 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 She is accompanied by her mother. Patient is wheelchair bound and nonverbal.  According to the mother in November. [place] was [place] [facility] [place] and [place] where they performed a sleep study and she was found to have mild degree of central and obstructive sleep apnea. However, her end-tidal was quite elevated, that is end tidal CO2 and they had to call a rapid response for her take her to the  pediatric intensive care unit. She was intubated for approximately 5 days and then successfully extubated and transitioned over to a BiPAP ST. She uses it at night but not during the day. She also has neuromuscular disease. Because of the underlying genetic disorder, she has a baclofen pump is on benzodiazepine's and trazodone for sleep and they also cause her to have hypoventilation in addition to restrictive lung disease due to kyphoscoliosis and neuromuscular disease.   [place] also [place] [facility] [place]    Day, I see that she has redness on the nasal bridge and some skin breakdown due to mask being too tight on her.   It is a nasal mask    PAST MEDICAL HISTORY:       Diagnosis Date    Abnormal electroencephalogram 6/25/2018    Acute on chronic respiratory failure with hypoxia and hypercapnia (HCC)     Acute respiratory failure with hypercapnia (HCC)     Aspiration pneumonia (HCC) 1/16/2018    Atelectasis 4/27/2017    BiPAP (biphasic positive airway pressure) dependence     Bladder retention 4/26/2017    Body temperature low     Breast lump in female     one at 3 oclock and one at 7 oclock    Cellulitis     left elbow    Cellulitis of left elbow     Central sleep apnea 1/9/2017    Chronic respiratory failure with hypoxia and hypercapnia (HCC) 4/9/2017    Closed fracture of shaft of right humerus with routine healing 4/24/2018    Community acquired pneumonia of left lower lobe of lung ([address] 1/13/2018    Constipation 8/6/2018    Contracture of elbow joint, right 11/30/2017    Convulsions ([address]     Dependence on enabling machine 4/27/2017    Dependent on ventilator ([address]     Diarrhea 8/6/2018    Dystonia     Encounter for removal of internal fixation device 4/11/2013    Overview:  Removal of spinal rods and baclofen pump on 4.12.13 for suspected infection    Fibroadenoma of breast     Fistula     G tube feedings ([address]     Gastric reflux 8/6/2018    Gastroesophageal reflux disease without esophagitis 10/20/2015    GERD (gastroesophageal reflux disease)     History of recurrent pneumonia 4/27/2017    Hypercapnic respiratory failure ([address]     Hypotension 3/20/2017     Updating Deprecated Diagnoses    Hypothermia due to non-environmental cause 4/8/2017    Kyphoscoliosis     Mild sleep apnea     not treated    Muscle spasticity 4/8/2013    Myoclonus     Myoneural disorder ([address]     Neuromuscular disease ([address]     Nonverbal     ISELA (obstructive sleep apnea)     Pneumonia due to infectious organism     left lower lob    Pneumonia of left lower lobe due to infectious organism ([address] 2/28/2017    Prolonged Q-T interval on ECG     Prolonged QT interval 4/8/2017    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 ([address] 1/9/2017    Rett's syndrome     Scoliosis     Seizure ([address] 1/23/2018    Seizure disorder (HCC)     Seizures (HCC)     Sepsis ([address]     Sialorrhea     Skin infection at gastrostomy tube site ([address]     Sleep-related hypoventilation     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 ([address] 2/14/2017    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    Tonic clonic seizures (HCC)     Tremors of nervous system     Urinary retention with incomplete bladder emptying     Vitamin D deficiency     Wheelchair bound          Family History:       Problem Relation Age of Onset    High Blood Pressure Mother        SURGICAL HISTORY:   Past Surgical History:   Procedure Laterality Date    BACLOFEN PUMP IMPLANTATION  [id number]    BACLOFEN PUMP IMPLANTATION  06/28/2016    CHOLECYSTECTOMY  [id number]    GASTRIC FUNDOPLICATION  [id number]   [facility] SPINAL FUSION  [id number]    removal of spinal and baclofen pump 04/01/2013           SOCIAL AND OCCUPATIONAL HEALTH:    Nonsmoker, no alcohol, no drugs*        TOBACCO:   reports that she has never smoked. She has never used smokeless tobacco.  ETOH:   reports that she does not drink alcohol.   Immunization History   Administered Date(s) Administered    DTaP 1996, 1996, 1996, 04/15/1997, 09/04/2001    HIB PRP-T (ActHIB, Hiberix) 1996, 1996, 1996, 04/15/1997    HPV 9-valent Jerris Proper) 11/27/2007, 02/08/2008, 07/11/2008    Hepatitis A 08/29/2009, 03/25/2011    Hepatitis B (Recombivax HB) 1996, 1996, 1996    Influenza Vaccine, unspecified formulation 10/27/2014    Influenza Virus Vaccine

## 2019-09-22 DIAGNOSIS — R33.9 URINARY RETENTION WITH INCOMPLETE BLADDER EMPTYING: ICD-10-CM

## 2019-09-24 RX ORDER — DOXAZOSIN MESYLATE 1 MG/1
1 TABLET ORAL NIGHTLY
Qty: 90 TABLET | Refills: 1 | Status: SHIPPED | OUTPATIENT
Start: 2019-09-24 | End: 2020-03-24

## 2019-09-24 NOTE — TELEPHONE ENCOUNTER
due to non-environmental cause     Prolonged QT interval     Urinary retention with incomplete bladder emptying     Chronic respiratory failure with hypoxia and hypercapnia ([facility])     Myoclonus     Atelectasis     Dependence on enabling machine     Dependent on ventilator ([address]     Delay in development     Tonic-clonic seizures ([address]     History of recurrent pneumonia     Fistula     Encounter for removal of internal fixation device     Sialorrhea     Myoneural disorder (HCC)     Muscle spasticity     Bladder retention     Community acquired pneumonia of left lower lobe of lung ([facility])     Aspiration pneumonia ([facility])     Intractable vomiting     Seizure ([address]     Seizure disorder ([facility])     Abnormal electroencephalogram     Closed fracture of shaft of right humerus with routine healing     Constipation     Contracture of elbow joint, right     Diarrhea     Gastric reflux     Spastic quadriplegic cerebral palsy ([address]     Wheelchair bound     Malnutrition related to chronic disease ([address]     Pneumonia     Severe malnutrition ([address]     Aspiration pneumonia ([address]

## 2019-10-09 ENCOUNTER — TELEPHONE (OUTPATIENT)
Dept: FAMILY MEDICINE CLINIC | Age: 24
End: 2019-10-09

## 2019-10-09 ENCOUNTER — HOSPITAL ENCOUNTER (OUTPATIENT)
Age: 24
Setting detail: SPECIMEN
Discharge: HOME | End: 2019-10-09
Payer: COMMERCIAL

## 2019-10-09 DIAGNOSIS — R82.90 ABNORMAL URINE ODOR: Primary | ICD-10-CM

## 2019-10-09 DIAGNOSIS — R82.90 ABNORMAL URINE ODOR: ICD-10-CM

## 2019-10-09 LAB
BILIRUBIN URINE: NEGATIVE
COLOR: YELLOW
COMMENT UA: ABNORMAL
GLUCOSE URINE: NEGATIVE
KETONES, URINE: NEGATIVE
LEUKOCYTE ESTERASE, URINE: ABNORMAL
NITRITE, URINE: NEGATIVE
PH UA: 7.5 (ref 5–8)
PROTEIN UA: NEGATIVE
SPECIFIC GRAVITY UA: 1.02 (ref 1–1.03)
TURBIDITY: ABNORMAL
URINE HGB: ABNORMAL
UROBILINOGEN, URINE: NORMAL

## 2019-10-10 LAB
-: ABNORMAL
AMORPHOUS: ABNORMAL
BACTERIA: ABNORMAL
CASTS UA: ABNORMAL /LPF (ref 0–2)
CRYSTALS, UA: ABNORMAL /HPF
CULTURE: NORMAL
EPITHELIAL CELLS UA: ABNORMAL /HPF (ref 0–5)
Lab: NORMAL
MUCUS: ABNORMAL
OTHER OBSERVATIONS UA: ABNORMAL
RBC UA: ABNORMAL /HPF (ref 0–2)
RENAL EPITHELIAL, UA: ABNORMAL /HPF
SPECIMEN DESCRIPTION: NORMAL
TRICHOMONAS: ABNORMAL
WBC UA: ABNORMAL /HPF (ref 0–5)
YEAST: ABNORMAL

## 2019-10-30 ENCOUNTER — APPOINTMENT (OUTPATIENT)
Dept: GENERAL RADIOLOGY | Age: 24
End: 2019-10-30
Payer: COMMERCIAL

## 2019-10-30 ENCOUNTER — HOSPITAL ENCOUNTER (EMERGENCY)
Age: 24
Discharge: HOME | End: 2019-10-30
Attending: EMERGENCY MEDICINE
Payer: COMMERCIAL

## 2019-10-30 VITALS
HEART RATE: 74 BPM | BODY MASS INDEX: 17.31 KG/M2 | SYSTOLIC BLOOD PRESSURE: 113 MMHG | OXYGEN SATURATION: 99 % | DIASTOLIC BLOOD PRESSURE: 74 MMHG | WEIGHT: 80 LBS | RESPIRATION RATE: 18 BRPM | TEMPERATURE: 98.8 F

## 2019-10-30 DIAGNOSIS — R79.81 LOW OXYGEN SATURATION: Primary | ICD-10-CM

## 2019-10-30 LAB
ABSOLUTE EOS #: 0.18 K/UL (ref 0–0.44)
ABSOLUTE IMMATURE GRANULOCYTE: 0.06 K/UL (ref 0–0.3)
ABSOLUTE LYMPH #: 2.35 K/UL (ref 1.1–3.7)
ABSOLUTE MONO #: 0.57 K/UL (ref 0.1–1.2)
ALBUMIN SERPL-MCNC: 4.7 G/DL (ref 3.5–5.2)
ALBUMIN/GLOBULIN RATIO: 1.6 (ref 1–2.5)
ALLEN TEST: ABNORMAL
ALP BLD-CCNC: 69 U/L (ref 35–104)
ALT SERPL-CCNC: 12 U/L (ref 5–33)
ANION GAP SERPL CALCULATED.3IONS-SCNC: 12 MMOL/L (ref 9–17)
AST SERPL-CCNC: 15 U/L
BASOPHILS # BLD: 1 % (ref 0–2)
BASOPHILS ABSOLUTE: 0.06 K/UL (ref 0–0.2)
BILIRUB SERPL-MCNC: 0.31 MG/DL (ref 0.3–1.2)
BUN BLDV-MCNC: 14 MG/DL (ref 6–20)
BUN/CREAT BLD: ABNORMAL (ref 9–20)
CALCIUM SERPL-MCNC: 9.3 MG/DL (ref 8.6–10.4)
CARBOXYHEMOGLOBIN: 0.7 % (ref 0–5)
CHLORIDE BLD-SCNC: 99 MMOL/L (ref 98–107)
CO2: 27 MMOL/L (ref 20–31)
CREAT SERPL-MCNC: 0.26 MG/DL (ref 0.5–0.9)
DIFFERENTIAL TYPE: ABNORMAL
EOSINOPHILS RELATIVE PERCENT: 3 % (ref 1–4)
FIO2: ABNORMAL
GFR AFRICAN AMERICAN: >60 ML/MIN
GFR NON-AFRICAN AMERICAN: >60 ML/MIN
GFR SERPL CREATININE-BSD FRML MDRD: ABNORMAL ML/MIN/{1.73_M2}
GFR SERPL CREATININE-BSD FRML MDRD: ABNORMAL ML/MIN/{1.73_M2}
GLUCOSE BLD-MCNC: 89 MG/DL (ref 70–99)
HCO3 VENOUS: 29.5 MMOL/L (ref 24–30)
HCT VFR BLD CALC: 39.7 % (ref 36.3–47.1)
HEMOGLOBIN: 12.4 G/DL (ref 11.9–15.1)
IMMATURE GRANULOCYTES: 1 %
LYMPHOCYTES # BLD: 34 % (ref 24–43)
MCH RBC QN AUTO: 27.7 PG (ref 25.2–33.5)
MCHC RBC AUTO-ENTMCNC: 31.2 G/DL (ref 28.4–34.8)
MCV RBC AUTO: 88.6 FL (ref 82.6–102.9)
METHEMOGLOBIN: ABNORMAL % (ref 0–1.5)
MODE: ABNORMAL
MONOCYTES # BLD: 8 % (ref 3–12)
NEGATIVE BASE EXCESS, VEN: ABNORMAL MMOL/L (ref 0–2)
NOTIFICATION TIME: ABNORMAL
NOTIFICATION: ABNORMAL
NRBC AUTOMATED: 0 PER 100 WBC
O2 DEVICE/FLOW/%: ABNORMAL
O2 SAT, VEN: 43.1 % (ref 60–85)
OXYHEMOGLOBIN: ABNORMAL % (ref 95–98)
PATIENT TEMP: 37
PCO2, VEN, TEMP ADJ: ABNORMAL MMHG (ref 39–55)
PCO2, VEN: 59.2 (ref 39–55)
PDW BLD-RTO: 13.2 % (ref 11.8–14.4)
PEEP/CPAP: ABNORMAL
PH VENOUS: 7.32 (ref 7.32–7.42)
PH, VEN, TEMP ADJ: ABNORMAL (ref 7.32–7.42)
PLATELET # BLD: 319 K/UL (ref 138–453)
PLATELET ESTIMATE: ABNORMAL
PMV BLD AUTO: 10 FL (ref 8.1–13.5)
PO2, VEN, TEMP ADJ: ABNORMAL MMHG (ref 30–50)
PO2, VEN: 25.3 (ref 30–50)
POSITIVE BASE EXCESS, VEN: 2.5 MMOL/L (ref 0–2)
POTASSIUM SERPL-SCNC: 3.9 MMOL/L (ref 3.7–5.3)
PSV: ABNORMAL
PT. POSITION: ABNORMAL
RBC # BLD: 4.48 M/UL (ref 3.95–5.11)
RBC # BLD: ABNORMAL 10*6/UL
RESPIRATORY RATE: ABNORMAL
SAMPLE SITE: ABNORMAL
SEG NEUTROPHILS: 53 % (ref 36–65)
SEGMENTED NEUTROPHILS ABSOLUTE COUNT: 3.64 K/UL (ref 1.5–8.1)
SET RATE: ABNORMAL
SODIUM BLD-SCNC: 138 MMOL/L (ref 135–144)
TEXT FOR RESPIRATORY: ABNORMAL
TOTAL HB: ABNORMAL G/DL (ref 12–16)
TOTAL PROTEIN: 7.6 G/DL (ref 6.4–8.3)
TOTAL RATE: ABNORMAL
VT: ABNORMAL
WBC # BLD: 6.9 K/UL (ref 3.5–11.3)
WBC # BLD: ABNORMAL 10*3/UL

## 2019-10-30 PROCEDURE — 85025 COMPLETE CBC W/AUTO DIFF WBC: CPT

## 2019-10-30 PROCEDURE — 82805 BLOOD GASES W/O2 SATURATION: CPT

## 2019-10-30 PROCEDURE — 80053 COMPREHEN METABOLIC PANEL: CPT

## 2019-10-30 PROCEDURE — 99285 EMERGENCY DEPT VISIT HI MDM: CPT

## 2019-10-30 PROCEDURE — 71046 X-RAY EXAM CHEST 2 VIEWS: CPT

## 2019-10-30 PROCEDURE — 36415 COLL VENOUS BLD VENIPUNCTURE: CPT

## 2019-10-30 ASSESSMENT — ENCOUNTER SYMPTOMS
DIARRHEA: 0
VOMITING: 0
TROUBLE SWALLOWING: 0
NAUSEA: 0
COLOR CHANGE: 1
COUGH: 0
SHORTNESS OF BREATH: 0
EYE DISCHARGE: 0
EYE REDNESS: 0
CHOKING: 0

## 2019-11-05 ENCOUNTER — TELEPHONE (OUTPATIENT)
Dept: FAMILY MEDICINE CLINIC | Age: 24
End: 2019-11-05

## 2019-11-06 ENCOUNTER — OFFICE VISIT (OUTPATIENT)
Dept: FAMILY MEDICINE CLINIC | Age: 24
End: 2019-11-06
Payer: COMMERCIAL

## 2019-11-06 VITALS
RESPIRATION RATE: 20 BRPM | SYSTOLIC BLOOD PRESSURE: 112 MMHG | WEIGHT: 80 LBS | HEART RATE: 74 BPM | TEMPERATURE: 98.3 F | BODY MASS INDEX: 17.31 KG/M2 | DIASTOLIC BLOOD PRESSURE: 68 MMHG

## 2019-11-06 DIAGNOSIS — K59.01 SLOW TRANSIT CONSTIPATION: ICD-10-CM

## 2019-11-06 DIAGNOSIS — G47.31 CENTRAL SLEEP APNEA: ICD-10-CM

## 2019-11-06 DIAGNOSIS — G47.33 OSA (OBSTRUCTIVE SLEEP APNEA): ICD-10-CM

## 2019-11-06 DIAGNOSIS — G24.9 DYSTONIA: ICD-10-CM

## 2019-11-06 DIAGNOSIS — M62.838 MUSCLE SPASTICITY: ICD-10-CM

## 2019-11-06 DIAGNOSIS — R25.1 TREMORS OF NERVOUS SYSTEM: ICD-10-CM

## 2019-11-06 DIAGNOSIS — J96.12 CHRONIC RESPIRATORY FAILURE WITH HYPOXIA AND HYPERCAPNIA (HCC): ICD-10-CM

## 2019-11-06 DIAGNOSIS — G80.0 SPASTIC QUADRIPLEGIC CEREBRAL PALSY (HCC): ICD-10-CM

## 2019-11-06 DIAGNOSIS — Z99.11 DEPENDENT ON VENTILATOR (HCC): ICD-10-CM

## 2019-11-06 DIAGNOSIS — G40.909 SEIZURE DISORDER (HCC): ICD-10-CM

## 2019-11-06 DIAGNOSIS — F84.2 RETT'S SYNDROME: Primary | ICD-10-CM

## 2019-11-06 DIAGNOSIS — J96.11 CHRONIC RESPIRATORY FAILURE WITH HYPOXIA AND HYPERCAPNIA (HCC): ICD-10-CM

## 2019-11-06 DIAGNOSIS — K21.9 GASTROESOPHAGEAL REFLUX DISEASE, ESOPHAGITIS PRESENCE NOT SPECIFIED: ICD-10-CM

## 2019-11-06 DIAGNOSIS — J98.4 RESTRICTIVE LUNG DISEASE DUE TO KYPHOSCOLIOSIS: ICD-10-CM

## 2019-11-06 DIAGNOSIS — R06.89 HYPOVENTILATION: ICD-10-CM

## 2019-11-06 DIAGNOSIS — E55.9 VITAMIN D DEFICIENCY: ICD-10-CM

## 2019-11-06 DIAGNOSIS — G47.01 INSOMNIA DUE TO MEDICAL CONDITION: ICD-10-CM

## 2019-11-06 DIAGNOSIS — M41.9 RESTRICTIVE LUNG DISEASE DUE TO KYPHOSCOLIOSIS: ICD-10-CM

## 2019-11-06 PROCEDURE — 99214 OFFICE O/P EST MOD 30 MIN: CPT | Performed by: PEDIATRICS

## 2019-11-06 RX ORDER — ESZOPICLONE 1 MG/1
1 TABLET, FILM COATED ORAL NIGHTLY
Qty: 14 TABLET | Refills: 0 | Status: SHIPPED | OUTPATIENT
Start: 2019-11-06 | End: 2020-03-06 | Stop reason: SDUPTHER

## 2019-11-06 ASSESSMENT — PATIENT HEALTH QUESTIONNAIRE - PHQ9: DEPRESSION UNABLE TO ASSESS: FUNCTIONAL CAPACITY MOTIVATION LIMITS ACCURACY

## 2019-11-12 ASSESSMENT — ENCOUNTER SYMPTOMS
APNEA: 1
RHINORRHEA: 0
EYE DISCHARGE: 0
TROUBLE SWALLOWING: 0
COLOR CHANGE: 0
EYE PAIN: 0
STRIDOR: 0
WHEEZING: 0
DIARRHEA: 1
VOMITING: 0
PHOTOPHOBIA: 0
SHORTNESS OF BREATH: 0
EYE REDNESS: 0
COUGH: 0
CONSTIPATION: 1
ABDOMINAL PAIN: 0

## 2019-11-20 DIAGNOSIS — K21.9 GASTRIC REFLUX: ICD-10-CM

## 2019-12-13 DIAGNOSIS — F51.01 PRIMARY INSOMNIA: Primary | ICD-10-CM

## 2019-12-13 RX ORDER — TEMAZEPAM 30 MG/1
30 CAPSULE ORAL NIGHTLY PRN
Qty: 30 CAPSULE | Refills: 0 | Status: SHIPPED | OUTPATIENT
Start: 2019-12-13 | End: 2020-01-24

## 2020-01-09 NOTE — TELEPHONE ENCOUNTER
Per insurance patients's Nexium cannot be approved until patient trials 2 formulary options first. These are Omeprazole or Pantoprazole. Which do you want to start with. cm

## 2020-01-17 RX ORDER — LEVALBUTEROL INHALATION SOLUTION 1.25 MG/3ML
1.25 SOLUTION RESPIRATORY (INHALATION) EVERY 4 HOURS PRN
Qty: 120 ML | Refills: 5 | Status: SHIPPED | OUTPATIENT
Start: 2020-01-17 | End: 2020-06-24

## 2020-01-17 NOTE — TELEPHONE ENCOUNTER
The patient mom called and asked for a medication refill on levalbuterol if you agree that the patient should have it please sign the pending script

## 2020-01-24 RX ORDER — TEMAZEPAM 30 MG/1
CAPSULE ORAL
Qty: 30 CAPSULE | Refills: 0 | Status: SHIPPED | OUTPATIENT
Start: 2020-01-24 | End: 2020-02-27

## 2020-01-24 NOTE — TELEPHONE ENCOUNTER
YGW:[birth year]  ROV:6 months  DCI:04-90-31  Health Maintenance   Topic Date Due    Varicella Vaccine (2 of 2 - 2-dose childhood series) 03/30/2000    DTaP/Tdap/Td vaccine (7 - Td) 11/27/2017    HIV screen  05/15/2020 (Originally 3/30/2011)    Chlamydia screen  05/15/2020 (Originally 10/12/2016)    Cervical cancer screen  11/12/2020 (Originally 3/30/2017)    HPV vaccine  Completed    Flu vaccine  Completed    Pneumococcal 0-64 years Vaccine  Completed             (applicable per patient's age: Cancer Screenings, Depression Screening, Fall Risk Screening, Immunizations)    AST (U/L)   Date Value   10/30/2019 15     ALT (U/L)   Date Value   10/30/2019 12     BUN (mg/dL)   Date Value   10/30/2019 14      (goal A1C is < 7)   (goal LDL is <100) need 30-50% reduction from baseline     BP Readings from Last 3 Encounters:   11/06/19 112/68   10/30/19 113/74   09/06/19 130/81    (goal /80)      All Future Testing planned in [facility]:  Lab Frequency Next Occurrence       Next Visit Date:  Future Appointments   Date Time Provider Kishan Rachana   4/3/2020  1:30 PM Mohan Amaya MD Resp Spec [id number]            Patient Active Problem List:     Rett's syndrome     Scoliosis     Dystonia     Tremors of nervous system     Convulsions ([address]     Gastroesophageal reflux disease without esophagitis     Fibroadenoma of breast     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 [facility])     Acute respiratory failure with hypercapnia ([address]     Skin infection at gastrostomy tube site [facility])     Acute on chronic respiratory failure with hypoxia and hypercapnia ([address]     Pneumonia due to infectious organism     Body temperature low     Hypotension     Nonverbal     Sepsis ([address]     Hypothermia due to non-environmental cause     Prolonged QT interval     Urinary retention with incomplete bladder emptying     Chronic respiratory failure with hypoxia and hypercapnia ([facility])     Myoclonus     Atelectasis     Dependence on enabling machine     Dependent on ventilator ([address]     Delay in development     Tonic-clonic seizures ([address]     History of recurrent pneumonia     Fistula     Encounter for removal of internal fixation device     Sialorrhea     Myoneural disorder ([facility])     Muscle spasticity     Bladder retention     Community acquired pneumonia of left lower lobe of lung ([facility])     Aspiration pneumonia ([facility])     Intractable vomiting     Seizure ([address]     Seizure disorder ([facility])     Abnormal electroencephalogram     Closed fracture of shaft of right humerus with routine healing     Constipation     Contracture of elbow joint, right     Diarrhea     Gastric reflux     Spastic quadriplegic cerebral palsy ([address]     Wheelchair bound     Malnutrition related to chronic disease ([address]     Pneumonia     Severe malnutrition (HCC)     Aspiration pneumonia ([address]

## 2020-01-29 ENCOUNTER — APPOINTMENT (OUTPATIENT)
Dept: GENERAL RADIOLOGY | Facility: CLINIC | Age: 25
End: 2020-01-29
Payer: COMMERCIAL

## 2020-01-29 ENCOUNTER — HOSPITAL ENCOUNTER (EMERGENCY)
Facility: CLINIC | Age: 25
Discharge: HOME | End: 2020-01-29
Attending: EMERGENCY MEDICINE
Payer: COMMERCIAL

## 2020-01-29 VITALS
HEART RATE: 74 BPM | DIASTOLIC BLOOD PRESSURE: 67 MMHG | TEMPERATURE: 97.6 F | OXYGEN SATURATION: 96 % | HEIGHT: 57 IN | RESPIRATION RATE: 18 BRPM | SYSTOLIC BLOOD PRESSURE: 119 MMHG | BODY MASS INDEX: 17.26 KG/M2 | WEIGHT: 80 LBS

## 2020-01-29 PROCEDURE — 99283 EMERGENCY DEPT VISIT LOW MDM: CPT

## 2020-01-29 PROCEDURE — 6370000000 HC RX 637 (ALT 250 FOR IP): Performed by: EMERGENCY MEDICINE

## 2020-01-29 PROCEDURE — 73090 X-RAY EXAM OF FOREARM: CPT

## 2020-01-29 PROCEDURE — 73060 X-RAY EXAM OF HUMERUS: CPT

## 2020-01-29 RX ORDER — MEDROXYPROGESTERONE ACETATE 150 MG/ML
INJECTION, SUSPENSION INTRAMUSCULAR
COMMUNITY
Start: 2020-01-10

## 2020-01-29 RX ORDER — TRAMADOL HYDROCHLORIDE 50 MG/1
50 TABLET ORAL EVERY 6 HOURS PRN
Qty: 12 TABLET | Refills: 0 | Status: SHIPPED | OUTPATIENT
Start: 2020-01-29 | End: 2020-02-01

## 2020-01-29 RX ADMIN — IBUPROFEN 364 MG: 100 SUSPENSION ORAL at 10:33

## 2020-01-29 ASSESSMENT — ENCOUNTER SYMPTOMS: COLOR CHANGE: 1

## 2020-01-29 ASSESSMENT — PAIN SCALES - GENERAL: PAINLEVEL_OUTOF10: 4

## 2020-01-29 NOTE — ED PROVIDER NOTES
[facility]  [facility]  Phone: [phone]        Pt Name: Brayan Cee  MRN: [id number]  [place] 1995  Date of evaluation: 1/29/20      CHIEF COMPLAINT     Chief Complaint   Patient presents with    Arm Pain     bruise noted on patients left elbow         HISTORY OF PRESENT ILLNESS  (Location/Symptom, Timing/Onset, Context/Setting, Quality, Duration, Modifying Factors, Severity.)    Brayan Cee is a 22 y.o. female who presents with arm pain and bruising to the left elbow. Family and caregiver relates that she has had a broken arm before when they tried to move her and she has contractures. This arm is quite contracted and they're worried that they might have broken a bone. She does seem to be uncomfortable but she is nonverbal so they're not sure. Mom also relates that it does not feel warm or hot. The patient has had cellulitis before but it doesn't seem to be looking like that. No fevers that they're aware of. No other concerns at this time. REVIEW OF SYSTEMS    (2-9 systems for level 4, 10 or more for level 5)     Review of Systems   Unable to perform ROS: Patient nonverbal   Musculoskeletal:        Left elbow pain   Skin: Positive for color change. All other systems reviewed and are negative.       PAST MEDICAL HISTORY    has a past medical history of Abnormal electroencephalogram, Acute on chronic respiratory failure with hypoxia and hypercapnia (HCC), Acute respiratory failure with hypercapnia (HCC), Aspiration pneumonia (HCC), Atelectasis, BiPAP (biphasic positive airway pressure) dependence, Bladder retention, Body temperature low, Breast lump in female, Cellulitis, Cellulitis of left elbow, Central sleep apnea, Chronic respiratory failure with hypoxia and hypercapnia (HCC), Closed fracture of shaft of right humerus with routine healing, Community acquired pneumonia of left lower lobe of lung ([facility] Constipation, Contracture of elbow joint, right, abnormality. LABS:  No results found for this visit on 01/29/20. EMERGENCY DEPARTMENT COURSE:   Vitals:    Vitals:    01/29/20 1014   BP: 119/67   Pulse: 74   Resp: 18   Temp: 97.6 °F (36.4 °C)   TempSrc: Oral   SpO2: 96%   Weight: 36.3 kg (80 lb)   Height: 4' 9\" (1.448 m)     -------------------------  BP: 119/67, Temp: 97.6 °F (36.4 °C), Pulse: 74, Resp: 18      RE-EVALUATION:  No change. Patient is sleeping comfortably on my arrival to discuss negative x-ray. CONSULTS:  None    PROCEDURES:  None    FINAL IMPRESSION      1. Left arm pain          DISPOSITION/PLAN   DISPOSITION Decision To Discharge 01/29/2020 11:15:12 AM      CONDITION ON DISPOSITION:   Stable    PATIENT REFERRED TO:  Blaise Toscano MD  [address] [place] [address], [place]   [phone]    Call in 1 week  For wound re-check      DISCHARGE MEDICATIONS:  New Prescriptions    TRAMADOL (ULTRAM) 50 MG TABLET    Take 1 tablet by mouth every 6 hours as needed for Pain for up to 3 days. Intended supply: 3 days. Take lowest dose possible to manage pain       (Please note that portions of this note were completed with a voicerecognition program.  Efforts were made to edit the dictations but occasionally words are mis-transcribed.)    Masterson MD, F.A.C.E.P.   Attending Emergency Medicine Physician        Talya Milton MD  01/29/20 [id number]

## 2020-02-04 ENCOUNTER — TELEPHONE (OUTPATIENT)
Dept: FAMILY MEDICINE CLINIC | Age: 25
End: 2020-02-04

## 2020-02-27 NOTE — TELEPHONE ENCOUNTER
LOV 11/6/19  LRF 1/24/30   RTO 4/3/20    Health Maintenance   Topic Date Due    Hepatitis B vaccine (4 of 4 - 4-dose series) 1996    Varicella vaccine (2 of 2 - 2-dose childhood series) 03/30/2000    DTaP/Tdap/Td vaccine (7 - Td) 11/27/2017    HIV screen  05/15/2020 (Originally 3/30/2011)    Chlamydia screen  05/15/2020 (Originally 10/12/2016)    Cervical cancer screen  11/12/2020 (Originally 3/30/2017)    Shingles Vaccine (1 of 2) 03/30/2046    Hepatitis A vaccine  Completed    Hib vaccine  Completed    HPV vaccine  Completed    Meningococcal (ACWY) vaccine  Completed    Flu vaccine  Completed    Pneumococcal 0-64 years Vaccine  Completed             (applicable per patient's age: Cancer Screenings, Depression Screening, Fall Risk Screening, Immunizations)    AST (U/L)   Date Value   10/30/2019 15     ALT (U/L)   Date Value   10/30/2019 12     BUN (mg/dL)   Date Value   10/30/2019 14      (goal A1C is < 7)   (goal LDL is <100) need 30-50% reduction from baseline     BP Readings from Last 3 Encounters:   01/29/20 119/67   11/06/19 112/68   10/30/19 113/74    (goal /80)      All Future Testing planned in [facility]:  Lab Frequency Next Occurrence       Next Visit Date:  Future Appointments   Date Time Provider Kishan Reich   4/3/2020  1:30 PM Jimena Bahena MD Resp Spec [id number]            Patient Active Problem List:     Rett's syndrome     Scoliosis     Dystonia     Tremors of nervous system     Convulsions ([address]     Gastroesophageal reflux disease without esophagitis     Fibroadenoma of breast     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 chronic respiratory failure with hypoxia and hypercapnia (HCC)     Pneumonia due to infectious organism     Body temperature low     Hypotension     Nonverbal     Sepsis (HCC)     Hypothermia due to non-environmental cause     Prolonged QT interval     Urinary retention with incomplete bladder emptying     Chronic respiratory failure with hypoxia and hypercapnia ([facility])     Myoclonus     Atelectasis     Dependence on enabling machine     Dependent on ventilator ([address]     Delay in development     Tonic-clonic seizures ([address]     History of recurrent pneumonia     Fistula     Encounter for removal of internal fixation device     Sialorrhea     Myoneural disorder ([facility])     Muscle spasticity     Bladder retention     Community acquired pneumonia of left lower lobe of lung (HCC)     Aspiration pneumonia ([facility])     Intractable vomiting     Seizure ([address]     Seizure disorder ([facility])     Abnormal electroencephalogram     Closed fracture of shaft of right humerus with routine healing     Constipation     Contracture of elbow joint, right     Diarrhea     Gastric reflux     Spastic quadriplegic cerebral palsy ([address]     Wheelchair bound     Malnutrition related to chronic disease ([address]     Pneumonia     Severe malnutrition ([address]     Aspiration pneumonia ([address]

## 2020-02-28 RX ORDER — TEMAZEPAM 30 MG/1
CAPSULE ORAL
Qty: 30 CAPSULE | Refills: 0 | Status: SHIPPED | OUTPATIENT
Start: 2020-02-28 | End: 2020-03-29

## 2020-03-06 RX ORDER — ESZOPICLONE 1 MG/1
1 TABLET, FILM COATED ORAL NIGHTLY
Qty: 30 TABLET | Refills: 0 | Status: SHIPPED | OUTPATIENT
Start: 2020-03-06 | End: 2020-05-01 | Stop reason: SDUPTHER

## 2020-03-09 RX ORDER — MULTIVIT WITH IRON,MINERALS
15 LIQUID (ML) ORAL DAILY
Qty: 472 ML | Refills: 5 | Status: SHIPPED | OUTPATIENT
Start: 2020-03-09 | End: 2020-09-16

## 2020-03-09 NOTE — TELEPHONE ENCOUNTER
LOV was 5-15-19, LR was 8-21-19, RTO: 11-15-19. LM for mother to schedule appt. cm  Does patient have enough medication for 72 hours: Yes    Next Visit Date:  Future Appointments   Date Time Provider Kishan Reich   4/3/2020  1:30 PM Iván Ye  [address] Maintenance   Topic Date Due    Hepatitis B vaccine (4 of 4 - 4-dose series) 1996    Varicella vaccine (2 of 2 - 2-dose childhood series) 03/30/2000    DTaP/Tdap/Td vaccine (7 - Td) 11/27/2017    HIV screen  05/15/2020 (Originally 3/30/2011)    Chlamydia screen  05/15/2020 (Originally 10/12/2016)    Cervical cancer screen  11/12/2020 (Originally 3/30/2017)    Shingles Vaccine (1 of 2) 03/30/2046    Hepatitis A vaccine  Completed    Hib vaccine  Completed    HPV vaccine  Completed    Meningococcal (ACWY) vaccine  Completed    Flu vaccine  Completed    Pneumococcal 0-64 years Vaccine  Completed       No results found for: LABA1C          ( goal A1C is < 7)   No results found for: LABMICR  No results found for: LDLCHOLESTEROL, LDLCALC    (goal LDL is <100)   AST (U/L)   Date Value   10/30/2019 15     ALT (U/L)   Date Value   10/30/2019 12     BUN (mg/dL)   Date Value   10/30/2019 14     BP Readings from Last 3 Encounters:   01/29/20 119/67   11/06/19 112/68   10/30/19 113/74          (goal 120/80)    All Future Testing planned in [facility]  Lab Frequency Next Occurrence               Patient Active Problem List:     Rett's syndrome     Scoliosis     Dystonia     Tremors of nervous system     Convulsions ([address]     Gastroesophageal reflux disease without esophagitis     Fibroadenoma of breast     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 ([address]     Acute respiratory failure with hypercapnia ([address] Skin infection at gastrostomy tube site [facility])     Acute on chronic respiratory failure with hypoxia and hypercapnia ([facility])     Pneumonia due to infectious organism     Body temperature low     Hypotension     Nonverbal     Sepsis (HCC)     Hypothermia due to non-environmental cause     Prolonged QT interval     Urinary retention with incomplete bladder emptying     Chronic respiratory failure with hypoxia and hypercapnia ([facility])     Myoclonus     Atelectasis     Dependence on enabling machine     Dependent on ventilator ([address]     Delay in development     Tonic-clonic seizures ([address]     History of recurrent pneumonia     Fistula     Encounter for removal of internal fixation device     Sialorrhea     Myoneural disorder ([facility])     Muscle spasticity     Bladder retention     Community acquired pneumonia of left lower lobe of lung (HCC)     Aspiration pneumonia (HCC)     Intractable vomiting     Seizure ([address]     Seizure disorder ([facility])     Abnormal electroencephalogram     Closed fracture of shaft of right humerus with routine healing     Constipation     Contracture of elbow joint, right     Diarrhea     Gastric reflux     Spastic quadriplegic cerebral palsy ([address]     Wheelchair bound     Malnutrition related to chronic disease ([address]     Pneumonia     Severe malnutrition ([address]     Aspiration pneumonia ([address]

## 2020-03-17 ENCOUNTER — TELEPHONE (OUTPATIENT)
Dept: FAMILY MEDICINE CLINIC | Age: 25
End: 2020-03-17

## 2020-03-19 RX ORDER — POLYETHYLENE GLYCOL 3350 17 G/17G
17 POWDER, FOR SOLUTION ORAL DAILY
Qty: 510 G | Refills: 5 | Status: SHIPPED | OUTPATIENT
Start: 2020-03-19 | End: 2020-10-16 | Stop reason: SDUPTHER

## 2020-03-19 NOTE — TELEPHONE ENCOUNTER
Patient's mother states [facility] was supposed to request diapers as well with the last order. Patient is now out of Adult Med briefs. Please send order for 196 briefs, with refills. Tele [phone]  Patient's mother also that Arabella Sanchez is awaiting a PA for Medicaid. Patient's mother is also requesting a refill of Glycolax to go to [facility]. She uses 17 grams in her Gtube daily. Insurance will pay for this verses Miralax.
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 ([address]     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 (HCC)     Cellulitis of left elbow     Pneumonia of left lower lobe due to infectious organism [facility])     Acute respiratory failure with hypercapnia (HCC)     Skin infection at gastrostomy tube site [facility])     Acute on chronic respiratory failure with hypoxia and hypercapnia (HCC)     Pneumonia due to infectious organism     Body temperature low     Hypotension     Nonverbal     Sepsis ([address]     Hypothermia due to non-environmental cause     Prolonged QT interval     Urinary retention with incomplete bladder emptying     Chronic respiratory failure with hypoxia and hypercapnia (HCC)     Myoclonus     Atelectasis     Dependence on enabling machine     Dependent on ventilator ([address]     Delay in development     Tonic-clonic seizures ([address]     History of recurrent pneumonia     Fistula     Encounter for removal of internal fixation device     Sialorrhea     Myoneural disorder ([address]     Muscle spasticity     Bladder retention     Community acquired pneumonia of left lower lobe of lung ([address]     Aspiration pneumonia (HCC)     Intractable vomiting     Seizure ([address]     Seizure disorder ([address]     Abnormal electroencephalogram     Closed fracture of shaft of right humerus with routine healing     Constipation     Contracture of elbow joint, right     Diarrhea     Gastric reflux     Spastic quadriplegic cerebral palsy ([address]     Wheelchair bound     Malnutrition related to chronic disease ([address]     Pneumonia     Severe malnutrition (HCC)     Aspiration pneumonia ([address]

## 2020-03-24 RX ORDER — DOXAZOSIN MESYLATE 1 MG/1
1 TABLET ORAL NIGHTLY
Qty: 90 TABLET | Refills: 1 | Status: SHIPPED | OUTPATIENT
Start: 2020-03-24 | End: 2020-09-21

## 2020-03-25 PROBLEM — J69.0 ASPIRATION PNEUMONIA (HCC): Status: RESOLVED | Noted: 2018-01-16 | Resolved: 2020-03-24

## 2020-03-27 ENCOUNTER — TELEPHONE (OUTPATIENT)
Dept: FAMILY MEDICINE CLINIC | Age: 25
End: 2020-03-27

## 2020-03-27 NOTE — TELEPHONE ENCOUNTER
Previous note dated 12/13/2019 states that the first prescription sent for Shea Jerome was denied by the insurance and it was recommended that she have 2 trials and documented failures of generic medications. She has tried and failed on Ambien and temazepam has now been tried and she has failed. This should suffice as 2 trials and failures. The Lunesta should be covered and I am not certain why it is not. Was all of this documented in the prior authorization this time?

## 2020-04-01 ENCOUNTER — TELEPHONE (OUTPATIENT)
Dept: FAMILY MEDICINE CLINIC | Age: 25
End: 2020-04-01

## 2020-04-01 NOTE — TELEPHONE ENCOUNTER
Order signed. Do we know if this requires a face to face appointment for documentation? Close encounter when complete.

## 2020-04-30 ENCOUNTER — TELEPHONE (OUTPATIENT)
Dept: FAMILY MEDICINE CLINIC | Age: 25
End: 2020-04-30

## 2020-04-30 NOTE — TELEPHONE ENCOUNTER
Let's have her increase her Lunesta to 2 mg once nightly to see if this will help her sleep pattern.

## 2020-05-01 RX ORDER — ESZOPICLONE 1 MG/1
1 TABLET, FILM COATED ORAL NIGHTLY
Qty: 14 TABLET | Refills: 0 | Status: SHIPPED | OUTPATIENT
Start: 2020-05-01 | End: 2020-05-28

## 2020-05-01 RX ORDER — ESZOPICLONE 2 MG/1
2 TABLET, FILM COATED ORAL NIGHTLY
Qty: 30 TABLET | Refills: 0 | Status: SHIPPED | OUTPATIENT
Start: 2020-05-01 | End: 2020-07-01

## 2020-05-22 NOTE — TELEPHONE ENCOUNTER
Last OARRS Review-05/01/2020  Last Office Visit-05/15/2019  Return To Office-6 months   Next Appointment Date-0  Last Refill Date-05/01/2020  30 tabs   Number of Refills Given-       Health Maintenance   Topic Date Due    Hepatitis B vaccine (4 of 4 - 4-dose series) 1996    Varicella vaccine (2 of 2 - 2-dose childhood series) 03/30/2000    HIV screen  03/30/2011    Chlamydia screen  10/12/2016    DTaP/Tdap/Td vaccine (7 - Td) 11/27/2017    Cervical cancer screen  11/12/2020 (Originally 3/30/2017)    Hepatitis A vaccine  Completed    Hib vaccine  Completed    HPV vaccine  Completed    Meningococcal (ACWY) vaccine  Completed    Flu vaccine  Completed    Pneumococcal 0-64 years Vaccine  Completed             (applicable per patient's age: Cancer Screenings, Depression Screening, Fall Risk Screening, Immunizations)    AST (U/L)   Date Value   10/30/2019 15     ALT (U/L)   Date Value   10/30/2019 12     BUN (mg/dL)   Date Value   10/30/2019 14      (goal A1C is < 7)   (goal LDL is <100) need 30-50% reduction from baseline     BP Readings from Last 3 Encounters:   01/29/20 119/67   11/06/19 112/68   10/30/19 113/74    (goal /80)      All Future Testing planned in [facility]:  Lab Frequency Next Occurrence       Next Visit Date:  Future Appointments   Date Time Provider Kishan Reich   7/17/2020  3:15 PM Kole Dominguez MD Resp Spec [id number]            Patient Active Problem List:     Rett's syndrome     Scoliosis     Dystonia     Tremors of nervous system     Convulsions ([address]     Fibroadenoma of breast     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

## 2020-05-27 NOTE — TELEPHONE ENCOUNTER
1mg is covered. Do you want to do 2 tabs nightly?       2mg requires a PA. We can start PA for this. She is completely out.

## 2020-05-27 NOTE — TELEPHONE ENCOUNTER
PA completed for 2mg lunesta. It has been sent to [employer]. [employer] states 24-72 hour turnaround for decision.

## 2020-05-28 RX ORDER — ESZOPICLONE 1 MG/1
2 TABLET, FILM COATED ORAL NIGHTLY
Qty: 60 TABLET | Refills: 0 | Status: SHIPPED | OUTPATIENT
Start: 2020-05-28 | End: 2020-06-27

## 2020-06-16 ENCOUNTER — TELEPHONE (OUTPATIENT)
Dept: FAMILY MEDICINE CLINIC | Age: 25
End: 2020-06-16

## 2020-06-17 ENCOUNTER — TELEPHONE (OUTPATIENT)
Dept: FAMILY MEDICINE CLINIC | Age: 25
End: 2020-06-17

## 2020-06-17 ENCOUNTER — HOSPITAL ENCOUNTER (OUTPATIENT)
Dept: INTERVENTIONAL RADIOLOGY/VASCULAR | Age: 25
Discharge: HOME | End: 2020-06-19
Payer: COMMERCIAL

## 2020-06-17 ENCOUNTER — APPOINTMENT (OUTPATIENT)
Dept: GENERAL RADIOLOGY | Facility: CLINIC | Age: 25
End: 2020-06-17
Payer: COMMERCIAL

## 2020-06-17 ENCOUNTER — HOSPITAL ENCOUNTER (EMERGENCY)
Facility: CLINIC | Age: 25
Discharge: ANOTHER HOSPITAL | End: 2020-06-17
Attending: EMERGENCY MEDICINE
Payer: COMMERCIAL

## 2020-06-17 ENCOUNTER — CARE COORDINATION (OUTPATIENT)
Dept: CARE COORDINATION | Age: 25
End: 2020-06-17

## 2020-06-17 ENCOUNTER — HOSPITAL ENCOUNTER (EMERGENCY)
Age: 25
Discharge: HOME | End: 2020-06-18
Attending: EMERGENCY MEDICINE
Payer: COMMERCIAL

## 2020-06-17 VITALS
DIASTOLIC BLOOD PRESSURE: 73 MMHG | BODY MASS INDEX: 19.67 KG/M2 | OXYGEN SATURATION: 96 % | HEART RATE: 110 BPM | RESPIRATION RATE: 17 BRPM | TEMPERATURE: 98.2 F | SYSTOLIC BLOOD PRESSURE: 105 MMHG | WEIGHT: 85 LBS | HEIGHT: 55 IN

## 2020-06-17 VITALS
RESPIRATION RATE: 16 BRPM | OXYGEN SATURATION: 95 % | TEMPERATURE: 98.2 F | HEIGHT: 55 IN | HEART RATE: 97 BPM | BODY MASS INDEX: 19.67 KG/M2 | WEIGHT: 85 LBS | DIASTOLIC BLOOD PRESSURE: 88 MMHG | SYSTOLIC BLOOD PRESSURE: 132 MMHG

## 2020-06-17 LAB
-: ABNORMAL
ABSOLUTE EOS #: 0 K/UL (ref 0–0.4)
ABSOLUTE IMMATURE GRANULOCYTE: ABNORMAL K/UL (ref 0–0.3)
ABSOLUTE LYMPH #: 1.76 K/UL (ref 1–4.8)
ABSOLUTE MONO #: 1.76 K/UL (ref 0.1–0.8)
AMORPHOUS: ABNORMAL
ANION GAP SERPL CALCULATED.3IONS-SCNC: 10 MMOL/L (ref 9–17)
BACTERIA: ABNORMAL
BASOPHILS # BLD: 0 % (ref 0–2)
BASOPHILS ABSOLUTE: 0 K/UL (ref 0–0.2)
BILIRUBIN URINE: NEGATIVE
BUN BLDV-MCNC: 20 MG/DL (ref 6–20)
BUN/CREAT BLD: ABNORMAL (ref 9–20)
CALCIUM SERPL-MCNC: 9.6 MG/DL (ref 8.6–10.4)
CASTS UA: ABNORMAL /LPF (ref 0–2)
CHLORIDE BLD-SCNC: 95 MMOL/L (ref 98–107)
CO2: 28 MMOL/L (ref 20–31)
COLOR: YELLOW
COMMENT UA: ABNORMAL
CREAT SERPL-MCNC: 0.5 MG/DL (ref 0.5–0.9)
CRYSTALS, UA: ABNORMAL /HPF
DIFFERENTIAL TYPE: ABNORMAL
EOSINOPHILS RELATIVE PERCENT: 0 % (ref 1–4)
EPITHELIAL CELLS UA: ABNORMAL /HPF (ref 0–5)
GFR AFRICAN AMERICAN: >60 ML/MIN
GFR NON-AFRICAN AMERICAN: >60 ML/MIN
GFR SERPL CREATININE-BSD FRML MDRD: ABNORMAL ML/MIN/{1.73_M2}
GFR SERPL CREATININE-BSD FRML MDRD: ABNORMAL ML/MIN/{1.73_M2}
GLUCOSE BLD-MCNC: 135 MG/DL (ref 70–99)
GLUCOSE URINE: NEGATIVE
HCT VFR BLD CALC: 38.5 % (ref 36–46)
HEMOGLOBIN: 12.2 G/DL (ref 12–16)
IMMATURE GRANULOCYTES: ABNORMAL %
KETONES, URINE: NEGATIVE
LEUKOCYTE ESTERASE, URINE: ABNORMAL
LYMPHOCYTES # BLD: 9 % (ref 24–44)
MCH RBC QN AUTO: 26.4 PG (ref 26–34)
MCHC RBC AUTO-ENTMCNC: 31.7 G/DL (ref 31–37)
MCV RBC AUTO: 83.3 FL (ref 80–100)
MONOCYTES # BLD: 9 % (ref 1–7)
MORPHOLOGY: NORMAL
MUCUS: ABNORMAL
NITRITE, URINE: NEGATIVE
NRBC AUTOMATED: ABNORMAL PER 100 WBC
OTHER OBSERVATIONS UA: ABNORMAL
PDW BLD-RTO: 15.3 % (ref 12.5–15.4)
PH UA: 5.5 (ref 5–8)
PLATELET # BLD: 301 K/UL (ref 140–450)
PLATELET ESTIMATE: ABNORMAL
PMV BLD AUTO: 7.7 FL (ref 6–12)
POTASSIUM SERPL-SCNC: 3.7 MMOL/L (ref 3.7–5.3)
PROTEIN UA: ABNORMAL
RBC # BLD: 4.63 M/UL (ref 4–5.2)
RBC # BLD: ABNORMAL 10*6/UL
RBC UA: ABNORMAL /HPF (ref 0–2)
RENAL EPITHELIAL, UA: ABNORMAL /HPF
SARS-COV-2, PCR: NORMAL
SARS-COV-2, RAPID: NOT DETECTED
SARS-COV-2: NORMAL
SEG NEUTROPHILS: 82 % (ref 36–66)
SEGMENTED NEUTROPHILS ABSOLUTE COUNT: 15.98 K/UL (ref 1.8–7.7)
SODIUM BLD-SCNC: 133 MMOL/L (ref 135–144)
SOURCE: NORMAL
SPECIFIC GRAVITY UA: 1.01 (ref 1–1.03)
TRICHOMONAS: ABNORMAL
TURBIDITY: ABNORMAL
URINE HGB: ABNORMAL
UROBILINOGEN, URINE: NORMAL
WBC # BLD: 19.5 K/UL (ref 3.5–11)
WBC # BLD: ABNORMAL 10*3/UL
WBC UA: ABNORMAL /HPF (ref 0–5)
YEAST: ABNORMAL

## 2020-06-17 PROCEDURE — 96374 THER/PROPH/DIAG INJ IV PUSH: CPT

## 2020-06-17 PROCEDURE — 87077 CULTURE AEROBIC IDENTIFY: CPT

## 2020-06-17 PROCEDURE — 71045 X-RAY EXAM CHEST 1 VIEW: CPT

## 2020-06-17 PROCEDURE — 87186 SC STD MICRODIL/AGAR DIL: CPT

## 2020-06-17 PROCEDURE — 6360000002 HC RX W HCPCS: Performed by: EMERGENCY MEDICINE

## 2020-06-17 PROCEDURE — 99283 EMERGENCY DEPT VISIT LOW MDM: CPT

## 2020-06-17 PROCEDURE — 36573 INSJ PICC RS&I 5 YR+: CPT | Performed by: RADIOLOGY

## 2020-06-17 PROCEDURE — 80048 BASIC METABOLIC PNL TOTAL CA: CPT

## 2020-06-17 PROCEDURE — 2580000003 HC RX 258: Performed by: EMERGENCY MEDICINE

## 2020-06-17 PROCEDURE — 85025 COMPLETE CBC W/AUTO DIFF WBC: CPT

## 2020-06-17 PROCEDURE — 36415 COLL VENOUS BLD VENIPUNCTURE: CPT

## 2020-06-17 PROCEDURE — 87086 URINE CULTURE/COLONY COUNT: CPT

## 2020-06-17 PROCEDURE — 2709999900 IR PICC WO SQ PORT/PUMP > 5 YEARS

## 2020-06-17 PROCEDURE — 81001 URINALYSIS AUTO W/SCOPE: CPT

## 2020-06-17 PROCEDURE — U0002 COVID-19 LAB TEST NON-CDC: HCPCS

## 2020-06-17 RX ORDER — 0.9 % SODIUM CHLORIDE 0.9 %
500 INTRAVENOUS SOLUTION INTRAVENOUS ONCE
Status: COMPLETED | OUTPATIENT
Start: 2020-06-17 | End: 2020-06-17

## 2020-06-17 RX ADMIN — SODIUM CHLORIDE 500 ML: 9 INJECTION, SOLUTION INTRAVENOUS at 11:14

## 2020-06-17 RX ADMIN — CEFTRIAXONE SODIUM 1 G: 1 INJECTION, POWDER, FOR SOLUTION INTRAMUSCULAR; INTRAVENOUS at 12:21

## 2020-06-17 NOTE — TELEPHONE ENCOUNTER
tis am when she woke up she had puked all over and her diaper was dry and her belly is distended so she is going to take her to the [facility] facility on [address] right now

## 2020-06-17 NOTE — TELEPHONE ENCOUNTER
Dr Marita Manzo called back wanted to inform Patient was brought in for urinary retention and fever. Patient was catheterized. Patient has a white blood count of 19,000. Doctor wished to admit but mother declined. IV rocephin  was administered.  Dr Marita Manzo stated he can arrange for patient to receive a Liane Megan today @ 1500 @ [facility] if Dr Tunde Carson would be willing to manage this IV antibiotic treatment       Please advise     Dr Marita Manzo [phone]

## 2020-06-17 NOTE — ED NOTES
Dr Garcia Neely paged through her office, they will have her call back to speak with Dr Nik Escobar about patient and PICC line.       Renetta Valladares RN  06/17/20 [id number]

## 2020-06-17 NOTE — ED PROVIDER NOTES
Platelet Estimate NOT REPORTED     Seg Neutrophils 82 (H) 36 - 66 %    Lymphocytes 9 (L) 24 - 44 %    Monocytes 9 (H) 1 - 7 %    Eosinophils % 0 (L) 1 - 4 %    Basophils 0 0 - 2 %    Segs Absolute 15.98 (H) 1.8 - 7.7 k/uL    Absolute Lymph # 1.76 1.0 - 4.8 k/uL    Absolute Mono # 1.76 (H) 0.1 - 0.8 k/uL    Absolute Eos # 0.00 0.0 - 0.4 k/uL    Basophils Absolute 0.00 0.0 - 0.2 k/uL    Morphology Normal    Basic Metabolic Panel   Result Value Ref Range    Glucose 135 (H) 70 - 99 mg/dL    BUN 20 6 - 20 mg/dL    CREATININE 0.50 0.50 - 0.90 mg/dL    Bun/Cre Ratio NOT REPORTED 9 - 20    Calcium 9.6 8.6 - 10.4 mg/dL    Sodium 133 (L) 135 - 144 mmol/L    Potassium 3.7 3.7 - 5.3 mmol/L    Chloride 95 (L) 98 - 107 mmol/L    CO2 28 20 - 31 mmol/L    Anion Gap 10 9 - 17 mmol/L    GFR Non-African American >60 >60 mL/min    GFR African American >60 >60 mL/min    GFR Comment          GFR Staging NOT REPORTED    Urinalysis Reflex to Culture   Result Value Ref Range    Color, UA YELLOW YELLOW    Turbidity UA CLOUDY (A) CLEAR    Glucose, Ur NEGATIVE NEGATIVE    Bilirubin Urine NEGATIVE NEGATIVE    Ketones, Urine NEGATIVE NEGATIVE    Specific Gravity, UA 1.015 1.005 - 1.030    Urine Hgb LARGE (A) NEGATIVE    pH, UA 5.5 5.0 - 8.0    Protein, UA 2+ (A) NEGATIVE    Urobilinogen, Urine Normal Normal    Nitrite, Urine NEGATIVE NEGATIVE    Leukocyte Esterase, Urine MODERATE (A) NEGATIVE    Urinalysis Comments NOT REPORTED    Microscopic Urinalysis   Result Value Ref Range    -          WBC, UA 20 TO 50 0 - 5 /HPF    RBC, UA TOO NUMEROUS TO COUNT 0 - 2 /HPF    Casts UA NOT REPORTED 0 - 2 /LPF    Crystals, UA NOT REPORTED None /HPF    Epithelial Cells UA 0 TO 2 0 - 5 /HPF    Renal Epithelial, UA NOT REPORTED 0 /HPF    Bacteria, UA MANY (A) None    Mucus, UA NOT REPORTED None    Trichomonas, UA NOT REPORTED None    Amorphous, UA NOT REPORTED None    Other Observations UA Culture ordered based on defined criteria. (A) NOT REQ.     Yeast, UA NOT REPORTED None       Not indicated unless otherwise documented above    RADIOLOGY:   I reviewed the radiologist interpretations:    XR CHEST PORTABLE   Final Result   No acute cardiopulmonary disease. Not indicated unless otherwise documented above    EMERGENCY DEPARTMENT COURSE:     The patient was given the following medications:  Orders Placed This Encounter   Medications    0.9 % sodium chloride bolus    cefTRIAXone (ROCEPHIN) 1 g in sterile water 10 mL IV syringe        Vitals:   -------------------------  /73   Pulse 122   Temp 98.2 °F (36.8 °C) (Temporal)   Resp 20   Ht 4' 7\" (1.397 m)   Wt 38.6 kg (85 lb)   SpO2 95%   BMI 19.76 kg/m²     12 PM there was some delay in getting IV access and Day catheter access due to patient's anatomy. Ultimately catheter was placed and large amount of urine was drained. Urine does show UTI. Given IV fluids. White blood cell count 19,000. Discussed with caregivers different options. Would like to start IV antibiotics. Family is questioning whether or not we can obtain PICC line access and outpatient antibiotics due to the state of COVID. We did contact [id number] [facility] and they are able to place the PICC line. We will talk with her family physician. 12:30 PM able to talk with the medical assistant Desire Gutierrez at Dr. Deborah Bobo office who will send a message to her regarding follow-up. In the meantime we are able to have a PICC line placed at [address] [place] [address] at 3:00 today for treatment. After IV antibiotics here she will be discharged to [facility]. 2:15 PM addendum I did speak with patient's family physician who will arrange antibiotics and follow-up with PICC line placement. I have reviewed the disposition diagnosis with the patient and or their family/guardian. I have answered their questions and given discharge instructions. They voiced understanding of these instructions and did not have any furtherquestions or complaints.     CRITICAL

## 2020-06-17 NOTE — TELEPHONE ENCOUNTER
Dr Jeremy Dakins from [address] [facility] called requesting to speak with Dr Liliam Young concerning Patient brought to ER.    [phone]      Please advise

## 2020-06-17 NOTE — TELEPHONE ENCOUNTER
I called Dr. Rohan Pinedo at 215pm and he did get her set up for the PICC line at 3pm at [address]. Now she will need home care and IV antibiotics.     Rocephin 1 gram IV once daily for 10 days total.

## 2020-06-18 ENCOUNTER — TELEPHONE (OUTPATIENT)
Dept: FAMILY MEDICINE CLINIC | Age: 25
End: 2020-06-18

## 2020-06-18 LAB
CULTURE: ABNORMAL
Lab: ABNORMAL
SPECIMEN DESCRIPTION: ABNORMAL

## 2020-06-18 PROCEDURE — 87086 URINE CULTURE/COLONY COUNT: CPT

## 2020-06-18 PROCEDURE — 6370000000 HC RX 637 (ALT 250 FOR IP): Performed by: NURSE PRACTITIONER

## 2020-06-18 RX ORDER — PHENAZOPYRIDINE HYDROCHLORIDE 200 MG/1
TABLET, FILM COATED ORAL
Qty: 45 TABLET | Refills: 0 | Status: SHIPPED | OUTPATIENT
Start: 2020-06-18 | End: 2020-07-17 | Stop reason: ALTCHOICE

## 2020-06-18 RX ORDER — PHENAZOPYRIDINE HYDROCHLORIDE 100 MG/1
100 TABLET, FILM COATED ORAL ONCE
Status: COMPLETED | OUTPATIENT
Start: 2020-06-18 | End: 2020-06-18

## 2020-06-18 RX ADMIN — PHENAZOPYRIDINE HYDROCHLORIDE 100 MG: 100 TABLET ORAL at 01:34

## 2020-06-18 ASSESSMENT — ENCOUNTER SYMPTOMS
COLOR CHANGE: 0
DIARRHEA: 0
ABDOMINAL PAIN: 1
VOMITING: 0

## 2020-06-18 NOTE — TELEPHONE ENCOUNTER
LOV   LRF 8/17/17    Health Maintenance   Topic Date Due    Hepatitis B vaccine (4 of 4 - 4-dose series) 1996    Varicella vaccine (2 of 2 - 2-dose childhood series) 03/30/2000    HIV screen  03/30/2011    Chlamydia screen  10/12/2016    DTaP/Tdap/Td vaccine (7 - Td) 11/27/2017    Cervical cancer screen  11/12/2020 (Originally 3/30/2017)    Hepatitis A vaccine  Completed    Hib vaccine  Completed    HPV vaccine  Completed    Meningococcal (ACWY) vaccine  Completed    Flu vaccine  Completed    Pneumococcal 0-64 years Vaccine  Completed             (applicable per patient's age: Cancer Screenings, Depression Screening, Fall Risk Screening, Immunizations)    AST (U/L)   Date Value   10/30/2019 15     ALT (U/L)   Date Value   10/30/2019 12     BUN (mg/dL)   Date Value   06/17/2020 20      (goal A1C is < 7)   (goal LDL is <100) need 30-50% reduction from baseline     BP Readings from Last 3 Encounters:   06/17/20 132/88   06/17/20 105/73   01/29/20 119/67    (goal /80)      All Future Testing planned in [facility]:  Lab Frequency Next Occurrence   XR CHEST STANDARD (2 VW) Once 06/16/2020       Next Visit Date:  Future Appointments   Date Time Provider Kishan Reich   7/17/2020  3:15 PM Dalia De León MD Resp Spec [id number]            Patient Active Problem List:     Rett's syndrome     Scoliosis     Dystonia     Tremors of nervous system     Convulsions ([address]     Fibroadenoma of breast     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 ([address]     Acute respiratory failure with hypercapnia ([address]     Skin infection at gastrostomy tube site [facility])     Acute on chronic respiratory failure with hypoxia and hypercapnia ([address]     Pneumonia due to infectious organism     Body temperature low     Hypotension     Nonverbal     Sepsis ([address]     Hypothermia due to non-environmental cause     Prolonged QT interval     Urinary retention with incomplete bladder emptying     Chronic respiratory failure with hypoxia and hypercapnia ([facility])     Myoclonus     Atelectasis     Dependence on enabling machine     Dependent on ventilator ([address]     Delay in development     Tonic-clonic seizures ([address]     History of recurrent pneumonia     Fistula     Encounter for removal of internal fixation device     Sialorrhea     Myoneural disorder (HCC)     Muscle spasticity     Bladder retention     Community acquired pneumonia of left lower lobe of lung ([facility])     Intractable vomiting     Seizure ([address]     Seizure disorder ([facility])     Abnormal electroencephalogram     Closed fracture of shaft of right humerus with routine healing     Constipation     Contracture of elbow joint, right     Diarrhea     Gastric reflux     Spastic quadriplegic cerebral palsy ([address]     Wheelchair bound     Malnutrition related to chronic disease ([address]     Pneumonia     Severe malnutrition (HCC)     Aspiration pneumonia ([address]

## 2020-06-18 NOTE — ED NOTES
Pt presents accompanied by mother with c/o urinary retention. Pt is non verbal. Pt was seen earlier at [facility] ED and diagnosed with a UTI. Pts mother states pt was straight cathed at [facility] after 3 attempts at a Day catheter. Pts mother also states she attempted to cath pt twice prior to bringing her in tonight. PICC placement earlier today for pt to receive ATBs at home.       Janessa Nelson RN  06/18/20 [id number]

## 2020-06-19 LAB
CULTURE: NO GROWTH
Lab: NORMAL
SPECIMEN DESCRIPTION: NORMAL

## 2020-06-19 NOTE — CARE COORDINATION
[place] [facility] [place] Michelle Sparks who said they are all set to deliver meds called [facility] who said they will call [facility] to get delivery time they need before 5pm and set up a start of care with kassandra and her mom . Called jordy and updated her she has acm cell phone to call if any issues moving forward.
Spoke with mom who said she did get the abx last night the nurse will be out this am to give the abx. Dedra wanted to know about Ivonne De La Cruz seeing a urologist. Will check with her pcp who she would like to refer her to.
TAKE ONE CAPSULE BY MOUTH EVERY EVENING VIA G-TUBE 12/12/19   Miles Turner MD   esomeprazole ([employer]) 20 MG delayed release capsule TAKE ONE CAPSULE BY MOUTH EVERY MORNING AND TAKE ONE CAPSULE BY MOUTH EVERY EVENING VIA G-TUBE 11/22/19 11/22/20  Miles Turner MD   docusate sodium (ENEMEEZ) 283 MG enema Place 1 enema rectally 10/23/19   Historical Provider, MD   levalbuterol Damaris Arriaga) 1.25 MG/3ML nebulizer solution Take 3 mLs by nebulization every 8 hours as needed for Wheezing 12/20/18 11/6/19  Jamal Nogueira MD   acetaminophen (TYLENOL CHILDRENS) 160 MG/5ML suspension Take 18.09 mLs by mouth every 6 hours as needed for Fever 12/15/18   Caden García DO   magnesium hydroxide (MILK OF MAGNESIA CONCENTRATE) 2400 MG/10ML SUSP Take 8 mLs by mouth 2 times daily 2/9/18   Miles Turner MD   saccharomyces boulardii (FLORASTOR) 250 MG capsule Take 1 capsule by mouth daily 2/9/18   Miles Turner MD   Probiotic Product (ALIGN) 4 MG CAPS Take 4 mg by mouth nightly 2/9/18   Miles Turner MD   Sodium Phosphates (FLEET) 7-19 GM/118ML Place 1 enema rectally daily as needed (constipation) 2/9/18   Miles Turner MD   diazepam (DIASTAT) 10 MG GEL Place 10 mg rectally once as needed (seizure) for up to 1 dose.  2/9/18 9/7/19  Miles Turner MD   ibuprofen (ADVIL;MOTRIN) 100 MG/5ML suspension Take 10 mg/kg by mouth every 4 hours as needed for Fever    Historical Provider, MD   levETIRAcetam (KEPPRA) 100 MG/ML solution Take 14ML two times a day by G-button  Patient taking differently: Take 20ML two times a day by G-button 12/28/17 5/10/19  Miles Turner MD   Feeding Tubes - Sets (JAYDEN-KEY GASTROSTOMY KIT 18FR) MISC 3.5 cm 5/19/17 9/7/19  Miles Turner MD   Nutritional Supplements (PEPTAMEN 1 NEETU) LIQD Take 1 Can by mouth 3 times daily Peptamen Adult 1/13/17 11/6/19  Miles Turner MD   Misc Natural Products

## 2020-06-24 ENCOUNTER — TELEPHONE (OUTPATIENT)
Dept: FAMILY MEDICINE CLINIC | Age: 25
End: 2020-06-24

## 2020-06-24 NOTE — TELEPHONE ENCOUNTER
Date of Service: 01/12/2018    PREOPERATIVE DIAGNOSIS:  Right carotid stenosis with a history of an infarct.    POSTOPERATIVE DIAGNOSIS:    Right carotid stenosis with a history of an infarct.    OPERATIVE PROCEDURE:  Right carotid endarterectomy with shunt.    SURGEON:  Milton Bermeo MD    FIRST ASSISTANT:  Celena Peres SA    ASSISTANT(S):  Vielka Tolbert SA    ANESTHESIOLOGIST:  Moncho Rubi MD      TYPE OF ANESTHESIA:  General.    ESTIMATED BLOOD LOSS:  Minimal.    COMPLICATIONS:  None, examination pending.    SPECIMEN:  Removed plaque.    Sponge, instrument and needle count correct.    DESCRIPTION OF OPERATIVE PROCEDURE:  The patient was taken to the operating room and placed on the operating table in the supine position.  The right neck was prepped and draped in usual sterile fashion.  A skin incision was made anterior to the sternocleidomastoid muscle.  The incision was carried down through subcutaneous tissues.  Platysma sternocleidomastoid muscle was retracted laterally.  The common carotid, internal and external carotid arteries were circumferentially dissected and vessel loops placed around these arteries.  The patient was systemically heparinized.  The external carotid artery was clamped, then a stump pressure was obtained.  The stump pressure was less than 30 mmHg mean pressure.  Therefore, a shunt was used.  Vascular control was obtained of the internal carotid and common carotid and an arteriotomy was made with an 11-blade knife and extended with Krishnamurthy-Zimmerman scissors.  The patient had a high-grade greater than 90% plaque, distal to this, the internal carotid artery was normal.  The shunt was placed without difficulty, it was flushed appropriately.  The endarterectomy was performed.  There was an excellent distal endpoint.  No tacking sutures were necessary.  After the endarterectomy site was meticulously cleaned with fine forceps, the closure was performed with bovine pericardial patch  Luis from [facility] is contacting the office today because the patient was put on Rocephin 1 gram by the ER doctor for a UTI. The medication will be completed on Sat. Are they supposed to pull the picc line after that or leave it in place. Usman Taylor states that she does not see where the patient is seeing ID. Please advise. angioplasty sewn on with 6-0 Prolene suture in a standard running fashion.  Prior to completing the closure, the shunt was removed, the arteries were flushed and backbled.  The endarterectomy site was irrigated and closure was completed and flow was restored up the external carotid artery first, then the internal carotid artery.  The Heparin effect was reversed with Protamine.  Hemostasis was obtained.  Platysma was closed with Vicryl, skin was closed with Monocryl.  The patient tolerated the procedure well.  Examination is pending.      Dictated By: Milton Bermeo MD  Signing Provider: MD SHITAL Salazar/roberto ([id number])  DD: 01/12/2018 10:28:49 TD: 01/12/2018 10:41:10    Copy Sent To:

## 2020-06-24 NOTE — TELEPHONE ENCOUNTER
The PICC line can be removed after the final dosage of antibiotic. The patient did not and does not need to see ID for this as I have been the prescribing physician.

## 2020-06-25 RX ORDER — LEVALBUTEROL INHALATION SOLUTION 1.25 MG/3ML
SOLUTION RESPIRATORY (INHALATION)
Qty: 270 ML | Refills: 4 | Status: SHIPPED | OUTPATIENT
Start: 2020-06-25 | End: 2021-01-15 | Stop reason: SDUPTHER

## 2020-06-26 ENCOUNTER — CARE COORDINATION (OUTPATIENT)
Dept: CARE COORDINATION | Age: 25
End: 2020-06-26

## 2020-06-27 NOTE — CARE COORDINATION
If she is doing well - it is not necessary to have her come in at this time.
Left mom a vm with this information will sign off as was asked to set up home iv abx and these are complete now
MOUTH EVERY EVENING VIA G-TUBE 12/12/19   Shelly Escobar MD   esomeprazole ([employer]) 20 MG delayed release capsule TAKE ONE CAPSULE BY MOUTH EVERY MORNING AND TAKE ONE CAPSULE BY MOUTH EVERY EVENING VIA G-TUBE 11/22/19 11/22/20  Shelly Escobar MD   docusate sodium (ENEMEEZ) 283 MG enema Place 1 enema rectally 10/23/19   Historical Provider, MD   levalbuterol Cindra Natalia) 1.25 MG/3ML nebulizer solution Take 3 mLs by nebulization every 8 hours as needed for Wheezing 12/20/18 11/6/19  Angelique Huggins MD   acetaminophen (TYLENOL CHILDRENS) 160 MG/5ML suspension Take 18.09 mLs by mouth every 6 hours as needed for Fever 12/15/18   Caden García DO   magnesium hydroxide (MILK OF MAGNESIA CONCENTRATE) 2400 MG/10ML SUSP Take 8 mLs by mouth 2 times daily 2/9/18   Shelly Escobar MD   saccharomyces boulardii (FLORASTOR) 250 MG capsule Take 1 capsule by mouth daily 2/9/18   Shelly Escobar MD   Probiotic Product (ALIGN) 4 MG CAPS Take 4 mg by mouth nightly 2/9/18   Shelly Escobar MD   Sodium Phosphates (FLEET) 7-19 GM/118ML Place 1 enema rectally daily as needed (constipation) 2/9/18   Shelly Escobar MD   diazepam (DIASTAT) 10 MG GEL Place 10 mg rectally once as needed (seizure) for up to 1 dose.  2/9/18 9/7/19  Shelly Escobar MD   ibuprofen (ADVIL;MOTRIN) 100 MG/5ML suspension Take 10 mg/kg by mouth every 4 hours as needed for Fever    Historical Provider, MD   levETIRAcetam (KEPPRA) 100 MG/ML solution Take 14ML two times a day by G-button  Patient taking differently: Take 20ML two times a day by G-button 12/28/17 5/10/19  Shelly Escobar MD   Feeding Tubes - Sets (JAYDEN-KEY GASTROSTOMY KIT 18FR) MISC 3.5 cm 5/19/17 9/7/19  Shelly Escobar MD   Nutritional Supplements (PEPTAMEN 1 NEETU) LIQD Take 1 Can by mouth 3 times daily Peptamen Adult 1/13/17 11/6/19  Shelly Escobar MD   [facility] (CYSTEX) LIQD Take 15

## 2020-07-01 RX ORDER — ESZOPICLONE 2 MG/1
2 TABLET, FILM COATED ORAL NIGHTLY
Qty: 30 TABLET | Refills: 5 | Status: SHIPPED | OUTPATIENT
Start: 2020-07-01 | End: 2020-12-30

## 2020-07-01 NOTE — TELEPHONE ENCOUNTER
LOV 11/06/19  RTO 6 months; Physical scheduled  LRF 5/28/20    Mother requesting 6 months supply.     Health Maintenance   Topic Date Due    Hepatitis B vaccine (4 of 4 - 4-dose series) 1996    Varicella vaccine (2 of 2 - 2-dose childhood series) 03/30/2000    HIV screen  03/30/2011    Chlamydia screen  10/12/2016    DTaP/Tdap/Td vaccine (7 - Td) 11/27/2017    Cervical cancer screen  11/12/2020 (Originally 3/30/2017)    Flu vaccine (1) 09/01/2020    Hepatitis A vaccine  Completed    Hib vaccine  Completed    HPV vaccine  Completed    Meningococcal (ACWY) vaccine  Completed    Pneumococcal 0-64 years Vaccine  Completed             (applicable per patient's age: Cancer Screenings, Depression Screening, Fall Risk Screening, Immunizations)    AST (U/L)   Date Value   10/30/2019 15     ALT (U/L)   Date Value   10/30/2019 12     BUN (mg/dL)   Date Value   06/17/2020 20      (goal A1C is < 7)   (goal LDL is <100) need 30-50% reduction from baseline     BP Readings from Last 3 Encounters:   06/17/20 132/88   06/17/20 105/73   01/29/20 119/67    (goal /80)      All Future Testing planned in [facility]:  Lab Frequency Next Occurrence   XR CHEST STANDARD (2 VW) Once 06/16/2020       Next Visit Date:  Future Appointments   Date Time Provider Kishan Reich   7/17/2020  3:15 PM Bossman Garcia MD Resp Spec [email or web address]            Patient Active Problem List:     Rett's syndrome     Scoliosis     Dystonia     Tremors of nervous system     Convulsions ([address]     Fibroadenoma of breast     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 chronic respiratory failure with hypoxia and hypercapnia ([address]     Pneumonia due to infectious organism     Body temperature low     Hypotension     Nonverbal     Sepsis ([address]     Hypothermia due to non-environmental cause     Prolonged QT interval     Urinary retention with incomplete bladder emptying     Chronic respiratory failure with hypoxia and hypercapnia ([facility])     Myoclonus     Atelectasis     Dependence on enabling machine     Dependent on ventilator ([address]     Delay in development     Tonic-clonic seizures ([address]     History of recurrent pneumonia     Fistula     Encounter for removal of internal fixation device     Sialorrhea     Myoneural disorder ([facility])     Muscle spasticity     Bladder retention     Community acquired pneumonia of left lower lobe of lung ([facility])     Intractable vomiting     Seizure ([address]     Seizure disorder ([facility])     Abnormal electroencephalogram     Closed fracture of shaft of right humerus with routine healing     Constipation     Contracture of elbow joint, right     Diarrhea     Gastric reflux     Spastic quadriplegic cerebral palsy ([address]     Wheelchair bound     Malnutrition related to chronic disease ([address]     Pneumonia     Severe malnutrition (HCC)     Aspiration pneumonia ([address]

## 2020-07-17 ENCOUNTER — OFFICE VISIT (OUTPATIENT)
Dept: PULMONOLOGY | Age: 25
End: 2020-07-17
Payer: COMMERCIAL

## 2020-07-17 VITALS
SYSTOLIC BLOOD PRESSURE: 117 MMHG | HEART RATE: 89 BPM | OXYGEN SATURATION: 97 % | RESPIRATION RATE: 18 BRPM | WEIGHT: 85 LBS | BODY MASS INDEX: 19.67 KG/M2 | DIASTOLIC BLOOD PRESSURE: 80 MMHG | HEIGHT: 55 IN

## 2020-07-17 PROCEDURE — 99213 OFFICE O/P EST LOW 20 MIN: CPT | Performed by: INTERNAL MEDICINE

## 2020-07-17 RX ORDER — LEVALBUTEROL INHALATION SOLUTION 1.25 MG/3ML
1 SOLUTION RESPIRATORY (INHALATION) EVERY 8 HOURS PRN
Qty: 120 ML | Refills: 11 | Status: SHIPPED | OUTPATIENT
Start: 2020-07-17 | End: 2021-07-16

## 2020-07-17 NOTE — TELEPHONE ENCOUNTER
Last visit: 11/6/19  Last Med refill:12/12/19  Does patient have enough medication for 72 hours: No:     Next Visit Date:  Future Appointments   Date Time Provider Kishan Reich   10/16/2020  9:20 AM Tonja Robles MD [facility] [address] Maintenance   Topic Date Due    Hepatitis B vaccine (4 of 4 - 4-dose series) 1996    Varicella vaccine (2 of 2 - 2-dose childhood series) 03/30/2000    HIV screen  03/30/2011    Chlamydia screen  10/12/2016    DTaP/Tdap/Td vaccine (7 - Td) 11/27/2017    Cervical cancer screen  11/12/2020 (Originally 3/30/2017)    Flu vaccine (1) 09/01/2020    Hepatitis A vaccine  Completed    Hib vaccine  Completed    HPV vaccine  Completed    Meningococcal (ACWY) vaccine  Completed    Pneumococcal 0-64 years Vaccine  Completed       No results found for: LABA1C          ( goal A1C is < 7)   No results found for: LABMICR  No results found for: LDLCHOLESTEROL, LDLCALC    (goal LDL is <100)   AST (U/L)   Date Value   10/30/2019 15     ALT (U/L)   Date Value   10/30/2019 12     BUN (mg/dL)   Date Value   06/17/2020 20     BP Readings from Last 3 Encounters:   06/17/20 132/88   06/17/20 105/73   01/29/20 119/67          (goal 120/80)    All Future Testing planned in [facility]  Lab Frequency Next Occurrence   XR CHEST STANDARD (2 VW) Once 06/16/2020               Patient Active Problem List:     Rett's syndrome     Scoliosis     Dystonia     Tremors of nervous system     Convulsions ([address]     Fibroadenoma of breast     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

## 2020-07-19 NOTE — PROGRESS NOTES
REASON FOR THE CONSULTATION:  Chronic Hypercapnic respiratory failure  HISTORY OF PRESENT ILLNESS:    Lizette Kincaid is a 26y.o. year old female   Accompanied by her caregiver   No complaints   Comfortable   No recent hospitalization for respiratory symptoms   Last visit   Since last visit, patient had been in the hospital for influenza infection. Since discharge, she had been doing well until last week when I received a call from her mother that she had more secretions and sounded wheezy chest x-ray was ordered which showed left basilar atelectasis and I started her empirically on Levaquin. Since then patient has been doing well. Secretions are better. She is not wheezing. She is using albuterol only when she wheezes which is when she gets upper respiratory infection. 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 She is accompanied by her mother. Patient is wheelchair bound and nonverbal.  According to the mother in November. [place] [facility] [place] where they performed a sleep study and she was found to have mild degree of central and obstructive sleep apnea. However, her end-tidal was quite elevated, that is end tidal CO2 and they had to call a rapid response for her take her to the  pediatric intensive care unit. She was intubated for approximately 5 days and then successfully extubated and transitioned over to a BiPAP ST. She uses it at night but not during the day. She also has neuromuscular disease. Because of the underlying genetic disorder, she has a baclofen pump is on benzodiazepine's and trazodone for sleep and they also cause her to have hypoventilation in addition to restrictive lung disease due to kyphoscoliosis and neuromuscular disease.   She also had a U[place] [facility] and [place]    Day, I see that she has redness on the nasal bridge and some skin breakdown due to mask being too tight on her.   It is a nasal mask    PAST MEDICAL HISTORY:       Diagnosis Date    Abnormal electroencephalogram 6/25/2018    Acute on chronic respiratory failure with hypoxia and hypercapnia (HCC)     Acute respiratory failure with hypercapnia (HCC)     Aspiration pneumonia (HCC) 1/16/2018    Atelectasis 4/27/2017    BiPAP (biphasic positive airway pressure) dependence     Bladder retention 4/26/2017    Body temperature low     Breast lump in female     one at 3 oclock and one at 7 oclock    Cellulitis     left elbow    Cellulitis of left elbow     Central sleep apnea 1/9/2017    Chronic respiratory failure with hypoxia and hypercapnia (HCC) 4/9/2017    Closed fracture of shaft of right humerus with routine healing 4/24/2018    Community acquired pneumonia of left lower lobe of lung ([address] 1/13/2018    Constipation 8/6/2018    Contracture of elbow joint, right 11/30/2017    Convulsions ([address]     Dependence on enabling machine 4/27/2017    Dependent on ventilator ([address]     Diarrhea 8/6/2018    Dystonia     Encounter for removal of internal fixation device 4/11/2013    Overview:  Removal of spinal rods and baclofen pump on 4.12.13 for suspected infection    Fibroadenoma of breast     Fistula     G tube feedings ([address]     Gastric reflux 8/6/2018    Gastroesophageal reflux disease without esophagitis 10/20/2015    GERD (gastroesophageal reflux disease)     History of recurrent pneumonia 4/27/2017    Hypercapnic respiratory failure ([address]     Hypotension 3/20/2017     Updating Deprecated Diagnoses    Hypothermia due to non-environmental cause 4/8/2017    Kyphoscoliosis     Mild sleep apnea     not treated    Muscle spasticity 4/8/2013    Myoclonus     Myoneural disorder (HCC)     Neuromuscular disease (HCC)     Nonverbal     ISELA (obstructive sleep apnea)     Pneumonia due to infectious organism     left lower lob    Pneumonia Afluria) 02/13/2017    Influenza, Daniela Fabiana, IM, PF (6 mo and older Fluzone, Flulaval, Fluarix, and 3 yrs and older Afluria) 01/18/2019    MMR 04/15/1997, 09/04/2001    Meningococcal MCV4P (Menactra) 11/27/2007, 08/24/2012    Pneumococcal Polysaccharide (Seruhprsi64) 02/15/2001, 02/13/2017    Polio IPV (IPOL) 1996, 1996, 05/01/1997, 09/04/2001    Tdap (Boostrix, Adacel) 11/27/2007    Varicella (Varivax) 04/29/1999        LUNG CANCER SCREENING     1. CRITERIA MET    []     CT ORDERED  []      2. CRITERIA NOT MET   [x]      3. REFUSED                    []        REASON CRITERIA NOT MET     1. SMOKING LESS THAN 30 PY  []      2. AGE LESS THAN 55 or GREATER 77 YEARS  [x]      3. QUIT SMOKING 15 YEARS OR GREATER   []      4. RECENT CT WITH IN 11 MONTHS    []      5. LIFE EXPECTANCY < 5 YEARS   []      6. SIGNS  AND SYMPTOMS OF LUNG CANCER   []           ALLERGIES:      Allergies   Allergen Reactions    Clindamycin/Lincomycin     Gentamicin     Hydrocodone     Tape [Adhesive Tape] Rash     Redness that lasts a couple days         Home Meds:   Not in a hospital admission. REVIEW OF SYSTEMS:  Ros unable to perform due to nonverbal   Vitals:  /80 (Site: Left Upper Arm, Position: Sitting, Cuff Size: Medium Adult)   Pulse 89   Resp 18   Ht 4' 7\" (1.397 m)   Wt 85 lb (38.6 kg)   SpO2 97% Comment: room at rest  BMI 19.76 kg/m²     PHYSICAL EXAM:    Small status female in wheelchair  Lungs clear ant   cvs r s1 s2   abd peg                     IMPRESSION:     Diagnosis Orders   1. Chronic respiratory failure with hypercapnia (HCC)     2. 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 Central sleep apnea-  bipap st - rt 16- max pressure 20 min epap -7           :                PLAN:      Continue lev albuterol   Cont BiPAP   Follow up 6 months       I hope this updates you on my evaluation and clinical thinking. Thank you for allowing me to participate in his care.      Sincerely,      Electronically signed by Abad Leon MD on 7/19/2020 at 4:43 PM

## 2020-09-16 RX ORDER — MULTIVIT WITH IRON,MINERALS
15 LIQUID (ML) ORAL DAILY
Qty: 472 ML | Refills: 1 | Status: SHIPPED | OUTPATIENT
Start: 2020-09-16 | End: 2020-11-25

## 2020-09-16 NOTE — TELEPHONE ENCOUNTER
LOV 5-15-19  LRF 3-9-20    Health Maintenance   Topic Date Due    Hepatitis B vaccine (4 of 4 - 4-dose series) 1996    Varicella vaccine (2 of 2 - 2-dose childhood series) 03/30/2000    HIV screen  03/30/2011    Chlamydia screen  10/12/2016    DTaP/Tdap/Td vaccine (7 - Td) 11/27/2017    Flu vaccine (1) 09/01/2020    Cervical cancer screen  11/12/2020 (Originally 3/30/2017)    Hepatitis A vaccine  Completed    Hib vaccine  Completed    HPV vaccine  Completed    Meningococcal (ACWY) vaccine  Completed    Pneumococcal 0-64 years Vaccine  Completed             (applicable per patient's age: Cancer Screenings, Depression Screening, Fall Risk Screening, Immunizations)    AST (U/L)   Date Value   10/30/2019 15     ALT (U/L)   Date Value   10/30/2019 12     BUN (mg/dL)   Date Value   06/17/2020 20      (goal A1C is < 7)   (goal LDL is <100) need 30-50% reduction from baseline     BP Readings from Last 3 Encounters:   07/17/20 117/80   06/17/20 132/88   06/17/20 105/73    (goal /80)      All Future Testing planned in [facility]:  Lab Frequency Next Occurrence   Clostridium difficile EIA Once 02/25/2021   Clostridium Difficile Toxin Once 02/25/2021   XR CHEST STANDARD (2 VW) Once 06/16/2020       Next Visit Date:  Future Appointments   Date Time Provider Kishan Reich   10/16/2020  9:20 AM Umair Slaughter MD [place] [id number] [facility]   1/15/2021  2:45 PM Kassy Hawkins MD Resp Spec [id number]            Patient Active Problem List:     Rett's syndrome     Scoliosis     Dystonia     Tremors of nervous system     Convulsions ([address]     Fibroadenoma of breast     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 ([address]     Acute respiratory failure with hypercapnia ([address]     Skin infection at gastrostomy tube site [facility])     Acute on chronic respiratory failure with hypoxia and hypercapnia (HCC)     Pneumonia due to infectious organism     Body temperature low     Hypotension     Nonverbal     Sepsis (HCC)     Hypothermia due to non-environmental cause     Prolonged QT interval     Urinary retention with incomplete bladder emptying     Chronic respiratory failure with hypoxia and hypercapnia ([facility])     Myoclonus     Atelectasis     Dependence on enabling machine     Dependent on ventilator ([address]     Delay in development     Tonic-clonic seizures ([address]     History of recurrent pneumonia     Fistula     Encounter for removal of internal fixation device     Sialorrhea     Myoneural disorder (HCC)     Muscle spasticity     Bladder retention     Community acquired pneumonia of left lower lobe of lung (HCC)     Intractable vomiting     Seizure ([address]     Seizure disorder ([address]     Abnormal electroencephalogram     Closed fracture of shaft of right humerus with routine healing     Constipation     Contracture of elbow joint, right     Diarrhea     Gastric reflux     Spastic quadriplegic cerebral palsy ([address]     Wheelchair bound     Malnutrition related to chronic disease ([address]     Pneumonia     Severe malnutrition (HCC)     Aspiration pneumonia ([address]

## 2020-09-21 RX ORDER — DOXAZOSIN MESYLATE 1 MG/1
1 TABLET ORAL NIGHTLY
Qty: 90 TABLET | Refills: 1 | Status: SHIPPED | OUTPATIENT
Start: 2020-09-21 | End: 2021-06-28

## 2020-09-21 NOTE — TELEPHONE ENCOUNTER
LOV 5-15-19  LRF 3-24-20    Health Maintenance   Topic Date Due    Hepatitis B vaccine (4 of 4 - 4-dose series) 1996    Varicella vaccine (2 of 2 - 2-dose childhood series) 03/30/2000    HIV screen  03/30/2011    Chlamydia screen  10/12/2016    DTaP/Tdap/Td vaccine (7 - Td) 11/27/2017    Flu vaccine (1) 09/01/2020    Cervical cancer screen  11/12/2020 (Originally 3/30/2017)    Hepatitis A vaccine  Completed    Hib vaccine  Completed    HPV vaccine  Completed    Meningococcal (ACWY) vaccine  Completed    Pneumococcal 0-64 years Vaccine  Completed             (applicable per patient's age: Cancer Screenings, Depression Screening, Fall Risk Screening, Immunizations)    AST (U/L)   Date Value   10/30/2019 15     ALT (U/L)   Date Value   10/30/2019 12     BUN (mg/dL)   Date Value   06/17/2020 20      (goal A1C is < 7)   (goal LDL is <100) need 30-50% reduction from baseline     BP Readings from Last 3 Encounters:   07/17/20 117/80   06/17/20 132/88   06/17/20 105/73    (goal /80)      All Future Testing planned in [facility]:  Lab Frequency Next Occurrence   Clostridium difficile EIA Once 02/25/2021   Clostridium Difficile Toxin Once 02/25/2021   XR CHEST STANDARD (2 VW) Once 06/16/2020       Next Visit Date:  Future Appointments   Date Time Provider Kishan Reich   10/16/2020  9:20 AM MD Eliu Adam   1/15/2021  2:45 PM Erik Morse MD Resp Spec [id number]            Patient Active Problem List:     Rett's syndrome     Scoliosis     Dystonia     Tremors of nervous system     Convulsions ([address]     Fibroadenoma of breast     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)     Acute on chronic respiratory failure with hypoxia and hypercapnia ([facility])     Pneumonia due to infectious organism     Body temperature low     Hypotension     Nonverbal     Sepsis (HCC)     Hypothermia due to non-environmental cause     Prolonged QT interval     Urinary retention with incomplete bladder emptying     Chronic respiratory failure with hypoxia and hypercapnia ([facility])     Myoclonus     Atelectasis     Dependence on enabling machine     Dependent on ventilator ([address]     Delay in development     Tonic-clonic seizures ([address]     History of recurrent pneumonia     Fistula     Encounter for removal of internal fixation device     Sialorrhea     Myoneural disorder (HCC)     Muscle spasticity     Bladder retention     Community acquired pneumonia of left lower lobe of lung (HCC)     Intractable vomiting     Seizure ([address]     Seizure disorder ([address]     Abnormal electroencephalogram     Closed fracture of shaft of right humerus with routine healing     Constipation     Contracture of elbow joint, right     Diarrhea     Gastric reflux     Spastic quadriplegic cerebral palsy ([address]     Wheelchair bound     Malnutrition related to chronic disease ([address]     Pneumonia     Severe malnutrition ([address]     Aspiration pneumonia ([address]

## 2020-10-16 ENCOUNTER — OFFICE VISIT (OUTPATIENT)
Dept: FAMILY MEDICINE CLINIC | Age: 25
End: 2020-10-16
Payer: COMMERCIAL

## 2020-10-16 VITALS
TEMPERATURE: 97.6 F | DIASTOLIC BLOOD PRESSURE: 72 MMHG | HEART RATE: 69 BPM | WEIGHT: 85 LBS | BODY MASS INDEX: 19.76 KG/M2 | SYSTOLIC BLOOD PRESSURE: 106 MMHG

## 2020-10-16 PROCEDURE — 90471 IMMUNIZATION ADMIN: CPT | Performed by: PEDIATRICS

## 2020-10-16 PROCEDURE — 90472 IMMUNIZATION ADMIN EACH ADD: CPT | Performed by: PEDIATRICS

## 2020-10-16 PROCEDURE — 99395 PREV VISIT EST AGE 18-39: CPT | Performed by: PEDIATRICS

## 2020-10-16 PROCEDURE — 90686 IIV4 VACC NO PRSV 0.5 ML IM: CPT | Performed by: PEDIATRICS

## 2020-10-16 PROCEDURE — 90714 TD VACC NO PRESV 7 YRS+ IM: CPT | Performed by: PEDIATRICS

## 2020-10-16 PROCEDURE — 90746 HEPB VACCINE 3 DOSE ADULT IM: CPT | Performed by: PEDIATRICS

## 2020-10-16 RX ORDER — POLYETHYLENE GLYCOL 3350 17 G/17G
17 POWDER, FOR SOLUTION ORAL DAILY
Qty: 510 G | Refills: 5 | Status: SHIPPED | OUTPATIENT
Start: 2020-10-16 | End: 2021-12-23

## 2020-10-16 RX ORDER — SULFAMETHOXAZOLE AND TRIMETHOPRIM 200; 40 MG/5ML; MG/5ML
7.5 SUSPENSION ORAL DAILY
COMMUNITY
Start: 2020-10-02 | End: 2020-11-16

## 2020-10-16 NOTE — PROGRESS NOTES
Subjective:      Patient ID: Maria Luisa Garcia is a 26 y.o. female. Visit Information    Have you changed or started any medications since your last visit including any over-the-counter medicines, vitamins, or herbal medicines? no   Are you having any side effects from any of your medications? -  no  Have you stopped taking any of your medications? Is so, why? -  no    Have you seen any other physician or provider since your last visit? Yes - Records Obtained  Have you had any other diagnostic tests since your last visit? No  Have you been seen in the emergency room and/or had an admission to a hospital since we last saw you? No  Have you had your routine dental cleaning in the past 6 months? no    Have you activated your [employer] account? If not, what are your barriers?  Yes     Patient Care Team:  Sachin Cardona MD as PCP - General (Internal Medicine)  Sachin Cardona MD as PCP - [facility] Provider  Keiko Carter MD as Consulting Physician (Pulmonology)    Medical History Review  Past Medical, Family, and Social History reviewed and does contribute to the patient presenting condition    Health Maintenance   Topic Date Due    Varicella vaccine (2 of 2 - 2-dose childhood series) 03/30/2000    Cervical cancer screen  11/12/2020 (Originally 3/30/2017)    Chlamydia screen  10/16/2021 (Originally 10/12/2016)    DTaP/Tdap/Td vaccine (8 - Td) 10/16/2030    Hepatitis A vaccine  Completed    Hepatitis B vaccine  Completed    Hib vaccine  Completed    HPV vaccine  Completed    Meningococcal (ACWY) vaccine  Completed    Flu vaccine  Completed    Pneumococcal 0-64 years Vaccine  Completed    HIV screen  Discontinued       PHQ Scores 9/7/2018   PHQ2 Score 0   PHQ9 Score 0     Interpretation of Total Score DepressionSeverity: 1-4 = Minimal depression, 5-9 = Mild depression, 10-14 = Moderate depression, 15-19 = Moderately severe depression, 20-27 = Severe depression    Current Outpatient Medications   Medication Sig Dispense Refill    sulfamethoxazole-trimethoprim (BACTRIM;SEPTRA) 200-40 MG/5ML suspension 7.5 mLs by Per G Tube route daily      polyethylene glycol (GLYCOLAX) 17 GM/SCOOP powder 17 g by Per G Tube route daily 510 g 5    doxazosin (CARDURA) 1 MG tablet Take 1 tablet by mouth nightly 90 tablet 1    Multiple Vitamins-Minerals (MULTIVITAMIN WITH IRON-MINERALS) liquid Take 15 mLs by mouth daily 472 mL 1    esomeprazole (NEXIUM) 20 MG delayed release capsule TAKE ONE CAPSULE BY MOUTH TWICE A DAY IN THE MORNING AND EVENING VIA G-TUBE 60 capsule 5    eszopiclone (LUNESTA) 2 MG TABS Take 1 tablet by mouth nightly for 180 days.  30 tablet 5    levalbuterol (XOPENEX) 1.25 MG/3ML nebulizer solution USE ONE VIAL VIA NEBULIZER BY MOUTH EVERY 4 HOURS AS NEEDED FOR WHEEZING 270 mL 4    Cholecalciferol (AQUEOUS VITAMIN D) 10 MCG/ML LIQD TAKE 5 ML BY MOUTH ONCE DAILY VIA G-TUBE 150 mL 5    baclofen 10 MG/5ML SOLN BRING TO APPOINTMENT AS DIRECTED      medroxyPROGESTERone (DEPO-PROVERA) 150 MG/ML injection INJECT 1 MILLILITER INTO APPROPRIATE MUSCLE ONCE FOR 1 DOSE      docusate sodium (ENEMEEZ) 283 MG enema Place 1 enema rectally Monday, Wednesday & Friday      acetaminophen (TYLENOL CHILDRENS) 160 MG/5ML suspension Take 18.09 mLs by mouth every 6 hours as needed for Fever 240 mL 0    magnesium hydroxide (MILK OF MAGNESIA CONCENTRATE) 2400 MG/10ML SUSP Take 8 mLs by mouth 2 times daily 10 mL 5    saccharomyces boulardii (FLORASTOR) 250 MG capsule Take 1 capsule by mouth daily 30 capsule 11    Probiotic Product (ALIGN) 4 MG CAPS Take 4 mg by mouth nightly 30 capsule 11    Sodium Phosphates (FLEET) 7-19 GM/118ML Place 1 enema rectally daily as needed (constipation) 30 Bottle 11    ibuprofen (ADVIL;MOTRIN) 100 MG/5ML suspension Take 10 mg/kg by mouth every 4 hours as needed for Fever      levETIRAcetam (KEPPRA) 100 MG/ML solution Take 14ML two times a day by G-button (Patient taking differently: Take 20ML two times a day by G-button) 2520 mL 2    levalbuterol (XOPENEX) 1.25 MG/3ML nebulizer solution Take 3 mLs by nebulization every 8 hours as needed for Wheezing 120 mL 11    diazepam (DIASTAT) 10 MG GEL Place 10 mg rectally once as needed (seizure) for up to 1 dose. 2 each 5    Feeding Tubes - Sets (JAYDEN-KEY GASTROSTOMY KIT 18FR) MISC 3.5 cm 1 each 11    Nutritional Supplements (PEPTAMEN 1 NEETU) LIQD Take 1 Can by mouth 3 times daily Peptamen Adult 32350 mL 5    Misc Natural Products (CYSTEX) LIQD Take 15 mLs by mouth daily 450 mL 11     No current facility-administered medications for this visit. HPI    Patient presents today for routine physical.  She is here with her caregiver, Shelley Ramirez. She does have stable chronic medical problems which include Rett syndrome, seizure disorder, spastic quadriplegia, dystonia, muscle tremors, hypercapnic respiratory failure, ISELA and central sleep apnea, restrictive lung disease due to kyphoscoliosis, hypoventilation syndrome, GERD and chronic constipation. She does also have insomnia and vitamin D deficiency. She has neurogenic bladder and recurrent UTI. She does follow-up regularly at [facility] for her history of Rett syndrome. She does have an appointment coming up in November for this. She does see her neurologist, Dr. Veronica Shah yearly in [place]. She does have a neurologist, Dr. Kiara [place] here in [place]. She does see these neurologist for her history of seizure disorder, spastic quadriplegia, dystonia and muscle tremors. Her caregiver reports that she does still have a few seizures per week usually. She does have a baclofen pump that does control her dystonia and her tremors. Her caregiver tells me that she did recently have a CT scan of the spinal column because her baclofen dosage seems to be short when she has it filled. They were concerned that there might be a leak but apparently the CT scan was negative for any CSF leak. She does continue to get massage therapy intermittently which is helpful. She does follow with pulmonary for her history of hypercapnic respiratory failure, ISELA, central sleep apnea, restrictive lung disease, hypoventilation syndrome and chronic dependence on trilogy non invasive vent. She takes Nexium for her GERD and this controls her symptoms well. Matt Pratt does get most of her nutrition by mouth but she does also have tube feeds with Peptamen which are going well.  She gets 2 cans of peptimin with 300ml of water at 80 ml per hour from 8pm until 5am.  She is tolerating these feeds well.        She does have a history of chronic constipation and uses milk of magnesia 8 ML's twice daily and she takes florastor and align once daily. She is getting miralax daily and gets fleets enemas on M,W,F. She does have a neurogenic bladder with urinary retention and incomplete bladder emptying. She did have a UTI that required IV therapy several months back. She was then seen by urology, Dr. Trice Pagan at [facility]. He recommended a low-dose daily antibiotic which she is now taking. They are supposed to follow-up in a few months. Apparently her there was question about creating a Mitrofanoff cath channel and if this would be helpful and the urologist did not recommend this at this point. He placed her on bactrim 7.5ml daily until follow up for renal and bladder ultrasound. She use lunesta for her history of insomnia and she is sleeping well with this.  This was recommended by her neurologist at [facility].    She does have vitamin D deficiency and is treated with vitamin D supplement. She has seen Dr. Kathia Lee, gynecology for her abnormal menstrual cycles. She is on Depo-Provera and this has been helpful. She does have some spotting occasionally. She does also have a history of breast lumps that are followed with yearly ultrasound with Dr. Kathia Lee. This is ordered to be done.     They have no other concerns cmw      Review of Systems   Reason unable to perform ROS: Pt nonverbal.   Constitutional: Negative for appetite change, fatigue, fever and unexpected weight change. HENT: Negative for congestion, postnasal drip, rhinorrhea, tinnitus and trouble swallowing. Eyes: Negative for photophobia, pain, discharge and redness. Respiratory: Positive for apnea (ISELA and Central sleep apnea). Negative for cough, shortness of breath, wheezing and stridor. Hypoventilation syndrome with hypoxia and hypercapnia - on trilogy ventilator at night   Cardiovascular: Negative for chest pain, palpitations and leg swelling. Gastrointestinal: Positive for constipation (now improved with 3x/weekly enemas) and diarrhea (occasional loose stools). Negative for abdominal pain, blood in stool and vomiting. Has a G-tube for medications and some tube feeds. She has a history of GERD controlled with nexium. Endocrine: Negative for polydipsia and polyphagia. Genitourinary: Negative for decreased urine volume, dysuria, hematuria and urgency. Recurrent uti - now on daily prophylactic ATB   Musculoskeletal: Negative for back pain and myalgias. Dystonia and muscle spasms   Skin: Negative for color change and rash. Allergic/Immunologic: Negative for immunocompromised state. Neurological: Positive for tremors (muscle spasms from her dystonia that appear just like her seizures all improved after placement of baclofen pump) and seizures (followed by neurology, Dr. Maricel Waldron at [facility] and Dr. Ministerio Saenz here in [place]). Negative for dizziness, light-headedness and headaches. Hematological: Negative for adenopathy. Does not bruise/bleed easily. Psychiatric/Behavioral: Positive for sleep disturbance (stable with lunesta). Negative for decreased concentration and dysphoric mood. The patient is not nervous/anxious.         Objective:     /72 (Site: Left Upper Arm, Position: Sitting, Cuff Size: Child) Pulse 69   Temp 97.6 °F (36.4 °C) (Temporal)   Wt 85 lb (38.6 kg) Comment: reported by care giver  BMI 19.76 kg/m²        Physical Exam  Vitals signs and nursing note reviewed. Constitutional:       General: She is not in acute distress. Appearance: She is well-developed. She is not diaphoretic. Comments: In wheelchair, non verbal   HENT:      Head: Normocephalic. Right Ear: Tympanic membrane, ear canal and external ear normal.      Left Ear: Tympanic membrane, ear canal and external ear normal. There is no impacted cerumen. Nose: Nose normal.      Comments: drools     Mouth/Throat:      Mouth: Mucous membranes are moist.   Eyes:      General: No scleral icterus. Right eye: No discharge. Left eye: No discharge. Pupils: Pupils are equal, round, and reactive to light. Neck:      Musculoskeletal: Normal range of motion. Thyroid: No thyromegaly. Vascular: No JVD. Comments: Head tilt to the left   Cardiovascular:      Rate and Rhythm: Normal rate and regular rhythm. Heart sounds: Normal heart sounds. No murmur. Pulmonary:      Effort: Pulmonary effort is normal.      Breath sounds: Normal breath sounds. No wheezing or rales. Abdominal:      General: There is no distension. Palpations: Abdomen is soft. There is no mass. Tenderness: There is no abdominal tenderness. Musculoskeletal:      Right lower leg: No edema. Left lower leg: No edema. Comments: Dystonia with contractures of upper and lower extremities - her extremities are much more easily movable. Skin:     General: Skin is warm. Comments: She has a reddish / purplish discoloration to her feet and lower extremities. Neurological:      Mental Status: She is alert. Motor: Atrophy and abnormal muscle tone present. No seizure activity. Assessment:      Diagnosis Orders   1. Annual physical exam     2. Rett's syndrome     3.  Seizure disorder ([facility] 4. Spastic quadriplegic cerebral palsy ([facility]     5. Dystonia     6. Tremors of nervous system     7. Chronic respiratory failure with hypercapnia (HCC)     8. Chronic respiratory failure with hypoxia and hypercapnia (HCC)     9. ISELA (obstructive sleep apnea)     10. Central sleep apnea     11. Restrictive lung disease due to kyphoscoliosis     12. Hypoventilation     13. Gastroesophageal reflux disease, unspecified whether esophagitis present     14. Slow transit constipation  polyethylene glycol (GLYCOLAX) 17 GM/SCOOP powder   15. Neurogenic bladder     16. Urinary retention with incomplete bladder emptying     17. Recurrent UTI     18. Primary insomnia     19. Vitamin D deficiency     20. Menorrhagia with irregular cycle     21. Benign breast lumps     22. Need for Td vaccine  Td (adult), 5 Lf Tetanus Toxoid, PF (Tenivac, Decavac)   23. Need for hepatitis B booster vaccination  Hep B Vaccine Adult (ENGERIX-B)   24. Need for influenza vaccination  INFLUENZA, QUADV, 3 YRS AND OLDER, IM PF, PREFILL SYR OR SDV, 0.5ML (AFLURIA QUADV, PF)       Plan:     Proceed with continue current medications  Subspecialty follow-up as scheduled with [facility], neurology, pulmonology, GI, urology and GYN  I did advise on adding a stool softener twice daily, fiber supplement twice daily and if this is helpful, wean use of miralax and use as needed to prevent really loose stools  Proceed with TD, hepatitis B, and flu vaccine today  Call with concerns         Shira's caregiver Marya Cobian received counseling on the following healthy behaviors: nutrition, exercise and medication adherence  Reviewed prior labs and health maintenance. Continue current medications, diet and exercise. Discussed use, benefit, and side effects of prescribed medications. Barriers to medication compliance addressed. Patient given educational materials - see patient instructions. All patient questions answered. Patient voiced understanding. Electronically signed by Johana Haywood MD on 10/24/2020 at 7:53 AM

## 2020-10-24 ASSESSMENT — ENCOUNTER SYMPTOMS
COLOR CHANGE: 0
DIARRHEA: 1
BACK PAIN: 0
APNEA: 1
SHORTNESS OF BREATH: 0
STRIDOR: 0
EYE REDNESS: 0
CONSTIPATION: 1
ABDOMINAL PAIN: 0
PHOTOPHOBIA: 0
EYE DISCHARGE: 0
COUGH: 0
WHEEZING: 0
RHINORRHEA: 0
EYE PAIN: 0
VOMITING: 0
TROUBLE SWALLOWING: 0
BLOOD IN STOOL: 0

## 2020-11-03 PROBLEM — J18.9 PNEUMONIA DUE TO INFECTIOUS ORGANISM: Status: RESOLVED | Noted: 2017-03-20 | Resolved: 2020-11-03

## 2020-11-03 PROBLEM — J18.9 COMMUNITY ACQUIRED PNEUMONIA OF LEFT LOWER LOBE OF LUNG: Status: RESOLVED | Noted: 2018-01-13 | Resolved: 2020-11-03

## 2020-11-03 PROBLEM — J18.9 PNEUMONIA OF LEFT LOWER LOBE DUE TO INFECTIOUS ORGANISM: Status: RESOLVED | Noted: 2017-02-28 | Resolved: 2020-11-03

## 2020-12-11 NOTE — TELEPHONE ENCOUNTER
Patient's caregiver is calling for a refill on the prednisone and levaquin that they keep on hand when the pt gets sick. They had to start her on the levaquin and prednisone that they had at the house a week ago she came down with a cold. Pt is doing good now. Please sign for the scripts.

## 2020-12-16 RX ORDER — FLUOXETINE HYDROCHLORIDE 20 MG/5ML
10 LIQUID ORAL DAILY
Qty: 35 ML | Refills: 0 | Status: SHIPPED | OUTPATIENT
Start: 2020-12-16 | End: 2022-02-10

## 2020-12-16 RX ORDER — FLUOXETINE HYDROCHLORIDE 20 MG/5ML
20 LIQUID ORAL DAILY
Qty: 150 ML | Refills: 5 | Status: SHIPPED | OUTPATIENT
Start: 2020-12-16 | End: 2021-05-28

## 2020-12-17 RX ORDER — LEVOFLOXACIN 250 MG/1
250 TABLET ORAL DAILY
Qty: 5 TABLET | Refills: 0 | Status: SHIPPED | OUTPATIENT
Start: 2020-12-17 | End: 2020-12-22

## 2020-12-17 RX ORDER — PREDNISONE 10 MG/1
10 TABLET ORAL DAILY
Qty: 15 TABLET | Refills: 0 | Status: SHIPPED | OUTPATIENT
Start: 2020-12-17 | End: 2020-12-22

## 2020-12-18 RX ORDER — CHOLECALCIFEROL (VITAMIN D3) 10(400)/ML
DROPS ORAL
Qty: 150 ML | Refills: 5 | Status: SHIPPED | OUTPATIENT
Start: 2020-12-18 | End: 2021-12-18

## 2020-12-18 NOTE — TELEPHONE ENCOUNTER
Nonverbal     Sepsis ([address]     Hypothermia due to non-environmental cause     Prolonged QT interval     Urinary retention with incomplete bladder emptying     Chronic respiratory failure with hypoxia and hypercapnia (HCC)     Myoclonus     Atelectasis     Dependence on enabling machine     Dependent on ventilator ([address]     Delay in development     Tonic-clonic seizures ([address]     History of recurrent pneumonia     Fistula     Encounter for removal of internal fixation device     Sialorrhea     Myoneural disorder (HCC)     Muscle spasticity     Bladder retention     Intractable vomiting     Seizure (HCC)     Seizure disorder ([facility])     Abnormal electroencephalogram     Closed fracture of shaft of right humerus with routine healing     Constipation     Contracture of elbow joint, right     Diarrhea     Gastric reflux     Spastic quadriplegic cerebral palsy ([address]     Wheelchair bound     Malnutrition related to chronic disease ([address]     Pneumonia     Severe malnutrition ([address]     Aspiration pneumonia ([address]

## 2020-12-30 RX ORDER — ESZOPICLONE 2 MG/1
2 TABLET, FILM COATED ORAL NIGHTLY
Qty: 30 TABLET | Refills: 5 | Status: SHIPPED | OUTPATIENT
Start: 2020-12-30 | End: 2021-07-08

## 2020-12-30 NOTE — TELEPHONE ENCOUNTER
Last visit: 10/16/2020  Last Med refill:7/1/20  Does patient have enough medication for 72 hours: No:     Next Visit Date:  Future Appointments   Date Time Provider Kishan Reich   1/15/2021  2:45 PM Giuliano Galvan  [address] Maintenance   Topic Date Due    Hepatitis C screen  1996    Varicella vaccine (2 of 2 - 2-dose childhood series) 03/30/2000    Cervical cancer screen  03/30/2017    Chlamydia screen  10/16/2021 (Originally 10/12/2016)    DTaP/Tdap/Td vaccine (8 - Td) 10/16/2030    Hepatitis A vaccine  Completed    Hepatitis B vaccine  Completed    Hib vaccine  Completed    HPV vaccine  Completed    Meningococcal (ACWY) vaccine  Completed    Flu vaccine  Completed    Pneumococcal 0-64 years Vaccine  Completed    HIV screen  Discontinued       No results found for: LABA1C          ( goal A1C is < 7)   No results found for: LABMICR  No results found for: LDLCHOLESTEROL, LDLCALC    (goal LDL is <100)   AST (U/L)   Date Value   10/30/2019 15     ALT (U/L)   Date Value   10/30/2019 12     BUN (mg/dL)   Date Value   06/17/2020 20     BP Readings from Last 3 Encounters:   10/16/20 106/72   07/17/20 117/80   06/17/20 132/88          (goal 120/80)    All Future Testing planned in [facility]  Lab Frequency Next Occurrence   Clostridium difficile EIA Once 02/25/2021   Clostridium Difficile Toxin Once 02/25/2021   XR CHEST STANDARD (2 VW) Once 06/16/2020               Patient Active Problem List:     Rett's syndrome     Scoliosis     Dystonia     Tremors of nervous system     Convulsions ([address]     Fibroadenoma of breast     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 ([address]     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 ([address]     Cellulitis of left elbow     Acute respiratory failure with hypercapnia ([address]     Skin infection at gastrostomy tube site ([address] Acute on chronic respiratory failure with hypoxia and hypercapnia (HCC)     Body temperature low     Hypotension     Nonverbal     Sepsis (HCC)     Hypothermia due to non-environmental cause     Prolonged QT interval     Urinary retention with incomplete bladder emptying     Chronic respiratory failure with hypoxia and hypercapnia (HCC)     Myoclonus     Atelectasis     Dependence on enabling machine     Dependent on ventilator ([address]     Delay in development     Tonic-clonic seizures ([address]     History of recurrent pneumonia     Fistula     Encounter for removal of internal fixation device     Sialorrhea     Myoneural disorder (HCC)     Muscle spasticity     Bladder retention     Intractable vomiting     Seizure (HCC)     Seizure disorder ([facility])     Abnormal electroencephalogram     Closed fracture of shaft of right humerus with routine healing     Constipation     Contracture of elbow joint, right     Diarrhea     Gastric reflux     Spastic quadriplegic cerebral palsy ([address]     Wheelchair bound     Malnutrition related to chronic disease ([address]     Pneumonia     Severe malnutrition ([address]     Aspiration pneumonia ([address]

## 2021-01-15 ENCOUNTER — VIRTUAL VISIT (OUTPATIENT)
Dept: PULMONOLOGY | Age: 26
End: 2021-01-15
Payer: COMMERCIAL

## 2021-01-15 DIAGNOSIS — J98.4 RESTRICTIVE LUNG MECHANICS DUE TO NEUROMUSCULAR DISEASE (HCC): ICD-10-CM

## 2021-01-15 DIAGNOSIS — G70.9 RESTRICTIVE LUNG MECHANICS DUE TO NEUROMUSCULAR DISEASE (HCC): ICD-10-CM

## 2021-01-15 DIAGNOSIS — J96.12 CHRONIC RESPIRATORY FAILURE WITH HYPERCAPNIA (HCC): Primary | ICD-10-CM

## 2021-01-15 DIAGNOSIS — G47.31 CENTRAL SLEEP APNEA: ICD-10-CM

## 2021-01-15 PROCEDURE — 99213 OFFICE O/P EST LOW 20 MIN: CPT | Performed by: INTERNAL MEDICINE

## 2021-01-15 RX ORDER — LEVALBUTEROL INHALATION SOLUTION 1.25 MG/3ML
SOLUTION RESPIRATORY (INHALATION)
Qty: 270 ML | Refills: 11 | Status: SHIPPED | OUTPATIENT
Start: 2021-01-15 | End: 2021-07-16 | Stop reason: SDUPTHER

## 2021-01-15 RX ORDER — LEVOFLOXACIN 250 MG/1
250 TABLET ORAL DAILY
Qty: 5 TABLET | Refills: 0 | Status: SHIPPED | OUTPATIENT
Start: 2021-01-15 | End: 2021-01-20

## 2021-01-15 RX ORDER — SULFAMETHOXAZOLE AND TRIMETHOPRIM 80; 16 MG/ML; MG/ML
INJECTION INTRAVENOUS
COMMUNITY

## 2021-01-15 RX ORDER — PREDNISONE 10 MG/1
30 TABLET ORAL DAILY
Qty: 15 TABLET | Refills: 0 | Status: SHIPPED | OUTPATIENT
Start: 2021-01-15 | End: 2021-01-20

## 2021-01-15 NOTE — PROGRESS NOTES
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 ([facility] 2/14/2017    Spastic quadriplegic cerebral palsy ([facility] [address] 2/26/2018    Tonic clonic seizures (HCC)     Tremors of nervous system     Urinary retention with incomplete bladder emptying     Vitamin D deficiency     Wheelchair bound          Family History:       Problem Relation Age of Onset    High Blood Pressure Mother        SURGICAL HISTORY:   Past Surgical History:   Procedure Laterality Date    ABDOMEN SURGERY      BACLOFEN PUMP IMPLANTATION  [id number]    BACLOFEN PUMP IMPLANTATION  06/28/2016    CHOLECYSTECTOMY  [id number]    GASTRIC FUNDOPLICATION  [id number]    SPINAL FUSION  [id number]    removal of spinal and baclofen pump 04/01/2013           SOCIAL AND OCCUPATIONAL HEALTH:      TOBACCO:   reports that she has never smoked. She has never used smokeless tobacco.  ETOH:   reports no history of alcohol use. ALLERGIES:      Allergies   Allergen Reactions    Clindamycin/Lincomycin     Gentamicin     Hydrocodone     Tape Morton Nanas Tape] Rash     Redness that lasts a couple days         Home Meds:   Prior to Admission medications    Medication Sig Start Date End Date Taking? Authorizing Provider   sulfamethoxazole-trimethoprim (BACTRIM) 400-80 MG/5ML injection Infuse intravenously   Yes Historical Provider, MD   predniSONE (DELTASONE) 10 MG tablet Take 3 tablets by mouth daily for 5 days 1/15/21 1/20/21 Yes Natalia Ortega MD   levoFLOXacin (LEVAQUIN) 250 MG tablet Take 1 tablet by mouth daily for 5 days 1/15/21 1/20/21 Yes Natalia Ortega MD   levalbuterol (XOPENEX) 1.25 MG/3ML nebulizer solution USE ONE VIAL VIA NEBULIZER BY MOUTH EVERY 4 HOURS AS NEEDED FOR WHEEZING 1/15/21  Yes Natalia Ortega MD   eszopiclone (LUNESTA) 2 MG TABS Take 1 tablet by mouth nightly.  12/30/20 12/30/21 Yes Tati Douglas MD   Cholecalciferol (AQUEOUS VITAMIN D) 10 MCG/ML LIQD TAKE 5 ML VIA G-TUBE ONCE DAILY 12/18/20 12/18/21 Yes KYLE Watson - NP   FLUoxetine (PROZAC) 20 MG/5ML solution Take 5 mLs by mouth daily 12/16/20 12/16/21 Yes Colin Briggs MD   Multiple Vitamins-Minerals (MULTIVITAMIN WITH IRON-MINERALS) liquid TAKE 15 MLS BY MOUTH DAILY 11/25/20 11/25/21 Yes Colin Briggs MD   polyethylene glycol (GLYCOLAX) 17 GM/SCOOP powder 17 g by Per G Tube route daily 10/16/20 10/16/21 Yes Colin Briggs MD   doxazosin (CARDURA) 1 MG tablet Take 1 tablet by mouth nightly 9/21/20 9/21/21 Yes Colin Briggs MD   esomeprazole ([employer]) 20 MG delayed release capsule TAKE ONE CAPSULE BY MOUTH TWICE A DAY IN THE MORNING AND EVENING VIA G-TUBE 7/17/20 7/17/21 Yes KYLE Watson - NP   baclofen 10 MG/5ML SOLN BRING TO APPOINTMENT AS DIRECTED 2/14/17  Yes Historical Provider, MD   medroxyPROGESTERone (DEPO-PROVERA) 150 MG/ML injection INJECT 1 MILLILITER INTO APPROPRIATE MUSCLE ONCE FOR 1 DOSE 1/10/20  Yes Historical Provider, MD   docusate sodium (ENEMEEZ) 283 MG enema Place 1 enema rectally Monday, Wednesday & Friday 10/23/19  Yes Historical Provider, MD   acetaminophen (TYLENOL CHILDRENS) 160 MG/5ML suspension Take 18.09 mLs by mouth every 6 hours as needed for Fever 12/15/18  Yes Caden García,    magnesium hydroxide (MILK OF MAGNESIA CONCENTRATE) 2400 MG/10ML SUSP Take 8 mLs by mouth 2 times daily 2/9/18  Yes Colin Briggs MD   saccharomyces boulardii (FLORASTOR) 250 MG capsule Take 1 capsule by mouth daily 2/9/18  Yes Colin Briggs MD   Probiotic Product (ALIGN) 4 MG CAPS Take 4 mg by mouth nightly 2/9/18  Yes Colin Briggs MD   Sodium Phosphates (FLEET) 7-19 GM/118ML Place 1 enema rectally daily as needed (constipation) 2/9/18  Yes Colin Briggs MD   ibuprofen (ADVIL;MOTRIN) 100 MG/5ML suspension Take 10 mg/kg by mouth every 4 hours as needed for Fever   Yes Historical Provider, MD levETIRAcetam (KEPPRA) 100 MG/ML solution Take 14ML two times a day by G-button  Patient taking differently: Take 20ML two times a day by G-button 12/28/17 10/16/21 Yes Pancho Knox MD   FLUoxetine (PROZAC) 20 MG/5ML solution 2.5 mLs by Per G Tube route daily for 14 days 12/16/20 12/30/20  Pancho Knox MD   levalbuterol Ezella Cassie) 1.25 MG/3ML nebulizer solution Take 3 mLs by nebulization every 8 hours as needed for Wheezing 7/17/20 8/16/20  Teresa Garcia MD   diazepam (DIASTAT) 10 MG GEL Place 10 mg rectally once as needed (seizure) for up to 1 dose. 2/9/18 9/7/19  Pancho Knox MD   Feeding Tubes - Sets (JAYDEN-KEY GASTROSTOMY KIT 18FR) MISC 3.5 cm 5/19/17 9/7/19  Pancho Knox MD   Nutritional Supplements (PEPTAMEN 1 NEETU) LIQD Take 1 Can by mouth 3 times daily Peptamen Adult 1/13/17 11/6/19  Pancho Knox MD   Mis Natural Products (CYSTEX) LIQD Take 15 mLs by mouth daily 2/3/16 2/13/18  Pancho Knox MD            Wt Readings from Last 3 Encounters:   10/16/20 85 lb (38.6 kg)   07/17/20 85 lb (38.6 kg)   06/17/20 85 lb (38.6 kg)         No results found. PHYSICAL EXAMINATION:  Due to this being a TeleHealth encounter, evaluation of the following organ systems is limited: Vitals/Constitutional/EENT/Resp/CV/GI//MS/Neuro/Skin/Heme-Lymph-Imm. Constitutional: [x] Appears well-developed and well-nourished. [] Abnormal  Mental status  [x] Alert and awake  [x] Oriented to person/place/time [x]Able to follow commands    [x] No apparent distress          HENT:   [x] Normocephalic, atraumatic. [] Abnormal shaped head   [x] Mouth/Throat: Mucous membranes are moist.     Ears [x] Normal  [] Abnormal-    Neck: [x] Normal range of motion [x] Supple [x] No visualized mass.      Pulmonary/Chest: [x] Respiratory effort normal.  [x] No visualized signs of difficulty breathing or respiratory distress        [] Abnormal              ASSESSMENT: Diagnosis Orders   1. Chronic respiratory failure with hypercapnia (HCC)  predniSONE (DELTASONE) 10 MG tablet    levoFLOXacin (LEVAQUIN) 250 MG tablet    levalbuterol (XOPENEX) 1.25 MG/3ML nebulizer solution   2. 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 Central sleep apnea-  bipap st - rt 16- max pressure 20 min epap -7            Plan:   1. Keep antibiotic and steroids at home for acute bronchitis  2. bipap   3. Prn xopenex   4. Follow up 3 months          Requested Prescriptions     Signed Prescriptions Disp Refills    predniSONE (DELTASONE) 10 MG tablet 15 tablet 0     Sig: Take 3 tablets by mouth daily for 5 days    levoFLOXacin (LEVAQUIN) 250 MG tablet 5 tablet 0     Sig: Take 1 tablet by mouth daily for 5 days    levalbuterol (XOPENEX) 1.25 MG/3ML nebulizer solution 270 mL 11     Sig: USE ONE VIAL VIA NEBULIZER BY MOUTH EVERY 4 HOURS AS NEEDED FOR WHEEZING       Medications Discontinued During This Encounter   Medication Reason    levalbuterol (XOPENEX) 1.25 MG/3ML nebulizer solution REORDER     I spent more than 22 minutes examining, evaluating, reviewing data and counseling the patient. Greater than 50% of time was spent face-to-face with the patient   Catana Skill received counseling on the following healthy behaviors: nutrition, exercise and medication adherence    Patient given educational materials : see patient instruction       Discussed use, benefit, and side effects of prescribed medications. Barriers to medication compliance addressed. All patient questions answered. Pt voiced understanding. An  electronic signature was used to authenticate this note. --Thi Mcdowell MD on 1/15/2021 at 6:16 PM      Please note that this chart was generated using voice recognition Dragon dictation software. Although every effort was made to ensure the accuracy of this automated transcription, some errors in transcription may have occurred.     Pursuant to the emergency declaration under the [facility][note 3][facility] waiver authority and the [employer] Act, this Virtual  Visit was conducted, with patient's consent, to reduce the patient's risk of exposure to COVID-19 and provide continuity of care for an established patient. Services were provided through a video synchronous discussion virtually to substitute for in-person clinic visit.

## 2021-01-18 ENCOUNTER — TELEPHONE (OUTPATIENT)
Dept: PULMONOLOGY | Age: 26
End: 2021-01-18

## 2021-01-18 NOTE — TELEPHONE ENCOUNTER
Outcome   Approvedtoday   PA Case: [id number], Status: Approved, Coverage Starts on: 1/18/2021 12:00:00 AM, Coverage Ends on: 1/18/2022 12:00:00 AM.      CALLED DEIRDRE MOORE OF THE APPROVAL

## 2021-02-08 DIAGNOSIS — K21.9 GASTRIC REFLUX: ICD-10-CM

## 2021-02-10 NOTE — TELEPHONE ENCOUNTER
Lov: 10/16/20  Lrf: 7/17/20  Na: 0  Health Maintenance   Topic Date Due    Hepatitis C screen  1995    Varicella vaccine (2 of 2 - 2-dose childhood series) 03/30/2000    Cervical cancer screen  03/30/2017    Chlamydia screen  10/16/2021 (Originally 10/12/2016)    DTaP/Tdap/Td vaccine (8 - Td) 10/16/2030    Hepatitis A vaccine  Completed    Hepatitis B vaccine  Completed    Hib vaccine  Completed    HPV vaccine  Completed    Meningococcal (ACWY) vaccine  Completed    Flu vaccine  Completed    Pneumococcal 0-64 years Vaccine  Completed    HIV screen  Discontinued             (applicable per patient's age: Cancer Screenings, Depression Screening, Fall Risk Screening, Immunizations)    AST (U/L)   Date Value   10/30/2019 15     ALT (U/L)   Date Value   10/30/2019 12     BUN (mg/dL)   Date Value   06/17/2020 20      (goal A1C is < 7)   (goal LDL is <100) need 30-50% reduction from baseline     BP Readings from Last 3 Encounters:   10/16/20 106/72   07/17/20 117/80   06/17/20 132/88    (goal /80)      All Future Testing planned in [facility]:  Lab Frequency Next Occurrence   Clostridium difficile EIA Once 02/25/2021   Clostridium Difficile Toxin Once 02/25/2021   XR CHEST STANDARD (2 VW) Once 06/16/2020       Next Visit Date:  Future Appointments   Date Time Provider Kishan Reich   7/23/2021  1:00 PM Rudy High MD Resp Spec [id number]            Patient Active Problem List:     Rett's syndrome     Scoliosis     Dystonia     Tremors of nervous system     Convulsions ([address]     Fibroadenoma of breast     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)     Acute on chronic respiratory failure with hypoxia and hypercapnia ([address]     Body temperature low     Hypotension     Nonverbal     Sepsis (HCC)     Hypothermia due to non-environmental cause     Prolonged QT interval     Urinary retention with incomplete bladder emptying     Chronic respiratory failure with hypoxia and hypercapnia (HCC)     Myoclonus     Atelectasis     Dependence on enabling machine     Dependent on ventilator ([address]     Delay in development     Tonic-clonic seizures ([address]     History of recurrent pneumonia     Fistula     Encounter for removal of internal fixation device     Sialorrhea     Myoneural disorder (HCC)     Muscle spasticity     Bladder retention     Intractable vomiting     Seizure (HCC)     Seizure disorder ([facility])     Abnormal electroencephalogram     Closed fracture of shaft of right humerus with routine healing     Constipation     Contracture of elbow joint, right     Diarrhea     Gastric reflux     Spastic quadriplegic cerebral palsy ([address]     Wheelchair bound     Malnutrition related to chronic disease ([address]     Pneumonia     Severe malnutrition ([address]     Aspiration pneumonia ([address]

## 2021-06-27 DIAGNOSIS — R33.9 URINARY RETENTION WITH INCOMPLETE BLADDER EMPTYING: ICD-10-CM

## 2021-06-28 RX ORDER — DOXAZOSIN MESYLATE 1 MG/1
1 TABLET ORAL NIGHTLY
Qty: 90 TABLET | Refills: 1 | Status: SHIPPED | OUTPATIENT
Start: 2021-06-28 | End: 2021-12-22

## 2021-06-28 NOTE — TELEPHONE ENCOUNTER
LOV 10-16-20  LRF 9-21-20    Health Maintenance   Topic Date Due    Hepatitis C screen  Never done    Varicella vaccine (2 of 2 - 2-dose childhood series) 03/30/2000    COVID-19 Vaccine (1) Never done    Cervical cancer screen  Never done    DTaP/Tdap/Td vaccine (8 - Td or Tdap) 10/16/2030    Pneumococcal 0-64 years Vaccine (2 of 2 - PPSV23) 03/30/2061    Hepatitis A vaccine  Completed    Hepatitis B vaccine  Completed    Hib vaccine  Completed    HPV vaccine  Completed    Meningococcal (ACWY) vaccine  Completed    Flu vaccine  Completed    HIV screen  Discontinued             (applicable per patient's age: Cancer Screenings, Depression Screening, Fall Risk Screening, Immunizations)    AST (U/L)   Date Value   10/30/2019 15     ALT (U/L)   Date Value   10/30/2019 12     BUN (mg/dL)   Date Value   06/17/2020 20      (goal A1C is < 7)   (goal LDL is <100) need 30-50% reduction from baseline     BP Readings from Last 3 Encounters:   10/16/20 106/72   07/17/20 117/80   06/17/20 132/88    (goal /80)      All Future Testing planned in [facility]:  Lab Frequency Next Occurrence       Next Visit Date:  Future Appointments   Date Time Provider Kishan Reich   7/16/2021  1:15 PM Luis Alberto Toro MD Resp Spec [id number]            Patient Active Problem List:     Rett's syndrome     Scoliosis     Dystonia     Tremors of nervous system     Convulsions ([address]     Fibroadenoma of breast     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 retention with incomplete bladder emptying     Chronic respiratory failure with hypoxia and hypercapnia (HCC)     Myoclonus     Atelectasis     Dependence on enabling machine     Dependent on ventilator ([address]     Delay in development     Tonic-clonic seizures ([address]     History of recurrent pneumonia     Fistula     Encounter for removal of internal fixation device     Sialorrhea     Myoneural disorder (HCC)     Muscle spasticity     Bladder retention     Intractable vomiting     Seizure (HCC)     Seizure disorder (HCC)     Abnormal electroencephalogram     Closed fracture of shaft of right humerus with routine healing     Constipation     Contracture of elbow joint, right     Diarrhea     Gastric reflux     Spastic quadriplegic cerebral palsy ([address]     Wheelchair bound     Malnutrition related to chronic disease ([address]     Pneumonia     Severe malnutrition ([address]     Aspiration pneumonia ([address]

## 2021-07-08 DIAGNOSIS — G47.01 INSOMNIA DUE TO MEDICAL CONDITION: ICD-10-CM

## 2021-07-08 RX ORDER — ESZOPICLONE 2 MG/1
2 TABLET, FILM COATED ORAL NIGHTLY
Qty: 30 TABLET | Refills: 5 | Status: SHIPPED | OUTPATIENT
Start: 2021-07-08 | End: 2022-01-18

## 2021-07-08 NOTE — TELEPHONE ENCOUNTER
Last visit: 10/16/2020  Last Med refill: 12/30/2020  Does patient have enough medication for 72 hours: Yes    Next Visit Date:  Future Appointments   Date Time Provider Kishan Abbasii   7/16/2021  1:15 PM Kiesha Presley  [address] Maintenance   Topic Date Due    Hepatitis C screen  Never done    Varicella vaccine (2 of 2 - 2-dose childhood series) 03/30/2000    COVID-19 Vaccine (1) Never done    Cervical cancer screen  Never done    Flu vaccine (1) 09/01/2021    DTaP/Tdap/Td vaccine (8 - Td or Tdap) 10/16/2030    Pneumococcal 0-64 years Vaccine (2 of 2 - PPSV23) 03/30/2061    Hepatitis A vaccine  Completed    Hepatitis B vaccine  Completed    Hib vaccine  Completed    HPV vaccine  Completed    Meningococcal (ACWY) vaccine  Completed    HIV screen  Discontinued       No results found for: LABA1C          ( goal A1C is < 7)   No results found for: LABMICR  No results found for: LDLCHOLESTEROL, LDLCALC    (goal LDL is <100)   AST (U/L)   Date Value   10/30/2019 15     ALT (U/L)   Date Value   10/30/2019 12     BUN (mg/dL)   Date Value   06/17/2020 20     BP Readings from Last 3 Encounters:   10/16/20 106/72   07/17/20 117/80   06/17/20 132/88          (goal 120/80)    All Future Testing planned in [facility]  Lab Frequency Next Occurrence               Patient Active Problem List:     Rett's syndrome     Scoliosis     Dystonia     Tremors of nervous system     Convulsions ([address]     Fibroadenoma of breast     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 ([address]     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 ([address]     Cellulitis of left elbow     Acute respiratory failure with hypercapnia ([address]     Skin infection at gastrostomy tube site ([address]     Acute on chronic respiratory failure with hypoxia and hypercapnia (HCC)     Body temperature low     Hypotension     Nonverbal     Sepsis ([address]     Hypothermia due to non-environmental cause     Prolonged QT interval     Urinary retention with incomplete bladder emptying     Chronic respiratory failure with hypoxia and hypercapnia (HCC)     Myoclonus     Atelectasis     Dependence on enabling machine     Dependent on ventilator ([address]     Delay in development     Tonic-clonic seizures ([address]     History of recurrent pneumonia     Fistula     Encounter for removal of internal fixation device     Sialorrhea     Myoneural disorder (HCC)     Muscle spasticity     Bladder retention     Intractable vomiting     Seizure (HCC)     Seizure disorder ([facility])     Abnormal electroencephalogram     Closed fracture of shaft of right humerus with routine healing     Constipation     Contracture of elbow joint, right     Diarrhea     Gastric reflux     Spastic quadriplegic cerebral palsy ([address]     Wheelchair bound     Malnutrition related to chronic disease ([address]     Pneumonia     Severe malnutrition ([address]     Aspiration pneumonia ([address]

## 2021-07-16 ENCOUNTER — VIRTUAL VISIT (OUTPATIENT)
Dept: PULMONOLOGY | Age: 26
End: 2021-07-16
Payer: COMMERCIAL

## 2021-07-16 DIAGNOSIS — G47.31 CENTRAL SLEEP APNEA: ICD-10-CM

## 2021-07-16 DIAGNOSIS — J96.12 CHRONIC RESPIRATORY FAILURE WITH HYPERCAPNIA (HCC): ICD-10-CM

## 2021-07-16 DIAGNOSIS — J98.4 RESTRICTIVE LUNG MECHANICS DUE TO NEUROMUSCULAR DISEASE (HCC): Primary | ICD-10-CM

## 2021-07-16 DIAGNOSIS — G70.9 RESTRICTIVE LUNG MECHANICS DUE TO NEUROMUSCULAR DISEASE (HCC): Primary | ICD-10-CM

## 2021-07-16 PROCEDURE — 99213 OFFICE O/P EST LOW 20 MIN: CPT | Performed by: INTERNAL MEDICINE

## 2021-07-16 RX ORDER — LEVALBUTEROL INHALATION SOLUTION 1.25 MG/3ML
SOLUTION RESPIRATORY (INHALATION)
Qty: 270 ML | Refills: 11 | Status: SHIPPED | OUTPATIENT
Start: 2021-07-16 | End: 2022-04-08 | Stop reason: SDUPTHER

## 2021-07-16 RX ORDER — LIDOCAINE AND PRILOCAINE 25; 25 MG/G; MG/G
CREAM TOPICAL PRN
COMMUNITY
Start: 2020-09-18

## 2021-07-16 RX ORDER — PREDNISONE 10 MG/1
30 TABLET ORAL DAILY
Qty: 15 TABLET | Refills: 1 | Status: SHIPPED | OUTPATIENT
Start: 2021-07-16 | End: 2021-07-21

## 2021-07-17 NOTE — PROGRESS NOTES
2021    TELEHEALTH EVALUATION -- Audio/Visual (During PLUSE-04 public health emergency)    Patient and physician are located in their individual locations.  This is visit is completed via [employer] application / [email or web address] / Telephone     Chief Complaint   Patient presents with    Other     hypercapnia, was having some coughing, breathing fine         HPI:    Patricia Camargo (:  1995) has requested an audio/video evaluation for the following concern(s):    D/w mother - care giver   NO COMPLAINTS   DID HAVE ONE EPISODE OF COLD AND USED PREDNISONE       Ros unable        Immunization   Immunization History   Administered Date(s) Administered    COVID-19, Moderna, PF, 100mcg/0.5mL 2021, 2021    DTaP 1996, 1996, 1996, 04/15/1997, 2001    HIB PRP-T (ActHIB, Hiberix) 1996, 1996, 1996, 04/15/1997    HPV 9-valent Blima Curry) 2007, 2008, 2008    Hepatitis A 2009, 2011    Hepatitis B (Recombivax HB) 1996, 1996, 1996    Hepatitis B Adult (Engerix-B) 10/16/2020    Influenza Vaccine, unspecified formulation 10/27/2014    Influenza Virus Vaccine 10/12/2015, 10/19/2019    Influenza, Quadv, IM, (6 mo and older Fluzone, Flulaval, Fluarix and 3 yrs and older Afluria) 2017    Influenza, Quadv, IM, PF (6 mo and older Fluzone, Flulaval, Fluarix, and 3 yrs and older Afluria) 2019, 10/16/2020    MMR 04/15/1997, 2001    Meningococcal MCV4P (Menactra) 2007, 2012    Pneumococcal Polysaccharide ([id number]) 02/15/2001, 2017    Polio IPV (IPOL) 1996, 1996, 1997, 2001    Td (Adult), 5 Lf Tetanus Toxoid, Pf (Tenivac, Decavac) 10/16/2020    Tdap (Boostrix, Adacel) 2007    Varicella (Varivax) 1999              PAST MEDICAL HISTORY:       Diagnosis Date    Abnormal electroencephalogram 2018    Acute on chronic respiratory failure with hypoxia and hypercapnia (HCC)     Acute respiratory failure with hypercapnia (HCC)     Aspiration pneumonia ([address] 1/16/2018    Atelectasis 4/27/2017    BiPAP (biphasic positive airway pressure) dependence     Bladder retention 4/26/2017    Body temperature low     Breast lump in female     one at 3 oclock and one at 7 oclock    Cellulitis     left elbow    Cellulitis of left elbow     Central sleep apnea 1/9/2017    Chronic respiratory failure with hypoxia and hypercapnia (HCC) 4/9/2017    Closed fracture of shaft of right humerus with routine healing 4/24/2018    Community acquired pneumonia of left lower lobe of lung 1/13/2018    Constipation 8/6/2018    Contracture of elbow joint, right 11/30/2017    Convulsions ([address]     Dependence on enabling machine 4/27/2017    Dependent on ventilator ([address]     Diarrhea 8/6/2018    Dystonia     Encounter for removal of internal fixation device 4/11/2013    Overview:  Removal of spinal rods and baclofen pump on 4.12.13 for suspected infection    Fibroadenoma of breast     Fistula     G tube feedings ([address]     Gastric reflux 8/6/2018    Gastroesophageal reflux disease without esophagitis 10/20/2015    GERD (gastroesophageal reflux disease)     History of recurrent pneumonia 4/27/2017    Hypercapnic respiratory failure ([address]     Hypotension 3/20/2017     Updating Deprecated Diagnoses    Hypothermia due to non-environmental cause 4/8/2017    Kyphoscoliosis     Mild sleep apnea     not treated    Muscle spasticity 4/8/2013    Myoclonus     Myoneural disorder (HCC)     Neuromuscular disease (HCC)     Nonverbal     ISELA (obstructive sleep apnea)     Pneumonia due to infectious organism     left lower lob    Pneumonia of left lower lobe due to infectious organism 2/28/2017    Prolonged Q-T interval on ECG     Prolonged QT interval 4/8/2017    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 ([address] 1/9/2017    Rett's syndrome     Scoliosis     Seizure ([facility] 1/23/2018    Seizure disorder ([facility]     Seizures (HCC)     Sepsis ([facility]     Sialorrhea     Skin infection at gastrostomy tube site ([facility]     Sleep-related hypoventilation     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 ([facility] 2/14/2017    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    Tonic clonic seizures (HCC)     Tremors of nervous system     Urinary retention with incomplete bladder emptying     Vitamin D deficiency     Wheelchair bound          Family History:       Problem Relation Age of Onset    High Blood Pressure Mother        SURGICAL HISTORY:   Past Surgical History:   Procedure Laterality Date    ABDOMEN SURGERY      BACLOFEN PUMP IMPLANTATION  [id number]    BACLOFEN PUMP IMPLANTATION  06/28/2016    CHOLECYSTECTOMY  [id number]    GASTRIC FUNDOPLICATION  [id number]    SPINAL FUSION  [id number]    removal of spinal and baclofen pump 04/01/2013           SOCIAL AND OCCUPATIONAL HEALTH:      TOBACCO:   reports that she has never smoked. She has never used smokeless tobacco.  ETOH:   reports no history of alcohol use. ALLERGIES:      Allergies   Allergen Reactions    Clindamycin/Lincomycin     Gentamicin     Hydrocodone     Tape [place] Tape] Rash     Redness that lasts a couple days         Home Meds:   Prior to Admission medications    Medication Sig Start Date End Date Taking?  Authorizing Provider   Cannabidiol 100 MG/ML SOLN Take 170 mg by mouth 2 times daily 4/20/21  Yes Historical Provider, MD   lidocaine-prilocaine (EMLA) 2.5-2.5 % cream Apply topically as needed 9/18/20  Yes Historical Provider, MD   predniSONE (DELTASONE) 10 MG tablet Take 3 tablets by mouth daily for 5 days 7/16/21 7/21/21 Yes Gordon Wong MD   levalbuterol (XOPENEX) 1.25 MG/3ML nebulizer solution USE ONE VIAL VIA NEBULIZER BY MOUTH EVERY 4 HOURS AS NEEDED FOR WHEEZING 7/16/21  Yes Gordon Wong MD   eszopiclone (LUNESTA) 2 MG TABS Take 1 tablet by mouth nightly for 365 doses. 7/8/21 7/8/22 Yes Lena Garcia MD   doxazosin (CARDURA) 1 MG tablet Take 1 tablet by mouth nightly 6/28/21 6/28/22 Yes Lena Garcia MD   FLUoxetine (PROZAC) 20 MG/5ML solution TAKE FIVE MILLILITERS BY MOUTH DAILY 5/28/21  Yes Lena Garcia MD   esomeprazole (CaLivingBenefits) 20 MG delayed release capsule TAKE ONE CAPSULE BY G-TUBE EVERY MORNING AND TAKE ONE CAPSULE BY G-TUBE EVERY EVENING 2/10/21 2/10/22 Yes KYLE Silva - CNP   sulfamethoxazole-trimethoprim (BACTRIM) 400-80 MG/5ML injection Infuse intravenously   Yes Historical Provider, MD   Cholecalciferol (AQUEOUS VITAMIN D) 10 MCG/ML LIQD TAKE 5 ML VIA G-TUBE ONCE DAILY 12/18/20 12/18/21 Yes Calin Fernando APRN - NP   Multiple Vitamins-Minerals (MULTIVITAMIN WITH IRON-MINERALS) liquid TAKE 15 MLS BY MOUTH DAILY 11/25/20 11/25/21 Yes Lena Garcia MD   polyethylene glycol (GLYCOLAX) 17 GM/SCOOP powder 17 g by Per G Tube route daily 10/16/20 10/16/21 Yes Lena Garcia MD   baclofen 10 MG/5ML SOLN BRING TO APPOINTMENT AS DIRECTED 2/14/17  Yes Historical Provider, MD   medroxyPROGESTERone (DEPO-PROVERA) 150 MG/ML injection INJECT 1 MILLILITER INTO APPROPRIATE MUSCLE ONCE FOR 1 DOSE 1/10/20  Yes Historical Provider, MD   docusate sodium (ENEMEEZ) 283 MG enema Place 1 enema rectally Monday, Wednesday & Friday 10/23/19  Yes Historical Provider, MD   acetaminophen (TYLENOL CHILDRENS) 160 MG/5ML suspension Take 18.09 mLs by mouth every 6 hours as needed for Fever 12/15/18  Yes Caden García,    magnesium hydroxide (MILK OF MAGNESIA CONCENTRATE) 2400 MG/10ML SUSP Take 8 mLs by mouth 2 times daily 2/9/18  Yes Lena Garcia MD   saccharomyces boulardii (FLORASTOR) 250 MG capsule Take 1 capsule by mouth daily 2/9/18  Yes Lena Garcia MD   Probiotic Product (ALIGN) 4 MG CAPS Take 4 mg by mouth nightly 2/9/18  Yes Lena Garcia MD   Sodium Phosphates (FLEET) 7-19 GM/118ML Place 1 enema rectally daily as needed (constipation) 2/9/18  Yes Daria Lawrence MD   ibuprofen (ADVIL;MOTRIN) 100 MG/5ML suspension Take 10 mg/kg by mouth every 4 hours as needed for Fever   Yes Historical Provider, MD   levETIRAcetam (KEPPRA) 100 MG/ML solution Take 14ML two times a day by G-button  Patient taking differently: Take 20ML two times a day by G-button 12/28/17 10/16/21 Yes Daria Lawrence MD   FLUoxetine (PROZAC) 20 MG/5ML solution 2.5 mLs by Per G Tube route daily for 14 days 12/16/20 12/30/20  Daria Lawrence MD   diazepam (DIASTAT) 10 MG GEL Place 10 mg rectally once as needed (seizure) for up to 1 dose. 2/9/18 9/7/19  Daria Lawrence MD   Feeding Tubes - Sets (JAYDEN-KEY GASTROSTOMY KIT 18FR) MISC 3.5 cm 5/19/17 9/7/19  Daria Lawrence MD   Nutritional Supplements (PEPTAMEN 1 NEETU) LIQD Take 1 Can by mouth 3 times daily Peptamen Adult 1/13/17 11/6/19  Daria Lawrence MD   Misc Natural Products (CYSTEX) LIQD Take 15 mLs by mouth daily 2/3/16 2/13/18  Daria Lawrence MD            Wt Readings from Last 3 Encounters:   10/16/20 85 lb (38.6 kg)   07/17/20 85 lb (38.6 kg)   06/17/20 85 lb (38.6 kg)         No results found. PHYSICAL EXAMINATION:  Due to this being a TeleHealth encounter, evaluation of the following organ systems is limited: Vitals/Constitutional/EENT/Resp/CV/GI//MS/Neuro/Skin/Heme-Lymph-Imm. Constitutional: [x] Appears well-developed and well-nourished. [] Abnormal  Mental status  [x] Alert and awake  [x] Oriented to person/place/time [x]Able to follow commands    [x] No apparent distress          HENT:   [x] Normocephalic, atraumatic. [] Abnormal shaped head   [x] Mouth/Throat: Mucous membranes are moist.     Ears [x] Normal  [] Abnormal-    Neck: [x] Normal range of motion [x] Supple [x] No visualized mass.      Pulmonary/Chest: [x] Respiratory effort normal.  [x] No visualized signs of difficulty breathing or respiratory distress        [] Abnormal              ASSESSMENT:   Diagnosis Orders   1. 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 Chronic respiratory failure with hypercapnia (HCC)  predniSONE (DELTASONE) 10 MG tablet    levalbuterol (XOPENEX) 1.25 MG/3ML nebulizer solution   3. Central sleep apnea           Plan:   1. PRN XOPENEX   2. TRILOGY VENT AT NIGHT AS TOLERATED   3. FOLLOW UP 6 MONTHS          Requested Prescriptions     Signed Prescriptions Disp Refills    predniSONE (DELTASONE) 10 MG tablet 15 tablet 1     Sig: Take 3 tablets by mouth daily for 5 days    levalbuterol (XOPENEX) 1.25 MG/3ML nebulizer solution 270 mL 11     Sig: USE ONE VIAL VIA NEBULIZER BY MOUTH EVERY 4 HOURS AS NEEDED FOR WHEEZING       Medications Discontinued During This Encounter   Medication Reason    levalbuterol (XOPENEX) 1.25 MG/3ML nebulizer solution LIST CLEANUP    levalbuterol (XOPENEX) 1.25 MG/3ML nebulizer solution REORDER     e    Patient given educational materials : see patient instruction       Discussed use, benefit, and side effects of prescribed medications. Barriers to medication compliance addressed. All patient questions answered. Pt voiced understanding. An  electronic signature was used to authenticate this note. --Antonio Soni MD on 7/17/2021 at 1:56 PM      Please note that this chart was generated using voice recognition Dragon dictation software. Although every effort was made to ensure the accuracy of this automated transcription, some errors in transcription may have occurred.     Pursuant to the emergency declaration under the [facility] waiver authority and the [employer] and [employer] Act, this Virtual  Visit was conducted, with patient's consent, to reduce the patient's risk of exposure to COVID-19 and provide continuity of care for an established patient. Services were provided through a video synchronous discussion virtually to substitute for in-person clinic visit.

## 2021-08-31 DIAGNOSIS — K21.9 GASTRIC REFLUX: ICD-10-CM

## 2021-09-01 NOTE — TELEPHONE ENCOUNTER
LOV 10-16-20  LRF 2-10-21    Health Maintenance   Topic Date Due    Hepatitis C screen  Never done    Varicella vaccine (2 of 2 - 2-dose childhood series) 03/30/2000    Cervical cancer screen  Never done    Flu vaccine (1) 09/01/2021    DTaP/Tdap/Td vaccine (8 - Td or Tdap) 10/16/2030    Pneumococcal 0-64 years Vaccine (2 of 2 - PPSV23) 03/30/2061    Hepatitis A vaccine  Completed    Hepatitis B vaccine  Completed    Hib vaccine  Completed    HPV vaccine  Completed    Meningococcal (ACWY) vaccine  Completed    COVID-19 Vaccine  Completed    HIV screen  Discontinued             (applicable per patient's age: Cancer Screenings, Depression Screening, Fall Risk Screening, Immunizations)    AST (U/L)   Date Value   10/30/2019 15     ALT (U/L)   Date Value   10/30/2019 12     BUN (mg/dL)   Date Value   06/17/2020 20      (goal A1C is < 7)   (goal LDL is <100) need 30-50% reduction from baseline     BP Readings from Last 3 Encounters:   10/16/20 106/72   07/17/20 117/80   06/17/20 132/88    (goal /80)      All Future Testing planned in [facility]:  Lab Frequency Next Occurrence       Next Visit Date:  Future Appointments   Date Time Provider Kishan Rachana   1/21/2022  2:30 PM Job Barlow MD Resp Spec [id number]            Patient Active Problem List:     Rett's syndrome     Scoliosis     Dystonia     Tremors of nervous system     Convulsions ([address]     Fibroadenoma of breast     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

## 2021-11-22 RX ORDER — FLUOXETINE HYDROCHLORIDE 20 MG/5ML
LIQUID ORAL
Qty: 150 ML | Refills: 5 | Status: SHIPPED | OUTPATIENT
Start: 2021-11-22 | End: 2022-05-16

## 2021-11-22 NOTE — TELEPHONE ENCOUNTER
LOV 10-16-20  LRF 5-28-21    Health Maintenance   Topic Date Due    Hepatitis C screen  Never done    Varicella vaccine (2 of 2 - 2-dose childhood series) 03/30/2000    Pap smear  Never done    Flu vaccine (1) 09/01/2021    DTaP/Tdap/Td vaccine (8 - Td or Tdap) 10/16/2030    Pneumococcal 0-64 years Vaccine (2 of 2 - PPSV23) 03/30/2061    Hepatitis A vaccine  Completed    Hepatitis B vaccine  Completed    Hib vaccine  Completed    HPV vaccine  Completed    Meningococcal (ACWY) vaccine  Completed    COVID-19 Vaccine  Completed    HIV screen  Discontinued             (applicable per patient's age: Cancer Screenings, Depression Screening, Fall Risk Screening, Immunizations)    AST (U/L)   Date Value   10/30/2019 15     ALT (U/L)   Date Value   10/30/2019 12     BUN (mg/dL)   Date Value   06/17/2020 20      (goal A1C is < 7)   (goal LDL is <100) need 30-50% reduction from baseline     BP Readings from Last 3 Encounters:   10/16/20 106/72   07/17/20 117/80   06/17/20 132/88    (goal /80)      All Future Testing planned in [facility]:  Lab Frequency Next Occurrence       Next Visit Date:  Future Appointments   Date Time Provider Kishan Rachana   1/21/2022  2:30 PM Kae Dick MD Resp Spec [id number]            Patient Active Problem List:     Rett's syndrome     Scoliosis     Dystonia     Tremors of nervous system     Convulsions ([address]     Fibroadenoma of breast     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

## 2021-12-09 DIAGNOSIS — K94.29 GASTRIC TUBE GRANULATION TISSUE (HCC): Primary | ICD-10-CM

## 2021-12-09 RX ORDER — SILVER NITRATE 10 %
OINTMENT (GRAM) TOPICAL
Refills: 0 | Status: CANCELLED | OUTPATIENT
Start: 2021-12-09

## 2021-12-13 NOTE — TELEPHONE ENCOUNTER
Mom had not picked up yet because pharm didn't have ready. Called pharm and spoke with Efrem Herrmann, who told me that the medication is not covered on their insurance so they cannot fill because it is a specialty medication that they have to order out to be delivered. Please advise if there is an alternative the patient should try.

## 2021-12-15 NOTE — TELEPHONE ENCOUNTER
Informed mother she told me to try [facility] on [address] I gave them a call and they referred me to [facility]. I contacted them and they no longer carry these applicators. Will call and inform mother.

## 2021-12-21 DIAGNOSIS — R33.9 URINARY RETENTION WITH INCOMPLETE BLADDER EMPTYING: ICD-10-CM

## 2021-12-22 RX ORDER — DOXAZOSIN MESYLATE 1 MG/1
1 TABLET ORAL NIGHTLY
Qty: 90 TABLET | Refills: 0 | Status: SHIPPED | OUTPATIENT
Start: 2021-12-22 | End: 2022-02-10 | Stop reason: SDUPTHER

## 2021-12-22 NOTE — TELEPHONE ENCOUNTER
LOV 10-16-20  LRF 6-28-21    Health Maintenance   Topic Date Due    Hepatitis C screen  Never done    Varicella vaccine (2 of 2 - 2-dose childhood series) 03/30/2000    Pap smear  Never done    COVID-19 Vaccine (3 - Booster for Moderna series) 08/24/2021    Flu vaccine (1) 09/01/2021    DTaP/Tdap/Td vaccine (8 - Td or Tdap) 10/16/2030    Pneumococcal 0-64 years Vaccine (2 of 2 - PPSV23) 03/30/2061    Hepatitis A vaccine  Completed    Hepatitis B vaccine  Completed    Hib vaccine  Completed    HPV vaccine  Completed    Meningococcal (ACWY) vaccine  Completed    HIV screen  Discontinued             (applicable per patient's age: Cancer Screenings, Depression Screening, Fall Risk Screening, Immunizations)    AST (U/L)   Date Value   10/30/2019 15     ALT (U/L)   Date Value   10/30/2019 12     BUN (mg/dL)   Date Value   06/17/2020 20      (goal A1C is < 7)   (goal LDL is <100) need 30-50% reduction from baseline     BP Readings from Last 3 Encounters:   10/16/20 106/72   07/17/20 117/80   06/17/20 132/88    (goal /80)      All Future Testing planned in [facility]:  Lab Frequency Next Occurrence       Next Visit Date:  Future Appointments   Date Time Provider Kishan Reich   1/21/2022  2:30 PM Ervin Mckee MD Resp Spec [facility]   2/10/2022  3:30 PM [facility]            Patient Active Problem List:     Rett's syndrome     Scoliosis     Dystonia     Tremors of nervous system     Convulsions ([address]     Fibroadenoma of breast     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 ([address]     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 ([address]     Cellulitis of left elbow     Acute respiratory failure with hypercapnia ([address]     Skin infection at gastrostomy tube site [facility])     Acute on chronic respiratory failure with hypoxia and hypercapnia ([address]     Body temperature low     Hypotension     Nonverbal Sepsis ([address]     Hypothermia due to non-environmental cause     Prolonged QT interval     Urinary retention with incomplete bladder emptying     Chronic respiratory failure with hypoxia and hypercapnia (HCC)     Myoclonus     Atelectasis     Dependence on enabling machine     Dependent on ventilator ([address]     Delay in development     Tonic-clonic seizures ([address]     History of recurrent pneumonia     Fistula     Encounter for removal of internal fixation device     Sialorrhea     Myoneural disorder (HCC)     Muscle spasticity     Bladder retention     Intractable vomiting     Seizure (HCC)     Seizure disorder (HCC)     Abnormal electroencephalogram     Closed fracture of shaft of right humerus with routine healing     Constipation     Contracture of elbow joint, right     Diarrhea     Gastric reflux     Spastic quadriplegic cerebral palsy ([address]     Wheelchair bound     Malnutrition related to chronic disease ([address]     Pneumonia     Severe malnutrition ([address]     Aspiration pneumonia ([address]

## 2021-12-23 DIAGNOSIS — K59.01 SLOW TRANSIT CONSTIPATION: ICD-10-CM

## 2021-12-23 RX ORDER — POLYETHYLENE GLYCOL 3350 17 G/17G
POWDER ORAL
Qty: 510 G | Refills: 5 | Status: SHIPPED | OUTPATIENT
Start: 2021-12-23 | End: 2022-12-23

## 2021-12-23 NOTE — TELEPHONE ENCOUNTER
LOV & LRF 10-16-20    Health Maintenance   Topic Date Due    Hepatitis C screen  Never done    Varicella vaccine (2 of 2 - 2-dose childhood series) 03/30/2000    Pap smear  Never done    COVID-19 Vaccine (3 - Booster for Moderna series) 08/24/2021    Flu vaccine (1) 09/01/2021    DTaP/Tdap/Td vaccine (8 - Td or Tdap) 10/16/2030    Pneumococcal 0-64 years Vaccine (2 of 2 - PPSV23) 03/30/2061    Hepatitis A vaccine  Completed    Hepatitis B vaccine  Completed    Hib vaccine  Completed    HPV vaccine  Completed    Meningococcal (ACWY) vaccine  Completed    HIV screen  Discontinued             (applicable per patient's age: Cancer Screenings, Depression Screening, Fall Risk Screening, Immunizations)    AST (U/L)   Date Value   10/30/2019 15     ALT (U/L)   Date Value   10/30/2019 12     BUN (mg/dL)   Date Value   06/17/2020 20      (goal A1C is < 7)   (goal LDL is <100) need 30-50% reduction from baseline     BP Readings from Last 3 Encounters:   10/16/20 106/72   07/17/20 117/80   06/17/20 132/88    (goal /80)      All Future Testing planned in [facility]:  Lab Frequency Next Occurrence       Next Visit Date:  Future Appointments   Date Time Provider Kishan Reich   1/21/2022  2:30 PM Alannah Canchola MD Resp Spec [facility]   2/10/2022  3:30 PM [facility]            Patient Active Problem List:     Rett's syndrome     Scoliosis     Dystonia     Tremors of nervous system     Convulsions ([address]     Fibroadenoma of breast     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 ([address]     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 ([address]     Cellulitis of left elbow     Acute respiratory failure with hypercapnia ([address]     Skin infection at gastrostomy tube site [facility])     Acute on chronic respiratory failure with hypoxia and hypercapnia ([address]     Body temperature low     Hypotension     Nonverbal Sepsis ([address]     Hypothermia due to non-environmental cause     Prolonged QT interval     Urinary retention with incomplete bladder emptying     Chronic respiratory failure with hypoxia and hypercapnia (HCC)     Myoclonus     Atelectasis     Dependence on enabling machine     Dependent on ventilator ([address]     Delay in development     Tonic-clonic seizures ([address]     History of recurrent pneumonia     Fistula     Encounter for removal of internal fixation device     Sialorrhea     Myoneural disorder (HCC)     Muscle spasticity     Bladder retention     Intractable vomiting     Seizure (HCC)     Seizure disorder (HCC)     Abnormal electroencephalogram     Closed fracture of shaft of right humerus with routine healing     Constipation     Contracture of elbow joint, right     Diarrhea     Gastric reflux     Spastic quadriplegic cerebral palsy ([address]     Wheelchair bound     Malnutrition related to chronic disease ([address]     Pneumonia     Severe malnutrition ([address]     Aspiration pneumonia ([address]

## 2022-01-18 DIAGNOSIS — G47.01 INSOMNIA DUE TO MEDICAL CONDITION: ICD-10-CM

## 2022-01-18 RX ORDER — ESZOPICLONE 2 MG/1
2 TABLET, FILM COATED ORAL NIGHTLY
Qty: 30 TABLET | Refills: 2 | Status: SHIPPED | OUTPATIENT
Start: 2022-01-18 | End: 2022-05-16

## 2022-01-18 NOTE — TELEPHONE ENCOUNTER
Last visit: 10-16-21  Last Med refill: 7-8-21  Does patient have enough medication for 72 hours: Yes    Next Visit Date:  Future Appointments   Date Time Provider Kishan Rachana   2/10/2022  3:30 PM Paulene Moritz, MD [id number]   4/8/2022  1:00 PM Binh Howell  [address] Maintenance   Topic Date Due    Hepatitis C screen  Never done    Varicella vaccine (2 of 2 - 2-dose childhood series) 03/30/2000    Depression Screen  Never done    Pap smear  Never done    COVID-19 Vaccine (3 - Booster for Moderna series) 08/24/2021    Flu vaccine (1) 09/01/2021    DTaP/Tdap/Td vaccine (8 - Td or Tdap) 10/16/2030    Pneumococcal 0-64 years Vaccine (2 of 2 - PPSV23) 03/30/2061    Hepatitis A vaccine  Completed    Hepatitis B vaccine  Completed    Hib vaccine  Completed    HPV vaccine  Completed    Meningococcal (ACWY) vaccine  Completed    HIV screen  Discontinued       No results found for: LABA1C          ( goal A1C is < 7)   No results found for: LABMICR  No results found for: LDLCHOLESTEROL, LDLCALC    (goal LDL is <100)   AST (U/L)   Date Value   10/30/2019 15     ALT (U/L)   Date Value   10/30/2019 12     BUN (mg/dL)   Date Value   06/17/2020 20     BP Readings from Last 3 Encounters:   10/16/20 106/72   07/17/20 117/80   06/17/20 132/88          (goal 120/80)    All Future Testing planned in [facility]  Lab Frequency Next Occurrence               Patient Active Problem List:     Rett's syndrome     Scoliosis     Dystonia     Tremors of nervous system     Convulsions ([address]     Fibroadenoma of breast     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 ([address]     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 ([address]     Cellulitis of left elbow     Acute respiratory failure with hypercapnia ([address]     Skin infection at gastrostomy tube site ([address]     Acute on chronic respiratory failure with hypoxia and hypercapnia (HCC)     Body temperature low     Hypotension     Nonverbal     Sepsis (HCC)     Hypothermia due to non-environmental cause     Prolonged QT interval     Urinary retention with incomplete bladder emptying     Chronic respiratory failure with hypoxia and hypercapnia (HCC)     Myoclonus     Atelectasis     Dependence on enabling machine     Dependent on ventilator ([address]     Delay in development     Tonic-clonic seizures ([address]     History of recurrent pneumonia     Fistula     Encounter for removal of internal fixation device     Sialorrhea     Myoneural disorder (HCC)     Muscle spasticity     Bladder retention     Intractable vomiting     Seizure (HCC)     Seizure disorder ([facility])     Abnormal electroencephalogram     Closed fracture of shaft of right humerus with routine healing     Constipation     Contracture of elbow joint, right     Diarrhea     Gastric reflux     Spastic quadriplegic cerebral palsy ([address]     Wheelchair bound     Malnutrition related to chronic disease ([address]     Pneumonia     Severe malnutrition (HCC)     Aspiration pneumonia ([address]

## 2022-02-10 ENCOUNTER — OFFICE VISIT (OUTPATIENT)
Dept: FAMILY MEDICINE CLINIC | Age: 27
End: 2022-02-10
Payer: COMMERCIAL

## 2022-02-10 VITALS
DIASTOLIC BLOOD PRESSURE: 72 MMHG | HEART RATE: 89 BPM | SYSTOLIC BLOOD PRESSURE: 108 MMHG | TEMPERATURE: 97.5 F | BODY MASS INDEX: 18.55 KG/M2 | WEIGHT: 79.8 LBS

## 2022-02-10 DIAGNOSIS — K59.01 SLOW TRANSIT CONSTIPATION: ICD-10-CM

## 2022-02-10 DIAGNOSIS — R25.1 TREMORS OF NERVOUS SYSTEM: ICD-10-CM

## 2022-02-10 DIAGNOSIS — G47.33 OSA (OBSTRUCTIVE SLEEP APNEA): ICD-10-CM

## 2022-02-10 DIAGNOSIS — K21.9 GASTROESOPHAGEAL REFLUX DISEASE, UNSPECIFIED WHETHER ESOPHAGITIS PRESENT: ICD-10-CM

## 2022-02-10 DIAGNOSIS — G47.36: ICD-10-CM

## 2022-02-10 DIAGNOSIS — R33.9 URINARY RETENTION WITH INCOMPLETE BLADDER EMPTYING: ICD-10-CM

## 2022-02-10 DIAGNOSIS — N39.0 RECURRENT UTI: ICD-10-CM

## 2022-02-10 DIAGNOSIS — Z80.41 FAMILY HISTORY OF OVARIAN CANCER: ICD-10-CM

## 2022-02-10 DIAGNOSIS — Z11.59 NEED FOR HEPATITIS C SCREENING TEST: ICD-10-CM

## 2022-02-10 DIAGNOSIS — G70.9: ICD-10-CM

## 2022-02-10 DIAGNOSIS — G80.0 SPASTIC QUADRIPLEGIC CEREBRAL PALSY (HCC): ICD-10-CM

## 2022-02-10 DIAGNOSIS — G40.909 SEIZURE DISORDER (HCC): ICD-10-CM

## 2022-02-10 DIAGNOSIS — M41.9 RESTRICTIVE LUNG DISEASE DUE TO KYPHOSCOLIOSIS: ICD-10-CM

## 2022-02-10 DIAGNOSIS — J98.4 RESTRICTIVE LUNG DISEASE DUE TO KYPHOSCOLIOSIS: ICD-10-CM

## 2022-02-10 DIAGNOSIS — F84.2 RETT'S SYNDROME: ICD-10-CM

## 2022-02-10 DIAGNOSIS — G47.01 INSOMNIA DUE TO MEDICAL CONDITION: ICD-10-CM

## 2022-02-10 DIAGNOSIS — E55.9 VITAMIN D DEFICIENCY: ICD-10-CM

## 2022-02-10 DIAGNOSIS — G47.31 CENTRAL SLEEP APNEA: ICD-10-CM

## 2022-02-10 DIAGNOSIS — E67.3 HIGH VITAMIN D LEVEL: ICD-10-CM

## 2022-02-10 DIAGNOSIS — D24.9 FIBROADENOMA OF BREAST, UNSPECIFIED LATERALITY: ICD-10-CM

## 2022-02-10 DIAGNOSIS — J96.12 CHRONIC RESPIRATORY FAILURE WITH HYPOXIA AND HYPERCAPNIA (HCC): ICD-10-CM

## 2022-02-10 DIAGNOSIS — J96.11 CHRONIC RESPIRATORY FAILURE WITH HYPOXIA AND HYPERCAPNIA (HCC): ICD-10-CM

## 2022-02-10 DIAGNOSIS — N31.9 NEUROGENIC BLADDER: ICD-10-CM

## 2022-02-10 DIAGNOSIS — G24.9 DYSTONIA: ICD-10-CM

## 2022-02-10 DIAGNOSIS — Z00.00 ANNUAL PHYSICAL EXAM: Primary | ICD-10-CM

## 2022-02-10 PROCEDURE — 99395 PREV VISIT EST AGE 18-39: CPT | Performed by: PEDIATRICS

## 2022-02-10 RX ORDER — MULTIVIT WITH IRON,MINERALS
15 LIQUID (ML) ORAL DAILY
Qty: 472 ML | Refills: 11 | Status: SHIPPED | OUTPATIENT
Start: 2022-02-10 | End: 2022-08-10

## 2022-02-10 RX ORDER — SULFAMETHOXAZOLE AND TRIMETHOPRIM 200; 40 MG/5ML; MG/5ML
60 SUSPENSION ORAL DAILY
Qty: 225 ML | Refills: 11 | Status: SHIPPED | OUTPATIENT
Start: 2022-02-10 | End: 2022-08-09

## 2022-02-10 RX ORDER — SACCHAROMYCES BOULARDII 250 MG
250 CAPSULE ORAL DAILY
Qty: 30 CAPSULE | Refills: 11 | Status: SHIPPED | OUTPATIENT
Start: 2022-02-10

## 2022-02-10 RX ORDER — DOXAZOSIN MESYLATE 1 MG/1
1 TABLET ORAL NIGHTLY
Qty: 30 TABLET | Refills: 11 | Status: SHIPPED | OUTPATIENT
Start: 2022-02-10 | End: 2023-02-10

## 2022-02-10 RX ORDER — OCTISALATE, AVOBENZONE, HOMOSALATE, AND OCTOCRYLENE 29.4; 29.4; 49; 25.48 MG/ML; MG/ML; MG/ML; MG/ML
4 LOTION TOPICAL NIGHTLY
Qty: 30 CAPSULE | Refills: 11 | Status: SHIPPED | OUTPATIENT
Start: 2022-02-10

## 2022-02-10 RX ORDER — FACIAL-BODY WIPES
EACH TOPICAL
Qty: 15 EACH | Refills: 0 | Status: SHIPPED | OUTPATIENT
Start: 2022-02-10

## 2022-02-10 RX ORDER — WATER
LIQUID (ML) MISCELLANEOUS
Qty: 5 EACH | Refills: 11 | Status: SHIPPED | OUTPATIENT
Start: 2022-02-10

## 2022-02-10 RX ORDER — MELATONIN
2000 DAILY
Qty: 60 TABLET | Refills: 11 | Status: SHIPPED | OUTPATIENT
Start: 2022-02-10 | End: 2023-08-10

## 2022-02-10 RX ORDER — ACETAMINOPHEN 160 MG/5ML
14.92 SUSPENSION, ORAL (FINAL DOSE FORM) ORAL EVERY 6 HOURS PRN
Qty: 240 ML | Refills: 5 | Status: SHIPPED | OUTPATIENT
Start: 2022-02-10

## 2022-02-10 RX ORDER — SODIUM PHOSPHATE, DIBASIC AND SODIUM PHOSPHATE, MONOBASIC 7; 19 G/133ML; G/133ML
1 ENEMA RECTAL DAILY PRN
Qty: 30 EACH | Refills: 1 | Status: SHIPPED | OUTPATIENT
Start: 2022-02-10

## 2022-02-10 RX ORDER — MULTIVITAMIN
1 CAPSULE ORAL DAILY
Qty: 30 CAPSULE | Refills: 11 | Status: SHIPPED | OUTPATIENT
Start: 2022-02-10 | End: 2023-02-10

## 2022-02-10 SDOH — ECONOMIC STABILITY: FOOD INSECURITY: WITHIN THE PAST 12 MONTHS, YOU WORRIED THAT YOUR FOOD WOULD RUN OUT BEFORE YOU GOT MONEY TO BUY MORE.: NEVER TRUE

## 2022-02-10 SDOH — ECONOMIC STABILITY: FOOD INSECURITY: WITHIN THE PAST 12 MONTHS, THE FOOD YOU BOUGHT JUST DIDN'T LAST AND YOU DIDN'T HAVE MONEY TO GET MORE.: NEVER TRUE

## 2022-02-10 ASSESSMENT — ENCOUNTER SYMPTOMS
TROUBLE SWALLOWING: 0
EYE PAIN: 0
PHOTOPHOBIA: 0
STRIDOR: 0
EYE DISCHARGE: 0
WHEEZING: 0
VOMITING: 0
APNEA: 1
SHORTNESS OF BREATH: 0
BLOOD IN STOOL: 0
EYE REDNESS: 0
ABDOMINAL PAIN: 0
COLOR CHANGE: 0
BACK PAIN: 0
RHINORRHEA: 0
COUGH: 0

## 2022-02-10 ASSESSMENT — SOCIAL DETERMINANTS OF HEALTH (SDOH): HOW HARD IS IT FOR YOU TO PAY FOR THE VERY BASICS LIKE FOOD, HOUSING, MEDICAL CARE, AND HEATING?: NOT HARD AT ALL

## 2022-02-10 ASSESSMENT — PATIENT HEALTH QUESTIONNAIRE - PHQ9: DEPRESSION UNABLE TO ASSESS: FUNCTIONAL CAPACITY MOTIVATION LIMITS ACCURACY

## 2022-02-10 NOTE — PROGRESS NOTES
2/10/2022    Tracy Ny (:  1995) is a 23 y.o. female, here for a preventive medicine evaluation. Patient presents today for routine physical.  She is here with her mother, Leda Pulido and her caregiver Allison Nicole. She does have stable chronic medical problems which include Rett syndrome, seizure disorder, spastic quadriplegia, dystonia, muscle tremors, hypercapnic respiratory failure, ISELA and central sleep apnea. She also has restrictive lung disease due to kyphoscoliosis, hypoventilation syndrome, GERD and chronic constipation. She has insomnia and vitamin D deficiency as well as a neurogenic bladder and recurrent UTI. She does follow-up at [facility] regularly for her history of Rett syndrome. She did have multiple appointments in the last few months for her routine follow-ups. She did see her neurologist, Dr. Alyse Lr in [place] and also sees her neurologist but unable in [facility]. She sees the specialist for history of seizure disorder, spastic quadriplegia, dystonia and muscle tremors. She does continue to have a baclofen pump that does help to control her dystonia and tremors fairly well. Her mother and caregiver report that she has been getting a little more stiff. They did not change her medications. She did have Botox through her physical medicine rehab physician. He injected her neck and arms finally did notice some improvement. Interestingly she had been a little more hand see and she had a after this to. She did follow-up with neurosurgery and physical medicine and rehab while at [facility] as well. They are considering DBS in [place]. They have been considering this for several years but are talking more about this.  She also had an evaluation with a neuropsychiatrist.  She does continue to follow with pulmonary for her history of hypercapnic respiratory failure, ISELA, central sleep apnea, restrictive lung disease, hypoventilation syndrome and chronic dependence on trilogy noninvasive vent. She does wear this most nights. PM&R did make her some braces that keep her arms in a comfortable position and keep her from taking her mask off. She does have a history of GERD and takes Nexium for this. This controls her symptoms well. She does get her nutrition both orally and with tube feeds. Apparently she was not eating well and had lost some weight. They did do a swallow study while she was in [place] and this showed that she was having aspiration with certain foods. They put her on a honey thick nectar diet along with Peptamen 1.5 kcal/mL and she gets 3 cans at night and 2 cans of this during the day. Since then she has put on 6 to 7 pounds. She does have a history of chronic constipation and uses milk of magnesia 8 mL twice daily and she takes Florastor and align once daily. She also gets MiraLAX daily and fleets enemas on Monday, Wednesday and Friday. She does have a neurogenic bladder with urinary retention and incomplete bladder emptying. She did have several UTIs with 1 being so severe that it required IV therapy in 2020. She was then seen by urology, Dr. Ruben Lopez at [facility]. He recommended low-dose daily antibiotic which she continues and has not had any urinary tract infections. I did recommend that they follow-up with him. She does take Lunesta for her history of insomnia and she sleeps fairly well with this. This has been recommended by her neurologist at [facility]. She does have vitamin D deficiency which is treated with a vitamin D supplement. Apparently her vitamin D level was elevated and her mother tells me that she was taking 5000 IUs daily instead of 2000. Her level was at 117. She was instructed by [facility] to hold her vitamin D supplementation for 3 months and recheck. I did give her an order for repeat vitamin D level. She does see gynecology, Dr. Shell Coley for her abnormal menstrual cycles.   She does continue on Depo-Provera and she is doing well with this. She does have a family history of ovarian cancer in her maternal grandmother and her mother would like a CA-125 drawn. She also has a history of breast lumps that are followed by Dr. Mackenzie Brooks with routine exam.  Her last exam with GYN did not show any breast cysts. They have no other concerns.    cmw      Patient Active Problem List   Diagnosis    Rett's syndrome    Scoliosis    Dystonia    Tremors of nervous system    Convulsions ([address]    Fibroadenoma of breast    Vitamin D deficiency    Central sleep apnea    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 ([address]    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 ([address]    Cellulitis of left elbow    Acute respiratory failure with hypercapnia (HCC)    Skin infection at gastrostomy tube site ([address]    Acute on chronic respiratory failure with hypoxia and hypercapnia (HCC)    Body temperature low    Hypotension    Nonverbal    Sepsis ([address]    Hypothermia due to non-environmental cause    Prolonged QT interval    Urinary retention with incomplete bladder emptying    Chronic respiratory failure with hypoxia and hypercapnia (HCC)    Myoclonus    Atelectasis    Dependence on enabling machine    Dependent on ventilator (HCC)    Delay in development    Tonic-clonic seizures (HCC)    History of recurrent pneumonia    Fistula    Encounter for removal of internal fixation device    Sialorrhea    Myoneural disorder (HCC)    Muscle spasticity    Bladder retention    Intractable vomiting    Seizure (HCC)    Seizure disorder (HCC)    Abnormal electroencephalogram    Closed fracture of shaft of right humerus with routine healing    Constipation    Contracture of elbow joint, right    Diarrhea    Gastric reflux    Spastic quadriplegic cerebral palsy (HCC)    Wheelchair bound    Malnutrition related to chronic disease ([address]    Pneumonia    Severe malnutrition ([address]    Aspiration pneumonia ([facility]       Review of Systems   Reason unable to perform ROS: Pt nonverbal.   Constitutional: Negative for appetite change, fatigue, fever and unexpected weight change. HENT: Negative for congestion, postnasal drip, rhinorrhea, tinnitus and trouble swallowing. Eyes: Negative for photophobia, pain, discharge and redness. Respiratory: Positive for apnea (ISELA and Central sleep apnea). Negative for cough, shortness of breath, wheezing and stridor. Hypoventilation syndrome with hypoxia and hypercapnia - on trilogy ventilator at night   Cardiovascular: Negative for chest pain, palpitations and leg swelling. Gastrointestinal: Positive for constipation. Negative for abdominal pain, blood in stool, diarrhea (occasional loose stools) and vomiting. Has a G-tube for medications and tube feeds. She has a history of GERD controlled with nexium. Endocrine: Negative for polydipsia and polyphagia. Genitourinary: Negative for decreased urine volume, dysuria, hematuria and urgency. Recurrent uti - now on daily prophylactic ATB   Musculoskeletal: Negative for back pain and myalgias. Dystonia and muscle spasms   Skin: Negative for color change and rash. Allergic/Immunologic: Negative for immunocompromised state. Neurological: Positive for tremors and seizures (followed by neurology, Dr. Yenni Cleaning at [facility] and Dr. Everardo Rivera here in [place]). Negative for dizziness, light-headedness and headaches. Hematological: Negative for adenopathy. Does not bruise/bleed easily. Psychiatric/Behavioral: Positive for sleep disturbance (stable with lunesta). Negative for decreased concentration and dysphoric mood. The patient is not nervous/anxious. Prior to Visit Medications    Medication Sig Taking?  Authorizing Provider   doxazosin (CARDURA) 1 MG tablet Take 1 tablet by mouth nightly Yes Regine Garvin MD   sulfamethoxazole-trimethoprim ([address] 3953) 200-40 MG/5ML suspension 7.5 mLs by Per G Tube route daily Yes Tomas Payan MD   Probiotic Product (ALIGN) 4 MG CAPS Take 4 mg by mouth nightly Yes Tomas Payan MD   saccharomyces boulardii (FLORASTOR) 250 MG capsule Take 1 capsule by mouth daily Yes Tomas Payan MD   Sodium Phosphates (FLEET) 7-19 GM/118ML Place 1 enema rectally daily as needed (constipation) Yes Tomas Payan MD   magnesium hydroxide (MILK OF MAGNESIA CONCENTRATE) 2400 MG/10ML SUSP Take 8 mLs by mouth 2 times daily Yes Tomas Payan MD   acetaminophen (TYLENOL CHILDRENS) 160 MG/5ML suspension Take 18 mLs by mouth every 6 hours as needed for Fever Yes Tomas Payan MD   ibuprofen (ADVIL;MOTRIN) 100 MG/5ML suspension Take 20 mLs by mouth every 4 hours as needed for Fever Yes Tomas Payan MD   Disposable Gloves (CAREMATES NITRILE GLOVES MED) MISC Use with each diaper change Yes Tomas Payan MD   Multiple Vitamins-Minerals (MULTIVITAMIN WITH IRON-MINERALS) liquid Take 15 mLs by mouth daily Yes Tomas Payan MD   D-Mannose 500 MG CAPS 1 capsule once daily through g-tube Yes Tomas Payan MD   vitamin D3 (CHOLECALCIFEROL) 25 MCG (1000 UT) TABS tablet Take 2 tablets by mouth daily Yes Tomas Payan MD   Infant Care Products (BABY WIPES) MISC Use with each diaper change Yes Tomas Payan MD   Multiple Vitamin (MULTIVITAMIN) capsule Take 1 capsule by mouth daily Yes Tomas Payan MD   Distilled Water LIQD Use daily for medications and respiratory equipment Yes Tomas Payan MD   eszopiclone (LUNESTA) 2 MG TABS Take 1 tablet by mouth nightly.  Yes Tomas Payan MD   polyethylene glycol (MIRALAX) 17 GM/SCOOP POWD powder DISSOLVE 17 GRAMS IN 8 OUNCES OF LIQUID AND GIVE PER GIVE TUBE ONCE A DAY Yes Marie Desai MD   FLUoxetine (PROZAC) 20 MG/5ML solution TAKE 5 mL BY MOUTH DAILY Yes Cj Jennings MD   esomeprazole ([address]) 20 MG delayed release capsule TAKE ONE CAPSULE BY G-TUBE EVERY MORNING AND TAKE ONE CAPSULE BY G-TUBE IN THE EVENING Yes Cj Jennings MD   Cannabidiol 100 MG/ML SOLN Take 170 mg by mouth 2 times daily Yes Historical Provider, MD   lidocaine-prilocaine (EMLA) 2.5-2.5 % cream Apply topically as needed Yes Historical Provider, MD   levalbuterol (XOPENEX) 1.25 MG/3ML nebulizer solution USE ONE VIAL VIA NEBULIZER BY MOUTH EVERY 4 HOURS AS NEEDED FOR WHEEZING Yes Laila Bowels, MD   sulfamethoxazole-trimethoprim (BACTRIM) 400-80 MG/5ML injection Infuse intravenously Yes Historical Provider, MD   baclofen 10 MG/5ML SOLN BRING TO APPOINTMENT AS DIRECTED Yes Historical Provider, MD   medroxyPROGESTERone (DEPO-PROVERA) 150 MG/ML injection INJECT 1 MILLILITER INTO APPROPRIATE MUSCLE ONCE FOR 1 DOSE Yes Historical Provider, MD   docusate sodium (ENEMEEZ) 283 MG enema Place 1 enema rectally Monday, Wednesday & Friday Yes Historical Provider, MD   Cholecalciferol (AQUEOUS VITAMIN D) 10 MCG/ML LIQD TAKE 5 ML VIA G-TUBE ONCE DAILY  KYLE Molina - NP   diazepam (DIASTAT) 10 MG GEL Place 10 mg rectally once as needed (seizure) for up to 1 dose.   Cj Jennings MD   levETIRAcetam (KEPPRA) 100 MG/ML solution Take 14ML two times a day by G-button  Patient taking differently: Take 20ML two times a day by G-button  Cj Jennings MD   Feeding Tubes - Sets (JAYDEN-KEY GASTROSTOMY KIT 18FR) MISC 3.5 cm  Cj Jennings MD   Nutritional Supplements (PEPTAMEN 1 NEETU) LIQD Take 1 Can by mouth 3 times daily Peptamen Adult  jC Jennings MD        Allergies   Allergen Reactions    Clindamycin/Lincomycin     Gentamicin     Hydrocodone     Tape Raghu Burger Tape] Rash     Redness that lasts a couple days       Past Medical History:   Diagnosis Date    Abnormal electroencephalogram 6/25/2018    Acute on chronic respiratory failure with hypoxia and hypercapnia (HCC)     Acute respiratory failure with hypercapnia (HCC)     Aspiration pneumonia ([address] 1/16/2018    Atelectasis 4/27/2017    BiPAP (biphasic positive airway pressure) dependence     Bladder retention 4/26/2017    Body temperature low     Breast lump in female     one at 3 oclock and one at 7 oclock    Cellulitis     left elbow    Cellulitis of left elbow     Central sleep apnea 1/9/2017    Chronic respiratory failure with hypoxia and hypercapnia (HCC) 4/9/2017    Closed fracture of shaft of right humerus with routine healing 4/24/2018    Community acquired pneumonia of left lower lobe of lung 1/13/2018    Constipation 8/6/2018    Contracture of elbow joint, right 11/30/2017    Convulsions ([address]     Dependence on enabling machine 4/27/2017    Dependent on ventilator ([address]     Diarrhea 8/6/2018    Dystonia     Encounter for removal of internal fixation device 4/11/2013    Overview:  Removal of spinal rods and baclofen pump on 4.12.13 for suspected infection    Fibroadenoma of breast     Fistula     G tube feedings ([address]     Gastric reflux 8/6/2018    Gastroesophageal reflux disease without esophagitis 10/20/2015    GERD (gastroesophageal reflux disease)     History of recurrent pneumonia 4/27/2017    Hypercapnic respiratory failure ([address]     Hypotension 3/20/2017     Updating Deprecated Diagnoses    Hypothermia due to non-environmental cause 4/8/2017    Kyphoscoliosis     Mild sleep apnea     not treated    Muscle spasticity 4/8/2013    Myoclonus     Myoneural disorder (HCC)     Neuromuscular disease (HCC)     Nonverbal     ISELA (obstructive sleep apnea)     Pneumonia due to infectious organism     left lower lob    Pneumonia of left lower lobe due to infectious organism 2/28/2017    Prolonged Q-T interval on ECG     Prolonged QT interval 4/8/2017    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 ([address] 1/9/2017    Rett's : Not on file   Social Connections:     Frequency of Communication with Friends and Family: Not on file    Frequency of Social Gatherings with Friends and Family: Not on file    Attends Sikh Services: Not on file    Active Member of Clubs or Organizations: Not on file    Attends Club or Organization Meetings: Not on file    Marital Status: Not on file   Intimate Partner Violence:     Fear of Current or Ex-Partner: Not on file    Emotionally Abused: Not on file    Physically Abused: Not on file    Sexually Abused: Not on file   Housing Stability:     Unable to Pay for Housing in the Last Year: Not on file    Number of [place] in the Last Year: Not on file    Unstable Housing in the Last Year: Not on file        Family History   Problem Relation Age of Onset    High Blood Pressure Mother        ADVANCE DIRECTIVE: N, <no information>    Vitals:    02/10/22 1549   BP: 108/72   Site: Left Upper Arm   Position: Sitting   Cuff Size: Child   Pulse: 89   Temp: 97.5 °F (36.4 °C)   TempSrc: Temporal   Weight: 79 lb 12.8 oz (36.2 kg)     Estimated body mass index is 18.55 kg/m² as calculated from the following:    Height as of 7/17/20: 4' 7\" (1.397 m). Weight as of this encounter: 79 lb 12.8 oz (36.2 kg). Physical Exam  Vitals and nursing note reviewed. Constitutional:       General: She is not in acute distress. Appearance: She is well-developed. She is not diaphoretic. Comments: In wheelchair, non verbal   HENT:      Head: Normocephalic. Right Ear: Tympanic membrane, ear canal and external ear normal.      Left Ear: Tympanic membrane, ear canal and external ear normal. There is no impacted cerumen. Nose: Nose normal.      Comments: drools     Mouth/Throat:      Mouth: Mucous membranes are moist.   Eyes:      General: No scleral icterus. Right eye: No discharge. Left eye: No discharge. Pupils: Pupils are equal, round, and reactive to light.    Neck: Thyroid: No thyromegaly. Vascular: No JVD. Comments: Head tilt to the left   Cardiovascular:      Rate and Rhythm: Normal rate and regular rhythm. Heart sounds: Normal heart sounds. No murmur heard. Pulmonary:      Effort: Pulmonary effort is normal.      Breath sounds: Normal breath sounds. No wheezing or rales. Abdominal:      General: There is no distension. Palpations: Abdomen is soft. There is no mass. Tenderness: There is no abdominal tenderness. Musculoskeletal:      Cervical back: Normal range of motion. Right lower leg: No edema. Left lower leg: No edema. Comments: Dystonia with contractures of upper and lower extremities     Skin:     General: Skin is warm. Comments: She has a reddish / purplish discoloration to her feet and lower extremities. Neurological:      Mental Status: She is alert. Motor: Atrophy and abnormal muscle tone present. No seizure activity. No flowsheet data found. Lab Results   Component Value Date    GLUCOSE 135 06/17/2020       The ASCVD Risk score (Edgardo Kunz., et al., 2013) failed to calculate for the following reasons:     The 2013 ASCVD risk score is only valid for ages 36 to 78    Immunization History   Administered Date(s) Administered    DTaP 1995, 1995, 1995, 04/15/1997, 09/04/2001    HIB PRP-T (ActHIB, Hiberix) 1996, 1996, 1996, 04/15/1997    HPV 9-valent Duyus Dede) 11/27/2007, 02/08/2008, 07/11/2008    Hepatitis A 08/29/2009, 03/25/2011    Hepatitis B (Recombivax HB) 1995, 1995, 1995    Hepatitis B Adult (Engerix-B) 10/16/2020    Influenza Vaccine, unspecified formulation 10/27/2014    Influenza Virus Vaccine 10/12/2015, 10/19/2019    Influenza, Quadv, IM, (6 mo and older Fluzone, Flulaval, Fluarix and 3 yrs and older Afluria) 02/13/2017    Influenza, Quadv, IM, PF (6 mo and older Fluzone, Flulaval, Fluarix, and 3 yrs and older Afluria) 01/18/2019, 10/16/2020    MMR 04/15/1997, 09/04/2001    Meningococcal MCV4P (Menactra) 11/27/2007, 08/24/2012    Pneumococcal Polysaccharide (Twqotqsep96) 02/15/2001, 02/13/2017    Polio IPV (IPOL) 1996, 1996, 05/01/1997, 09/04/2001    Td (Adult), 5 Lf Tetanus Toxoid, Pf (Tenivac, Decavac) 10/16/2020    Tdap (Boostrix, Adacel) 11/27/2007    Varicella (Varivax) 04/29/1999       Health Maintenance   Topic Date Due    Hepatitis C screen  Never done    Varicella vaccine (2 of 2 - 2-dose childhood series) 03/30/2000    COVID-19 Vaccine (1) Never done    Depression Screen  Never done    Pap smear  Never done    DTaP/Tdap/Td vaccine (8 - Td or Tdap) 10/16/2030    Pneumococcal 0-64 years Vaccine (2 of 2 - PPSV23) 03/30/2061    Hepatitis A vaccine  Completed    Hepatitis B vaccine  Completed    Hib vaccine  Completed    HPV vaccine  Completed    Meningococcal (ACWY) vaccine  Completed    Flu vaccine  Completed    HIV screen  Discontinued          ASSESSMENT/PLAN:    1. Annual physical exam  -     Lipid Panel; Future  -     Comprehensive Metabolic Panel; Future  -     CBC; Future  -     acetaminophen (TYLENOL CHILDRENS) 160 MG/5ML suspension; Take 18 mLs by mouth every 6 hours as needed for Fever, Disp-240 mL, R-5Print  -     ibuprofen (ADVIL;MOTRIN) 100 MG/5ML suspension; Take 20 mLs by mouth every 4 hours as needed for Fever, Disp-240 mL, R-5Print  2. Rett's syndrome  -     Multiple Vitamins-Minerals (MULTIVITAMIN WITH IRON-MINERALS) liquid; Take 15 mLs by mouth daily, Disp-472 mL, R-11Print  3. Seizure disorder (HCC)  -     Levetiracetam Level; Future  4. Spastic quadriplegic cerebral palsy (HCC)  -     Infant Care Products (BABY WIPES) MISC; Disp-15 each, R-0, PrintUse with each diaper change  -     Multiple Vitamin (MULTIVITAMIN) capsule;  Take 1 capsule by mouth daily, Disp-30 capsule, R-11Print  -     Distilled Water LIQD; Disp-5 each, R-11, PrintUse daily for medications and respiratory equipment  5. Dystonia  -     TSH without Reflex; Future  6. Tremors of nervous system  -     TSH without Reflex; Future  7. Chronic respiratory failure with hypoxia and hypercapnia (HCC)  8. ISELA (obstructive sleep apnea)  9. Central sleep apnea  -     Venous Blood Gas; Future  10. Restrictive lung disease due to kyphoscoliosis  11. 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 (HCC)  -     Venous Blood Gas; Future  12. Gastroesophageal reflux disease, unspecified whether esophagitis present  13. Slow transit constipation  -     Probiotic Product (ALIGN) 4 MG CAPS; Take 4 mg by mouth nightly, Disp-30 capsule, R-11Print  -     saccharomyces boulardii (FLORASTOR) 250 MG capsule; Take 1 capsule by mouth daily, Disp-30 capsule, R-11Print  -     Sodium Phosphates (FLEET) 7-19 GM/118ML; Place 1 enema rectally daily as needed (constipation), Disp-30 each, R-1Print  -     magnesium hydroxide (MILK OF MAGNESIA CONCENTRATE) 2400 MG/10ML SUSP; Take 8 mLs by mouth 2 times daily, Oral, 2 TIMES DAILY Starting Thu 2/10/2022, Disp-480 mL, R-11, Print  14. Neurogenic bladder  15. Urinary retention with incomplete bladder emptying  -     doxazosin (CARDURA) 1 MG tablet; Take 1 tablet by mouth nightly, Disp-30 tablet, R-11Normal  16. Recurrent UTI  -     sulfamethoxazole-trimethoprim (SULFATRIM PEDIATRIC) 200-40 MG/5ML suspension; 7.5 mLs by Per G Tube route daily, Disp-225 mL, R-11Normal  17. Insomnia due to medical condition  18. Vitamin D deficiency  -     vitamin D3 (CHOLECALCIFEROL) 25 MCG (1000 UT) TABS tablet; Take 2 tablets by mouth daily, Disp-60 tablet, R-11Print  19. High vitamin D level  -     Vitamin D 25 Hydroxy; Future  -     vitamin D3 (CHOLECALCIFEROL) 25 MCG (1000 UT) TABS tablet; Take 2 tablets by mouth daily, Disp-60 tablet, R-11Print  20. Family history of ovarian cancer  -     ; Future  21. Fibroadenoma of breast, unspecified laterality  22.  Need for hepatitis C screening test  -     Hepatitis C Antibody; Future      Continue current medications  Mother given prescriptions as documented for prescribed and OTC medications  Subspecialty follow-up as scheduled with [facility], neurology, neurosurgery, pulmonology, GI, urology and GYN  Obtain labs as ordered  Recommend COVID-19 vaccine  Call with concerns         Return in about 6 months (around 8/10/2022) for routine follow up. An electronic signature was used to authenticate this note.     --Niya Cardona MD on 2/20/2022 at 7:48 PM

## 2022-02-20 ASSESSMENT — ENCOUNTER SYMPTOMS
CONSTIPATION: 1
DIARRHEA: 0

## 2022-03-11 LAB
ALBUMIN SERPL-MCNC: 4.7 G/DL
ALP BLD-CCNC: 65 U/L
ALT SERPL-CCNC: 22 U/L
ANION GAP SERPL CALCULATED.3IONS-SCNC: 13 MMOL/L
ANTIBODY: NONREACTIVE
AST SERPL-CCNC: 26 U/L
BASOPHILS ABSOLUTE: NORMAL
BASOPHILS RELATIVE PERCENT: NORMAL
BILIRUB SERPL-MCNC: 0.3 MG/DL (ref 0.1–1.4)
BUN BLDV-MCNC: 17 MG/DL
CA 125: 8
CALCIUM SERPL-MCNC: 9.4 MG/DL
CHLORIDE BLD-SCNC: 102 MMOL/L
CHOLESTEROL, TOTAL: 199 MG/DL
CHOLESTEROL/HDL RATIO: 3.6
CO2: 25 MMOL/L
CREAT SERPL-MCNC: 0.25 MG/DL
EOSINOPHILS ABSOLUTE: NORMAL
EOSINOPHILS RELATIVE PERCENT: NORMAL
GFR CALCULATED: >60
GLUCOSE BLD-MCNC: 79 MG/DL
HCT VFR BLD CALC: 41.3 % (ref 36–46)
HDLC SERPL-MCNC: 55 MG/DL (ref 35–70)
HEMOGLOBIN: 13.5 G/DL (ref 12–16)
LDL CHOLESTEROL CALCULATED: 129 MG/DL (ref 0–160)
LYMPHOCYTES ABSOLUTE: NORMAL
LYMPHOCYTES RELATIVE PERCENT: NORMAL
MCH RBC QN AUTO: 27.6 PG
MCHC RBC AUTO-ENTMCNC: 32.7 G/DL
MCV RBC AUTO: 84 FL
MONOCYTES ABSOLUTE: NORMAL
MONOCYTES RELATIVE PERCENT: NORMAL
NEUTROPHILS ABSOLUTE: NORMAL
NEUTROPHILS RELATIVE PERCENT: NORMAL
NONHDLC SERPL-MCNC: NORMAL MG/DL
PLATELET # BLD: 344 K/ΜL
PMV BLD AUTO: 7.8 FL
POTASSIUM SERPL-SCNC: 4.9 MMOL/L
RBC # BLD: 4.9 10^6/ΜL
SODIUM BLD-SCNC: 140 MMOL/L
TOTAL PROTEIN: 7.3
TRIGL SERPL-MCNC: 75 MG/DL
TSH SERPL DL<=0.05 MIU/L-ACNC: 0.59 UIU/ML
VITAMIN D 25-HYDROXY: 63.9
VITAMIN D2, 25 HYDROXY: NORMAL
VITAMIN D3,25 HYDROXY: NORMAL
VLDLC SERPL CALC-MCNC: 15 MG/DL
WBC # BLD: 7.4 10^3/ML

## 2022-03-14 DIAGNOSIS — G40.909 SEIZURE DISORDER (HCC): ICD-10-CM

## 2022-03-16 DIAGNOSIS — K21.9 GASTRIC REFLUX: ICD-10-CM

## 2022-03-16 NOTE — TELEPHONE ENCOUNTER
LOV 2-10-22  LRF 9-1-21    Health Maintenance   Topic Date Due    Hepatitis C screen  Never done    Varicella vaccine (2 of 2 - 2-dose childhood series) 03/30/2000    COVID-19 Vaccine (1) Never done    Depression Screen  Never done    Chlamydia screen  10/12/2016    Pap smear  Never done    DTaP/Tdap/Td vaccine (8 - Td or Tdap) 10/16/2030    Pneumococcal 0-64 years Vaccine (2 of 2 - PPSV23) 03/30/2061    Hepatitis A vaccine  Completed    Hepatitis B vaccine  Completed    Hib vaccine  Completed    HPV vaccine  Completed    Meningococcal (ACWY) vaccine  Completed    Flu vaccine  Completed    HIV screen  Discontinued             (applicable per patient's age: Cancer Screenings, Depression Screening, Fall Risk Screening, Immunizations)    AST (U/L)   Date Value   10/30/2019 15     ALT (U/L)   Date Value   10/30/2019 12     BUN (mg/dL)   Date Value   06/17/2020 20      (goal A1C is < 7)   (goal LDL is <100) need 30-50% reduction from baseline     BP Readings from Last 3 Encounters:   02/10/22 108/72   10/16/20 106/72   07/17/20 117/80    (goal /80)      All Future Testing planned in [facility]:  Lab Frequency Next Occurrence   Lipid Panel Once 02/10/2022   Comprehensive Metabolic Panel Once 28/57/8367   CBC Once 02/10/2022   Vitamin D 25 Hydroxy Once 02/10/2022   TSH without Reflex Once 02/10/2022    Once 02/10/2022   Venous Blood Gas Once 02/10/2022   Hepatitis C Antibody Once 02/10/2022       Next Visit Date:  Future Appointments   Date Time Provider Kishan Reich   4/8/2022  1:00 PM Jose Monahan MD Resp Spec [id number]   8/10/2022  3:00 PM [facility]            Patient Active Problem List:     Rett's syndrome     Scoliosis     Dystonia     Tremors of nervous system     Convulsions ([address]     Fibroadenoma of breast     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)     Acute on chronic respiratory failure with hypoxia and hypercapnia (HCC)     Body temperature low     Hypotension     Nonverbal     Sepsis (HCC)     Hypothermia due to non-environmental cause     Prolonged QT interval     Urinary retention with incomplete bladder emptying     Chronic respiratory failure with hypoxia and hypercapnia (HCC)     Myoclonus     Atelectasis     Dependence on enabling machine     Dependent on ventilator ([address]     Delay in development     Tonic-clonic seizures ([address]     History of recurrent pneumonia     Fistula     Encounter for removal of internal fixation device     Sialorrhea     Myoneural disorder (HCC)     Muscle spasticity     Bladder retention     Intractable vomiting     Seizure (HCC)     Seizure disorder ([facility])     Abnormal electroencephalogram     Closed fracture of shaft of right humerus with routine healing     Constipation     Contracture of elbow joint, right     Diarrhea     Gastric reflux     Spastic quadriplegic cerebral palsy ([address]     Wheelchair bound     Malnutrition related to chronic disease ([address]     Pneumonia     Severe malnutrition ([address]     Aspiration pneumonia ([address]

## 2022-03-17 DIAGNOSIS — E67.3 HIGH VITAMIN D LEVEL: ICD-10-CM

## 2022-03-17 DIAGNOSIS — Z00.00 ANNUAL PHYSICAL EXAM: ICD-10-CM

## 2022-03-17 DIAGNOSIS — R25.1 TREMORS OF NERVOUS SYSTEM: ICD-10-CM

## 2022-03-17 DIAGNOSIS — G24.9 DYSTONIA: ICD-10-CM

## 2022-03-17 DIAGNOSIS — Z80.41 FAMILY HISTORY OF OVARIAN CANCER: ICD-10-CM

## 2022-03-17 DIAGNOSIS — Z11.59 NEED FOR HEPATITIS C SCREENING TEST: ICD-10-CM

## 2022-04-08 ENCOUNTER — TELEMEDICINE (OUTPATIENT)
Dept: PULMONOLOGY | Age: 27
End: 2022-04-08
Payer: COMMERCIAL

## 2022-04-08 DIAGNOSIS — J98.4 RESTRICTIVE LUNG MECHANICS DUE TO NEUROMUSCULAR DISEASE (HCC): Primary | ICD-10-CM

## 2022-04-08 DIAGNOSIS — G70.9 RESTRICTIVE LUNG MECHANICS DUE TO NEUROMUSCULAR DISEASE (HCC): Primary | ICD-10-CM

## 2022-04-08 DIAGNOSIS — J96.12 CHRONIC RESPIRATORY FAILURE WITH HYPERCAPNIA (HCC): ICD-10-CM

## 2022-04-08 DIAGNOSIS — G47.31 CENTRAL SLEEP APNEA: ICD-10-CM

## 2022-04-08 PROCEDURE — 99212 OFFICE O/P EST SF 10 MIN: CPT | Performed by: INTERNAL MEDICINE

## 2022-04-08 RX ORDER — LEVALBUTEROL INHALATION SOLUTION 1.25 MG/3ML
SOLUTION RESPIRATORY (INHALATION)
Qty: 270 ML | Refills: 11 | Status: SHIPPED | OUTPATIENT
Start: 2022-04-08

## 2022-04-08 NOTE — PROGRESS NOTES
respiratory failure with hypercapnia (HCC)     Aspiration pneumonia ([address] 1/16/2018    Atelectasis 4/27/2017    BiPAP (biphasic positive airway pressure) dependence     Bladder retention 4/26/2017    Body temperature low     Breast lump in female     one at 3 oclock and one at 7 oclock    Cellulitis     left elbow    Cellulitis of left elbow     Central sleep apnea 1/9/2017    Chronic respiratory failure with hypoxia and hypercapnia (HCC) 4/9/2017    Closed fracture of shaft of right humerus with routine healing 4/24/2018    Community acquired pneumonia of left lower lobe of lung 1/13/2018    Constipation 8/6/2018    Contracture of elbow joint, right 11/30/2017    Convulsions ([address]     Dependence on enabling machine 4/27/2017    Dependent on ventilator ([address]     Diarrhea 8/6/2018    Dystonia     Encounter for removal of internal fixation device 4/11/2013    Overview:  Removal of spinal rods and baclofen pump on 4.12.13 for suspected infection    Fibroadenoma of breast     Fistula     G tube feedings ([address]     Gastric reflux 8/6/2018    Gastroesophageal reflux disease without esophagitis 10/20/2015    GERD (gastroesophageal reflux disease)     History of recurrent pneumonia 4/27/2017    Hypercapnic respiratory failure ([address]     Hypotension 3/20/2017     Updating Deprecated Diagnoses    Hypothermia due to non-environmental cause 4/8/2017    Kyphoscoliosis     Mild sleep apnea     not treated    Muscle spasticity 4/8/2013    Myoclonus     Myoneural disorder (HCC)     Neuromuscular disease (HCC)     Nonverbal     ISELA (obstructive sleep apnea)     Pneumonia due to infectious organism     left lower lob    Pneumonia of left lower lobe due to infectious organism 2/28/2017    Prolonged Q-T interval on ECG     Prolonged QT interval 4/8/2017    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 ([address] 1/9/2017    Rett's syndrome     Scoliosis     Seizure ([address] 1/23/2018    [facility]     Seizures ([facility]     Sepsis ([facility]     Sialorrhea     Skin infection at gastrostomy tube site ([address]     Sleep-related hypoventilation     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 ([facility] 2/14/2017    Spastic quadriplegic cerebral palsy ([facility] 2/26/2018    Tonic clonic seizures (HCC)     Tremors of nervous system     Urinary retention with incomplete bladder emptying     Vitamin D deficiency     Wheelchair bound          Family History:       Problem Relation Age of Onset    High Blood Pressure Mother        SURGICAL HISTORY:   Past Surgical History:   Procedure Laterality Date    ABDOMEN SURGERY      BACLOFEN PUMP IMPLANTATION  [id number]    BACLOFEN PUMP IMPLANTATION  06/28/2016    CHOLECYSTECTOMY  [id number]    GASTRIC FUNDOPLICATION  [id number]    SPINAL FUSION  [id number]    removal of spinal and baclofen pump 04/01/2013           SOCIAL AND OCCUPATIONAL HEALTH:      TOBACCO:   reports that she has never smoked. She has never used smokeless tobacco.  ETOH:   reports no history of alcohol use. ALLERGIES:      Allergies   Allergen Reactions    Clindamycin/Lincomycin     Gentamicin     Hydrocodone     Tape Simi Blonder Tape] Rash     Redness that lasts a couple days         Home Meds:   Prior to Admission medications    Medication Sig Start Date End Date Taking?  Authorizing Provider   levalbuterol (XOPENEX) 1.25 MG/3ML nebulizer solution USE ONE VIAL VIA NEBULIZER BY MOUTH EVERY 4 HOURS AS NEEDED FOR WHEEZING 4/8/22  Yes Job Barlow MD   esomeprazole (NEXIUM) 20 MG delayed release capsule TAKE ONE CAPSULE BY G-TUBE EVERY MORNING AND TAKE ONE CAPSULE BY G-TUBE IN THE EVENING 3/16/22 3/16/23 Yes Demetra Agee MD   doxazosin (CARDURA) 1 MG tablet Take 1 tablet by mouth nightly 2/10/22 2/10/23 Yes Demetra Agee MD   sulfamethoxazole-trimethoprim (SULFATRIM PEDIATRIC) 200-40 MG/5ML suspension 7.5 mLs by Per G Tube route daily 2/10/22 8/9/22 Yes Marcia Licona MD   Probiotic Product (ALIGN) 4 MG CAPS Take 4 mg by mouth nightly 2/10/22  Yes Marcia Licona MD   saccharomyces boulardii (FLORASTOR) 250 MG capsule Take 1 capsule by mouth daily 2/10/22  Yes Marcia Licona MD   Sodium Phosphates (FLEET) 7-19 GM/118ML Place 1 enema rectally daily as needed (constipation) 2/10/22  Yes Marcia Licona MD   magnesium hydroxide (MILK OF MAGNESIA CONCENTRATE) 2400 MG/10ML SUSP Take 8 mLs by mouth 2 times daily 2/10/22  Yes Marcia Licona MD   acetaminophen (TYLENOL CHILDRENS) 160 MG/5ML suspension Take 18 mLs by mouth every 6 hours as needed for Fever 2/10/22  Yes Marcia Licona MD   ibuprofen (ADVIL;MOTRIN) 100 MG/5ML suspension Take 20 mLs by mouth every 4 hours as needed for Fever 2/10/22 8/9/22 Yes Marcia Licona MD   Disposable Gloves (CAREMATES NITRILE GLOVES MED) MISC Use with each diaper change 2/10/22  Yes Macria Licona MD   Multiple Vitamins-Minerals (MULTIVITAMIN WITH IRON-MINERALS) liquid Take 15 mLs by mouth daily 2/10/22 2/10/23 Yes Marcia Licona MD   D-Mannose 500 MG CAPS 1 capsule once daily through g-tube 2/10/22 12/10/22 Yes Marcia Licona MD   vitamin D3 (CHOLECALCIFEROL) 25 MCG (1000 UT) TABS tablet Take 2 tablets by mouth daily 2/10/22 8/9/22 Yes Marcia Licona MD   Infant Care Products (BABY WIPES) MISC Use with each diaper change 2/10/22  Yes Marcia Licona MD   Multiple Vitamin (MULTIVITAMIN) capsule Take 1 capsule by mouth daily 2/10/22 2/10/23 Yes Marcia Licona MD   Distilled Water LIQD Use daily for medications and respiratory equipment 2/10/22  Yes Marcia Licona MD   eszopiclone (LUNESTA) 2 MG TABS Take 1 tablet by mouth nightly.  1/18/22 1/18/23 Yes Marcia Licona MD   polyethylene glycol (MIRALAX) 17 GM/SCOOP POWD powder DISSOLVE 17 GRAMS IN 8 OUNCES OF LIQUID AND GIVE PER GIVE TUBE ONCE A DAY 12/23/21 12/23/22 Yes Mirian Horan MD   FLUoxetine (PROZAC) 20 MG/5ML solution TAKE 5 mL BY MOUTH DAILY 11/22/21 11/22/22 Yes Sachin Cardona MD   Cannabidiol 100 MG/ML SOLN Take 170 mg by mouth 2 times daily 4/20/21  Yes Historical Provider, MD   lidocaine-prilocaine (EMLA) 2.5-2.5 % cream Apply topically as needed 9/18/20  Yes Historical Provider, MD   sulfamethoxazole-trimethoprim (BACTRIM) 400-80 MG/5ML injection Infuse intravenously   Yes Historical Provider, MD   baclofen 10 MG/5ML SOLN BRING TO APPOINTMENT AS DIRECTED 2/14/17  Yes Historical Provider, MD   medroxyPROGESTERone (DEPO-PROVERA) 150 MG/ML injection INJECT 1 MILLILITER INTO APPROPRIATE MUSCLE ONCE FOR 1 DOSE 1/10/20  Yes Historical Provider, MD   docusate sodium (ENEMEEZ) 283 MG enema Place 1 enema rectally Monday, Wednesday & Friday 10/23/19  Yes Historical Provider, MD   Cholecalciferol (AQUEOUS VITAMIN D) 10 MCG/ML LIQD TAKE 5 ML VIA G-TUBE ONCE DAILY 12/18/20 12/18/21  KYLE Wilkins - NP   diazepam (DIASTAT) 10 MG GEL Place 10 mg rectally once as needed (seizure) for up to 1 dose. 2/9/18 9/7/19  Sachin Cardona MD   levETIRAcetam (KEPPRA) 100 MG/ML solution Take 14ML two times a day by G-button  Patient taking differently: Take 20ML two times a day by G-button 12/28/17 10/16/21  Sachin Cardona MD   Feeding Tubes - Sets (JAYDEN-KEY GASTROSTOMY KIT 18FR) MISC 3.5 cm 5/19/17 9/7/19  Sachin Cardona MD   Nutritional Supplements (PEPTAMEN 1 NEETU) LIQD Take 1 Can by mouth 3 times daily Peptamen Adult 1/13/17 11/6/19  Sachin Cardona MD            Wt Readings from Last 3 Encounters:   02/10/22 79 lb 12.8 oz (36.2 kg)   10/16/20 85 lb (38.6 kg)   07/17/20 85 lb (38.6 kg)         No results found.     PHYSICAL EXAMINATION:  Due to this being a TeleHealth encounter, evaluation of the following organ systems is limited: Vitals/Constitutional/EENT/Resp/CV/GI//MS/Neuro/Skin/Heme-Lymph-Imm. Constitutional: [x] Appears well-developed and well-nourished. [] Abnormal  Mental status  [x] Alert and awake  [x] Oriented to person/place/time [x]Able to follow commands    [x] No apparent distress          HENT:   [x] Normocephalic, atraumatic. [] Abnormal shaped head   [x] Mouth/Throat: Mucous membranes are moist.     Ears [x] Normal  [] Abnormal-    Neck: [x] Normal range of motion [x] Supple [x] No visualized mass. Pulmonary/Chest: [x] Respiratory effort normal.  [x] No visualized signs of difficulty breathing or respiratory distress        [] Abnormal              ASSESSMENT:   Diagnosis Orders   1. 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 ([address]  levalbuterol (XOPENEX) 1.25 MG/3ML nebulizer solution   2. Chronic respiratory failure with hypercapnia (HCC)  levalbuterol (XOPENEX) 1.25 MG/3ML nebulizer solution   3. Central sleep apnea           Plan:   1. No new complaints   2. Cont prn Xopenex   3. Continue home trilogy   4. Follow 6 months          Requested Prescriptions     Signed Prescriptions Disp Refills    levalbuterol (XOPENEX) 1.25 MG/3ML nebulizer solution 270 mL 11     Sig: USE ONE VIAL VIA NEBULIZER BY MOUTH EVERY 4 HOURS AS NEEDED FOR WHEEZING       Medications Discontinued During This Encounter   Medication Reason    levalbuterol (XOPENEX) 1.25 MG/3ML nebulizer solution REORDER     e    Patient given educational materials : see patient instruction       Discussed use, benefit, and side effects of prescribed medications. Barriers to medication compliance addressed. All patient questions answered. Pt voiced understanding. An  electronic signature was used to authenticate this note. --Rohit Fox MD on 4/8/2022 at 4:15 PM      Please note that this chart was generated using voice recognition Dragon dictation software.   Although every effort was made to ensure the accuracy of this automated transcription, some errors in transcription may have occurred. Pursuant to the emergency declaration under the [facility], [facility] waiver authority and the [email or web address] and [employer] Act, this Virtual  Visit was conducted, with patient's consent, to reduce the patient's risk of exposure to COVID-19 and provide continuity of care for an established patient. Services were provided through a video synchronous discussion virtually to substitute for in-person clinic visit.

## 2022-05-02 DIAGNOSIS — G40.909 SEIZURE DISORDER (HCC): ICD-10-CM

## 2022-05-02 RX ORDER — DIAZEPAM 10 MG/2ML
10 GEL RECTAL
Qty: 2 EACH | Refills: 5 | Status: SHIPPED | OUTPATIENT
Start: 2022-05-02 | End: 2023-08-10

## 2022-05-02 NOTE — TELEPHONE ENCOUNTER
LOV 2-10-22  LRF 2-9-18    Health Maintenance   Topic Date Due    Varicella vaccine (2 of 2 - 2-dose childhood series) 03/30/2000    Depression Screen  Never done    Pap smear  Never done    Pneumococcal 0-64 years Vaccine (2 - PCV) 02/13/2018    COVID-19 Vaccine (3 - Booster for Moderna series) 07/24/2021    DTaP/Tdap/Td vaccine (8 - Td or Tdap) 10/16/2030    Hepatitis A vaccine  Completed    Hepatitis B vaccine  Completed    Hib vaccine  Completed    HPV vaccine  Completed    Meningococcal (ACWY) vaccine  Completed    Flu vaccine  Completed    Hepatitis C screen  Completed    HIV screen  Discontinued             (applicable per patient's age: Cancer Screenings, Depression Screening, Fall Risk Screening, Immunizations)    LDL Calculated (mg/dL)   Date Value   03/11/2022 129     AST (U/L)   Date Value   03/11/2022 26     ALT (U/L)   Date Value   03/11/2022 22     BUN (mg/dL)   Date Value   03/11/2022 17      (goal A1C is < 7)   (goal LDL is <100) need 30-50% reduction from baseline     BP Readings from Last 3 Encounters:   02/10/22 108/72   10/16/20 106/72   07/17/20 117/80    (goal /80)      All Future Testing planned in [facility]:  Lab Frequency Next Occurrence   Venous Blood Gas Once 02/10/2022       Next Visit Date:  Future Appointments   Date Time Provider Kishan Reich   8/10/2022  3:00 PM MD [facility]   10/21/2022  1:15 PM Shahid Liang MD Resp Spec [facility]            Patient Active Problem List:     Rett's syndrome     Scoliosis     Dystonia     Tremors of nervous system     Convulsions ([address]     Fibroadenoma of breast     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 ([address]     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 ([address]     Cellulitis of left elbow     Acute respiratory failure with hypercapnia ([address]     Skin infection at gastrostomy tube site [facility])     Acute on chronic respiratory failure with hypoxia and hypercapnia (HCC)     Body temperature low     Hypotension     Nonverbal     Sepsis (HCC)     Hypothermia due to non-environmental cause     Prolonged QT interval     Urinary retention with incomplete bladder emptying     Chronic respiratory failure with hypoxia and hypercapnia (HCC)     Myoclonus     Atelectasis     Dependence on enabling machine     Dependent on ventilator ([address]     Delay in development     Tonic-clonic seizures ([address]     History of recurrent pneumonia     Fistula     Encounter for removal of internal fixation device     Sialorrhea     Myoneural disorder (HCC)     Muscle spasticity     Bladder retention     Intractable vomiting     Seizure (HCC)     Seizure disorder ([facility])     Abnormal electroencephalogram     Closed fracture of shaft of right humerus with routine healing     Constipation     Contracture of elbow joint, right     Diarrhea     Gastric reflux     Spastic quadriplegic cerebral palsy ([address]     Wheelchair bound     Malnutrition related to chronic disease ([address]     Pneumonia     Severe malnutrition (HCC)     Aspiration pneumonia ([address]

## 2022-05-16 DIAGNOSIS — G47.01 INSOMNIA DUE TO MEDICAL CONDITION: ICD-10-CM

## 2022-05-16 RX ORDER — ESZOPICLONE 2 MG/1
2 TABLET, FILM COATED ORAL NIGHTLY
Qty: 30 TABLET | Refills: 2 | Status: SHIPPED | OUTPATIENT
Start: 2022-05-16 | End: 2022-09-12

## 2022-05-16 RX ORDER — FLUOXETINE HYDROCHLORIDE 20 MG/5ML
LIQUID ORAL
Qty: 150 ML | Refills: 5 | Status: SHIPPED | OUTPATIENT
Start: 2022-05-16 | End: 2022-10-19

## 2022-05-16 NOTE — TELEPHONE ENCOUNTER
LOV 2-10-22  LRF Prozac 11-22-21, Tonny Moat 1-18-22    Health Maintenance   Topic Date Due    Varicella vaccine (2 of 2 - 2-dose childhood series) 03/30/2000    Depression Screen  Never done    Pap smear  Never done    Pneumococcal 0-64 years Vaccine (2 - PCV) 02/13/2018    COVID-19 Vaccine (3 - Booster for Moderna series) 07/24/2021    DTaP/Tdap/Td vaccine (8 - Td or Tdap) 10/16/2030    Hepatitis A vaccine  Completed    Hepatitis B vaccine  Completed    Hib vaccine  Completed    HPV vaccine  Completed    Meningococcal (ACWY) vaccine  Completed    Flu vaccine  Completed    Hepatitis C screen  Completed    HIV screen  Discontinued             (applicable per patient's age: Cancer Screenings, Depression Screening, Fall Risk Screening, Immunizations)    LDL Calculated (mg/dL)   Date Value   03/11/2022 129     AST (U/L)   Date Value   03/11/2022 26     ALT (U/L)   Date Value   03/11/2022 22     BUN (mg/dL)   Date Value   03/11/2022 17      (goal A1C is < 7)   (goal LDL is <100) need 30-50% reduction from baseline     BP Readings from Last 3 Encounters:   02/10/22 108/72   10/16/20 106/72   07/17/20 117/80    (goal /80)      All Future Testing planned in [facility]:  Lab Frequency Next Occurrence   Venous Blood Gas Once 02/10/2022       Next Visit Date:  Future Appointments   Date Time Provider Kishan Reich   8/10/2022  3:00 PM Lucendia Fabry, MD [place] [facility]   10/21/2022  1:15 PM Gordon Wong MD Resp Spec [facility]            Patient Active Problem List:     Rett's syndrome     Scoliosis     Dystonia     Tremors of nervous system     Convulsions ([address]     Fibroadenoma of breast     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 ([address]     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 ([address]     Cellulitis of left elbow     Acute respiratory failure with hypercapnia ([address]     Skin infection at gastrostomy tube site ([address]     Acute on chronic respiratory failure with hypoxia and hypercapnia (HCC)     Body temperature low     Hypotension     Nonverbal     Sepsis (HCC)     Hypothermia due to non-environmental cause     Prolonged QT interval     Urinary retention with incomplete bladder emptying     Chronic respiratory failure with hypoxia and hypercapnia (HCC)     Myoclonus     Atelectasis     Dependence on enabling machine     Dependent on ventilator ([address]     Delay in development     Tonic-clonic seizures ([address]     History of recurrent pneumonia     Fistula     Encounter for removal of internal fixation device     Sialorrhea     Myoneural disorder (HCC)     Muscle spasticity     Bladder retention     Intractable vomiting     Seizure (HCC)     Seizure disorder (HCC)     Abnormal electroencephalogram     Closed fracture of shaft of right humerus with routine healing     Constipation     Contracture of elbow joint, right     Diarrhea     Gastric reflux     Spastic quadriplegic cerebral palsy ([address]     Wheelchair bound     Malnutrition related to chronic disease ([address]     Pneumonia     Severe malnutrition ([address]     Aspiration pneumonia ([address]

## 2022-07-19 ENCOUNTER — TELEPHONE (OUTPATIENT)
Dept: FAMILY MEDICINE CLINIC | Age: 27
End: 2022-07-19

## 2022-07-19 DIAGNOSIS — G40.909 SEIZURE DISORDER (HCC): Primary | ICD-10-CM

## 2022-07-19 DIAGNOSIS — G80.0 SPASTIC QUADRIPLEGIC CEREBRAL PALSY (HCC): ICD-10-CM

## 2022-07-19 DIAGNOSIS — F84.2 RETT'S SYNDROME: ICD-10-CM

## 2022-07-19 NOTE — TELEPHONE ENCOUNTER
Order signed. Please fax and do whatever is necessary to ensure this gets done. Close encounter when complete.

## 2022-07-19 NOTE — TELEPHONE ENCOUNTER
Pt mom called in and asked if she can have a referral for physical therapy and bathroom remodeling. Pt was  out of town for 6wks and they discharged her and told mom they need a new referral to restart physical therapy.  Mom states for the bathroom remodeling they need an order to have a new shower put in.  [phone] fax referral and order to [facility] living for a pt evaluation for the bathroom and physical therapy   Order pending

## 2022-07-21 ENCOUNTER — TELEPHONE (OUTPATIENT)
Dept: FAMILY MEDICINE CLINIC | Age: 27
End: 2022-07-21

## 2022-07-21 NOTE — TELEPHONE ENCOUNTER
[address] called requesting office note supporting home care that was ordered. Office note needs to be with in 90 days prior or 30 days after. Patient has an appointment 8/10/22. Writer added to appointment notes.  (Please fax office note to [address] when completed [phone] to support the home care that was ordered.)

## 2022-08-10 ENCOUNTER — OFFICE VISIT (OUTPATIENT)
Dept: FAMILY MEDICINE CLINIC | Age: 27
End: 2022-08-10
Payer: COMMERCIAL

## 2022-08-10 VITALS
TEMPERATURE: 97.2 F | WEIGHT: 74 LBS | SYSTOLIC BLOOD PRESSURE: 122 MMHG | BODY MASS INDEX: 17.2 KG/M2 | DIASTOLIC BLOOD PRESSURE: 74 MMHG | HEART RATE: 96 BPM

## 2022-08-10 DIAGNOSIS — G70.9: ICD-10-CM

## 2022-08-10 DIAGNOSIS — Z80.41 FAMILY HISTORY OF OVARIAN CANCER: ICD-10-CM

## 2022-08-10 DIAGNOSIS — M41.9 RESTRICTIVE LUNG DISEASE DUE TO KYPHOSCOLIOSIS: ICD-10-CM

## 2022-08-10 DIAGNOSIS — G24.9 DYSTONIA: ICD-10-CM

## 2022-08-10 DIAGNOSIS — R33.9 URINARY RETENTION WITH INCOMPLETE BLADDER EMPTYING: ICD-10-CM

## 2022-08-10 DIAGNOSIS — G47.36: ICD-10-CM

## 2022-08-10 DIAGNOSIS — G47.01 INSOMNIA DUE TO MEDICAL CONDITION: ICD-10-CM

## 2022-08-10 DIAGNOSIS — J96.12 CHRONIC RESPIRATORY FAILURE WITH HYPOXIA AND HYPERCAPNIA (HCC): ICD-10-CM

## 2022-08-10 DIAGNOSIS — D24.9 FIBROADENOMA OF BREAST, UNSPECIFIED LATERALITY: ICD-10-CM

## 2022-08-10 DIAGNOSIS — N39.0 RECURRENT UTI: ICD-10-CM

## 2022-08-10 DIAGNOSIS — J98.4 RESTRICTIVE LUNG DISEASE DUE TO KYPHOSCOLIOSIS: ICD-10-CM

## 2022-08-10 DIAGNOSIS — G47.33 OSA (OBSTRUCTIVE SLEEP APNEA): ICD-10-CM

## 2022-08-10 DIAGNOSIS — K59.01 SLOW TRANSIT CONSTIPATION: ICD-10-CM

## 2022-08-10 DIAGNOSIS — G47.31 CENTRAL SLEEP APNEA: ICD-10-CM

## 2022-08-10 DIAGNOSIS — E55.9 VITAMIN D DEFICIENCY: ICD-10-CM

## 2022-08-10 DIAGNOSIS — F84.2 RETT'S SYNDROME: Primary | ICD-10-CM

## 2022-08-10 DIAGNOSIS — E67.3 HIGH VITAMIN D LEVEL: ICD-10-CM

## 2022-08-10 DIAGNOSIS — K21.9 GASTROESOPHAGEAL REFLUX DISEASE, UNSPECIFIED WHETHER ESOPHAGITIS PRESENT: ICD-10-CM

## 2022-08-10 DIAGNOSIS — G80.0 SPASTIC QUADRIPLEGIC CEREBRAL PALSY (HCC): ICD-10-CM

## 2022-08-10 DIAGNOSIS — R25.1 TREMORS OF NERVOUS SYSTEM: ICD-10-CM

## 2022-08-10 DIAGNOSIS — J96.11 CHRONIC RESPIRATORY FAILURE WITH HYPOXIA AND HYPERCAPNIA (HCC): ICD-10-CM

## 2022-08-10 DIAGNOSIS — Z23 NEED FOR PNEUMOCOCCAL VACCINATION: ICD-10-CM

## 2022-08-10 DIAGNOSIS — N31.9 NEUROGENIC BLADDER: ICD-10-CM

## 2022-08-10 DIAGNOSIS — G40.909 SEIZURE DISORDER (HCC): ICD-10-CM

## 2022-08-10 PROCEDURE — 99215 OFFICE O/P EST HI 40 MIN: CPT | Performed by: PEDIATRICS

## 2022-08-10 PROCEDURE — 90471 IMMUNIZATION ADMIN: CPT | Performed by: PEDIATRICS

## 2022-08-10 PROCEDURE — 90677 PCV20 VACCINE IM: CPT | Performed by: PEDIATRICS

## 2022-08-10 ASSESSMENT — PATIENT HEALTH QUESTIONNAIRE - PHQ9: DEPRESSION UNABLE TO ASSESS: FUNCTIONAL CAPACITY MOTIVATION LIMITS ACCURACY

## 2022-08-10 NOTE — PROGRESS NOTES
Cherelle Gonzales (:  1995) is a 33 y.o. female,Established patient, here for evaluation of the following chief complaint(s):    Follow-up         ASSESSMENT/PLAN:    1. Rett's syndrome  2. Seizure disorder ([address]  3. Spastic quadriplegic cerebral palsy ([address]  4. Dystonia  5. Tremors of nervous system  6. Chronic respiratory failure with hypoxia and hypercapnia (HCC)  7. ISELA (obstructive sleep apnea)  8. Central sleep apnea  9. Restrictive lung disease due to kyphoscoliosis  10. 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 ([address]  11. Gastroesophageal reflux disease, unspecified whether esophagitis present  12. Slow transit constipation  13. Insomnia due to medical condition  14. Vitamin D deficiency  15. High vitamin D level  16. Neurogenic bladder  17. Urinary retention with incomplete bladder emptying  18. Recurrent UTI  19. Family history of ovarian cancer  20. Fibroadenoma of breast, unspecified laterality  21. Need for pneumococcal vaccination  -     Pneumococcal, PCV20, PREVNAR 21, (age 25 yrs+), IM, PF          Continue current medications  Continue follow-up with subspecialists as scheduled which include [facility], neurology here in [place], neurosurgery at [facility], pulmonology here in [place], [place], urology and GYN  Recommend home health for nursing, speech, PT, OT  Recommend complete bathroom renovation to make handicap accessible   Prevnar 20 today  COVID booster recommended  Call with concerns       Return in about 6 months (around 2/10/2023) for routine follow up. Subjective     HPI      Patient presents today for routine follow-up of her chronic medical problems which include Rett syndrome, seizure disorder, spastic quadriplegia, dystonia, muscle tremors, hypercapnic respiratory failure, ISELA and central sleep apnea. She also has restrictive lung disease due to kyphoscoliosis, hypoventilation syndrome, GERD and chronic constipation.   She also has insomnia and vitamin D deficiency as well as a neurogenic bladder and recurrent UTI. She is here today with her mother, Bernestine Merlin and her caregiver Yonis Arana. They both report that she has been doing well. She does follow at [facility] regularly for her history of Rett syndrome. She sees her neurologist, Dr. Agnes Oliveira in [place] and her neurologist, Dr. Zaynab Houser in [facility]. She does have a history of seizure disorder, spastic quadriplegia, dystonia and frequent muscle tremors. She did recently have DBS surgery in June at [facility].  Her mother reports that she did very well and they actually stayed for approximately 6 weeks in [place] after the surgery. They are trying different programs every 2 weeks and they are journaling regarding Shira's responses to these programs. Her mother reports that she is not grinding her teeth anymore and not pulling her hair out. She has had less self mutilating behaviors. She actually reports that her hands are not clenched as much and her knees actually bend a little more than previous. She is sometimes more vocal.  She will also push her self backwards with her feet while she is in her wheelchair. She is much less rigid. She does continue on a baclofen pump that does help to control her dystonia and tremors fairly well. She did have her baclofen pump replaced on 7/1/2022 at [facility].  She does continue to see Dr. Zaynab Houser every 6 weeks for refill of the baclofen pump. Her mother reports that she did see another doctor in [place] and had bilateral hip injections because of some tightness. She has had good control of her seizures. Her mother reports that she thinks there has been improvement in the amount of seizures that she is having. She does follow-up with [facility] on 9/28/2022 for reevaluation.   She does continue to follow with pulmonary for her history of hypercapnic respiratory failure, ISELA, central sleep apnea, restrictive lung disease, hypoventilation syndrome and chronic dependence on trilogy noninvasive vent. She does wear this nightly. She does have a history of GERD and takes Nexium for this. This controls her symptoms well. She does continue to get nutrition with oral and tube feeds. She does continue on Peptamen for now however they are trying a [employer]. They are attempting to get this. She is working with her nutritionist in [place]. Her weight has been stable. She did have a swallow study while she was in [place] and this did show that she was having some aspiration with certain foods so she does continue on honey thick nectar diet. She does have a history of chronic constipation and uses milk of magnesia 8 mL twice daily and she takes Florastor and align probiotics once daily. She also gets MiraLAX daily and fleets enemas on Monday, Wednesday and Friday. She does continue on Lunesta for her history of insomnia and she sleeps fairly well with this. This has been recommended by her neurologist at [facility]. She does have vitamin D deficiency that is treated with vitamin D supplement. She did have a previously elevated vitamin D level and her dosage was changed. She does continue on 2000 IUs once daily. She does have a neurogenic bladder with urinary retention and incomplete bladder emptying. She has had several UTIs in the past.  She does follow with urology, Dr. Teri Danielson at [facility] in [place]. He recommended low-dose antibiotic and she continues on this daily. She has not had any recent urinary tract infections. She does see Dr. Davis Rios, gynecology for her history of abnormal menstrual cycles. She does continue on Depo-Provera and she is doing well with this. She does have a family history of ovarian cancer in her maternal grandmother.   She also has a history of breast lumps that are followed by Dr. Davis Rios with routine exam.  It is recommended that she have speech therapy, occupation therapy and physical therapy. She was reevaluated by home health specifically [facility]. She did have a speech evaluation today and they are evaluating for her to have a voicebox. They will be coming out once weekly. They have also recommended an eye gaze therapist to come and evaluate her. She did have occupational therapy and physical therapy evaluation previously. It has been recommended that they upgrade their bathroom because the shower is too small and the walkway is too small for her needs. This will require a complete redo to be completely handicapped accessible and wheelchair accessible. She is currently getting occupational and physical therapy twice weekly. I feel it is medically necessary for these accommodations to be made. They have no other concerns at this time. cmw        Review of Systems   Reason unable to perform ROS: Pt nonverbal.   Constitutional:  Negative for appetite change, fatigue, fever and unexpected weight change. HENT:  Negative for congestion, postnasal drip, rhinorrhea, tinnitus and trouble swallowing. Eyes:  Negative for photophobia, pain, discharge and redness. Respiratory:  Positive for apnea (ISELA and Central sleep apnea). Negative for cough, shortness of breath, wheezing and stridor. Hypoventilation syndrome with hypoxia and hypercapnia - on trilogy ventilator at night   Cardiovascular:  Negative for chest pain, palpitations and leg swelling. Gastrointestinal:  Positive for constipation and diarrhea (occasional loose stools). Negative for abdominal pain, blood in stool and vomiting. Has a G-tube for medications and tube feeds. She has a history of GERD controlled with nexium. Endocrine: Negative for polydipsia and polyphagia. Genitourinary:  Negative for decreased urine volume, dysuria, hematuria and urgency. Recurrent uti - now on daily prophylactic ATB   Musculoskeletal:  Negative for back pain and myalgias.         Dystonia and muscle spasms   Skin:  Negative for color change and rash. Allergic/Immunologic: Negative for immunocompromised state. Neurological:  Positive for tremors and seizures (followed by neurology, Dr. Wanda Hdz at [facility] and Dr. Tory Lucia here in [place]). Negative for dizziness, light-headedness and headaches. Hematological:  Negative for adenopathy. Does not bruise/bleed easily. Psychiatric/Behavioral:  Positive for sleep disturbance (stable with lunesta). Negative for decreased concentration and dysphoric mood. The patient is not nervous/anxious. Objective     Physical Exam  Vitals and nursing note reviewed. Constitutional:       General: She is not in acute distress. Appearance: She is well-developed. She is not diaphoretic. Comments: In wheelchair, non verbal   HENT:      Head: Normocephalic. Right Ear: Tympanic membrane, ear canal and external ear normal.      Left Ear: Tympanic membrane, ear canal and external ear normal. There is no impacted cerumen. Nose: Nose normal. No congestion or rhinorrhea. Comments: drools     Mouth/Throat:      Mouth: Mucous membranes are moist.   Eyes:      General: No scleral icterus. Right eye: No discharge. Left eye: No discharge. Pupils: Pupils are equal, round, and reactive to light. Neck:      Thyroid: No thyromegaly. Vascular: No JVD. Comments: Head tilt to the left   Cardiovascular:      Rate and Rhythm: Normal rate and regular rhythm. Heart sounds: Normal heart sounds. No murmur heard. Pulmonary:      Effort: Pulmonary effort is normal. No respiratory distress. Breath sounds: Normal breath sounds. No stridor. No wheezing, rhonchi or rales. Abdominal:      General: There is no distension. Palpations: Abdomen is soft. There is no mass. Tenderness: There is no abdominal tenderness. There is no right CVA tenderness or left CVA tenderness.        Musculoskeletal:      Cervical back: Normal range of motion. Right lower leg: No edema. Left lower leg: No edema. Comments: Dystonia with contractures of upper and lower extremities     Skin:     General: Skin is warm. Capillary Refill: Capillary refill takes 2 to 3 seconds. Comments: She has a reddish / purplish discoloration to her feet and lower extremities. Neurological:      Mental Status: She is alert. Motor: Atrophy and abnormal muscle tone present. No seizure activity. Coordination: Coordination abnormal.      Deep Tendon Reflexes: Reflexes abnormal.          On this date 8/10/2022 I have spent 45 minutes reviewing previous notes, test results and face to face with the patient discussing the diagnosis and importance of compliance with the treatment plan as well as documenting on the day of the visit. An electronic signature was used to authenticate this note.     --Agatha Mckenzie MD

## 2022-08-16 ENCOUNTER — PATIENT MESSAGE (OUTPATIENT)
Dept: FAMILY MEDICINE CLINIC | Age: 27
End: 2022-08-16

## 2022-08-17 RX ORDER — LIDOCAINE 50 MG/G
OINTMENT TOPICAL
Status: CANCELLED | OUTPATIENT
Start: 2022-08-17

## 2022-08-17 ASSESSMENT — ENCOUNTER SYMPTOMS
CONSTIPATION: 1
APNEA: 1
TROUBLE SWALLOWING: 0
VOMITING: 0
PHOTOPHOBIA: 0
SHORTNESS OF BREATH: 0
EYE DISCHARGE: 0
ABDOMINAL PAIN: 0
STRIDOR: 0
RHINORRHEA: 0
BACK PAIN: 0
EYE PAIN: 0
COUGH: 0
COLOR CHANGE: 0
WHEEZING: 0
EYE REDNESS: 0
BLOOD IN STOOL: 0

## 2022-08-17 NOTE — TELEPHONE ENCOUNTER
From: Sandi Mary  To: Dr. Nicki Amaro: 8/16/2022 8:46 PM EDT  Subject: Need Rx    Can you right an Rx for lidocaine (topical)? Need for baclofen pump refills. Cant find tube. Need at least 1 or 2 refills on it.

## 2022-08-18 RX ORDER — LIDOCAINE AND PRILOCAINE 25; 25 MG/G; MG/G
CREAM TOPICAL
Qty: 30 G | Refills: 2 | Status: SHIPPED | OUTPATIENT
Start: 2022-08-18

## 2022-08-18 NOTE — TELEPHONE ENCOUNTER
Please let patient's mother know I will send a prescription for emla cream.    Please verify pharmacy. . 's pharmacy is chosen - I don't think this is correct.

## 2022-08-21 ASSESSMENT — ENCOUNTER SYMPTOMS: DIARRHEA: 1

## 2022-09-12 DIAGNOSIS — K21.9 GASTRIC REFLUX: ICD-10-CM

## 2022-09-12 DIAGNOSIS — G47.01 INSOMNIA DUE TO MEDICAL CONDITION: ICD-10-CM

## 2022-09-12 RX ORDER — ESZOPICLONE 2 MG/1
2 TABLET, FILM COATED ORAL NIGHTLY
Qty: 30 TABLET | Refills: 2 | Status: SHIPPED | OUTPATIENT
Start: 2022-09-12 | End: 2023-09-12

## 2022-09-12 NOTE — TELEPHONE ENCOUNTER
LOV 8-10-22  LRF Lunest 5-16-22, Nexium 3-16-22    Health Maintenance   Topic Date Due    Varicella vaccine (2 of 2 - 2-dose childhood series) 03/30/2000    Depression Screen  Never done    Pap smear  Never done    COVID-19 Vaccine (3 - Booster for Moderna series) 07/24/2021    Flu vaccine (1) 09/01/2022    DTaP/Tdap/Td vaccine (8 - Td or Tdap) 10/16/2030    Hepatitis A vaccine  Completed    Hepatitis B vaccine  Completed    Hib vaccine  Completed    HPV vaccine  Completed    Meningococcal (ACWY) vaccine  Completed    Pneumococcal 0-64 years Vaccine  Completed    Hepatitis C screen  Completed    HIV screen  Discontinued             (applicable per patient's age: Cancer Screenings, Depression Screening, Fall Risk Screening, Immunizations)    LDL Calculated (mg/dL)   Date Value   03/11/2022 129     AST (U/L)   Date Value   03/11/2022 26     ALT (U/L)   Date Value   03/11/2022 22     BUN (mg/dL)   Date Value   03/11/2022 17      (goal A1C is < 7)   (goal LDL is <100) need 30-50% reduction from baseline     BP Readings from Last 3 Encounters:   08/10/22 122/74   02/10/22 108/72   10/16/20 106/72    (goal /80)      All Future Testing planned in [facility]:  Lab Frequency Next Occurrence   Venous Blood Gas Once 02/10/2022       Next Visit Date:  Future Appointments   Date Time Provider Kishan Reich   10/21/2022  1:15 PM Deniz Varma MD Resp Spec [id number]            Patient Active Problem List:     Rett's syndrome     Scoliosis     Dystonia     Tremors of nervous system     Convulsions ([address]     Fibroadenoma of breast     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 retention with incomplete bladder emptying     Chronic respiratory failure with hypoxia and hypercapnia (HCC)     Myoclonus     Atelectasis     Dependence on enabling machine     Dependent on ventilator ([address]     Delay in development     Tonic-clonic seizures ([address]     History of recurrent pneumonia     Fistula     Encounter for removal of internal fixation device     Sialorrhea     Myoneural disorder (HCC)     Muscle spasticity     Bladder retention     Intractable vomiting     Seizure (HCC)     Seizure disorder ([facility])     Abnormal electroencephalogram     Closed fracture of shaft of right humerus with routine healing     Constipation     Contracture of elbow joint, right     Diarrhea     Gastric reflux     Spastic quadriplegic cerebral palsy ([address]     Wheelchair bound     Malnutrition related to chronic disease ([address]     Pneumonia     Severe malnutrition ([address]     Aspiration pneumonia ([address]

## 2022-09-26 ENCOUNTER — TELEPHONE (OUTPATIENT)
Dept: FAMILY MEDICINE CLINIC | Age: 27
End: 2022-09-26

## 2022-09-26 NOTE — TELEPHONE ENCOUNTER
Lucy Hinson from [place] [address] called stated patient BP was 131/94 repeat 137/94, 138/92, 138/99 within 20-25 minute time frame. Patient is leaving tomorrow for [place] to see specialist for her deep brain stimulator and Lucy Hinson thinks family has been also taking with them about patient BP.

## 2022-09-26 NOTE — TELEPHONE ENCOUNTER
Noted.  I would recommend following up with docs in [place] for this as she is leaving tomorrow. If there is any change in her condition before her follow up I would recommend an evaluation in the ER. I don't suspect this will be necessary but I just want to make sure if anything changes she should be seen urgently.

## 2022-10-10 ENCOUNTER — TELEPHONE (OUTPATIENT)
Dept: FAMILY MEDICINE CLINIC | Age: 27
End: 2022-10-10

## 2022-10-10 NOTE — TELEPHONE ENCOUNTER
From other enc:  Noted. I would recommend following up with docs in [place] for this as she is leaving tomorrow. If there is any change in her condition before her follow up I would recommend an evaluation in the ER. I don't suspect this will be necessary but I just want to make sure if anything changes she should be seen urgently. CLINICAL STAFF: PLease call patient guardian and see if they have consulted with a doc in [place] about this?

## 2022-10-10 NOTE — TELEPHONE ENCOUNTER
Pt seen by neurology [facility] today. BP readings normal in report. Scanned into media. Routing to PCP as FYI on this.

## 2022-10-11 NOTE — TELEPHONE ENCOUNTER
Was the patient's blood pressure high in [place]? Please have mother schedule a nurse visit for blood pressure check as the home nursing service seems to be getting high blood pressures without any history of high blood pressure.

## 2022-10-12 ENCOUNTER — TELEPHONE (OUTPATIENT)
Dept: FAMILY MEDICINE CLINIC | Age: 27
End: 2022-10-12

## 2022-10-12 NOTE — TELEPHONE ENCOUNTER
[address]  PT contacted the office with patient update. Physical therapy continued 2x weekly/1 month. Will reassess at that time. Making progress with leg ROM and transfers/showering/stimulator. BP still slightly elevated at 133/96 HR 98 O2 92%. Alonzo Gan with any questions [phone].

## 2022-10-12 NOTE — TELEPHONE ENCOUNTER
Mother states she does not believe it was high in [place] but will also contact the doctor. Mother will have Enrike Easton contact office for scheduling.

## 2022-10-13 NOTE — TELEPHONE ENCOUNTER
Noted.  No further questions. Patient caregiver to call for nurse visit for blood pressure check in office.

## 2022-10-19 RX ORDER — FLUOXETINE HYDROCHLORIDE 20 MG/5ML
LIQUID ORAL
Qty: 150 ML | Refills: 5 | Status: SHIPPED | OUTPATIENT
Start: 2022-10-19

## 2022-10-19 NOTE — TELEPHONE ENCOUNTER
Last visit: 8/10/22  Last Med refill: 5/16/22  Does patient have enough medication for 72 hours: Yes    Next Visit Date:  Future Appointments   Date Time Provider Kishan Abbasii   10/21/2022  1:15 PM Zuleima Redd  [address] Maintenance   Topic Date Due    Varicella vaccine (2 of 2 - 2-dose childhood series) 03/30/2000    Depression Screen  Never done    Pap smear  Never done    COVID-19 Vaccine (3 - Booster for Moderna series) 07/24/2021    Flu vaccine (1) 08/01/2022    DTaP/Tdap/Td vaccine (8 - Td or Tdap) 10/16/2030    Hepatitis A vaccine  Completed    Hib vaccine  Completed    HPV vaccine  Completed    Meningococcal (ACWY) vaccine  Completed    Pneumococcal 0-64 years Vaccine  Completed    Hepatitis C screen  Completed    HIV screen  Discontinued       No results found for: LABA1C          ( goal A1C is < 7)   No results found for: LABMICR  LDL Calculated (mg/dL)   Date Value   03/11/2022 129       (goal LDL is <100)   AST (U/L)   Date Value   03/11/2022 26     ALT (U/L)   Date Value   03/11/2022 22     BUN (mg/dL)   Date Value   03/11/2022 17     BP Readings from Last 3 Encounters:   08/10/22 122/74   02/10/22 108/72   10/16/20 106/72          (goal 120/80)    All Future Testing planned in [facility]  Lab Frequency Next Occurrence   Venous Blood Gas Once 02/10/2022               Patient Active Problem List:     Rett's syndrome     Scoliosis     Dystonia     Tremors of nervous system     Convulsions (HCC)     Fibroadenoma of breast     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 ([address]     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 (HCC)     Cellulitis of left elbow     Acute respiratory failure with hypercapnia (HCC)     Skin infection at gastrostomy tube site ([address]     Acute on chronic respiratory failure with hypoxia and hypercapnia (HCC)     Body temperature low     Hypotension     Nonverbal     Sepsis ([address]     Hypothermia due to non-environmental cause     Prolonged QT interval     Urinary retention with incomplete bladder emptying     Chronic respiratory failure with hypoxia and hypercapnia (HCC)     Myoclonus     Atelectasis     Dependence on enabling machine     Dependent on ventilator ([address]     Delay in development     Tonic-clonic seizures ([address]     History of recurrent pneumonia     Fistula     Encounter for removal of internal fixation device     Sialorrhea     Myoneural disorder (HCC)     Muscle spasticity     Bladder retention     Intractable vomiting     Seizure (HCC)     Seizure disorder ([facility])     Abnormal electroencephalogram     Closed fracture of shaft of right humerus with routine healing     Constipation     Contracture of elbow joint, right     Diarrhea     Gastric reflux     Spastic quadriplegic cerebral palsy ([address]     Wheelchair bound     Malnutrition related to chronic disease ([address]     Pneumonia     Severe malnutrition ([address]     Aspiration pneumonia ([address]

## 2022-10-25 ENCOUNTER — TELEPHONE (OUTPATIENT)
Dept: FAMILY MEDICINE CLINIC | Age: 27
End: 2022-10-25

## 2022-10-25 NOTE — TELEPHONE ENCOUNTER
Makenzie Caballero from [place] called stated patients BP today on right wrist 132/96 & left wrist 141/107.  Makenzie Caballero states someone will be bringing patient in our office today to check BP

## 2022-11-09 ENCOUNTER — PATIENT MESSAGE (OUTPATIENT)
Dept: PULMONOLOGY | Age: 27
End: 2022-11-09

## 2022-11-09 NOTE — TELEPHONE ENCOUNTER
From: Ramon Hayden  To: Dr. Sunny Cordova  Sent: 11/9/2022 2:26 PM EST  Subject: Kingsvester Brynn Thacker ([address]) asked if you could write a new order for a Trilogy Rojelio. She said that she hasnt been able to get replacements on the recall for the Trilogy unless a new order is written. She also asked if you could write an order for a the filter to be replaced weekly until we get a new machine. Our company is currently only giving us one filter every month. Your preventative measures when Eliza Jaramillo starts getting a cold has kept her from it progressing into pneumonia. Shes doing great.  Thank you so much!  -[facility]

## 2022-11-10 ENCOUNTER — TELEPHONE (OUTPATIENT)
Dept: PULMONOLOGY | Age: 27
End: 2022-11-10

## 2022-11-10 DIAGNOSIS — G70.9 RESTRICTIVE LUNG MECHANICS DUE TO NEUROMUSCULAR DISEASE (HCC): Primary | ICD-10-CM

## 2022-11-10 DIAGNOSIS — J98.4 RESTRICTIVE LUNG MECHANICS DUE TO NEUROMUSCULAR DISEASE (HCC): Primary | ICD-10-CM

## 2022-11-10 DIAGNOSIS — G47.31 CENTRAL SLEEP APNEA: ICD-10-CM

## 2022-11-10 NOTE — TELEPHONE ENCOUNTER
Regarding: Trilogy Machine  ----- Message from [address] sent at 11/9/2022  2:47 PM EST -----       ----- Message sent from [address] to Som Thompson at 11/9/2022  2:47 PM -----   Thank you for using [facility]. This message has been forwarded to your [facility]) provider. Please allow your provider some time to review the message and act on it as necessary. You should hear something within 24-72 business hours. If you don't, please respond to this message or call the office. ----- Message -----       From:Shira Burroughse detention       Sent:11/9/2022  2:26 PM EST         To:Dr. Priya Mcmullen ([address] [facility]) asked if you could write a new order for a Trilogy Rojelio. She said that she hasnt been able to get replacements on the recall for the Trilogy unless a new order is written. She also asked if you could write an order for a the filter to be replaced weekly until we get a new machine. Our company is currently only giving us one filter every month. Your preventative measures when Tucker Koehler starts getting a cold has kept her from it progressing into pneumonia. Shes doing great.  Thank you so much!  -Brittney Mayberry

## 2022-11-11 ENCOUNTER — PATIENT MESSAGE (OUTPATIENT)
Dept: FAMILY MEDICINE CLINIC | Age: 27
End: 2022-11-11

## 2022-11-11 ENCOUNTER — TELEPHONE (OUTPATIENT)
Dept: PULMONOLOGY | Age: 27
End: 2022-11-11

## 2022-11-11 DIAGNOSIS — J98.4 RESTRICTIVE LUNG MECHANICS DUE TO NEUROMUSCULAR DISEASE (HCC): Primary | ICD-10-CM

## 2022-11-11 DIAGNOSIS — G70.9 RESTRICTIVE LUNG MECHANICS DUE TO NEUROMUSCULAR DISEASE (HCC): Primary | ICD-10-CM

## 2022-11-12 NOTE — TELEPHONE ENCOUNTER
From: Den Begin  To: Dr. Moralez Fleming: 11/11/2022 11:08 AM EST  Subject: Iron Rx    Dr. Rodriguez Soto wants to add Iron to her Rx. Need you to send so [facility] will cover it. Can you call this in to [facility] please? I have attached her paper Rx.

## 2022-11-14 RX ORDER — FERROUS SULFATE 7.5 MG/0.5
75 SYRINGE (EA) ORAL DAILY
Qty: 150 ML | Refills: 5 | Status: SHIPPED | OUTPATIENT
Start: 2022-11-14 | End: 2023-11-14

## 2022-11-16 ENCOUNTER — TELEPHONE (OUTPATIENT)
Dept: FAMILY MEDICINE CLINIC | Age: 27
End: 2022-11-16

## 2022-11-16 NOTE — TELEPHONE ENCOUNTER
----- Message from [facility] sent at 11/15/2022  4:03 PM EST -----  Subject: Message to Provider    QUESTIONS  Information for Provider? Dustin Miranda with [facility] called in   regards to the patient to let the patient's provider know that they saw   her today 11/15/2022 for reassessment. She stated that they will continue   to see the patient once this week, once next week and then twice for a   week for 3 weeks. Please contact.   ---------------------------------------------------------------------------  --------------  [facility] INFO  [phone]; OK to leave message on voicemail  ---------------------------------------------------------------------------  --------------  SCRIPT ANSWERS  Relationship to Patient? Third Party  Third Party Type? Home Health Care? Representative Name?  Carmen Sevilla

## 2022-12-22 ENCOUNTER — PATIENT MESSAGE (OUTPATIENT)
Dept: FAMILY MEDICINE CLINIC | Age: 27
End: 2022-12-22

## 2022-12-22 NOTE — TELEPHONE ENCOUNTER
From: Caleb Reynoso  To: Dr. Tremaine Jin: 2022 12:26 PM EST  Subject: Letter     I forgot to tell you, the letter we got about long-term was Dr. Sumaya Mcgee is retiring on . I forgot that Ev Y Donovan  both see her. Read the name a little too quick-lol.    Have a [place] and great vacation!  -Pearl Hinkle

## 2023-01-04 ENCOUNTER — TELEPHONE (OUTPATIENT)
Dept: FAMILY MEDICINE CLINIC | Age: 28
End: 2023-01-04

## 2023-01-04 NOTE — TELEPHONE ENCOUNTER
Please call mom and see when [facility] is going back to [address] for her Rett Syndrome. I think it is time she see cardiology for evaluation of these intermittent elevated blood pressure readings. Is this something mom can get set up at [address] through her specialists?

## 2023-01-04 NOTE — TELEPHONE ENCOUNTER
She may need to - or if she sees cardio in [place] I may be able to follow this with occasional follow ups in [place]. We will see after her February appointment. She should have a nurse visit here for blood pressure check in 2 weeks.

## 2023-01-04 NOTE — TELEPHONE ENCOUNTER
Spoke with patients mom, mom states patient has an appointment sometime in February. Mom will call [address] and see if she can get an appointment with a cardiologist. Mom will call our office if she needs anything. Mom did state she might need a referral for a cardiologist in our area if patient needs a follow up when they come home.

## 2023-01-04 NOTE — TELEPHONE ENCOUNTER
Patients /102 11:30 am today & a repeat at noon 117/93. Personal aid stated patient had a seizure 1/2 prior to [facility] arrival for PT. Bria Whitney will check to see if patients neurologist has been notified. Bria Whitney states patient is having a seizure at least once a week.

## 2023-01-05 NOTE — TELEPHONE ENCOUNTER
Mother informed and advised to have records sent to the office if she is going to see cardio in [place]. BP check scheduled.

## 2023-01-16 ENCOUNTER — TELEMEDICINE (OUTPATIENT)
Dept: FAMILY MEDICINE CLINIC | Age: 28
End: 2023-01-16
Payer: COMMERCIAL

## 2023-01-16 DIAGNOSIS — F84.2 RETT'S SYNDROME: Primary | ICD-10-CM

## 2023-01-16 PROCEDURE — 99212 OFFICE O/P EST SF 10 MIN: CPT | Performed by: PEDIATRICS

## 2023-01-16 ASSESSMENT — ENCOUNTER SYMPTOMS
COUGH: 0
EYE DISCHARGE: 0
EYE REDNESS: 0
COLOR CHANGE: 0
STRIDOR: 0
WHEEZING: 0
PHOTOPHOBIA: 0
VOMITING: 0
ABDOMINAL PAIN: 0
BACK PAIN: 0
RHINORRHEA: 0
SHORTNESS OF BREATH: 0
TROUBLE SWALLOWING: 0
CONSTIPATION: 1
BLOOD IN STOOL: 0
APNEA: 1
EYE PAIN: 0
DIARRHEA: 1

## 2023-01-16 ASSESSMENT — PATIENT HEALTH QUESTIONNAIRE - PHQ9: DEPRESSION UNABLE TO ASSESS: FUNCTIONAL CAPACITY MOTIVATION LIMITS ACCURACY

## 2023-01-16 NOTE — PROGRESS NOTES
Blane Galan (:  1995) is a Established patient, here for evaluation of the following:  Eyegaze communication device    Assessment & Plan     Below is the assessment and plan developed based on review of pertinent history, physical exam, labs, studies, and medications. 1. Rett's syndrome    I recommend that this patient have an Eyegaze device for communication secondary to her Rett syndrome        Return if symptoms worsen or fail to improve. Subjective     HPI    Patient presents today via virtual office visit for face-to-face documentation in order to obtain an Eyegaze communication device. 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 She was given a loaner device several months ago and has been using this regularly. Her speech and language pathologist as well as her mother has noticed improvement in her communication. She seems to be doing very well with this device. It is my recommendation that she have an Eyegaze device of her own to be able to use for communication as this has been shown to be quite effective in patient's with Rett syndrome. She has shown improvement since having the Eyegaze device. cmw      Review of Systems   Reason unable to perform ROS: Pt nonverbal - ROS per mother. Constitutional:  Negative for appetite change, fatigue, fever and unexpected weight change. HENT:  Negative for congestion, postnasal drip, rhinorrhea, tinnitus and trouble swallowing. Eyes:  Negative for photophobia, pain, discharge and redness. Respiratory:  Positive for apnea (ISELA and Central sleep apnea). Negative for cough, shortness of breath, wheezing and stridor. Hypoventilation syndrome with hypoxia and hypercapnia - on trilogy ventilator at night   Cardiovascular:  Negative for chest pain, palpitations and leg swelling.    Gastrointestinal:  Positive for constipation and diarrhea (occasional loose stools). Negative for abdominal pain, blood in stool and vomiting. Has a G-tube for medications and tube feeds. She has a history of GERD controlled with nexium. Endocrine: Negative for polydipsia and polyphagia. Genitourinary:  Negative for decreased urine volume, dysuria, hematuria and urgency. Recurrent uti - now on daily prophylactic ATB   Musculoskeletal:  Negative for back pain and myalgias. Dystonia and muscle spasms   Skin:  Negative for color change and rash. Allergic/Immunologic: Negative for immunocompromised state. Neurological:  Positive for tremors and seizures (followed by neurology, Dr. Jania Isaac at [facility] and Dr. Ericka Ruby here in [place]). Negative for dizziness, light-headedness and headaches. Hematological:  Negative for adenopathy. Does not bruise/bleed easily. Psychiatric/Behavioral:  Positive for sleep disturbance (stable with lunesta). Negative for decreased concentration and dysphoric mood. The patient is not nervous/anxious. Objective     Patient-Reported Vitals    No data recorded       Physical Exam    [INSTRUCTIONS:  \"[x]\" Indicates a positive item  \"[]\" Indicates a negative item  -- DELETE ALL ITEMS NOT EXAMINED]    Constitutional: [] Appears well-developed and well-nourished [x] No apparent distress      [x] Abnormal - lying in bed, unable to participate in verbal communication.   She does smile at me when she sees my on the video    Mental status: [x] Alert and awake  [] Oriented to person/place/time [] Able to follow commands    [x] Abnormal - non verbal    Eyes:   EOM    [x]  Normal    [] Abnormal -   Sclera  [x]  Normal    [] Abnormal -          Discharge [x]  None visible   [] Abnormal -     HENT: [x] Normocephalic, atraumatic  [] Abnormal -   [x] Mouth/Throat: Mucous membranes are moist    External Ears [x] Normal  [] Abnormal -    Neck: [x] No visualized mass [] Abnormal -     Pulmonary/Chest: [x] Respiratory effort normal   [x] No visualized signs of difficulty breathing or respiratory distress        [] Abnormal -      Musculoskeletal:   [] Normal gait with no signs of ataxia         [] Normal range of motion of neck        [x] Abnormal - in bed    Neurological:        [] No Facial Asymmetry (Cranial nerve 7 motor function) (limited exam due to video visit)          [] No gaze palsy        [] Abnormal -          Skin:        [x] No significant exanthematous lesions or discoloration noted on facial skin         [] Abnormal -            Psychiatric:       [] Normal Affect [] Abnormal -        [] No Hallucinations    Other pertinent observable physical exam findings:-         On this date 1/16/2023 I have spent 10 minutes reviewing previous notes, test results and face to face (virtual) with the patient discussing the diagnosis and importance of compliance with the treatment plan as well as documenting on the day of the visit. Allie Hill, was evaluated through a synchronous (real-time) audio-video encounter. The patient (or guardian if applicable) is aware that this is a billable service, which includes applicable co-pays. This Virtual Visit was conducted with patient's (and/or legal guardian's) consent. The visit was conducted pursuant to the emergency declaration under the [address], [address] [facility] authority and the [employer] Act. Patient identification was verified, and a caregiver was present when appropriate. The patient was located at Home: [facility] [address] [facility]. Provider was located at [place] [address] ([address] [place], [place] [address]t): [address] [facility],  [address] [facility].         --Huang Reyes MD

## 2023-01-18 NOTE — PROGRESS NOTES
Subjective:      Patient ID: Katerina Emmanuel is a 33 y.o. female. HPI  Patient here for restrictive lung disease, hypercapnic respiratory failure and central sleep apnea. Last seen in office per Dr. Juan Cee as a telehealth visit in April 2022. Patient was last seen in the office she was seen at [facility] and underwent a deep brain stimulation implantation device on 6/10/2022. She is accompanied by her caregiver. Caregiver indicates no concerns regarding her respiratory status. States that she has been compliant with her Xopenex and is asking for refill on antibiotic and steroid prescriptions to have on hand. Medications:   Xopenex    PRIOR WORKUP:  PFT:    CT Imaging:  CT chest 7/1/2020: [facility]: Normal heart size. No pleural or pericardial effusions. Central pulmonary arteries are unremarkable. No enlarged thoracic lymph nodes. Thoracic scoliosis. Chronic atelectasis/chronic consolidation in the left lower lobe with elevated right hemidiaphragm. Sleep Study:  Sleep study 10/28/2021: Completed at [facility]:   During the split-night sleep study adult scoring criteria moderate obstructive sleep apnea, 14.5 obstructive respiratory events per hour of sleep and snoring and central sleep apnea F33.7 Central apneas per hour sleep were recorded. Oxygen emerson was 88%. During the treatment portion of the study patient was on home ventilator settings. Patient slept for 2 hours and 40 minutes well on AVAPS, tidal volume 300, EPAP 7, pressure support 9, high time 1.5, P max 20, respiratory rate 16-18. During sleep patient was noted to have 1 central apnea with a duration of 9.6 seconds and 3 obstructive hypopneas. Total AHI was 1.5/pH in the obstructive apnea hypopnea index was 1.1/H. Oxygen emerson was 84%. Patient spent 0.2% of the total sleep time with an SPO2 of less than 88%. 0 leg movements, for a PLM index of 0.0 with no associated arousals.     Laboratory Evaluation:    Immunizations:   Immunization History   Administered Date(s) Administered    COVID-19, MODERNA BLUE border, Primary or Immunocompromised, (age 12y+), IM, 100 mcg/0.5mL 01/27/2021, 02/24/2021    DTaP 1995, 1996, 1996, 04/15/1997, 09/04/2001    HIB PRP-T (ActHIB, Hiberix) 1996, 1996, 1996, 04/15/1997    HPV 9-valent Cynthia Marqueseger) 11/27/2007, 02/08/2008, 07/11/2008    Hepatitis A 08/29/2009, 03/25/2011    Hepatitis B (Recombivax HB) 1995, 1996, 1996    Hepatitis B Adult (Engerix-B) 10/16/2020    Influenza Vaccine, unspecified formulation 10/27/2014    Influenza Virus Vaccine 10/27/2014, 10/12/2015, 10/20/2017, 10/19/2019, 10/26/2021, 11/08/2022    Influenza, AFLURIA (age 1 yrs+), FLUZONE, (age 10 mo+), MDV, 0.5mL 02/13/2017    Influenza, FLUARIX, FLULAVAL, FLUZONE (age 10 mo+) AND AFLURIA, (age 1 y+), PF, 0.5mL 01/18/2019, 10/16/2020    MMR 04/15/1997, 09/04/2001    Meningococcal MCV4P (Menactra) 11/27/2007, 08/24/2012    Pneumococcal Polysaccharide ([id number]) 02/15/2001, 02/13/2017    Pneumococcal conjugate PCV20, PF (Prevnar 20) 08/10/2022    Polio IPV (IPOL) 1996, 1996, 05/01/1997, 09/04/2001    Td (Adult), 5 Lf Tetanus Toxoid, Pf (Tenivac, Decavac) 10/16/2020    Tdap (Boostrix, Adacel) 11/27/2007    Varicella (Varivax) 04/29/1999        No flowsheet data found.     BP (!) 124/90 (Site: Left Upper Arm, Position: Sitting, Cuff Size: Medium Adult)   Pulse 84   Ht 4' 9\" (1.448 m)   Wt 78 lb (35.4 kg)   SpO2 95%   BMI 16.88 kg/m²     Past Medical History:   Diagnosis Date    Abnormal electroencephalogram 6/25/2018    Acute on chronic respiratory failure with hypoxia and hypercapnia (HCC)     Acute respiratory failure with hypercapnia (HCC)     Aspiration pneumonia ([facility] [address] 1/16/2018    Atelectasis 4/27/2017    BiPAP (biphasic positive airway pressure) dependence     Bladder retention 4/26/2017    Body temperature low     Breast lump in female     one at 1 oclock and one at 7 oclock    Cellulitis     left elbow    Cellulitis of left elbow     Central sleep apnea 1/9/2017    Chronic respiratory failure with hypoxia and hypercapnia (HCC) 4/9/2017    Closed fracture of shaft of right humerus with routine healing 4/24/2018    Community acquired pneumonia of left lower lobe of lung 1/13/2018    Constipation 8/6/2018    Contracture of elbow joint, right 11/30/2017    Convulsions ([address]     Dependence on enabling machine 4/27/2017    Dependent on ventilator ([address]     Diarrhea 8/6/2018    Dystonia     Encounter for removal of internal fixation device 4/11/2013    Overview:  Removal of spinal rods and baclofen pump on 4.12.13 for suspected infection    Fibroadenoma of breast     Fistula     G tube feedings (HCC)     Gastric reflux 8/6/2018    Gastroesophageal reflux disease without esophagitis 10/20/2015    GERD (gastroesophageal reflux disease)     History of recurrent pneumonia 4/27/2017    Hypercapnic respiratory failure ([address]     Hypotension 3/20/2017     Updating Deprecated Diagnoses    Hypothermia due to non-environmental cause 4/8/2017    Kyphoscoliosis     Mild sleep apnea     not treated    Muscle spasticity 4/8/2013    Myoclonus     Myoneural disorder (HCC)     Neuromuscular disease (HCC)     Nonverbal     ISELA (obstructive sleep apnea)     Pneumonia due to infectious organism     left lower lob    Pneumonia of left lower lobe due to infectious organism 2/28/2017    Prolonged Q-T interval on ECG     Prolonged QT interval 4/8/2017    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 ([address] 1/9/2017    Rett's syndrome     Scoliosis     Seizure ([address] 1/23/2018    Seizure disorder ([address]     Seizures ([address]     Sepsis ([address]     Sialorrhea     Skin infection at gastrostomy tube site [facility])     Sleep-related hypoventilation     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 ([address] 2/14/2017    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    Tonic clonic seizures (HCC)     Tremors of nervous system     Urinary retention with incomplete bladder emptying     Vitamin D deficiency     Wheelchair bound      Past Surgical History:   Procedure Laterality Date    ABDOMEN SURGERY      BACLOFEN PUMP IMPLANTATION  [id number]    BACLOFEN PUMP IMPLANTATION  06/28/2016    CHOLECYSTECTOMY  [id number]    GASTRIC FUNDOPLICATION  [id number]    SPINAL FUSION  [id number]    removal of spinal and baclofen pump 04/01/2013     Family History   Problem Relation Age of Onset    High Blood Pressure Mother        Social History     Socioeconomic History    Marital status: Single     Spouse name: Not on file    Number of children: Not on file    Years of education: Not on file    Highest education level: Not on file   Occupational History    Not on file   Tobacco Use    Smoking status: Never    Smokeless tobacco: Never   Vaping Use    Vaping Use: Never used   Substance and Sexual Activity    Alcohol use: No    Drug use: No    Sexual activity: Never   Other Topics Concern    Not on file   Social History Narrative    Not on file     Social Determinants of Health     Financial Resource Strain: Low Risk     Difficulty of Paying Living Expenses: Not hard at all   Food Insecurity: No Food Insecurity    Worried About Running Out of Food in the Last Year: Never true    Ran Out of Food in the Last Year: Never true   Transportation Needs: Not on file   Physical Activity: Not on file   Stress: Not on file   Social Connections: Not on file   Intimate Partner Violence: Not on file   Housing Stability: Not on file       Review of Systems   Reason unable to perform ROS: Patient non verbal. Caregiver denies any respiratory concerns. Objective:       Physical Exam  General appearance -small framed female in a wheelchair.   Nonverbal.  Mental status -nonverbal  Eyes - pupils equal and reactive, extraocular eye movements intact  Ears -not examined  Nose - not examined  Mouth - not examined  Neck - supple, no significant adenopathy  Chest - clear to auscultation, no wheezes, rales or rhonchi, symmetric air entry, shallow breathing. No adventitious lung sounds. Heart -normal rate, regular rhythm, normal S1, S2, no murmurs, rubs, clicks or gallops  Abdomen - soft, nontender, nondistended, no masses or organomegaly  Neuro-Limited exam.  Patient is in a wheelchair for mobility.   Stiff  Extremities - peripheral pulses normal, no pedal edema, no clubbing or cyanosis  Skin - normal coloration and turgor, no rashes, no suspicious skin lesions noted     Wt Readings from Last 3 Encounters:   01/24/23 78 lb (35.4 kg)   08/10/22 74 lb (33.6 kg)   02/10/22 79 lb 12.8 oz (36.2 kg)       Results for orders placed or performed in visit on 03/17/22   Lipid Panel   Result Value Ref Range    Cholesterol, Total 199 mg/dL    HDL 55 35 - 70 mg/dL    LDL Calculated 129 0 - 160 mg/dL    Triglycerides 75 mg/dL    Chol/HDL Ratio 3.6     VLDL 15 mg/dL    Cholesterol non HDL     Hepatitis C Antibody   Result Value Ref Range    Antibody Screen Nonreactive    Comprehensive Metabolic Panel   Result Value Ref Range    Sodium 140 mmol/L    Chloride 102 mmol/L    Potassium 4.9 mmol/L    BUN 17 mg/dL    Creatinine 0.25     Glucose 79 mg/dL    AST 26 U/L    ALT 22 U/L    Calcium 9.4 mg/dL    Total Protein 7.3     CO2 25 mmol/L    Albumin 4.7     Alkaline Phosphatase 65 U/L    Total Bilirubin 0.3 0.1 - 1.4 mg/dL    Gfr Calculated >60     Anion Gap 13 mmol/L   CBC   Result Value Ref Range    WBC 7.4 10^3/mL    Hemoglobin 13.5 12.0 - 16.0 g/dL    Hematocrit 41.3 36 - 46 %    Platelets 873 K/µL    Neutrophils %      Lymphocytes %      Monocytes %      Eosinophils %      Basophils %      Neutrophils Absolute      Lymphocytes Absolute      Monocytes Absolute      Eosinophils Absolute      Basophils Absolute      RBC 4.90 10^6/µL    MCV 84 fL    MCH 27.6 pg    MCHC 32.7 g/dL    MPV 7.8 fL   Vitamin D 25 Hydroxy   Result Value Ref Range    Vit D, 25-Hydroxy 63.9     Vitamin D3,25 Hydroxy      Vitamin D2, 25 Hydroxy     TSH without Reflex   Result Value Ref Range    TSH 0.59 uIU/mL      Result Value Ref Range     8        No results found. Assessment:      1. Central sleep apnea    2. 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 Chronic respiratory failure with hypercapnia (HCC)    4. Rett's syndrome          Plan:      Medications reviewed, continue as ordered. Educated and clarified the medication use. Refills sent to Rx if requested. Recommend flu vaccination in the fall annually. Patient is up-to-date with pneumococcal vaccinations from pulmonary perspective. Patient has received initial Covid vaccination recommended bivalent booster when eligible. Discussed strategies to protect against Covid 19. Maintain an active lifestyle. Patient's questions were answered to her satisfaction. CT scan of the chest was reviewed-radiology report from [facility]   Compliance data not available for my review.  Per patient caregiver report they use the machine faithfully every night  We'll see the patient back in 6 months with Dr. Nolan Aguilar          Electronically signed by KYLE Winkler CNP on 1/24/2023 at 12:22 PM

## 2023-01-24 ENCOUNTER — OFFICE VISIT (OUTPATIENT)
Dept: PULMONOLOGY | Age: 28
End: 2023-01-24
Payer: COMMERCIAL

## 2023-01-24 VITALS
SYSTOLIC BLOOD PRESSURE: 124 MMHG | HEIGHT: 57 IN | OXYGEN SATURATION: 95 % | BODY MASS INDEX: 16.83 KG/M2 | HEART RATE: 84 BPM | DIASTOLIC BLOOD PRESSURE: 90 MMHG | WEIGHT: 78 LBS

## 2023-01-24 DIAGNOSIS — G70.9 RESTRICTIVE LUNG MECHANICS DUE TO NEUROMUSCULAR DISEASE (HCC): ICD-10-CM

## 2023-01-24 DIAGNOSIS — J96.12 CHRONIC RESPIRATORY FAILURE WITH HYPERCAPNIA (HCC): ICD-10-CM

## 2023-01-24 DIAGNOSIS — J98.4 RESTRICTIVE LUNG MECHANICS DUE TO NEUROMUSCULAR DISEASE (HCC): ICD-10-CM

## 2023-01-24 DIAGNOSIS — F84.2 RETT'S SYNDROME: Chronic | ICD-10-CM

## 2023-01-24 DIAGNOSIS — G47.31 CENTRAL SLEEP APNEA: Primary | ICD-10-CM

## 2023-01-24 PROCEDURE — 99213 OFFICE O/P EST LOW 20 MIN: CPT | Performed by: NURSE PRACTITIONER

## 2023-01-24 RX ORDER — PREDNISONE 20 MG/1
20 TABLET ORAL 2 TIMES DAILY
Qty: 10 TABLET | Refills: 0 | Status: SHIPPED | OUTPATIENT
Start: 2023-01-24 | End: 2023-01-29

## 2023-01-24 RX ORDER — LEVOFLOXACIN 500 MG/1
500 TABLET, FILM COATED ORAL DAILY
Qty: 7 TABLET | Refills: 0 | Status: SHIPPED | OUTPATIENT
Start: 2023-01-24 | End: 2023-01-31

## 2023-01-24 RX ORDER — LEVALBUTEROL INHALATION SOLUTION 1.25 MG/3ML
SOLUTION RESPIRATORY (INHALATION)
Qty: 270 ML | Refills: 11 | Status: SHIPPED | OUTPATIENT
Start: 2023-01-24

## 2023-02-08 DIAGNOSIS — N39.0 RECURRENT UTI: ICD-10-CM

## 2023-02-09 RX ORDER — SULFAMETHOXAZOLE AND TRIMETHOPRIM 200; 40 MG/5ML; MG/5ML
SUSPENSION ORAL
Qty: 225 ML | Refills: 11 | Status: SHIPPED | OUTPATIENT
Start: 2023-02-09 | End: 2024-02-09

## 2023-02-09 NOTE — TELEPHONE ENCOUNTER
LOV 1-16-23  LRF 2-10-22    Health Maintenance   Topic Date Due    Varicella vaccine (2 of 2 - 2-dose childhood series) 03/30/2000    Depression Screen  Never done    Pap smear  Never done    COVID-19 Vaccine (3 - Booster for Moderna series) 04/21/2021    DTaP/Tdap/Td vaccine (8 - Td or Tdap) 10/16/2030    Hepatitis A vaccine  Completed    Hib vaccine  Completed    HPV vaccine  Completed    Meningococcal (ACWY) vaccine  Completed    Flu vaccine  Completed    Pneumococcal 0-64 years Vaccine  Completed    Hepatitis C screen  Completed    HIV screen  Discontinued             (applicable per patient's age: Cancer Screenings, Depression Screening, Fall Risk Screening, Immunizations)    LDL Calculated (mg/dL)   Date Value   03/11/2022 129     AST (U/L)   Date Value   03/11/2022 26     ALT (U/L)   Date Value   03/11/2022 22     BUN (mg/dL)   Date Value   03/11/2022 17      (goal A1C is < 7)   (goal LDL is <100) need 30-50% reduction from baseline     BP Readings from Last 3 Encounters:   01/24/23 (!) 124/90   08/10/22 122/74   02/10/22 108/72    (goal /80)      All Future Testing planned in [facility]:  Lab Frequency Next Occurrence   Venous Blood Gas Once 02/10/2022       Next Visit Date:  Future Appointments   Date Time Provider Kishan Reich   2/15/2023 12:30 PM MD Eliu Escobar   7/28/2023  1:00 PM Shirleyann Primrose, MD Resp Spec Alli Brown   8/16/2023 12:30 PM MD Dougie Escobar            Patient Active Problem List:     Rett's syndrome     Scoliosis     Dystonia     Tremors of nervous system     Convulsions ([address]     Fibroadenoma of breast     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 ([address]     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 ([address]     Cellulitis of left elbow     Acute respiratory failure with hypercapnia ([address]     Skin infection at gastrostomy tube site [facility])     Acute on chronic respiratory failure with hypoxia and hypercapnia (HCC)     Body temperature low     Hypotension     Nonverbal     Sepsis (HCC)     Hypothermia due to non-environmental cause     Prolonged QT interval     Urinary retention with incomplete bladder emptying     Chronic respiratory failure with hypoxia and hypercapnia (HCC)     Myoclonus     Atelectasis     Dependence on enabling machine     Dependent on ventilator ([address]     Delay in development     Tonic-clonic seizures ([address]     History of recurrent pneumonia     Fistula     Encounter for removal of internal fixation device     Sialorrhea     Myoneural disorder (HCC)     Muscle spasticity     Bladder retention     Intractable vomiting     Seizure (HCC)     Seizure disorder (HCC)     Abnormal electroencephalogram     Closed fracture of shaft of right humerus with routine healing     Constipation     Contracture of elbow joint, right     Diarrhea     Gastric reflux     Spastic quadriplegic cerebral palsy ([address]     Wheelchair bound     Malnutrition related to chronic disease ([address]     Pneumonia     Severe malnutrition ([address]     Aspiration pneumonia ([address]

## 2023-02-15 ENCOUNTER — TELEPHONE (OUTPATIENT)
Dept: FAMILY MEDICINE CLINIC | Age: 28
End: 2023-02-15

## 2023-02-15 ENCOUNTER — OFFICE VISIT (OUTPATIENT)
Dept: FAMILY MEDICINE CLINIC | Age: 28
End: 2023-02-15

## 2023-02-15 VITALS
TEMPERATURE: 98 F | DIASTOLIC BLOOD PRESSURE: 88 MMHG | OXYGEN SATURATION: 94 % | SYSTOLIC BLOOD PRESSURE: 122 MMHG | HEART RATE: 66 BPM

## 2023-02-15 DIAGNOSIS — G24.9 DYSTONIA: ICD-10-CM

## 2023-02-15 DIAGNOSIS — E55.9 VITAMIN D DEFICIENCY: ICD-10-CM

## 2023-02-15 DIAGNOSIS — M41.9 RESTRICTIVE LUNG DISEASE DUE TO KYPHOSCOLIOSIS: ICD-10-CM

## 2023-02-15 DIAGNOSIS — K59.01 SLOW TRANSIT CONSTIPATION: ICD-10-CM

## 2023-02-15 DIAGNOSIS — F84.2 RETT'S SYNDROME: ICD-10-CM

## 2023-02-15 DIAGNOSIS — K21.9 GASTROESOPHAGEAL REFLUX DISEASE, UNSPECIFIED WHETHER ESOPHAGITIS PRESENT: ICD-10-CM

## 2023-02-15 DIAGNOSIS — N39.0 RECURRENT UTI: ICD-10-CM

## 2023-02-15 DIAGNOSIS — G80.0 SPASTIC QUADRIPLEGIC CEREBRAL PALSY (HCC): ICD-10-CM

## 2023-02-15 DIAGNOSIS — Z00.00 ANNUAL PHYSICAL EXAM: Primary | ICD-10-CM

## 2023-02-15 DIAGNOSIS — E67.3 HIGH VITAMIN D LEVEL: ICD-10-CM

## 2023-02-15 DIAGNOSIS — Z80.41 FAMILY HISTORY OF OVARIAN CANCER: ICD-10-CM

## 2023-02-15 DIAGNOSIS — R25.1 TREMORS OF NERVOUS SYSTEM: ICD-10-CM

## 2023-02-15 DIAGNOSIS — N31.9 NEUROGENIC BLADDER: ICD-10-CM

## 2023-02-15 DIAGNOSIS — G47.31 CENTRAL SLEEP APNEA: ICD-10-CM

## 2023-02-15 DIAGNOSIS — J96.12 CHRONIC RESPIRATORY FAILURE WITH HYPOXIA AND HYPERCAPNIA (HCC): ICD-10-CM

## 2023-02-15 DIAGNOSIS — J98.4 RESTRICTIVE LUNG DISEASE DUE TO KYPHOSCOLIOSIS: ICD-10-CM

## 2023-02-15 DIAGNOSIS — G47.36: ICD-10-CM

## 2023-02-15 DIAGNOSIS — G47.33 OSA (OBSTRUCTIVE SLEEP APNEA): ICD-10-CM

## 2023-02-15 DIAGNOSIS — G47.01 INSOMNIA DUE TO MEDICAL CONDITION: ICD-10-CM

## 2023-02-15 DIAGNOSIS — D24.9 FIBROADENOMA OF BREAST, UNSPECIFIED LATERALITY: ICD-10-CM

## 2023-02-15 DIAGNOSIS — J96.11 CHRONIC RESPIRATORY FAILURE WITH HYPOXIA AND HYPERCAPNIA (HCC): ICD-10-CM

## 2023-02-15 DIAGNOSIS — G70.9: ICD-10-CM

## 2023-02-15 DIAGNOSIS — G40.909 SEIZURE DISORDER (HCC): ICD-10-CM

## 2023-02-15 DIAGNOSIS — R33.9 URINARY RETENTION WITH INCOMPLETE BLADDER EMPTYING: ICD-10-CM

## 2023-02-15 ASSESSMENT — PATIENT HEALTH QUESTIONNAIRE - PHQ9: DEPRESSION UNABLE TO ASSESS: FUNCTIONAL CAPACITY MOTIVATION LIMITS ACCURACY

## 2023-02-15 NOTE — TELEPHONE ENCOUNTER
Marco Rojas PT from [address] calling to notify PCP that patient was re-assessed today for further PT services. Jurline Render stated that the plan of care is for patient to complete 4 more weeks of PT  2 X weekly, then possibly maintenance. Marco Render wanted PCP to be aware that patient BP was elevated today @ 115/91. Patient has a follow up appointment with PCP today.

## 2023-02-15 NOTE — PROGRESS NOTES
2/15/2023    Tanya Herron (:  1995) is a 33 y.o. female, here for a preventive medicine evaluation. Patient presents today for routine annual physical.  She does have chronic medical problems including Rett syndrome, seizure disorder, spastic quadriplegia, dystonia, muscle tremors, hypercapnic respiratory failure, ISELA and central sleep apnea. She does have restrictive lung disease due to kyphoscoliosis, hypoventilation syndrome, GERD and chronic constipation. She also has insomnia and vitamin D deficiency as well as a neurogenic bladder and recurrent UTI. She is here today with her caregiver Alber Harper. They both report that she has been doing well. She does follow at [facility] regularly for her history of Rett syndrome. She sees her neurologist, Dr. Alesha Henry in [place] and her neurologist, Dr. Leon Perez here in [place]. She does have a history of seizure disorder, spastic quadriplegia, dystonia and frequent muscle tremors. She did have DBS surgery in 2022 at [facility].  Her caretaker reports that she is doing very well from this standpoint. She continues to getter and better with this. Her mother is changing her settings based on her neurology recommendations. She has been much more vocal and moving a lot more. They did also add iron to her regimen in hopes this might help. She is going to continue with physical therapy and I agree with this. She has been grinding her teeth a little more and scratching her skin which could be complications from the surgery. Her neurologist is aware of this. He thought maybe this was something behavioral and offered Klonopin. They did not want to start another medication. They are monitoring. He does continue on a baclofen pump and sees Dr. Leon Perez every 6 weeks for refill of the baclofen pump. She did have hip injections with one of her doctors in [place] because of some hip tightness.   She does have good control of her seizures with her current medications. Her caretaker reports that she is as though her seizures are less since having her DBS surgery. She does follow-up with [facility] regularly. She does continue to follow with pulmonary for her history of hypercapnic respiratory failure, ISELA, central sleep apnea, restrictive lung disease, hypoventilation syndrome and chronic dependence on trilogy noninvasive vent. She does wear this nightly but has been having a hard time leaving this on at night. Her oxygen levels are dropping because of this. She has had some elevated blood pressure readings and I suspect this could be from not using her vent all night and her low sats. This could also be related to her DBS surgery. Her mother has been instructed to keep in contact with her surgeon and docs at [facility] about this. She does have a history of GERD and takes Nexium for this. This controls her symptoms well. She does continue to get nutrition with oral and tube feeds. She really isn't taking much by mouth. She does continue on a [employer]. She does work with a nutritionist in [place]. Her weight has been stable. She did have a swallow study done last year in [place] and this did show that she was having some aspiration with certain foods and so she continues on a honey thick nectar diet. She does have a history of chronic constipation and uses milk of magnesia 8 mL twice daily and she takes Florastor and align probiotics once daily. She does get MiraLAX daily and fleets enemas on Monday, Wednesday and Friday. She does continue on Lunesta for her history of insomnia and she sleeps fairly well with this. This has been recommended by her neurologist at [facility]. She does have a history of vitamin D deficiency that is treated with vitamin D supplement. She did previously have an elevated vitamin D level and her dosage was changed. She does continue on 2000 IUs once daily.   She does have a neurogenic bladder with urinary retention and incomplete bladder emptying. She has had several UTIs in the past.  She does follow with urology, Dr. Louise Wallace at [facility] in [place] intermittently. He recommended low-dose antibiotic to prevent UTI and she does continue on this daily. She has not had any recent urinary tract infections. She does see Dr. Kiran Medina, gynecology for her history of abnormal menstrual cycles. She does continue on Depo-Provera and this is doing very well. She does have a family history of ovarian cancer in her maternal grandmother. She also has a history of breast lumps that are followed by Dr. Kiran Medina with routine exam.  It has been recommended that she continue with speech therapy, occupational therapy and physical therapy twice weekly. She does continue to use her Eyegaze device. Her communication has improved with this. She is playing games. She is due for fasting labs. Her caretaker has no other concerns at this time.    cmw      Patient Active Problem List   Diagnosis    Rett's syndrome    Scoliosis    Dystonia    Tremors of nervous system    Convulsions ([address]    Fibroadenoma of breast    Vitamin D deficiency    Central sleep apnea    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 ([address]    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 ([address]    Cellulitis of left elbow    Acute respiratory failure with hypercapnia (HCC)    Skin infection at gastrostomy tube site ([address]    Acute on chronic respiratory failure with hypoxia and hypercapnia (HCC)    Body temperature low    Hypotension    Nonverbal    Sepsis ([address]    Hypothermia due to non-environmental cause    Prolonged QT interval    Urinary retention with incomplete bladder emptying    Chronic respiratory failure with hypoxia and hypercapnia (HCC)    Myoclonus    Atelectasis    Dependence on enabling machine    Dependent on ventilator ([address]    Delay in development    Tonic-clonic seizures [facility])    History of recurrent pneumonia    Fistula    Encounter for removal of internal fixation device    Sialorrhea    Myoneural disorder ([facility]    Muscle spasticity    Bladder retention    Intractable vomiting    Seizure (HCC)    Seizure disorder ([facility])    Abnormal electroencephalogram    Closed fracture of shaft of right humerus with routine healing    Constipation    Contracture of elbow joint, right    Diarrhea    Gastric reflux    Spastic quadriplegic cerebral palsy ([address]    Wheelchair bound    Malnutrition related to chronic disease ([facility]    Pneumonia    Severe malnutrition (HCC)    Aspiration pneumonia ([facility]       Review of Systems   Reason unable to perform ROS: Pt nonverbal - ROS per mother. Constitutional:  Negative for appetite change, fatigue, fever and unexpected weight change. HENT:  Negative for congestion, postnasal drip, rhinorrhea, tinnitus and trouble swallowing. Eyes:  Negative for photophobia, pain, discharge and redness. Respiratory:  Positive for apnea (ISELA and Central sleep apnea). Negative for cough, shortness of breath, wheezing and stridor. Hypoventilation syndrome with hypoxia and hypercapnia - on trilogy ventilator at night   Cardiovascular:  Negative for chest pain, palpitations and leg swelling. Gastrointestinal:  Positive for constipation and diarrhea (occasional loose stools). Negative for abdominal pain, blood in stool and vomiting. Has a G-tube for medications and tube feeds. She has a history of GERD controlled with nexium. Endocrine: Negative for polydipsia and polyphagia. Genitourinary:  Negative for decreased urine volume, dysuria, hematuria and urgency. Recurrent uti - now on daily prophylactic ATB   Musculoskeletal:  Negative for back pain and myalgias. Dystonia and muscle spasms   Skin:  Negative for color change and rash. Allergic/Immunologic: Negative for immunocompromised state.    Neurological:  Positive for tremors and seizures (followed by neurology, Dr. Arnoldo Bernard at [facility] and Dr. Roopa Vale here in [place]). Negative for dizziness, light-headedness and headaches. Hematological:  Negative for adenopathy. Does not bruise/bleed easily. Psychiatric/Behavioral:  Positive for sleep disturbance (stable with lunesta). Negative for decreased concentration and dysphoric mood. The patient is not nervous/anxious. Prior to Visit Medications    Medication Sig Taking? Authorizing Provider   sulfamethoxazole-trimethoprim (BACTRIM;SEPTRA) 200-40 MG/5ML suspension GIVE 7.5 MLS BY G-TUBE DAILY Yes Adele Campos MD   levalbuterol (XOPENEX) 1.25 MG/3ML nebulizer solution USE ONE VIAL VIA NEBULIZER BY MOUTH EVERY 4 HOURS AS NEEDED FOR WHEEZING Yes KYLE Brooks CNP   ferrous sulfate (IRON SUPPLEMENT) 75 (15 Fe) MG/ML solution 5 mLs by Per G Tube route daily Yes KYLE Delaney CNP   FLUoxetine (PROZAC) 20 MG/5ML solution TAKE ONE TEASPOONFUL BY MOUTH EVERY DAY Yes Adele Campos MD   esomeprazole ([address]) 20 MG delayed release capsule TAKE ONE CAPSULE BY MOUTH TWICE A DAY IN THE MORNING AND EVENING PER G-TUBE Yes Adele Campos MD   eszopiclone (LUNESTA) 2 MG TABS Take 1 tablet by mouth nightly. Yes Adele Campos MD   lidocaine-prilocaine (EMLA) 2.5-2.5 % cream Apply a quarter size amount of cream topically as needed with an occlusive dressing for 30 minutes prior to procedure. Yes Adele Campos MD   diazePAM (DIASTAT) 10 MG GEL Place 10 mg rectally once as needed (seizure) for up to 1 dose.  Yes Adele Campos MD   doxazosin (CARDURA) 1 MG tablet Take 1 tablet by mouth nightly Yes Adele Campos MD   Probiotic Product (ALIGN) 4 MG CAPS Take 4 mg by mouth nightly Yes Adele Campos MD   saccharomyces boulardii (FLORASTOR) 250 MG capsule Take 1 capsule by mouth daily Yes Adele Campos MD   Sodium Phosphates (FLEET) 7-19 GM/118ML Place 1 enema rectally daily as needed (constipation) Yes Mackenzie Ren MD   magnesium hydroxide (MILK OF MAGNESIA CONCENTRATE) 2400 MG/10ML SUSP Take 8 mLs by mouth 2 times daily Yes Mackenzie Ren MD   acetaminophen (TYLENOL CHILDRENS) 160 MG/5ML suspension Take 18 mLs by mouth every 6 hours as needed for Fever Yes Mackenzie Ren MD   ibuprofen (ADVIL;MOTRIN) 100 MG/5ML suspension Take 20 mLs by mouth every 4 hours as needed for Fever Yes Mackenzie Ren MD   D-Mannose 500 MG CAPS 1 capsule once daily through g-tube Yes Mackenzie Ren MD   vitamin D3 (CHOLECALCIFEROL) 25 MCG (1000 UT) TABS tablet Take 2 tablets by mouth daily Yes Mackenzie Ren MD   Multiple Vitamin (MULTIVITAMIN) capsule Take 1 capsule by mouth daily Yes Mackenzie Ren MD   sulfamethoxazole-trimethoprim (BACTRIM) 400-80 MG/5ML injection Infuse intravenously Yes Historical Provider, MD   Cholecalciferol (AQUEOUS VITAMIN D) 10 MCG/ML LIQD TAKE 5 ML VIA G-TUBE ONCE DAILY Yes KYLE Putnam NP   baclofen 10 MG/5ML SOLN BRING TO APPOINTMENT AS DIRECTED Yes Historical Provider, MD   medroxyPROGESTERone (DEPO-PROVERA) 150 MG/ML injection INJECT 1 MILLILITER INTO APPROPRIATE MUSCLE ONCE FOR 1 DOSE Yes Historical Provider, MD   docusate sodium (ENEMEEZ) 283 MG enema Place 1 enema rectally Monday, Wednesday & Friday Yes Historical Provider, MD   levETIRAcetam (KEPPRA) 100 MG/ML solution Take 14ML two times a day by G-button  Patient taking differently: Take 20ML two times a day by G-button Yes Mackenzie Ren MD   Nutritional Supplements (PEPTAMEN 1 NEETU) LIQD Take 1 Can by mouth 3 times daily Peptamen Adult Yes Mackenzie Ren MD   Disposable Gloves (CAREMATES NITRILE GLOVES MED) MISC Use with each diaper change  Mackenzie Ren MD   Infant Care Products (BABY WIPES) MISC Use with each diaper change  Mackenzie Ren MD Distilled Water LIQD Use daily for medications and respiratory equipment  Adele Campos MD   Feeding Tubes - Sets (JAYDEN-KEY GASTROSTOMY KIT 18FR) MISC 3.5 cm  Adele Campos MD        Allergies   Allergen Reactions    Clindamycin/Lincomycin     Gentamicin     Hydrocodone     Tape [place] Tape] Rash     Redness that lasts a couple days       Past Medical History:   Diagnosis Date    Abnormal electroencephalogram 6/25/2018    Acute on chronic respiratory failure with hypoxia and hypercapnia (HCC)     Acute respiratory failure with hypercapnia (HCC)     Aspiration pneumonia ([address] 1/16/2018    Atelectasis 4/27/2017    BiPAP (biphasic positive airway pressure) dependence     Bladder retention 4/26/2017    Body temperature low     Breast lump in female     one at 3 oclock and one at 7 oclock    Cellulitis     left elbow    Cellulitis of left elbow     Central sleep apnea 1/9/2017    Chronic respiratory failure with hypoxia and hypercapnia (HCC) 4/9/2017    Closed fracture of shaft of right humerus with routine healing 4/24/2018    Community acquired pneumonia of left lower lobe of lung 1/13/2018    Constipation 8/6/2018    Contracture of elbow joint, right 11/30/2017    Convulsions ([address]     Dependence on enabling machine 4/27/2017    Dependent on ventilator ([address]     Diarrhea 8/6/2018    Dystonia     Encounter for removal of internal fixation device 4/11/2013    Overview:  Removal of spinal rods and baclofen pump on 4.12.13 for suspected infection    Fibroadenoma of breast     Fistula     G tube feedings (HCC)     Gastric reflux 8/6/2018    Gastroesophageal reflux disease without esophagitis 10/20/2015    GERD (gastroesophageal reflux disease)     History of recurrent pneumonia 4/27/2017    Hypercapnic respiratory failure ([address]     Hypotension 3/20/2017     Updating Deprecated Diagnoses    Hypothermia due to non-environmental cause 4/8/2017    Kyphoscoliosis     Mild sleep apnea     not treated    Muscle spasticity 4/8/2013    Myoclonus     Myoneural disorder (HCC)     Neuromuscular disease ([facility])     Nonverbal     ISELA (obstructive sleep apnea)     Pneumonia due to infectious organism     left lower lob    Pneumonia of left lower lobe due to infectious organism 2/28/2017    Prolonged Q-T interval on ECG     Prolonged QT interval 4/8/2017    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 ([facility]) 1/9/2017    Rett's syndrome     Scoliosis     Seizure ([facility]) 1/23/2018    Seizure disorder ([facility])     Seizures ([facility])     Sepsis ([facility])     Sialorrhea     Skin infection at gastrostomy tube site ([facility])     Sleep-related hypoventilation     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 ([facility]) 2/14/2017    Spastic quadriplegic cerebral palsy ([facility]) 2/26/2018    Tonic clonic seizures ([facility])     Tremors of nervous system     Urinary retention with incomplete bladder emptying     Vitamin D deficiency     Wheelchair bound        Past Surgical History:   Procedure Laterality Date    ABDOMEN SURGERY      BACLOFEN PUMP IMPLANTATION  [id number]    BACLOFEN PUMP IMPLANTATION  06/28/2016    CHOLECYSTECTOMY  [id number]    GASTRIC FUNDOPLICATION  [id number]    SPINAL FUSION  [id number]    removal of spinal and baclofen pump 04/01/2013       Social History     Socioeconomic History    Marital status: Single     Spouse name: Not on file    Number of children: Not on file    Years of education: Not on file    Highest education level: Not on file   Occupational History    Not on file   Tobacco Use    Smoking status: Never    Smokeless tobacco: Never   Vaping Use    Vaping Use: Never used   Substance and Sexual Activity    Alcohol use: No    Drug use: No    Sexual activity: Never   Other Topics Concern    Not on file   Social History Narrative    Not on file     Social Determinants of Health     Financial Resource Strain: Not on file   Food Insecurity: Not on file   Transportation Needs: Not on file   Physical Activity: Not on  file   Stress: Not on file   Social Connections: Not on file   Intimate Partner Violence: Not on file   Housing Stability: Not on file        Family History   Problem Relation Age of Onset    High Blood Pressure Mother        ADVANCE DIRECTIVE: N, <no information>    Vitals:    02/15/23 1241   BP: 122/88   Site: Left Upper Arm   Position: Sitting   Cuff Size: Child   Pulse: 66   Temp: 98 °F (36.7 °C)   TempSrc: Temporal   SpO2: 94%     Estimated body mass index is 16.88 kg/m² as calculated from the following:    Height as of 1/24/23: 4' 9\" (1.448 m). Weight as of 1/24/23: 78 lb (35.4 kg). Physical Exam  Vitals and nursing note reviewed. Constitutional:       General: She is not in acute distress. Appearance: She is well-developed. She is not diaphoretic. Comments: In wheelchair, non verbal   HENT:      Head: Normocephalic. Right Ear: Tympanic membrane, ear canal and external ear normal.      Left Ear: Tympanic membrane, ear canal and external ear normal. There is no impacted cerumen. Nose: Nose normal. No congestion or rhinorrhea. Comments: drools     Mouth/Throat:      Mouth: Mucous membranes are moist.   Eyes:      General: No scleral icterus. Right eye: No discharge. Left eye: No discharge. Pupils: Pupils are equal, round, and reactive to light. Neck:      Thyroid: No thyromegaly. Vascular: No JVD. Comments: Head tilt to the left   Cardiovascular:      Rate and Rhythm: Normal rate and regular rhythm. Heart sounds: Normal heart sounds. No murmur heard. Pulmonary:      Effort: Pulmonary effort is normal. No respiratory distress. Breath sounds: Normal breath sounds. No stridor. No wheezing, rhonchi or rales. Abdominal:      General: There is no distension. Palpations: Abdomen is soft. There is no mass. Tenderness: There is no abdominal tenderness. There is no right CVA tenderness or left CVA tenderness.        Musculoskeletal: Cervical back: Normal range of motion. Right lower leg: No edema. Left lower leg: No edema. Comments: Dystonia with contractures of upper and lower extremities     Skin:     General: Skin is warm. Capillary Refill: Capillary refill takes 2 to 3 seconds. Comments: She has a reddish / purplish discoloration to her feet and lower extremities. Neurological:      Mental Status: She is alert. Motor: Atrophy and abnormal muscle tone present. No seizure activity. Coordination: Coordination abnormal.      Deep Tendon Reflexes: Reflexes abnormal.       No flowsheet data found. Lab Results   Component Value Date/Time    CHOL 199 03/11/2022 12:00 AM    TRIG 75 03/11/2022 12:00 AM    HDL 55 03/11/2022 12:00 AM    LDLCALC 129 03/11/2022 12:00 AM    GLUCOSE 79 03/11/2022 12:00 AM       The ASCVD Risk score (Florin GRAY, et al., 2019) failed to calculate for the following reasons:     The 2019 ASCVD risk score is only valid for ages 36 to 78    Immunization History   Administered Date(s) Administered    COVID-19, MODERNA BLUE border, Primary or Immunocompromised, (age 12y+), IM, 100 mcg/0.5mL 01/27/2021, 02/24/2021    DTaP 1995, 1995, 1995, 04/15/1997, 09/04/2001    HIB PRP-T (ActHIB, Hiberix) 1996, 1996, 1996, 04/15/1997    HPV 9-valent [facility]) 11/27/2007, 02/08/2008, 07/11/2008    Hepatitis A 08/29/2009, 03/25/2011    Hepatitis B (Recombivax HB) 1995, 1995, 1995    Hepatitis B Adult (Engerix-B) 10/16/2020    Influenza Vaccine, unspecified formulation 10/27/2014    Influenza Virus Vaccine 10/27/2014, 10/12/2015, 10/20/2017, 10/19/2019, 10/26/2021, 11/08/2022    Influenza, AFLURIA (age 1 yrs+), FLUZONE, (age 10 mo+), MDV, 0.5mL 02/13/2017    Influenza, FLUARIX, FLULAVAL, FLUZONE (age 10 mo+) AND AFLURIA, (age 1 y+), PF, 0.5mL 01/18/2019, 10/16/2020    MMR 04/15/1997, 09/04/2001    Meningococcal MCV4P (Menactra) 11/27/2007, 08/24/2012 Pneumococcal Polysaccharide ([id number]) 02/15/2001, 02/13/2017    Pneumococcal conjugate PCV20, PF (Prevnar 20) 08/10/2022    Polio IPV (IPOL) 1996, 1996, 05/01/1997, 09/04/2001    Td (Adult), 5 Lf Tetanus Toxoid, Pf (Tenivac, Decavac) 10/16/2020    Tdap (Boostrix, Adacel) 11/27/2007    Varicella (Varivax) 04/29/1999       Health Maintenance   Topic Date Due    Varicella vaccine (2 of 2 - 2-dose childhood series) 03/30/2000    Depression Screen  Never done    Pap smear  Never done    COVID-19 Vaccine (3 - Booster for Moderna series) 04/21/2021    DTaP/Tdap/Td vaccine (8 - Td or Tdap) 10/16/2030    Hepatitis A vaccine  Completed    Hib vaccine  Completed    HPV vaccine  Completed    Meningococcal (ACWY) vaccine  Completed    Flu vaccine  Completed    Pneumococcal 0-64 years Vaccine  Completed    Hepatitis C screen  Completed    HIV screen  Discontinued       Assessment & Plan     Annual physical exam  -     Lipid Panel; Future  -     Comprehensive Metabolic Panel; Future  -     CBC with Auto Differential; Future  Rett's syndrome  -     TSH; Future  Seizure disorder (HCC)  -     TSH; Future  -     Levetiracetam Level; Future  Spastic quadriplegic cerebral palsy (HCC)  -     TSH; Future  Dystonia  Tremors of nervous system  Chronic respiratory failure with hypoxia and hypercapnia (HCC)  ISELA (obstructive sleep apnea)  Central sleep apnea  Restrictive lung disease due to kyphoscoliosis  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 (HCC)  Gastroesophageal reflux disease, unspecified whether esophagitis present  Slow transit constipation  Insomnia due to medical condition  Vitamin D deficiency  -     Vitamin D 25 Hydroxy; Future  High vitamin D level  -     Vitamin D 25 Hydroxy; Future  Neurogenic bladder  Urinary retention with incomplete bladder emptying  Recurrent UTI  Family history of ovarian cancer  -     ;  Future  Fibroadenoma of breast, unspecified laterality      Continue current medications  [facility] [place] [facility]y, neurosurgery, pulmonology, GI, urology and GYN  Obtain routine labs as ordered  Continue home health for nursing, speech, PT and OT  COVID-19 booster recommended  Call with concerns           Return in about 6 months (around 8/15/2023) for routine follow up.

## 2023-02-19 ASSESSMENT — ENCOUNTER SYMPTOMS
VOMITING: 0
CONSTIPATION: 1
BLOOD IN STOOL: 0
WHEEZING: 0
ABDOMINAL PAIN: 0
EYE REDNESS: 0
DIARRHEA: 1
SHORTNESS OF BREATH: 0
TROUBLE SWALLOWING: 0
EYE PAIN: 0
APNEA: 1
COLOR CHANGE: 0
BACK PAIN: 0
COUGH: 0
RHINORRHEA: 0
PHOTOPHOBIA: 0
STRIDOR: 0
EYE DISCHARGE: 0

## 2023-02-21 DIAGNOSIS — G47.01 INSOMNIA DUE TO MEDICAL CONDITION: ICD-10-CM

## 2023-02-22 RX ORDER — ESZOPICLONE 2 MG/1
2 TABLET, FILM COATED ORAL NIGHTLY
Qty: 30 TABLET | Refills: 2 | Status: SHIPPED | OUTPATIENT
Start: 2023-02-22 | End: 2024-02-22

## 2023-02-22 NOTE — TELEPHONE ENCOUNTER
LOV 2-15-23  LRF 9-12-22    Health Maintenance   Topic Date Due    Varicella vaccine (2 of 2 - 2-dose childhood series) 03/30/2000    Depression Screen  Never done    Pap smear  Never done    COVID-19 Vaccine (3 - Booster for Moderna series) 04/21/2021    DTaP/Tdap/Td vaccine (8 - Td or Tdap) 10/16/2030    Hepatitis A vaccine  Completed    Hib vaccine  Completed    HPV vaccine  Completed    Meningococcal (ACWY) vaccine  Completed    Flu vaccine  Completed    Pneumococcal 0-64 years Vaccine  Completed    Hepatitis C screen  Completed    HIV screen  Discontinued             (applicable per patient's age: Cancer Screenings, Depression Screening, Fall Risk Screening, Immunizations)    LDL Calculated (mg/dL)   Date Value   03/11/2022 129     AST (U/L)   Date Value   03/11/2022 26     ALT (U/L)   Date Value   03/11/2022 22     BUN (mg/dL)   Date Value   03/11/2022 17      (goal A1C is < 7)   (goal LDL is <100) need 30-50% reduction from baseline     BP Readings from Last 3 Encounters:   02/15/23 122/88   01/24/23 (!) 124/90   08/10/22 122/74    (goal /80)      All Future Testing planned in [facility]:  Lab Frequency Next Occurrence   Lipid Panel Once 02/20/2023   Comprehensive Metabolic Panel Once 68/26/0104   CBC with Auto Differential Once 02/20/2023   TSH Once 02/20/2023    Once 02/20/2023   Vitamin D 25 Hydroxy Once 02/20/2023   Levetiracetam Level Once 02/20/2023       Next Visit Date:  Future Appointments   Date Time Provider Kishan Reich   7/28/2023  1:00 PM Franklyn Raymundo MD Resp Spec [facility]   8/16/2023 12:30 PM MD Parish Lovelace [facility]            Patient Active Problem List:     Rett's syndrome     Scoliosis     Dystonia     Tremors of nervous system     Convulsions ([facility] [address]     Fibroadenoma of breast     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)     Acute on chronic respiratory failure with hypoxia and hypercapnia (HCC)     Body temperature low     Hypotension     Nonverbal     Sepsis (HCC)     Hypothermia due to non-environmental cause     Prolonged QT interval     Urinary retention with incomplete bladder emptying     Chronic respiratory failure with hypoxia and hypercapnia (HCC)     Myoclonus     Atelectasis     Dependence on enabling machine     Dependent on ventilator ([address]     Delay in development     Tonic-clonic seizures ([address]     History of recurrent pneumonia     Fistula     Encounter for removal of internal fixation device     Sialorrhea     Myoneural disorder (HCC)     Muscle spasticity     Bladder retention     Intractable vomiting     Seizure (HCC)     Seizure disorder (HCC)     Abnormal electroencephalogram     Closed fracture of shaft of right humerus with routine healing     Constipation     Contracture of elbow joint, right     Diarrhea     Gastric reflux     Spastic quadriplegic cerebral palsy ([address]     Wheelchair bound     Malnutrition related to chronic disease ([address]     Pneumonia     Severe malnutrition ([address]     Aspiration pneumonia ([address]

## 2023-02-23 DIAGNOSIS — R33.9 URINARY RETENTION WITH INCOMPLETE BLADDER EMPTYING: ICD-10-CM

## 2023-02-23 RX ORDER — DOXAZOSIN MESYLATE 1 MG/1
1 TABLET ORAL NIGHTLY
Qty: 30 TABLET | Refills: 11 | Status: SHIPPED | OUTPATIENT
Start: 2023-02-23 | End: 2024-02-23

## 2023-02-23 NOTE — TELEPHONE ENCOUNTER
LOV 2-15-22  LRF 2-10-22    Health Maintenance   Topic Date Due    Varicella vaccine (2 of 2 - 2-dose childhood series) 03/30/2000    Depression Screen  Never done    Pap smear  Never done    COVID-19 Vaccine (3 - Booster for Moderna series) 04/21/2021    DTaP/Tdap/Td vaccine (8 - Td or Tdap) 10/16/2030    Hepatitis A vaccine  Completed    Hib vaccine  Completed    HPV vaccine  Completed    Meningococcal (ACWY) vaccine  Completed    Flu vaccine  Completed    Pneumococcal 0-64 years Vaccine  Completed    Hepatitis C screen  Completed    HIV screen  Discontinued             (applicable per patient's age: Cancer Screenings, Depression Screening, Fall Risk Screening, Immunizations)    LDL Calculated (mg/dL)   Date Value   03/11/2022 129     AST (U/L)   Date Value   03/11/2022 26     ALT (U/L)   Date Value   03/11/2022 22     BUN (mg/dL)   Date Value   03/11/2022 17      (goal A1C is < 7)   (goal LDL is <100) need 30-50% reduction from baseline     BP Readings from Last 3 Encounters:   02/15/23 122/88   01/24/23 (!) 124/90   08/10/22 122/74    (goal /80)      All Future Testing planned in [facility]:  Lab Frequency Next Occurrence   Lipid Panel Once 02/20/2023   Comprehensive Metabolic Panel Once 26/60/9619   CBC with Auto Differential Once 02/20/2023   TSH Once 02/20/2023    Once 02/20/2023   Vitamin D 25 Hydroxy Once 02/20/2023   Levetiracetam Level Once 02/20/2023       Next Visit Date:  Future Appointments   Date Time Provider Kishan Reich   7/28/2023  1:00 PM Lamin Oreilly MD Resp Spec [facility]   8/16/2023 12:30 PM La Nena Harrison MD Cecile Blend [facility]            Patient Active Problem List:     Rett's syndrome     Scoliosis     Dystonia     Tremors of nervous system     Convulsions ([address]     Fibroadenoma of breast     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 ([address]     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 ([address]     Cellulitis of left elbow     Acute respiratory failure with hypercapnia (HCC)     Skin infection at gastrostomy tube site [facility])     Acute on chronic respiratory failure with hypoxia and hypercapnia (HCC)     Body temperature low     Hypotension     Nonverbal     Sepsis (HCC)     Hypothermia due to non-environmental cause     Prolonged QT interval     Urinary retention with incomplete bladder emptying     Chronic respiratory failure with hypoxia and hypercapnia (HCC)     Myoclonus     Atelectasis     Dependence on enabling machine     Dependent on ventilator ([address]     Delay in development     Tonic-clonic seizures ([address]     History of recurrent pneumonia     Fistula     Encounter for removal of internal fixation device     Sialorrhea     Myoneural disorder (HCC)     Muscle spasticity     Bladder retention     Intractable vomiting     Seizure (HCC)     Seizure disorder (HCC)     Abnormal electroencephalogram     Closed fracture of shaft of right humerus with routine healing     Constipation     Contracture of elbow joint, right     Diarrhea     Gastric reflux     Spastic quadriplegic cerebral palsy ([address]     Wheelchair bound     Malnutrition related to chronic disease ([address]     Pneumonia     Severe malnutrition ([address]     Aspiration pneumonia ([address]

## 2023-03-10 DIAGNOSIS — K21.9 GASTRIC REFLUX: ICD-10-CM

## 2023-03-13 NOTE — TELEPHONE ENCOUNTER
LOV 2-15-23  LRF 9-12-22    Health Maintenance   Topic Date Due    Varicella vaccine (2 of 2 - 2-dose childhood series) 03/30/2000    Depression Screen  Never done    Pap smear  Never done    COVID-19 Vaccine (3 - Booster for Moderna series) 04/21/2021    DTaP/Tdap/Td vaccine (8 - Td or Tdap) 10/16/2030    Hepatitis A vaccine  Completed    Hib vaccine  Completed    HPV vaccine  Completed    Meningococcal (ACWY) vaccine  Completed    Flu vaccine  Completed    Pneumococcal 0-64 years Vaccine  Completed    Hepatitis C screen  Completed    HIV screen  Discontinued             (applicable per patient's age: Cancer Screenings, Depression Screening, Fall Risk Screening, Immunizations)    LDL Calculated (mg/dL)   Date Value   03/11/2022 129     AST (U/L)   Date Value   03/11/2022 26     ALT (U/L)   Date Value   03/11/2022 22     BUN (mg/dL)   Date Value   03/11/2022 17      (goal A1C is < 7)   (goal LDL is <100) need 30-50% reduction from baseline     BP Readings from Last 3 Encounters:   02/15/23 122/88   01/24/23 (!) 124/90   08/10/22 122/74    (goal /80)      All Future Testing planned in [facility]:  Lab Frequency Next Occurrence   Lipid Panel Once 02/20/2023   Comprehensive Metabolic Panel Once 27/49/6332   CBC with Auto Differential Once 02/20/2023   TSH Once 02/20/2023    Once 02/20/2023   Vitamin D 25 Hydroxy Once 02/20/2023   Levetiracetam Level Once 02/20/2023       Next Visit Date:  Future Appointments   Date Time Provider Kishan Reich   7/28/2023  1:00 PM Cheli Chand MD Resp Spec [facility]   8/16/2023 12:30 PM MD Vivien Whitman            Patient Active Problem List:     Rett's syndrome     Scoliosis     Dystonia     Tremors of nervous system     Convulsions ([address]     Fibroadenoma of breast     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 ([address]     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 ([address]     Cellulitis of left elbow     Acute respiratory failure with hypercapnia (HCC)     Skin infection at gastrostomy tube site [facility])     Acute on chronic respiratory failure with hypoxia and hypercapnia (HCC)     Body temperature low     Hypotension     Nonverbal     Sepsis (HCC)     Hypothermia due to non-environmental cause     Prolonged QT interval     Urinary retention with incomplete bladder emptying     Chronic respiratory failure with hypoxia and hypercapnia (HCC)     Myoclonus     Atelectasis     Dependence on enabling machine     Dependent on ventilator ([address]     Delay in development     Tonic-clonic seizures ([address]     History of recurrent pneumonia     Fistula     Encounter for removal of internal fixation device     Sialorrhea     Myoneural disorder (HCC)     Muscle spasticity     Bladder retention     Intractable vomiting     Seizure (HCC)     Seizure disorder (HCC)     Abnormal electroencephalogram     Closed fracture of shaft of right humerus with routine healing     Constipation     Contracture of elbow joint, right     Diarrhea     Gastric reflux     Spastic quadriplegic cerebral palsy ([address]     Wheelchair bound     Malnutrition related to chronic disease ([address]     Pneumonia     Severe malnutrition ([address]     Aspiration pneumonia ([address]

## 2023-03-16 ENCOUNTER — TELEPHONE (OUTPATIENT)
Dept: FAMILY MEDICINE CLINIC | Age: 28
End: 2023-03-16

## 2023-03-16 NOTE — TELEPHONE ENCOUNTER
Pt called and did a reassessment on pt and changing to maintenance and seeing her 1x a week for 9 weeks due to not seeing improvements in her ROM in her legs.

## 2023-03-17 NOTE — TELEPHONE ENCOUNTER
Spoke with Bárbara Quintanilla at [address] [place] [address] and this was just an FYI for continuing occupational therapy.

## 2023-04-28 ENCOUNTER — PATIENT MESSAGE (OUTPATIENT)
Dept: FAMILY MEDICINE CLINIC | Age: 28
End: 2023-04-28

## 2023-04-28 DIAGNOSIS — K59.01 SLOW TRANSIT CONSTIPATION: ICD-10-CM

## 2023-04-28 DIAGNOSIS — G47.01 INSOMNIA DUE TO MEDICAL CONDITION: ICD-10-CM

## 2023-05-09 ENCOUNTER — TELEPHONE (OUTPATIENT)
Dept: FAMILY MEDICINE CLINIC | Age: 28
End: 2023-05-09

## 2023-05-09 DIAGNOSIS — F88 GLOBAL DEVELOPMENTAL DELAY: ICD-10-CM

## 2023-05-09 DIAGNOSIS — F84.2 RETT'S SYNDROME: Primary | ICD-10-CM

## 2023-05-09 DIAGNOSIS — G80.0 SPASTIC QUADRIPLEGIC CEREBRAL PALSY (HCC): ICD-10-CM

## 2023-05-09 NOTE — TELEPHONE ENCOUNTER
Mother requesting an order for PT/OT/Speech to be sent to [address] [place] [address] for Home therapies.   Phone: [phone]  Fax: [phone]

## 2023-05-10 RX ORDER — POLYETHYLENE GLYCOL 3350 17 G/17G
POWDER ORAL
Qty: 510 G | Refills: 5 | Status: SHIPPED | OUTPATIENT
Start: 2023-05-10 | End: 2024-05-01

## 2023-05-10 RX ORDER — ESZOPICLONE 2 MG/1
2 TABLET, FILM COATED ORAL NIGHTLY
Qty: 30 TABLET | Refills: 2 | Status: SHIPPED | OUTPATIENT
Start: 2023-05-10 | End: 2024-05-10

## 2023-05-10 RX ORDER — FERROUS SULFATE 7.5 MG/0.5
75 SYRINGE (EA) ORAL DAILY
Qty: 150 ML | Refills: 5 | Status: SHIPPED | OUTPATIENT
Start: 2023-05-10 | End: 2024-05-10

## 2023-05-10 RX ORDER — FLUOXETINE HYDROCHLORIDE 20 MG/5ML
LIQUID ORAL
Qty: 150 ML | Refills: 5 | Status: SHIPPED | OUTPATIENT
Start: 2023-05-10 | End: 2024-05-01

## 2023-05-10 RX ORDER — MEDROXYPROGESTERONE ACETATE 150 MG/ML
150 INJECTION, SUSPENSION INTRAMUSCULAR
Qty: 1 ML | Refills: 0 | Status: SHIPPED | OUTPATIENT
Start: 2023-05-10 | End: 2024-05-10

## 2023-05-10 NOTE — TELEPHONE ENCOUNTER
Patient's mother states Reagan Fox was prescribing but she retired. Patient's last depo was 3-12-23.

## 2023-05-10 NOTE — TELEPHONE ENCOUNTER
Patient's mother states Evert Marie was prescribing but she retired. Patient's last depo was 3-12-23.

## 2023-05-30 RX ORDER — FERROUS SULFATE 7.5 MG/0.5
75 SYRINGE (EA) ORAL DAILY
Qty: 150 ML | Refills: 5 | OUTPATIENT
Start: 2023-05-30

## 2023-06-07 ENCOUNTER — TELEPHONE (OUTPATIENT)
Dept: FAMILY MEDICINE CLINIC | Age: 28
End: 2023-06-07

## 2023-06-07 NOTE — TELEPHONE ENCOUNTER
Physical therapist Vu Chahal called to inform physician she is going to continue PT 2 times a week for 4 weeks. Patient is very tight.

## 2023-07-06 ENCOUNTER — TELEPHONE (OUTPATIENT)
Dept: FAMILY MEDICINE CLINIC | Age: 28
End: 2023-07-06

## 2023-07-06 NOTE — TELEPHONE ENCOUNTER
Allen Farooq from [facility] called to notify PCP that plan of care reassessment will be 2x weekly for 1 week, 1x weekly- 1 week, 2x weekly-1 week and 1x weekly due to patient going out of town. CB: [phone] with any questions.

## 2023-07-28 ENCOUNTER — TELEMEDICINE (OUTPATIENT)
Dept: PULMONOLOGY | Age: 28
End: 2023-07-28
Payer: COMMERCIAL

## 2023-07-28 DIAGNOSIS — G70.9 RESTRICTIVE LUNG MECHANICS DUE TO NEUROMUSCULAR DISEASE (HCC): ICD-10-CM

## 2023-07-28 DIAGNOSIS — J98.4 RESTRICTIVE LUNG MECHANICS DUE TO NEUROMUSCULAR DISEASE (HCC): ICD-10-CM

## 2023-07-28 DIAGNOSIS — J96.12 CHRONIC RESPIRATORY FAILURE WITH HYPERCAPNIA (HCC): ICD-10-CM

## 2023-07-28 PROCEDURE — 99213 OFFICE O/P EST LOW 20 MIN: CPT | Performed by: INTERNAL MEDICINE

## 2023-07-28 RX ORDER — LEVALBUTEROL INHALATION SOLUTION 1.25 MG/3ML
SOLUTION RESPIRATORY (INHALATION)
Qty: 270 ML | Refills: 6 | Status: SHIPPED | OUTPATIENT
Start: 2023-07-28

## 2023-07-28 RX ORDER — PREDNISONE 10 MG/1
20 TABLET ORAL DAILY
Qty: 20 TABLET | Refills: 0 | Status: SHIPPED | OUTPATIENT
Start: 2023-07-28 | End: 2023-08-07

## 2023-07-28 RX ORDER — LEVOFLOXACIN 250 MG/1
250 TABLET ORAL DAILY
Qty: 5 TABLET | Refills: 0 | Status: SHIPPED | OUTPATIENT
Start: 2023-07-28 | End: 2023-08-02

## 2023-07-30 NOTE — PROGRESS NOTES
sodium (ENEMEEZ) 283 MG enema Place 5 mLs rectally Monday, Wednesday & Friday Yes Historical Provider, MD   levETIRAcetam (KEPPRA) 100 MG/ML solution Take 14ML two times a day by G-button  Patient taking differently: Take 20ML two times a day by G-button Yes Tremayne Daniel MD   Nutritional Supplements (PEPTAMEN 1 NEETU) LIQD Take 1 Can by mouth 3 times daily Peptamen Adult Yes Tremayne Daniel MD   Feeding Tubes - Sets (JAYDEN-KEY GASTROSTOMY KIT 18FR) MISC 3.5 cm  Tremayne Daniel MD       Social History     Tobacco Use    Smoking status: Never    Smokeless tobacco: Never   Vaping Use    Vaping Use: Never used   Substance Use Topics    Alcohol use: No    Drug use: No          PHYSICAL EXAMINATION:    Vital Signs: (As obtained by patient/caregiver or practitioner observation)      Constitutional: [x] Appears well-developed and well-nourished [] No apparent distress      [] Abnormal-   Mental status  [x] Alert and awake  [] Oriented to person/place/time []Able to follow commands           Pulmonary/Chest: [x] Respiratory effort normal.  [x] No visualized signs of difficulty breathing or respiratory distress        [] Abnormal-               Other pertinent observable physical exam findings-     ASSESSMENT/PLAN:  1. 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  Chronic respiratory failure with hypercapnia (HCC)    - levalbuterol (XOPENEX) 1.25 MG/3ML nebulizer solution; USE ONE VIAL VIA NEBULIZER BY MOUTH EVERY 4 HOURS AS NEEDED FOR WHEEZING  Dispense: 270 mL; Refill: 6      No follow-ups on

## 2023-08-16 ENCOUNTER — OFFICE VISIT (OUTPATIENT)
Dept: FAMILY MEDICINE CLINIC | Age: 28
End: 2023-08-16
Payer: COMMERCIAL

## 2023-08-16 VITALS
SYSTOLIC BLOOD PRESSURE: 118 MMHG | DIASTOLIC BLOOD PRESSURE: 84 MMHG | WEIGHT: 80.7 LBS | HEART RATE: 104 BPM | TEMPERATURE: 97.2 F | BODY MASS INDEX: 17.46 KG/M2

## 2023-08-16 DIAGNOSIS — G80.0 SPASTIC QUADRIPLEGIC CEREBRAL PALSY (HCC): ICD-10-CM

## 2023-08-16 DIAGNOSIS — R25.1 TREMORS OF NERVOUS SYSTEM: ICD-10-CM

## 2023-08-16 DIAGNOSIS — G47.31 CENTRAL SLEEP APNEA: ICD-10-CM

## 2023-08-16 DIAGNOSIS — G47.36: ICD-10-CM

## 2023-08-16 DIAGNOSIS — Z80.41 FAMILY HISTORY OF OVARIAN CANCER: ICD-10-CM

## 2023-08-16 DIAGNOSIS — G47.33 OSA (OBSTRUCTIVE SLEEP APNEA): ICD-10-CM

## 2023-08-16 DIAGNOSIS — N39.0 RECURRENT UTI: ICD-10-CM

## 2023-08-16 DIAGNOSIS — J96.11 CHRONIC RESPIRATORY FAILURE WITH HYPOXIA AND HYPERCAPNIA (HCC): ICD-10-CM

## 2023-08-16 DIAGNOSIS — J98.4 RESTRICTIVE LUNG DISEASE DUE TO KYPHOSCOLIOSIS: ICD-10-CM

## 2023-08-16 DIAGNOSIS — J96.12 CHRONIC RESPIRATORY FAILURE WITH HYPOXIA AND HYPERCAPNIA (HCC): ICD-10-CM

## 2023-08-16 DIAGNOSIS — G47.01 INSOMNIA DUE TO MEDICAL CONDITION: ICD-10-CM

## 2023-08-16 DIAGNOSIS — N31.9 NEUROGENIC BLADDER: ICD-10-CM

## 2023-08-16 DIAGNOSIS — M41.9 RESTRICTIVE LUNG DISEASE DUE TO KYPHOSCOLIOSIS: ICD-10-CM

## 2023-08-16 DIAGNOSIS — G70.9: ICD-10-CM

## 2023-08-16 DIAGNOSIS — G24.9 DYSTONIA: ICD-10-CM

## 2023-08-16 DIAGNOSIS — R33.9 URINARY RETENTION WITH INCOMPLETE BLADDER EMPTYING: ICD-10-CM

## 2023-08-16 DIAGNOSIS — K59.01 SLOW TRANSIT CONSTIPATION: ICD-10-CM

## 2023-08-16 DIAGNOSIS — F84.2 RETT'S SYNDROME: Primary | ICD-10-CM

## 2023-08-16 DIAGNOSIS — G40.909 SEIZURE DISORDER (HCC): ICD-10-CM

## 2023-08-16 DIAGNOSIS — K21.9 GASTROESOPHAGEAL REFLUX DISEASE, UNSPECIFIED WHETHER ESOPHAGITIS PRESENT: ICD-10-CM

## 2023-08-16 DIAGNOSIS — E55.9 VITAMIN D DEFICIENCY: ICD-10-CM

## 2023-08-16 PROCEDURE — 99214 OFFICE O/P EST MOD 30 MIN: CPT | Performed by: PEDIATRICS

## 2023-08-16 RX ORDER — LACOSAMIDE 10 MG/ML
7.6 SOLUTION ORAL 2 TIMES DAILY
Qty: 456 ML | Refills: 11 | COMMUNITY
Start: 2023-08-15 | End: 2024-08-15

## 2023-08-16 ASSESSMENT — ENCOUNTER SYMPTOMS
RHINORRHEA: 0
SHORTNESS OF BREATH: 0
CONSTIPATION: 1
WHEEZING: 0
BACK PAIN: 0
PHOTOPHOBIA: 0
COUGH: 0
BLOOD IN STOOL: 0
COLOR CHANGE: 0
ABDOMINAL PAIN: 0
VOMITING: 0
STRIDOR: 0
APNEA: 1
DIARRHEA: 1
EYE DISCHARGE: 0
EYE REDNESS: 0
EYE PAIN: 0
TROUBLE SWALLOWING: 0

## 2023-08-16 ASSESSMENT — PATIENT HEALTH QUESTIONNAIRE - PHQ9: DEPRESSION UNABLE TO ASSESS: FUNCTIONAL CAPACITY MOTIVATION LIMITS ACCURACY

## 2023-08-16 NOTE — PROGRESS NOTES
Alexander Gandhi (:  1995) is a 33 y.o. female,Established patient, here for evaluation of the following chief complaint(s):    Follow-up (Routine)         ASSESSMENT/PLAN:    1. Rett's syndrome  2. Seizure disorder ([address])  3. Spastic quadriplegic cerebral palsy ([address])  4. Dystonia  5. Tremors of nervous system  6. Chronic respiratory failure with hypoxia and hypercapnia (HCC)  7. ISELA (obstructive sleep apnea)  8. Central sleep apnea  9. Restrictive lung disease due to kyphoscoliosis  10. 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 ([address])  11. Gastroesophageal reflux disease, unspecified whether esophagitis present  12. Slow transit constipation  13. Insomnia due to medical condition  14. Vitamin D deficiency  15. Neurogenic bladder  16. Urinary retention with incomplete bladder emptying  17. Recurrent UTI  18. Family history of ovarian cancer        Continue current medications  Subspecialty follow-up as scheduled with [facility], neurology, neurosurgery, pulmonology, GI, urology and GYN  Recommend home nursing, speech, PT and OT  COVID-19 booster recommended  Flu vaccine recommended  Call with concerns         Return in about 6 months (around 2024) for routine follow up as needed. Subjective     HPI      Patient presents today for routine follow up. She does have chronic medical problems including Rett syndrome, seizure disorder, spastic quadriplegia, dystonia, muscle tremors, hypercapnic respiratory failure, ISELA and central sleep apnea. She does have restrictive lung disease due to kyphoscoliosis, hypoventilation syndrome, GERD and chronic constipation. She also has insomnia and vitamin D deficiency as well as a neurogenic bladder and recurrent UTI. She has a family history of ovarian cancer. She is here today with her mother. They are going to be moving to [place] very soon.       She does follow at [facility] regularly for her history of Rett

## 2023-08-18 ENCOUNTER — TELEPHONE (OUTPATIENT)
Dept: FAMILY MEDICINE CLINIC | Age: 28
End: 2023-08-18

## 2023-08-21 NOTE — TELEPHONE ENCOUNTER
LOV: 8/16/2023    RTO: No future appointments. LRF: 5/10/2023          Controlled Substance Monitoring:    Acute and Chronic Pain Monitoring:   RX Monitoring 5/10/2023   Attestation -   Periodic Controlled Substance Monitoring No signs of potential drug abuse or diversion identified.

## 2023-08-22 RX ORDER — MEDROXYPROGESTERONE ACETATE 150 MG/ML
150 INJECTION, SUSPENSION INTRAMUSCULAR
Qty: 1 ML | Refills: 1 | Status: SHIPPED | OUTPATIENT
Start: 2023-08-22

## 2023-09-13 DIAGNOSIS — K21.9 GASTRIC REFLUX: ICD-10-CM

## 2023-09-14 NOTE — TELEPHONE ENCOUNTER
LOV 8/16/2023     RTO n/a  LRF 3/14/2023          Controlled Substance Monitoring:    Acute and Chronic Pain Monitoring:   RX Monitoring Periodic Controlled Substance Monitoring   5/10/2023   6:18 PM No signs of potential drug abuse or diversion identified.

## 2023-10-16 RX ORDER — FLUOXETINE 20 MG/5ML
SOLUTION ORAL
Qty: 150 ML | Refills: 5 | Status: SHIPPED | OUTPATIENT
Start: 2023-10-16

## 2023-10-16 RX ORDER — FERROUS SULFATE 7.5 MG/0.5
SYRINGE (EA) ORAL
Qty: 150 ML | Refills: 5 | Status: SHIPPED | OUTPATIENT
Start: 2023-10-16

## 2023-10-16 NOTE — TELEPHONE ENCOUNTER
LOV 8/16/23   LRF 5/10/23          Controlled Substance Monitoring:    Acute and Chronic Pain Monitoring:   RX Monitoring Periodic Controlled Substance Monitoring   5/10/2023   6:18 PM No signs of potential drug abuse or diversion identified.

## 2023-10-16 NOTE — TELEPHONE ENCOUNTER
LOV: 8/16/2023    RTO: No future appointments. LRF: 5/10/23          Controlled Substance Monitoring:    Acute and Chronic Pain Monitoring:   RX Monitoring Periodic Controlled Substance Monitoring   5/10/2023   6:18 PM No signs of potential drug abuse or diversion identified.

## 2025-02-20 NOTE — TELEPHONE ENCOUNTER
Christie from the [address] [place] [address] contacted the office to obtain an order for a [facility] lift.  Order pending approval.     Faxed to [address] [place] [address] Wound has very little change.    There continues to be nonviable tissue in the wound occupying 80%.  There were few granulation buds.    Excisional debridement performed to remove nonviable tissue.    Continue Santyl.  Continue compression.